# Patient Record
Sex: FEMALE | Race: WHITE
[De-identification: names, ages, dates, MRNs, and addresses within clinical notes are randomized per-mention and may not be internally consistent; named-entity substitution may affect disease eponyms.]

---

## 2018-03-28 ENCOUNTER — HOSPITAL ENCOUNTER (OUTPATIENT)
Dept: HOSPITAL 47 - EC | Age: 63
Setting detail: OBSERVATION
LOS: 2 days | Discharge: HOME | End: 2018-03-30
Attending: INTERNAL MEDICINE | Admitting: INTERNAL MEDICINE
Payer: MEDICAID

## 2018-03-28 VITALS — BODY MASS INDEX: 32.1 KG/M2

## 2018-03-28 DIAGNOSIS — I11.0: ICD-10-CM

## 2018-03-28 DIAGNOSIS — Z88.1: ICD-10-CM

## 2018-03-28 DIAGNOSIS — Z80.3: ICD-10-CM

## 2018-03-28 DIAGNOSIS — Z79.84: ICD-10-CM

## 2018-03-28 DIAGNOSIS — Z99.89: ICD-10-CM

## 2018-03-28 DIAGNOSIS — R59.0: ICD-10-CM

## 2018-03-28 DIAGNOSIS — I50.32: ICD-10-CM

## 2018-03-28 DIAGNOSIS — Z91.041: ICD-10-CM

## 2018-03-28 DIAGNOSIS — G47.30: ICD-10-CM

## 2018-03-28 DIAGNOSIS — E66.9: ICD-10-CM

## 2018-03-28 DIAGNOSIS — N39.0: ICD-10-CM

## 2018-03-28 DIAGNOSIS — Z80.0: ICD-10-CM

## 2018-03-28 DIAGNOSIS — R07.89: Primary | ICD-10-CM

## 2018-03-28 DIAGNOSIS — Z79.899: ICD-10-CM

## 2018-03-28 DIAGNOSIS — Z79.82: ICD-10-CM

## 2018-03-28 DIAGNOSIS — I25.2: ICD-10-CM

## 2018-03-28 DIAGNOSIS — Z88.8: ICD-10-CM

## 2018-03-28 DIAGNOSIS — E11.65: ICD-10-CM

## 2018-03-28 DIAGNOSIS — Z87.442: ICD-10-CM

## 2018-03-28 DIAGNOSIS — T36.8X5A: ICD-10-CM

## 2018-03-28 DIAGNOSIS — Z90.49: ICD-10-CM

## 2018-03-28 DIAGNOSIS — Z87.820: ICD-10-CM

## 2018-03-28 DIAGNOSIS — E78.5: ICD-10-CM

## 2018-03-28 DIAGNOSIS — R12: ICD-10-CM

## 2018-03-28 DIAGNOSIS — Z91.048: ICD-10-CM

## 2018-03-28 LAB
ALBUMIN SERPL-MCNC: 4.2 G/DL (ref 3.5–5)
ALP SERPL-CCNC: 193 U/L (ref 38–126)
ALT SERPL-CCNC: 24 U/L (ref 9–52)
ANION GAP SERPL CALC-SCNC: 16 MMOL/L
APTT BLD: 21.8 SEC (ref 22–30)
AST SERPL-CCNC: 23 U/L (ref 14–36)
BASOPHILS # BLD AUTO: 0.1 K/UL (ref 0–0.2)
BASOPHILS NFR BLD AUTO: 1 %
BUN SERPL-SCNC: 17 MG/DL (ref 7–17)
CALCIUM SPEC-MCNC: 10.1 MG/DL (ref 8.4–10.2)
CHLORIDE SERPL-SCNC: 99 MMOL/L (ref 98–107)
CK SERPL-CCNC: 25 U/L (ref 30–135)
CK SERPL-CCNC: 26 U/L (ref 30–135)
CO2 SERPL-SCNC: 25 MMOL/L (ref 22–30)
EOSINOPHIL # BLD AUTO: 0.4 K/UL (ref 0–0.7)
EOSINOPHIL NFR BLD AUTO: 3 %
ERYTHROCYTE [DISTWIDTH] IN BLOOD BY AUTOMATED COUNT: 4.77 M/UL (ref 3.8–5.4)
ERYTHROCYTE [DISTWIDTH] IN BLOOD: 15.1 % (ref 11.5–15.5)
GLUCOSE BLD-MCNC: 311 MG/DL (ref 75–99)
GLUCOSE BLD-MCNC: 382 MG/DL (ref 75–99)
GLUCOSE SERPL-MCNC: 398 MG/DL (ref 74–99)
HCT VFR BLD AUTO: 40.7 % (ref 34–46)
HGB BLD-MCNC: 13.2 GM/DL (ref 11.4–16)
INR PPP: 0.9 (ref ?–1.2)
LYMPHOCYTES # SPEC AUTO: 1.7 K/UL (ref 1–4.8)
LYMPHOCYTES NFR SPEC AUTO: 14 %
MAGNESIUM SPEC-SCNC: 1.7 MG/DL (ref 1.6–2.3)
MCH RBC QN AUTO: 27.6 PG (ref 25–35)
MCHC RBC AUTO-ENTMCNC: 32.4 G/DL (ref 31–37)
MCV RBC AUTO: 85.3 FL (ref 80–100)
MONOCYTES # BLD AUTO: 0.5 K/UL (ref 0–1)
MONOCYTES NFR BLD AUTO: 4 %
NEUTROPHILS # BLD AUTO: 9.1 K/UL (ref 1.3–7.7)
NEUTROPHILS NFR BLD AUTO: 76 %
PLATELET # BLD AUTO: 396 K/UL (ref 150–450)
POTASSIUM SERPL-SCNC: 4.3 MMOL/L (ref 3.5–5.1)
PROT SERPL-MCNC: 7.2 G/DL (ref 6.3–8.2)
PT BLD: 9.3 SEC (ref 9–12)
SODIUM SERPL-SCNC: 140 MMOL/L (ref 137–145)
TROPONIN I SERPL-MCNC: <0.012 NG/ML (ref 0–0.03)
TROPONIN I SERPL-MCNC: <0.012 NG/ML (ref 0–0.03)
WBC # BLD AUTO: 12 K/UL (ref 3.8–10.6)

## 2018-03-28 PROCEDURE — 86140 C-REACTIVE PROTEIN: CPT

## 2018-03-28 PROCEDURE — 80048 BASIC METABOLIC PNL TOTAL CA: CPT

## 2018-03-28 PROCEDURE — 83735 ASSAY OF MAGNESIUM: CPT

## 2018-03-28 PROCEDURE — 80061 LIPID PANEL: CPT

## 2018-03-28 PROCEDURE — 96361 HYDRATE IV INFUSION ADD-ON: CPT

## 2018-03-28 PROCEDURE — 85652 RBC SED RATE AUTOMATED: CPT

## 2018-03-28 PROCEDURE — 93306 TTE W/DOPPLER COMPLETE: CPT

## 2018-03-28 PROCEDURE — 81003 URINALYSIS AUTO W/O SCOPE: CPT

## 2018-03-28 PROCEDURE — 36415 COLL VENOUS BLD VENIPUNCTURE: CPT

## 2018-03-28 PROCEDURE — 74176 CT ABD & PELVIS W/O CONTRAST: CPT

## 2018-03-28 PROCEDURE — 84484 ASSAY OF TROPONIN QUANT: CPT

## 2018-03-28 PROCEDURE — 80053 COMPREHEN METABOLIC PANEL: CPT

## 2018-03-28 PROCEDURE — 75635 CT ANGIO ABDOMINAL ARTERIES: CPT

## 2018-03-28 PROCEDURE — 85730 THROMBOPLASTIN TIME PARTIAL: CPT

## 2018-03-28 PROCEDURE — 82553 CREATINE MB FRACTION: CPT

## 2018-03-28 PROCEDURE — 82550 ASSAY OF CK (CPK): CPT

## 2018-03-28 PROCEDURE — 96374 THER/PROPH/DIAG INJ IV PUSH: CPT

## 2018-03-28 PROCEDURE — 96375 TX/PRO/DX INJ NEW DRUG ADDON: CPT

## 2018-03-28 PROCEDURE — 87086 URINE CULTURE/COLONY COUNT: CPT

## 2018-03-28 PROCEDURE — 85610 PROTHROMBIN TIME: CPT

## 2018-03-28 PROCEDURE — 85025 COMPLETE CBC W/AUTO DIFF WBC: CPT

## 2018-03-28 PROCEDURE — 71046 X-RAY EXAM CHEST 2 VIEWS: CPT

## 2018-03-28 PROCEDURE — 99285 EMERGENCY DEPT VISIT HI MDM: CPT

## 2018-03-28 PROCEDURE — 70490 CT SOFT TISSUE NECK W/O DYE: CPT

## 2018-03-28 PROCEDURE — 93005 ELECTROCARDIOGRAM TRACING: CPT

## 2018-03-28 PROCEDURE — 83036 HEMOGLOBIN GLYCOSYLATED A1C: CPT

## 2018-03-28 PROCEDURE — 71250 CT THORAX DX C-: CPT

## 2018-03-28 PROCEDURE — 71275 CT ANGIOGRAPHY CHEST: CPT

## 2018-03-28 RX ADMIN — LORATADINE SCH MG: 10 TABLET ORAL at 22:29

## 2018-03-28 RX ADMIN — LISINOPRIL SCH MG: 20 TABLET ORAL at 22:29

## 2018-03-28 RX ADMIN — GABAPENTIN SCH MG: 100 CAPSULE ORAL at 22:29

## 2018-03-28 NOTE — XR
EXAMINATION TYPE: XR chest 2V

 

DATE OF EXAM: 3/28/2018

 

COMPARISON: 10/9/2015

 

HISTORY: Chest pain

 

TECHNIQUE:  Frontal and lateral views of the chest are obtained.

 

FINDINGS:  Heart and mediastinum are normal. There are small calcified granuloma in the left upper lo
be. Lungs are clear of consolidation. There is no heart failure. There is no pleural effusion. There 
are chest leads.

 

IMPRESSION:  No active cardiopulmonary disease. No change.

## 2018-03-28 NOTE — ED
General Adult HPI





- General


Chief complaint: Chest Pain


Stated complaint: Needs to have ct done but allergic to contrast


Time Seen by Provider: 03/28/18 16:15


Source: patient, RN notes reviewed


Mode of arrival: ambulatory


Limitations: no limitations





- History of Present Illness


Initial comments: 





62-year-old female presents to the emergency department stating that she does 

have a CT done of her chest.  She states for the last 6 weeks she's been 

developing this chest pain.  She states over the last today she's noticed 

worsening chest pain IN THE RIGHT SIDE OF HER CHEST AND RADIATING TO HER BACK.  

SHE STATES SOMETIMES IT'LL RADIATE DOWN HER RIGHT ARM.  SHE STATES KEEPING HER 

UP AT NIGHT TO THE POINT THAT SHE CANNOT SLEEP.  PATIENT STATES SHE WENT TO HER 

DOCTOR TODAY AND THEY REFERRED HER TO HAVE A STAT CT ANGIOGRAM FOR ANEURYSM.  

PATIENT STATES SHE IS ALLERGIC to the dye so they sent her here.  She states 

that this time she's not having the pain has been getting worse over last 3 

days.  She has a nausea vomiting or shortness of breath.  She does admit to 

history of hypertension diabetes and history of MI in the past.  Patient states 

that this time she is symptom free and she was just like a CAT scan done.

Patient denies any recent fever, chills, shortness of breath, abdominal pain, 

nausea vomiting, numbness or tingling, dysuria or hematuria, constipation or 

diarrhea, headaches or visual changes, or any other current symptoms.





- Related Data


 Home Medications











 Medication  Instructions  Recorded  Confirmed


 


Furosemide 20 mg PO DAILY 07/24/14 03/28/18


 


Gabapentin 100 mg PO QAM 07/24/14 03/28/18


 


Lisinopril [Zestril] 20 mg PO BID 07/24/14 03/28/18


 


Potassium Chloride [Klor-Con 10] 10 meq PO DAILY 07/24/14 03/28/18


 


Aspirin 162 mg PO DAILY 10/09/15 03/28/18


 


Atorvastatin [Lipitor] 20 mg PO HS 10/09/15 03/28/18


 


Gabapentin [Neurontin] 200 mg PO HS 10/09/15 03/28/18


 


Levothyroxine Sodium [Synthroid] 125 mcg PO [id number] 10/09/15 03/28/18


 


amLODIPine [Norvasc] 5 mg PO DAILY 10/09/15 03/28/18


 


metFORMIN HCL 1,000 mg PO BID 10/09/15 03/28/18


 


Cetirizine HCl [Zyrtec] 10 mg PO HS 03/28/18 03/28/18


 


Cholecalciferol [Vitamin D3] 1,000 unit PO DAILY 03/28/18 03/28/18


 


Glimepiride [Amaryl] 2 mg PO [id number] 03/28/18 03/28/18


 


Multivitamins, Thera [Multivitamin 1 tab PO DAILY 03/28/18 03/28/18





(formulary)]   











 Allergies











Allergy/AdvReac Type Severity Reaction Status Date / Time


 


haloperidol [From Haldol] Allergy  Unknown Verified 03/28/18 16:34


 


haloperidol lactate Allergy  Unknown Verified 03/28/18 16:34





[From Haldol]     


 


Iodinated Contrast- Oral and Allergy  Unknown Verified 03/28/18 16:34





IV Dye     





[Iodinated Contrast Media -     





IV Dye]     


 


adhesive AdvReac Severe sores Verified 03/28/18 16:34


 


ciprofloxacin [From Cipro] AdvReac  Heartburn Verified 03/28/18 16:34


 


steri-strips AdvReac Severe sore Uncoded 03/28/18 16:12














Review of Systems


ROS Statement: 


Those systems with pertinent positive or pertinent negative responses have been 

documented in the HPI.





ROS Other: All systems not noted in ROS Statement are negative.





Past Medical History


Past Medical History: Heart Failure, Diabetes Mellitus, Hypertension, 

Myocardial Infarction (MI), Sleep Apnea/CPAP/BIPAP


Additional Past Medical History / Comment(s): closed head injury 1989, kidney 

stones


Last Myocardial Infarction Date:: 1989


History of Any Multi-Drug Resistant Organisms: None Reported


Past Surgical History: Cholecystectomy, Hysterectomy


Additional Past Surgical History / Comment(s): thyroidectomy


Past Anesthesia/Blood Transfusion Reactions: Postoperative Nausea & Vomiting (

PONV)


Past Psychological History: No Psychological Hx Reported


Smoking Status: Never smoker


Past Alcohol Use History: None Reported


Past Drug Use History: None Reported





- Past Family History


  ** Mother


Family Medical History: Cancer





  ** Father


Family Medical History: Cancer





General Exam





- General Exam Comments


Initial Comments: 





General:  The patient is awake and alert, in no distress, and does not appear 

acutely ill. 


Eye:  Pupils are equal, round and reactive to light, extra-ocular movements are 

intact; there is normal conjunctiva bilaterally.  No signs of icterus.  


Ears, nose, mouth and throat:  There are moist mucous membranes. 


Neck:  The neck is supple, there is no tenderness.


Cardiovascular:  There is a regular rate and rhythm. No murmur, rub or gallop 

is appreciated.


Respiratory:  Lungs are clear to auscultation, respirations are non-labored, 

breath sounds are equal.  No wheezes, stridor, rales, or rhonchi.


Gastrointestinal:  Soft, non-distended, non-tender abdomen without masses or 

organomegaly noted. There is no rebound or guarding present.  No CVA 

tenderness. Bowel sounds are unremarkable.


Back:  There is no tenderness to palpation in the midline. There is no obvious 

deformity. No rashes noted. 


Musculoskeletal:  Normal ROM, no tenderness, There is no pedal edema. There is 

no calf tenderness or swelling. Sensation intact. Pulses equal bilaterally 2+.  


Neurological:  CN II-XII intact, There are no obvious motor or sensory 

deficits. Coordination appears grossly intact. Speech is normal.


Skin:  Skin is warm and dry and no rashes or lesions are noted. 


Psychiatric:  Cooperative, appropriate mood & affect, normal judgment.  





Limitations: no limitations





Course


 Vital Signs











  03/28/18 03/28/18 03/28/18





  16:10 17:23 19:20


 


Temperature 97.8 F  


 


Pulse Rate 86 86 77


 


Respiratory 18 18 18





Rate   


 


Blood Pressure 142/70 123/71 154/72


 


O2 Sat by Pulse 97 97 96





Oximetry   














EKG Findings





- EKG Comments:


EKG Findings:: Normal sinus rhythm, left axis deviation, ventricular rate 82, 

AK interval 182, QRS duration 98





Medical Decision Making





- Medical Decision Making





62-year-old female presents for needing CT to rule out aneurysm.  Patient does 

complain of worsening chest pain with hypertension diabetes and history of MI 

in the past.  This time we did do blood work we were unable to just send the 

patient for outpatient CAT scan because CAT scan stated that they will not give 

the patient that 3 Meds and they do not do CAT scans from premeds given prior 

to the patient receiving the CAT scan in the ER and would have to be in ER test 

ordered.  This time the test was ordered and also additional workup for the 

patient's chest pain.  CAT scan has been reviewed that does show some 

retroperitoneal lymphadenopathy.  Patient's laboratory otherwise is stable.  

This time patient for cardiac rule out. He will like her admitted overnight for 

observation when discussed with her by Dr. Vazquez.  Dr. Sanders who does agree to 

the admission.





- Lab Data


Result diagrams: 


 03/28/18 16:41





 03/28/18 16:41


 Lab Results











  03/28/18 03/28/18 03/28/18 Range/Units





  16:41 16:41 16:41 


 


WBC   12.0 H   (3.8-10.6)  k/uL


 


RBC   4.77   (3.80-5.40)  m/uL


 


Hgb   13.2   (11.4-16.0)  gm/dL


 


Hct   40.7   (34.0-46.0)  %


 


MCV   85.3   (80.0-100.0)  fL


 


MCH   27.6   (25.0-35.0)  pg


 


MCHC   32.4   (31.0-37.0)  g/dL


 


RDW   15.1   (11.5-15.5)  %


 


Plt Count   396   (150-450)  k/uL


 


Neutrophils %   76   %


 


Lymphocytes %   14   %


 


Monocytes %   4   %


 


Eosinophils %   3   %


 


Basophils %   1   %


 


Neutrophils #   9.1 H   (1.3-7.7)  k/uL


 


Lymphocytes #   1.7   (1.0-4.8)  k/uL


 


Monocytes #   0.5   (0-1.0)  k/uL


 


Eosinophils #   0.4   (0-0.7)  k/uL


 


Basophils #   0.1   (0-0.2)  k/uL


 


PT     (9.0-12.0)  sec


 


INR     (<1.2)  


 


APTT     (22.0-30.0)  sec


 


Sodium    140  (137-145)  mmol/L


 


Potassium    4.3  (3.5-5.1)  mmol/L


 


Chloride    99  ()  mmol/L


 


Carbon Dioxide    25  (22-30)  mmol/L


 


Anion Gap    16  mmol/L


 


BUN    17  (7-17)  mg/dL


 


Creatinine    1.02  (0.52-1.04)  mg/dL


 


Est GFR (CKD-EPI)AfAm    68  (>60 ml/min/1.73 sqM)  


 


Est GFR (CKD-EPI)NonAf    59  (>60 ml/min/1.73 sqM)  


 


Glucose    398 H  (74-99)  mg/dL


 


Calcium    10.1  (8.4-10.2)  mg/dL


 


Magnesium    1.7  (1.6-2.3)  mg/dL


 


Total Bilirubin    0.5  (0.2-1.3)  mg/dL


 


AST    23  (14-36)  U/L


 


ALT    24  (9-52)  U/L


 


Alkaline Phosphatase    193 H  ()  U/L


 


Total Creatine Kinase  25 L    ()  U/L


 


CK-MB (CK-2)  0.3    (0.0-2.4)  ng/mL


 


CK-MB (CK-2) Rel Index  1.2    


 


Troponin I  <0.012    (0.000-0.034)  ng/mL


 


Total Protein    7.2  (6.3-8.2)  g/dL


 


Albumin    4.2  (3.5-5.0)  g/dL














  03/28/18 Range/Units





  16:41 


 


WBC   (3.8-10.6)  k/uL


 


RBC   (3.80-5.40)  m/uL


 


Hgb   (11.4-16.0)  gm/dL


 


Hct   (34.0-46.0)  %


 


MCV   (80.0-100.0)  fL


 


MCH   (25.0-35.0)  pg


 


MCHC   (31.0-37.0)  g/dL


 


RDW   (11.5-15.5)  %


 


Plt Count   (150-450)  k/uL


 


Neutrophils %   %


 


Lymphocytes %   %


 


Monocytes %   %


 


Eosinophils %   %


 


Basophils %   %


 


Neutrophils #   (1.3-7.7)  k/uL


 


Lymphocytes #   (1.0-4.8)  k/uL


 


Monocytes #   (0-1.0)  k/uL


 


Eosinophils #   (0-0.7)  k/uL


 


Basophils #   (0-0.2)  k/uL


 


PT  9.3  (9.0-12.0)  sec


 


INR  0.9  (<1.2)  


 


APTT  21.8 L  (22.0-30.0)  sec


 


Sodium   (137-145)  mmol/L


 


Potassium   (3.5-5.1)  mmol/L


 


Chloride   ()  mmol/L


 


Carbon Dioxide   (22-30)  mmol/L


 


Anion Gap   mmol/L


 


BUN   (7-17)  mg/dL


 


Creatinine   (0.52-1.04)  mg/dL


 


Est GFR (CKD-EPI)AfAm   (>60 ml/min/1.73 sqM)  


 


Est GFR (CKD-EPI)NonAf   (>60 ml/min/1.73 sqM)  


 


Glucose   (74-99)  mg/dL


 


Calcium   (8.4-10.2)  mg/dL


 


Magnesium   (1.6-2.3)  mg/dL


 


Total Bilirubin   (0.2-1.3)  mg/dL


 


AST   (14-36)  U/L


 


ALT   (9-52)  U/L


 


Alkaline Phosphatase   ()  U/L


 


Total Creatine Kinase   ()  U/L


 


CK-MB (CK-2)   (0.0-2.4)  ng/mL


 


CK-MB (CK-2) Rel Index   


 


Troponin I   (0.000-0.034)  ng/mL


 


Total Protein   (6.3-8.2)  g/dL


 


Albumin   (3.5-5.0)  g/dL














- Radiology Data


Radiology results: report reviewed, image reviewed





Disposition


Clinical Impression: 


 Lymphadenopathy, retroperitoneal, Unstable angina





Disposition: ADMITTED [facility] IP TO [facility] [place]


Condition: Stable


Referrals: 


Faustino Villegas MD [Primary Care Provider] - 1-2 days


Decision Date: 03/28/18


Decision Time: 20:29

## 2018-03-28 NOTE — CT
EXAMINATION TYPE: CT angio thoracic/abd aorta

 

DATE OF EXAM: 3/28/2018

 

COMPARISON: 2015

 

HISTORY: Chest and back pain.

 

CT DLP: 2200 mGycm. Automated Exposure Control for Dose Reduction was Utilized.

 

 

CONTRAST: 

CT scan of the thorax, abdomen and pelvis is performed with IV Contrast, patient injected with 100ml 
mL of Isovue 370.

 

FINDINGS:

 

There are 3-D post processed images.

 

There is no evidence of thoracic aortic aneurysm or dissection. Heart is enlarged. There is no perica
rdial effusion.

 

Abdominal aorta has normal size. There is wide patency of the common internal and external iliac edelmira
sacha. There is no evidence of abdominal aortic aneurysm or dissection. There is wide patency of the c
eliac artery and the superior mesenteric artery. There is bilateral wide patency of the renal arterie
s.

 

There are multiple enlarged retroperitoneal lymph nodes on the left side more than the right. These m
easure up to 4 cm. There are multiple sigmoid diverticula. There is no evidence of diverticulitis. Bl
adder distends smoothly. There is no ascites. Appendix appears normal. There is a 1.5 cm left renal c
ortical cyst. There is normal contrast opacification of the kidneys without evidence of a solid renal
 mass. I see no bony destructive process. There are spondylotic changes in the thoracic and lumbar sp
ine. There are calcified granulomata at the left pulmonary hilum.

 

CONCLUSION:

There is atherosclerotic vascular disease. No evidence of thoracic aortic aneurysm or dissection.

 

Mild sigmoid diverticulosis without sign of diverticulitis.

 

There is clearing of the right renal obstruction and right renal calculus compared to old CT scan of 
10/9/2015.

 

There is new retroperitoneal moderate adenopathy suspicious for lymphoma compared to old exam. Follow
-up is recommended.

## 2018-03-29 LAB
ANION GAP SERPL CALC-SCNC: 22 MMOL/L
BASOPHILS # BLD AUTO: 0 K/UL (ref 0–0.2)
BASOPHILS NFR BLD AUTO: 0 %
BUN SERPL-SCNC: 21 MG/DL (ref 7–17)
CALCIUM SPEC-MCNC: 10.6 MG/DL (ref 8.4–10.2)
CHLORIDE SERPL-SCNC: 101 MMOL/L (ref 98–107)
CHOLEST SERPL-MCNC: 182 MG/DL (ref ?–200)
CK SERPL-CCNC: 25 U/L (ref 30–135)
CO2 SERPL-SCNC: 20 MMOL/L (ref 22–30)
EOSINOPHIL # BLD AUTO: 0 K/UL (ref 0–0.7)
EOSINOPHIL NFR BLD AUTO: 0 %
ERYTHROCYTE [DISTWIDTH] IN BLOOD BY AUTOMATED COUNT: 4.75 M/UL (ref 3.8–5.4)
ERYTHROCYTE [DISTWIDTH] IN BLOOD: 15.3 % (ref 11.5–15.5)
GLUCOSE BLD-MCNC: 258 MG/DL (ref 75–99)
GLUCOSE BLD-MCNC: 318 MG/DL (ref 75–99)
GLUCOSE BLD-MCNC: 373 MG/DL (ref 75–99)
GLUCOSE BLD-MCNC: 375 MG/DL (ref 75–99)
GLUCOSE BLD-MCNC: 422 MG/DL (ref 75–99)
GLUCOSE BLD-MCNC: 426 MG/DL (ref 75–99)
GLUCOSE SERPL-MCNC: 475 MG/DL (ref 74–99)
GLUCOSE UR QL: (no result)
HBA1C MFR BLD: 8.3 % (ref 4–6)
HCT VFR BLD AUTO: 40.9 % (ref 34–46)
HDLC SERPL-MCNC: 56 MG/DL (ref 40–60)
HGB BLD-MCNC: 12.6 GM/DL (ref 11.4–16)
LDLC SERPL CALC-MCNC: 113 MG/DL (ref 0–99)
LYMPHOCYTES # SPEC AUTO: 1.1 K/UL (ref 1–4.8)
LYMPHOCYTES NFR SPEC AUTO: 8 %
MCH RBC QN AUTO: 26.6 PG (ref 25–35)
MCHC RBC AUTO-ENTMCNC: 30.8 G/DL (ref 31–37)
MCV RBC AUTO: 86.1 FL (ref 80–100)
MONOCYTES # BLD AUTO: 0.3 K/UL (ref 0–1)
MONOCYTES NFR BLD AUTO: 2 %
NEUTROPHILS # BLD AUTO: 12.1 K/UL (ref 1.3–7.7)
NEUTROPHILS NFR BLD AUTO: 90 %
PH UR: 5.5 [PH] (ref 5–8)
PLATELET # BLD AUTO: 415 K/UL (ref 150–450)
POTASSIUM SERPL-SCNC: 4.7 MMOL/L (ref 3.5–5.1)
SODIUM SERPL-SCNC: 143 MMOL/L (ref 137–145)
SP GR UR: 1.03 (ref 1–1.03)
TRIGL SERPL-MCNC: 65 MG/DL (ref ?–150)
TROPONIN I SERPL-MCNC: <0.012 NG/ML (ref 0–0.03)
UROBILINOGEN UR QL STRIP: <2 MG/DL (ref ?–2)
WBC # BLD AUTO: 13.5 K/UL (ref 3.8–10.6)

## 2018-03-29 RX ADMIN — INSULIN ASPART SCH UNIT: 100 INJECTION, SOLUTION INTRAVENOUS; SUBCUTANEOUS at 12:20

## 2018-03-29 RX ADMIN — INSULIN ASPART SCH UNIT: 100 INJECTION, SOLUTION INTRAVENOUS; SUBCUTANEOUS at 17:16

## 2018-03-29 RX ADMIN — LORATADINE SCH MG: 10 TABLET ORAL at 19:57

## 2018-03-29 RX ADMIN — HYDROCODONE BITARTRATE AND ACETAMINOPHEN PRN EACH: 5; 325 TABLET ORAL at 01:30

## 2018-03-29 RX ADMIN — HYDROCODONE BITARTRATE AND ACETAMINOPHEN PRN EACH: 5; 325 TABLET ORAL at 19:57

## 2018-03-29 RX ADMIN — GLIMEPIRIDE SCH MG: 2 TABLET ORAL at 09:48

## 2018-03-29 RX ADMIN — INSULIN DETEMIR SCH UNIT: 100 INJECTION, SOLUTION SUBCUTANEOUS at 12:16

## 2018-03-29 RX ADMIN — GABAPENTIN SCH MG: 100 CAPSULE ORAL at 19:57

## 2018-03-29 RX ADMIN — INSULIN ASPART SCH UNIT: 100 INJECTION, SOLUTION INTRAVENOUS; SUBCUTANEOUS at 05:45

## 2018-03-29 RX ADMIN — LISINOPRIL SCH MG: 20 TABLET ORAL at 19:57

## 2018-03-29 RX ADMIN — POTASSIUM CHLORIDE SCH MEQ: 750 TABLET, EXTENDED RELEASE ORAL at 09:48

## 2018-03-29 RX ADMIN — Medication SCH UNIT: at 09:51

## 2018-03-29 RX ADMIN — LISINOPRIL SCH MG: 20 TABLET ORAL at 09:48

## 2018-03-29 RX ADMIN — THERA TABS SCH EACH: TAB at 09:51

## 2018-03-29 RX ADMIN — GABAPENTIN SCH MG: 100 CAPSULE ORAL at 09:48

## 2018-03-29 RX ADMIN — INSULIN ASPART SCH UNIT: 100 INJECTION, SOLUTION INTRAVENOUS; SUBCUTANEOUS at 19:59

## 2018-03-29 RX ADMIN — LEVOTHYROXINE SODIUM SCH MCG: 0.12 TABLET ORAL at 05:44

## 2018-03-29 NOTE — ECHOF
Referral Reason:cp



MEASUREMENTS

--------

HEIGHT: 157.5 cm

WEIGHT: 79.4 kg

BP: 

IVSd:   1.2 cm     (0.6 - 1.1)

LVIDd:   4.0 cm     (3.9 - 5.3)

LVPWd:   1.3 cm     (0.6 - 1.1)

IVSs:   2.1 cm

LVIDs:   2.1 cm

LVPWs:   1.8 cm

Ao Diam:   3.3 cm     (2.0 - 3.7)

AV Cusp:   1.9 cm     (1.5 - 2.6)

LA Diam:   3.2 cm     (2.7 - 3.8)

MV EXCURSION:   11.800 mm     (> 18.000)

MV EF SLOPE:   48 mm/s     (70 - 150)

EPSS:   1.5 cm

MV E Juan M:   0.73 m/s

MV DecT:   184 ms

MV A Juan M:   1.16 m/s

MV E/A Ratio:   0.63 

RAP:   5.00 mmHg

RVSP:   8.92 mmHg







FINDINGS

--------

Sinus rhythm.

This was a technically difficult study with suboptimal views.

The left ventricular size is normal.   There is mild concentric left ventricular hypertrophy.   Overa
ll left ventricular systolic function is low-normal with, an EF between 50 - 55 %.

The right ventricle is normal in size and function.

The left atrium is normal in size.

The right atrium is normal in size.

Lumason used

The aortic valve is trileaflet, and appears structurally normal. No aortic stenosis or regurgitation.


The mitral valve leaflets are mildly thickened.   There is trace mitral regurgitation.

Mild tricuspid regurgitation present.   The right ventricular systolic pressure, as measured by Doppl
er, is 8.92mmHg.

Pulmonic valve appears structurally normal.

The aortic root size is normal.

The pericardium is normal.



CONCLUSIONS

--------

1. Sinus rhythm.

2. This was a technically difficult study with suboptimal views.

3. The left ventricular size is normal.

4. There is mild concentric left ventricular hypertrophy.

5. Overall left ventricular systolic function is low-normal with, an EF between 50 - 55 %.

6. The right ventricle is normal in size and function.

7. The left atrium is normal in size.

8. The right atrium is normal in size.

9. Lumason used

10. The aortic valve is trileaflet, and appears structurally normal. No aortic stenosis or regurgitat
ion.

11. The mitral valve leaflets are mildly thickened.

12. There is trace mitral regurgitation.

13. Mild tricuspid regurgitation present.

14. The right ventricular systolic pressure, as measured by Doppler, is 8.92mmHg.

15. Pulmonic valve appears structurally normal.

16. The aortic root size is normal.

17. The pericardium is normal.





SONOGRAPHER: Aminata Lee RDCS

## 2018-03-29 NOTE — P.CRDCN
History of Present Illness


Consult date: 03/29/18


Consult reason: chest pain


History of present illness: 


Mrs. Falcon is a pleasant 62-year-old  female past medical history 

significant for diastolic heart failure, diabetes mellitus, hypertension, sleep 

apnea and kidney stones. She denies history of coronary artery disease. She has 

seen a cardiologist, Dr. Novoa out of [facility] in the past. She 

states she underwent cardiac catheterization 3-4 yrs ago and there was no 

blockage, per her. Those records are not available to me at this time. We have 

been asked to see her in consultation for complaints of chest pain. She states 

last week she had been feeling pain in the lower back/flank region. She saw her 

PCP and was started on cipro for a UTI. She took the cipro for a few days and 

then started noticing a burning sensation in her right anterior chest wall 

around the right breast. The pain was described as a burning, squeezing 

sensation that radiated through to the right upper back. The pain was worse 

with certain movements and when she coughed or sneezed. She denies associated 

shortness of breath, dizziness, palpitations, nausea, vomiting or diaphoresis. 

She saw her PCP who recommended she come to ED for evaluation and to rule out 

aortic dissection. CTA was done and was negative for aortic aneurysm or 

dissection but did reveal a new retroperitoneal moderate adenopathy suspicious 

for lymphoma compared to old exam in 2015. At the time of my exam she is seen 

sitting up in the chair in no acute distress. Chest pain has resolved. 





EKG reveals sinus mechanism with T-wave inversions noted in the anterior 

lateral leads.  Compared to old EKG this does not look to be anything new.


Chest x-ray negative for an acute cardiopulmonary process.


Laboratory data reviewed, WBC 13.5, hemoglobin 12.6, sodium 143, potassium 4.7, 

magnesium 1.7, creatinine 1.2 up from 1.02 on admission, cardiac enzymes 

negative 3, CRP 44.2, .  Blood sugars have been upwards of 300 since 

admission with 475 this morning.


Current cardiac medications include aspirin 162 mg daily, atorvastatin 20 mg 

daily, Lasix 20 mg daily, lisinopril 20 mg twice a day, potassium 

supplementation 10 daily and Norvasc 5 mg daily.


Most recent echocardiogram on file June 2014 reveals reserved left ventricular 

systolic function with ejection fraction 50-55% and no evidence of valvular 

heart disease.


 





Review of Systems





At the time of my exam:


CONSTITUTIONAL: Denies fever. Denies chills.


EYES: Denies blurred vision. Denies vision changes. Denies eye pain.


EARS, NOSE, MOUTH & THROAT: Denies headache. Denies sore throat. Denies ear 

pain.


CARDIOVASCULAR: Denies chest pain. Denies shortness of breath. Denies 

orthopnea. Denies PND. Denies palpitations.


RESPIRATORY: Denies cough. 


GASTROINTESTINAL: Denies abdominal pain. Denies diarrhea. Denies constipation. 

Denies nausea. Denies vomiting.


MUSCULOSKELETAL: Denies myalgias.


INTEGUMENTARY: Denies pruitis. Denies rash.


NEUROLOGIC: Denies numbness. Denies tingling. Denies weakness.


PSYCHIATRIC: Denies anxiety. Denies depression.


ENDOCRINE: Denies fatigue. Denies weight change. Denies polydipsia. Denies 

polyurina.


GENITOURINARY: Denies burning, hematuria or urgency with micturation.


HEMATOLOGIC: Denies history of anemia. Denies bleeding. 








Past Medical History


Past Medical History: Heart Failure, Diabetes Mellitus, Hypertension, Sleep 

Apnea/CPAP/BIPAP


Additional Past Medical History / Comment(s): closed head injury 1989, kidney 

stones


Last Myocardial Infarction Date:: 1989


History of Any Multi-Drug Resistant Organisms: None Reported


Past Surgical History: Cholecystectomy, Hysterectomy


Additional Past Surgical History / Comment(s): thyroidectomy


Past Anesthesia/Blood Transfusion Reactions: Postoperative Nausea & Vomiting (

PONV)


Smoking Status: Never smoker





- Past Family History


  ** Mother


Family Medical History: Cancer


Additional Family Medical History / Comment(s): breast CA





  ** Father


Family Medical History: Cancer


Additional Family Medical History / Comment(s): colon CA





Medications and Allergies


 Home Medications











 Medication  Instructions  Recorded  Confirmed  Type


 


Furosemide 20 mg PO DAILY 07/24/14 03/28/18 History


 


Gabapentin 100 mg PO QAM 07/24/14 03/28/18 History


 


Lisinopril [Zestril] 20 mg PO BID 07/24/14 03/28/18 History


 


Potassium Chloride [Klor-Con 10] 10 meq PO DAILY 07/24/14 03/28/18 History


 


Aspirin 162 mg PO DAILY 10/09/15 03/28/18 History


 


Atorvastatin [Lipitor] 20 mg PO HS 10/09/15 03/28/18 History


 


Gabapentin [Neurontin] 200 mg PO HS 10/09/15 03/28/18 History


 


Levothyroxine Sodium [Synthroid] 125 mcg PO MOTUWETHFRSA 10/09/15 03/28/18 

History


 


amLODIPine [Norvasc] 5 mg PO DAILY 10/09/15 03/28/18 History


 


metFORMIN HCL 1,000 mg PO BID 10/09/15 03/28/18 History


 


Cetirizine HCl [Zyrtec] 10 mg PO HS 03/28/18 03/28/18 History


 


Cholecalciferol [Vitamin D3] 1,000 unit PO DAILY 03/28/18 03/28/18 History


 


Glimepiride [Amaryl] 2 mg PO AC-BRKFST 03/28/18 03/28/18 History


 


Multivitamins, Thera [Multivitamin 1 tab PO DAILY 03/28/18 03/28/18 History





(formulary)]    











 Allergies











Allergy/AdvReac Type Severity Reaction Status Date / Time


 


haloperidol [From Haldol] Allergy  Unknown Verified 03/28/18 21:16


 


haloperidol lactate Allergy  Unknown Verified 03/28/18 21:16





[From Haldol]     


 


Iodinated Contrast- Oral and Allergy  Rash/Hives Verified 03/28/18 21:16





IV Dye     





[Iodinated Contrast Media -     





IV Dye]     


 


adhesive AdvReac Severe sores Verified 03/28/18 21:16


 


ciprofloxacin [From Cipro] AdvReac  Heartburn Verified 03/28/18 21:16


 


steri-strips AdvReac Severe sore Uncoded 03/28/18 21:16














Physical Exam


Vitals: 


 Vital Signs











  Temp Pulse Pulse Resp BP BP Pulse Ox


 


 03/29/18 04:00  98.1 F   79  16   122/64  96


 


 03/29/18 03:23     18   


 


 03/29/18 00:00     18   


 


 03/28/18 23:26  98.2 F   80  17   119/62  94 L


 


 03/28/18 21:23  98.5 F   81  16   140/73  98


 


 03/28/18 20:36   97   18  138/92   96


 


 03/28/18 19:20   77   18  154/72   96


 


 03/28/18 17:23   86   18  123/71   97


 


 03/28/18 16:10  97.8 F  86   18  142/70   97








 Intake and Output











 03/28/18 03/29/18 03/29/18





 22:59 06:59 14:59


 


Other:   


 


  # Voids 1 1 


 


  Weight 79.6 kg  














Blood pressure 141/74 heart rate 93 afebrile maintaining oxygen saturation on 

room air


GENERAL: This is a 62-year-old  female in no apparent distress at the 

time of my examination.  Obese.


HEENT: Head is atraumatic, normocephalic. Pupils are equal, round. Sclerae 

anicteric. Conjunctivae are clear. Mucous membranes of the mouth are moist. 

Neck is supple. There is no jugular venous distention. No carotid bruit is 

heard.


LUNGS: Clear to auscultation no wheezes, rales or rhonchi. No chest wall 

tenderness is noted on palpation or with deep breathing.


HEART: Regular rate and rhythm without murmurs, rubs or gallops. S1 and S2 

heard.


ABDOMEN: Soft, nontender. Bowel sounds are heard. No organomegaly noted.


EXTREMITIES: No evidence of peripheral edema and no calf tenderness noted.


VASCULAR: Radial and dorsalis pedis pulses palpated, no evidence of clubbing.  


NEUROLOGIC: Patient is awake, alert and oriented x3.


 








Results





 03/29/18 04:32





 03/29/18 04:32


 Cardiac Enzymes











  03/28/18 03/28/18 03/28/18 Range/Units





  16:41 16:41 22:16 


 


AST   23   (14-36)  U/L


 


CK-MB (CK-2)  0.3   0.4  (0.0-2.4)  ng/mL


 


Troponin I  <0.012   <0.012  (0.000-0.034)  ng/mL














  03/29/18 Range/Units





  04:32 


 


AST   (14-36)  U/L


 


CK-MB (CK-2)  0.4  (0.0-2.4)  ng/mL


 


Troponin I  <0.012  (0.000-0.034)  ng/mL








 Coagulation











  03/28/18 Range/Units





  16:41 


 


PT  9.3  (9.0-12.0)  sec


 


APTT  21.8 L  (22.0-30.0)  sec








 Lipids











  03/29/18 Range/Units





  04:32 


 


Triglycerides  65  (<150)  mg/dL


 


Cholesterol  182  (<200)  mg/dL


 


HDL Cholesterol  56  (40-60)  mg/dL








 CBC











  03/28/18 03/29/18 Range/Units





  16:41 04:32 


 


WBC  12.0 H  13.5 H  (3.8-10.6)  k/uL


 


RBC  4.77  4.75  (3.80-5.40)  m/uL


 


Hgb  13.2  12.6  (11.4-16.0)  gm/dL


 


Hct  40.7  40.9  (34.0-46.0)  %


 


Plt Count  396  415  (150-450)  k/uL








 Comprehensive Metabolic Panel











  03/28/18 03/29/18 Range/Units





  16:41 04:32 


 


Sodium  140  143  (137-145)  mmol/L


 


Potassium  4.3  4.7  (3.5-5.1)  mmol/L


 


Chloride  99  101  ()  mmol/L


 


Carbon Dioxide  25  20 L  (22-30)  mmol/L


 


BUN  17  21 H  (7-17)  mg/dL


 


Creatinine  1.02  1.20 H  (0.52-1.04)  mg/dL


 


Glucose  398 H  475 H*  (74-99)  mg/dL


 


Calcium  10.1  10.6 H  (8.4-10.2)  mg/dL


 


AST  23   (14-36)  U/L


 


ALT  24   (9-52)  U/L


 


Alkaline Phosphatase  193 H   ()  U/L


 


Total Protein  7.2   (6.3-8.2)  g/dL


 


Albumin  4.2   (3.5-5.0)  g/dL








 Current Medications











Generic Name Dose Route Start Last Admin





  Trade Name Freq  PRN Reason Stop Dose Admin


 


Hydrocodone Bitart/Acetaminophen  1 each  03/29/18 00:08  03/29/18 01:30





  [place] 5-325  PO   1 each





  Q6HR PRN   Administration





  MODERATE Pain   


 


Amlodipine Besylate  5 mg  03/29/18 09:00  





  Norvasc  PO   





  DAILY [facility]   


 


Aspirin  325 mg  03/29/18 09:00  





  Aspirin  PO   





  DAILY [facility]   


 


Atorvastatin Calcium  20 mg  03/28/18 21:00  03/28/18 22:29





  Lipitor  PO   20 mg





  HS [facility]IS   Administration


 


Cholecalciferol  1,000 unit  03/29/18 12:00  





  Vitamin D3  PO   





  DAILY@1200 [facility]   


 


Furosemide  20 mg  03/29/18 09:00  





  Lasix  PO   





  BID@0900,1600 [facility]   


 


Gabapentin  100 mg  03/29/18 09:00  





  Neurontin  PO   





  QAM LUIS   


 


Gabapentin  200 mg  03/28/18 21:00  03/28/18 22:29





  Neurontin  PO   200 mg





  HS LUIS   Administration


 


Glimepiride  2 mg  03/29/18 07:30  





  Amaryl  PO   





  AC-BRKFST [facility]   


 


Insulin Aspart  0 unit  03/29/18 07:30  03/29/18 05:45





  Novolog  SQ   8 unit





  ACHS LUIS   Administration





  Protocol   


 


Levothyroxine Sodium  125 mcg  03/29/18 06:30  03/29/18 05:44





  Synthroid  PO   125 mcg





  MOTUWETHFRSA LUIS   Administration


 


Lisinopril  20 mg  03/28/18 21:00  03/28/18 22:29





  Zestril  PO   20 mg





  BID LUIS   Administration


 


Loratadine  10 mg  03/28/18 21:00  03/28/18 22:29





  Claritin  PO   10 mg





  HS [facility]   Administration


 


Metformin HCl  1,000 mg  03/29/18 07:30  





  Glucophage  PO   





  BID-W/MEALS [facility]   


 


Miscellaneous Information  1 each  03/28/18 16:45  03/28/18 16:56





  Rx Info: Iv Contrast Was Given  MISCELLANE  03/30/18 16:46  1 each





  DAILY PRN   Administration





  Per Protocol   


 


Multivitamins  1 each  03/29/18 12:00  





  Theragran  PO   





  DAILY@1200 [facility]   


 


Nitroglycerin  0.4 mg  03/28/18 20:30  





  Nitrostat  SUBLINGUAL   





  Q5M PRN   





  Chest Pain   


 


Potassium Chloride  10 meq  03/29/18 09:00  





  K-Dur 10  PO   





  DAILY LU[facility]   








 Intake and Output











 03/28/18 03/29/18 03/29/18





 22:59 06:59 14:59


 


Other:   


 


  # Voids 1 1 


 


  Weight 79.6 kg  








 





 03/29/18 04:32 





 03/29/18 04:32 











Assessment and Plan


Assessment: 





ASSESSMENT


1.  Right sided chest pain, atypical for an acute ischemic event.  EKG shows no 

evidence of acute ischemic changes and cardiac enzymes are negative 3.  Acute 

coronary event has been ruled out.


2.  Hypertension


3.  Dyslipidemia


4.  Diabetes mellitus, uncontrolled


5.  History of diastolic heart failure


6.  Leukocytosis


7.  Sleep apnea





PLAN


Her pain is very atypical for cardiac etiology.  We will obtain a 2-D 

echocardiogram and Doppler study to assess cardiac structure and function.


Obtain records of previous catheterization. 


Continue with medical management of uncontrolled diabetes and questionable 

lymphoma on CT scan.


Increase atorvastatin 40 mg daily.  Change aspirin to 81 mg daily.  Continue 

with lisinopril, amlodipine and Lasix at home doses.


Follow-up with her primary cardiologist upon discharge.








Nurse Practitioner note has been reviewed, I agree with a documented findings 

and plan of care.  Patient was seen and examined.

## 2018-03-29 NOTE — HP
DOS 3/28/2018



HISTORY AND PHYSICAL



I am covering for Dr. Faustino Villegas.



CHIEF COMPLAINT:

Chest pain.



HISTORY OF PRESENT ILLNESS:

This 62-year-old woman with a past medical history of multiple medical problems

including CHF, history of diabetes, hypertension, history of sleep apnea, 
history of

closed head injury being followed by Dr. Faustino Villegas in the outpatient setting 
was

complaining of on and off chest pains on the right side of the chest for 6-8 
weeks.

The patient stated the pain is throbbing and radiating right from the breast to 
the

posterior part of the chest and patient came to [facility].  A CT scan 
of the

abdomen and pelvis did not show any evidence of aortic aneurysm, but showed some

retroperitoneal lymph nodes.

The patient admitted for further evaluation and treatment.  The patient is 
taking Aleve

for the pain.  Otherwise there is mild fever .  Otherwise no headache, loss of

consciousness, seizures at this time.



PAST MEDICAL HISTORY:

Past medical history of nephrolithiasis and as well as ureteric stent 
implantation,

diabetes, hypertension, sleep apnea, closed injury, cholecystectomy.



MEDICATIONS:

Prior to admission include:

1. Metformin 1000 mg p.o. b.i.d.

2. Norvasc 5 mg p.o. daily.

3. Klor-Con 10 mEq p.o. daily.

4. Multivitamins 1 p.o. daily.

6. Synthroid 125 mcg Monday, Tuesday, Wednesday, Thursday, Friday, Saturday.

7. Amaryl 2 mg with breakfast.

8. Neurontin 200 mg q.h.s.

9. Gabapentin 100 mg q.a.m.

10.Lasix 20 mg.

11.Vitamin D3 1000 daily.

12.Zyrtec 10 mg daily.

13.Lipitor 20 mg q.h.s.

14.Aspirin 162 mg p.o. daily.



ALLERGIES:

HALDOL, IODINATED CONTRAST DYE, ADHESIVES, CIPRO AND STERI-STRIPS.



FAMILY HISTORY:

History of breast cancer in the family.



SOCIAL HISTORY:

Occasional alcohol intake.  No history of smoking.



REVIEW OF SYSTEMS:

ENT: No diminished vision or hearing. Cardiovascular as mentioned earlier. 
Respiratory:

As mentioned earlier.  GI no nausea or vomiting.  as mentioned earlier. 
Central

nervous system: No numbness or weakness.  Allergy/Immunology: No asthma or 
hayfever.

Musculoskeletal: As mentioned earlier. Hematology/Oncology: No history of 
anemia.

Endocrine as mentioned earlier.  Constitutional: As mentioned earlier.

DERMATOLOGY: Negative. Negative. Rheumatology: Negative. Psychiatric: As 
mentioned

earlier.



PHYSICAL EXAMINATION:

Alert oriented x 3.  Pulse 77, blood pressure 150/72, respiration 18, 
temperature 98.4,

pulse ox 98% on room air. HEENT:  Conjunctivae normal.  Oral mucosa moist.  
Neck is no

jugular venous distention. No carotid bruit. No lymph node enlargement.

Cardiovascular System: S1, S2 muffled.  Respiration: Breath sounds diminished 
in the

bases.  No rhonchi and no crackles.

ABDOMEN:  Soft, nontender.  No mass palpable.  No hepatosplenomegaly.  Legs:  
No edema

and no swelling.

NERVOUS SYSTEM: Higher functions as mentioned earlier. Moves all four limbs.  
No focal

deficits.

LYMPHATICS: No lymph nodes palpable in neck, axillae or groin. SKIN: No ulcer, 
rash or

bleeding.



LAB STUDIES:

WBC 12, otherwise glucose 398.  Alkaline phosphatase 193.  Creatine kinase 25.



ASSESSMENT:

1. Right sided chest pain, rule out coronary artery disease.

2. Rule out possible musculoskeletal chest pain.

3. Increased WBC.

4. Rule out urinary tract infection.

5. Diabetes mellitus type 2, poorly controlled.

6. History of congestive heart failure with chronic diastolic dysfunction.

7. Diabetes.

8. Hypertension.

9. Sleep apnea.

10.History of closed head injury.

11.History of nephrolithiasis.



RECOMMENDATIONS AND DISCUSSION:

In this 62-year-old woman who presented with multiple complex medical issues, 
we will

monitor the patient closely.  Continue the current management and symptomatic

treatment.  Otherwise at this time I would recommend cardiology consultation.  
CT scan

has been done.  Repeat blood work as noted.  I would also recommend insulin to 
control

the blood sugars also.  Otherwise the prognosis guarded because of multiple 
complex

medical issues.  Further recommendations to follow.  A copy of dictation being

forwarded to Dr. Villegas who is the primary physician. We will check hemoglobin 
A1c also.





MMODL / IJN: [id number] / Job#: [id number]

MORIS

## 2018-03-29 NOTE — P.CONS
History of Present Illness





- Reason for Consult


Consult date: 03/29/18


retorperitoneal adenopathy


Requesting physician: Alina Cadet





- Chief Complaint


chest pain





- History of Present Illness





Mrs. Falcon is a very pleasant  female pt admitted with chest pain, 

she has been seen by IM and Cardiology and her presenting symptoms have 

resolved.  As part of her work up she had a CT of the aorta and incidentally 

multiple retroperitoneal lymph nodes were seen, L>R, up to 4cm in size, which 

is why we have been asked to see her.  Pt denies unintentional wt. loss, acute 

changes in appetite, fevers, night sweats, recent illnesses.  She is positive 

for 6-8 weeks of "lot's of back pain", all over, but more in the mid back on 

the right, came and went, worse when laying flat, aleve helped the pain, she 

was treated for a UTI and that helped the pain a little bit but, she just "

knows someting is wrong".  Denied cough, SOB, current chest pain or pressure, 

nausea, vomiting, did have some heartburn with abx therapy for UTI, no changes 

on bowel or bladder habits, nematuria, black or bloody stool, pain with 

elimination, swelling or other pain to report.  





Review of Systems





14 point ROS as stated in HPI





Past Medical History


Past Medical History: Heart Failure, Diabetes Mellitus, Hypertension, Sleep 

Apnea/CPAP/BIPAP


Additional Past Medical History / Comment(s): closed head injury 1989, kidney 

stones


Last Myocardial Infarction Date:: 1989


History of Any Multi-Drug Resistant Organisms: None Reported


Past Surgical History: Cholecystectomy, Hysterectomy


Additional Past Surgical History / Comment(s): thyroidectomy


Past Anesthesia/Blood Transfusion Reactions: Postoperative Nausea & Vomiting (

PONV)


Additional Psychological History / Comment(s): TBI


Smoking Status: Never smoker


Past Alcohol Use History: Unable to Obtain


Past Drug Use History: None Reported





- Past Family History


  ** Mother


Family Medical History: Cancer


Additional Family Medical History / Comment(s): breast CA





  ** Father


Family Medical History: Cancer


Additional Family Medical History / Comment(s): colon CA





Medications and Allergies


 Home Medications











 Medication  Instructions  Recorded  Confirmed  Type


 


Furosemide 20 mg PO DAILY 07/24/14 03/28/18 History


 


Gabapentin 100 mg PO QAM 07/24/14 03/28/18 History


 


Lisinopril [Zestril] 20 mg PO BID 07/24/14 03/28/18 History


 


Potassium Chloride [Klor-Con 10] 10 meq PO DAILY 07/24/14 03/28/18 History


 


Aspirin 162 mg PO DAILY 10/09/15 03/28/18 History


 


Atorvastatin [Lipitor] 20 mg PO HS 10/09/15 03/28/18 History


 


Gabapentin [Neurontin] 200 mg PO HS 10/09/15 03/28/18 History


 


Levothyroxine Sodium [Synthroid] 125 mcg PO [id number] 10/09/15 03/28/18 

History


 


amLODIPine [Norvasc] 5 mg PO DAILY 10/09/15 03/28/18 History


 


metFORMIN HCL 1,000 mg PO BID 10/09/15 03/28/18 History


 


Cetirizine HCl [Zyrtec] 10 mg PO HS 03/28/18 03/28/18 History


 


Cholecalciferol [Vitamin D3] 1,000 unit PO DAILY 03/28/18 03/28/18 History


 


Glimepiride [Amaryl] 2 mg PO [id number] 03/28/18 03/28/18 History


 


Multivitamins, Thera [Multivitamin 1 tab PO DAILY 03/28/18 03/28/18 History





(formulary)]    











 Allergies











Allergy/AdvReac Type Severity Reaction Status Date / Time


 


haloperidol [From Haldol] Allergy  Unknown Verified 03/28/18 21:16


 


haloperidol lactate Allergy  Unknown Verified 03/28/18 21:16





[From Haldol]     


 


Iodinated Contrast- Oral and Allergy  Rash/Hives Verified 03/28/18 21:16





IV Dye     





[Iodinated Contrast Media -     





IV Dye]     


 


adhesive AdvReac Severe sores Verified 03/28/18 21:16


 


ciprofloxacin [From Cipro] AdvReac  Heartburn Verified 03/28/18 21:16


 


steri-strips AdvReac Severe sore Uncoded 03/28/18 21:16














Physical Exam


Vitals: 


 Vital Signs











  Temp Pulse Pulse Resp BP BP Pulse Ox


 


 03/29/18 08:00  97.7 F   93  18   141/74  99


 


 03/29/18 04:00  98.1 F   79  16   122/64  96


 


 03/29/18 03:23     18   


 


 03/29/18 00:00     18   


 


 03/28/18 23:26  98.2 F   80  17   119/62  94 L


 


 03/28/18 21:23  98.5 F   81  16   140/73  98


 


 03/28/18 20:36   97   18  138/92   96


 


 03/28/18 19:20   77   18  154/72   96


 


 03/28/18 17:23   86   18  123/71   97


 


 03/28/18 16:10  97.8 F  86   18  142/70   97








 Intake and Output











 03/28/18 03/29/18 03/29/18





 22:59 06:59 14:59


 


Other:   


 


  # Voids 1 1 


 


  Weight 79.6 kg  














- Constitutional


General appearance: cooperative, no acute distress, obese





- EENT


Eyes: anicteric sclerae, EOMI, PERRLA, normal appearance


ENT: hearing grossly normal, normal oropharynx





- Neck





left submandibular ?LN, firm area that was tender to palpation.  Left 

supraclavicular fullness, possible LN swelling, no other areas of question, no 

axillary lymphadenpathy


Neck: lymphadenopathy





- Respiratory


Respiratory: bilateral: CTA





- Cardiovascular


Rhythm: regular


Heart sounds: normal: S1, S2


Abnormal Heart Sounds: no systolic murmur, no diastolic murmur, no rub, no S3 

Gallop, no S4 Gallop, no click, no other


  ** leg


Peripheral Edema: bilateral: None





- Gastrointestinal


General gastrointestinal: no absent bowel sounds, no decreased bowel sounds, no 

distended, no hepatomegaly, no hyperactive bowel sounds, normal bowel sounds, 

no organomegaly, no rigid, no scaphoid, soft, no splenomegaly, no tenderness, 

no umbilical hernia, no ventral hernia





- Genitourinary





no inguinal adenopathy





- Integumentary


Integumentary: normal





- Neurologic


Neurologic: CNII-XII intact





- Musculoskeletal


Musculoskeletal: strength equal bilaterally





- Psychiatric


Psychiatric: A&O x's 3, appropriate affect, intact judgment & insight





Results


CBC & Chem 7: 


 03/29/18 04:32





 03/29/18 04:32


Labs: 


 Abnormal Lab Results - Last 24 Hours (Table)











  03/28/18 03/28/18 03/28/18 Range/Units





  16:41 16:41 16:41 


 


WBC   12.0 H   (3.8-10.6)  k/uL


 


MCHC     (31.0-37.0)  g/dL


 


Neutrophils #   9.1 H   (1.3-7.7)  k/uL


 


ESR     (0-20)  mm/hr


 


APTT     (22.0-30.0)  sec


 


Carbon Dioxide     (22-30)  mmol/L


 


BUN     (7-17)  mg/dL


 


Creatinine     (0.52-1.04)  mg/dL


 


Glucose    398 H  (74-99)  mg/dL


 


POC Glucose (mg/dL)     (75-99)  mg/dL


 


Calcium     (8.4-10.2)  mg/dL


 


Alkaline Phosphatase    193 H  ()  U/L


 


Total Creatine Kinase  25 L    ()  U/L


 


C-Reactive Protein     (<10.0)  mg/L


 


LDL Cholesterol, Calc     (0-99)  mg/dL














  03/28/18 03/28/18 03/28/18 Range/Units





  16:41 21:09 22:16 


 


WBC     (3.8-10.6)  k/uL


 


MCHC     (31.0-37.0)  g/dL


 


Neutrophils #     (1.3-7.7)  k/uL


 


ESR     (0-20)  mm/hr


 


APTT  21.8 L    (22.0-30.0)  sec


 


Carbon Dioxide     (22-30)  mmol/L


 


BUN     (7-17)  mg/dL


 


Creatinine     (0.52-1.04)  mg/dL


 


Glucose     (74-99)  mg/dL


 


POC Glucose (mg/dL)   311 H   (75-99)  mg/dL


 


Calcium     (8.4-10.2)  mg/dL


 


Alkaline Phosphatase     ()  U/L


 


Total Creatine Kinase    26 L  ()  U/L


 


C-Reactive Protein     (<10.0)  mg/L


 


LDL Cholesterol, Calc     (0-99)  mg/dL














  03/28/18 03/29/18 03/29/18 Range/Units





  23:01 04:32 04:32 


 


WBC     (3.8-10.6)  k/uL


 


MCHC     (31.0-37.0)  g/dL


 


Neutrophils #     (1.3-7.7)  k/uL


 


ESR     (0-20)  mm/hr


 


APTT     (22.0-30.0)  sec


 


Carbon Dioxide    20 L  (22-30)  mmol/L


 


BUN    21 H  (7-17)  mg/dL


 


Creatinine    1.20 H  (0.52-1.04)  mg/dL


 


Glucose    475 H*  (74-99)  mg/dL


 


POC Glucose (mg/dL)  382 H    (75-99)  mg/dL


 


Calcium    10.6 H  (8.4-10.2)  mg/dL


 


Alkaline Phosphatase     ()  U/L


 


Total Creatine Kinase   25 L   ()  U/L


 


C-Reactive Protein    44.2 H  (<10.0)  mg/L


 


LDL Cholesterol, Calc    113 H  (0-99)  mg/dL














  03/29/18 03/29/18 03/29/18 Range/Units





  04:32 05:40 06:41 


 


WBC  13.5 H    (3.8-10.6)  k/uL


 


MCHC  30.8 L    (31.0-37.0)  g/dL


 


Neutrophils #  12.1 H    (1.3-7.7)  k/uL


 


ESR  70 H    (0-20)  mm/hr


 


APTT     (22.0-30.0)  sec


 


Carbon Dioxide     (22-30)  mmol/L


 


BUN     (7-17)  mg/dL


 


Creatinine     (0.52-1.04)  mg/dL


 


Glucose     (74-99)  mg/dL


 


POC Glucose (mg/dL)   422 H  426 H  (75-99)  mg/dL


 


Calcium     (8.4-10.2)  mg/dL


 


Alkaline Phosphatase     ()  U/L


 


Total Creatine Kinase     ()  U/L


 


C-Reactive Protein     (<10.0)  mg/L


 


LDL Cholesterol, Calc     (0-99)  mg/dL














  03/29/18 Range/Units





  07:44 


 


WBC   (3.8-10.6)  k/uL


 


MCHC   (31.0-37.0)  g/dL


 


Neutrophils #   (1.3-7.7)  k/uL


 


ESR   (0-20)  mm/hr


 


APTT   (22.0-30.0)  sec


 


Carbon Dioxide   (22-30)  mmol/L


 


BUN   (7-17)  mg/dL


 


Creatinine   (0.52-1.04)  mg/dL


 


Glucose   (74-99)  mg/dL


 


POC Glucose (mg/dL)  373 H  (75-99)  mg/dL


 


Calcium   (8.4-10.2)  mg/dL


 


Alkaline Phosphatase   ()  U/L


 


Total Creatine Kinase   ()  U/L


 


C-Reactive Protein   (<10.0)  mg/L


 


LDL Cholesterol, Calc   (0-99)  mg/dL











Chest x-ray: report reviewed


CT scan - abdomen: report reviewed





Assessment and Plan


(1) Lymphadenopathy, retroperitoneal


Narrative/Plan: 


Possible some adenopathy in the left submandibular area and left 

supraclavicular area.  CT of chest and neck ordered for evaluation and to see 

if there are any other areas of lymphadenopathy that are more amenable to core 

biopsy or excision. 


If there are plans for discharge biopsy could be scheduled out pt as pt with a 

follow up with Dr. Espinoza for results. 





Case was discussed with IM NP, agree with work up at this time.  They are 

working on treating pt severe hyperglycemia.





Current Visit: Yes   Status: Acute   Priority: High   Code(s): R59.0 - 

LOCALIZED ENLARGED LYMPH NODES   SNOMED Code(s): 098286926

## 2018-03-29 NOTE — CT
EXAMINATION TYPE: CT ChestAbdPelvis wo con

 

DATE OF EXAM: 3/29/2018

 

COMPARISON: 3/28/2018 and 10/9/2015

 

HISTORY: 62-year-old female retroperitoneal Lymphadenopathy

 

TECHNIQUE: Contiguous axial scanning of the chest, abdomen, and pelvis without IV contrast. Coronal a
nd sagittal reconstructions performed.

 

CT DLP: 1130.0 mGycm

Automated exposure control for dose reduction was used.

 

 

FINDINGS: 

Chest:

Heart is upper limits of normal in size without pericardial effusion. Mild coronary vessel calcificat
ions are present.

 

Ascending aortic ectatic at 3.8 cm. Conventional arch vessel branching anatomy.

 

Scattered nonenlarged mediastinal and axillary lymph nodes are present. Some calcified left paratrach
eal and left hilar lymph nodes compatible with prior granulomatous disease with a calcified granuloma
 at the left midlung.

 

Mild emphysematous change and some strandy atelectasis at the peripheral left base. No consolidation 
or pleural effusion.

 

 

 

ABDOMEN:

Noncontrast appearance of the liver, adrenal glands, spleen, and mildly atrophic pancreas show no paulo
ss abnormality. Retroaortic left renal vein.

 

Cholecystectomy clips.

 

Faint contrast seen still being excreted from the kidneys. Subcentimeter hypodensity lateral upper to
 midpole left kidney too small for accurate CT characterization, probable cyst.

 

Redemonstrated retroperitoneal lymphadenopathy. Lymph nodes measure up to 1 cm in the gastrohepatic l
igament region, 9 mm near the right frida of the diaphragm, 1.4 cm gastroduodenal, 1.9 cm caval, 1.1 c
m retrocaval, 4.8 x 2.9 cm left para-aortic.

 

No dilated small bowel, free fluid, or free air.

 

Normal appendix. Mild to moderate stool. Diffuse colonic diverticulosis. No pericolonic inflammatory 
change.

 

 

Pelvis:

Previously injected IV contrast has collected in the bladder which is incompletely distended. Uterus 
surgically absent. Neither ovary is clearly visualized. Pelvic phleboliths. No abnormal fluid collect
ion in the pelvis or pelvic lymphadenopathy seen.

 

 

Bones:

Mild degenerative changes at the hips and SI joints. Advanced degenerative changes throughout the lum
bar spine with grade 1 retrolistheses at L2-L3 and L3-L4 and grade 1 anterolisthesis at L4-L5. No oss
eous destructive process.

 

 

IMPRESSION: 

 

1. STABLE RETROPERITONEAL LYMPHADENOPATHY MEASURING UP TO 4.8 X 2.9 CM AND ADDITIONAL UPPER ABDOMINAL
 LYMPHADENOPATHY MEASURING UP TO 1.9 CM. NO OTHER LYMPHADENOPATHY SEEN IN THE THORAX OR PELVIS. LYMPH
INDIRA AND METASTATIC DISEASE ARE THE PRIMARY DIFFERENTIALS.

2. FAINT CONTRAST SEEN STILL BEING EXCRETED FROM THE KIDNEYS. CORRELATE FOR ACUTE RENAL FAILURE.

## 2018-03-29 NOTE — CT
EXAMINATION TYPE: CT soft tissue neck wo con

 

DATE OF EXAM: 3/29/2018

 

COMPARISON: CT brain 2/6/2012

 

HISTORY: 62-year-old female Left supraclavicular fullness

 

TECHNIQUE: Contiguous axial scanning of the neck without IV contrast. Coronal and sagittal reconstruc
tions performed.

 

CT DLP: 442.90 mGycm

Automated exposure control for dose reduction was used.

 

 

FINDINGS: 

Focal hypodensity lateral inferior left temporal lobe partially visualized has an appearance similar 
to the 2/6/2012 CT probably represents an area of encephalomalacia. Orbits and globes, visualized par
anasal sinuses, and mastoid air cells are clear. Rightward nasal septal deviation.

 

Lack of IV contrast limits assessment of the mucosal space within this limitation, the nasopharynx ap
pears clear.

 

There is dental amalgam artifact limiting assessment of the oropharynx. Fullness of the bilateral pal
atine tonsils in the assessed with direct visualization.

 

The glottic and subglottic airway as well as the tracheal column appear clear. Chest reported separat
ely.

 

The thyroid gland is either atrophic or surgically absent. The submandibular and parotid glands appea
r satisfactory.

 

Borderline sized 8mm left submandibular space lymph node. Additional scattered cervical lymph nodes o
n both sides measuring up to 8 mm. No supraclavicular lymphadenopathy is identified on this noncontra
st study.

 

Bones: Spondylotic change with grade 1 anterolisthesis at C4-C5. Some heterotopic ossification poster
iorly along the midline opposite C5 and C6.

 

 

 

IMPRESSION: 

 

1. SOFT TISSUE FULLNESS IN THE REGION OF THE BILATERAL PALATINE TONSILS. THIS CAN BE CORRELATED WITH 
DIRECT VISUALIZATION. PROBABLE TONSILLAR HYPERTROPHY.

2. AN ASYMMETRIC BORDERLINE-SIZED 8MM LEFT SUBMANDIBULAR SPACE LYMPH NODE CAN BE FOLLOWED CLINICALLY.


3. NO SUSPICIOUS MASS OR LYMPHADENOPATHY IDENTIFIED IN THE LEFT SUPRACLAVICULAR REGION OR ELSEWHERE I
N THE NECK.

## 2018-03-30 VITALS
SYSTOLIC BLOOD PRESSURE: 135 MMHG | RESPIRATION RATE: 16 BRPM | DIASTOLIC BLOOD PRESSURE: 63 MMHG | TEMPERATURE: 98 F | HEART RATE: 57 BPM

## 2018-03-30 LAB
ANION GAP SERPL CALC-SCNC: 12 MMOL/L
BASOPHILS # BLD AUTO: 0.1 K/UL (ref 0–0.2)
BASOPHILS NFR BLD AUTO: 1 %
BUN SERPL-SCNC: 27 MG/DL (ref 7–17)
CALCIUM SPEC-MCNC: 10.1 MG/DL (ref 8.4–10.2)
CHLORIDE SERPL-SCNC: 104 MMOL/L (ref 98–107)
CO2 SERPL-SCNC: 28 MMOL/L (ref 22–30)
EOSINOPHIL # BLD AUTO: 0.1 K/UL (ref 0–0.7)
EOSINOPHIL NFR BLD AUTO: 1 %
ERYTHROCYTE [DISTWIDTH] IN BLOOD BY AUTOMATED COUNT: 4.13 M/UL (ref 3.8–5.4)
ERYTHROCYTE [DISTWIDTH] IN BLOOD: 15.3 % (ref 11.5–15.5)
GLUCOSE BLD-MCNC: 125 MG/DL (ref 75–99)
GLUCOSE BLD-MCNC: 132 MG/DL (ref 75–99)
GLUCOSE BLD-MCNC: 258 MG/DL (ref 75–99)
GLUCOSE SERPL-MCNC: 134 MG/DL (ref 74–99)
HCT VFR BLD AUTO: 34.4 % (ref 34–46)
HGB BLD-MCNC: 11.1 GM/DL (ref 11.4–16)
LYMPHOCYTES # SPEC AUTO: 2.4 K/UL (ref 1–4.8)
LYMPHOCYTES NFR SPEC AUTO: 20 %
MCH RBC QN AUTO: 27 PG (ref 25–35)
MCHC RBC AUTO-ENTMCNC: 32.3 G/DL (ref 31–37)
MCV RBC AUTO: 83.3 FL (ref 80–100)
MONOCYTES # BLD AUTO: 0.6 K/UL (ref 0–1)
MONOCYTES NFR BLD AUTO: 5 %
NEUTROPHILS # BLD AUTO: 8.7 K/UL (ref 1.3–7.7)
NEUTROPHILS NFR BLD AUTO: 71 %
PLATELET # BLD AUTO: 399 K/UL (ref 150–450)
POTASSIUM SERPL-SCNC: 3.4 MMOL/L (ref 3.5–5.1)
SODIUM SERPL-SCNC: 144 MMOL/L (ref 137–145)
WBC # BLD AUTO: 12.3 K/UL (ref 3.8–10.6)

## 2018-03-30 RX ADMIN — INSULIN ASPART SCH UNIT: 100 INJECTION, SOLUTION INTRAVENOUS; SUBCUTANEOUS at 12:40

## 2018-03-30 RX ADMIN — GABAPENTIN SCH MG: 100 CAPSULE ORAL at 08:52

## 2018-03-30 RX ADMIN — INSULIN ASPART SCH UNIT: 100 INJECTION, SOLUTION INTRAVENOUS; SUBCUTANEOUS at 08:52

## 2018-03-30 RX ADMIN — LISINOPRIL SCH MG: 20 TABLET ORAL at 08:52

## 2018-03-30 RX ADMIN — POTASSIUM CHLORIDE SCH MEQ: 750 TABLET, EXTENDED RELEASE ORAL at 08:52

## 2018-03-30 RX ADMIN — INSULIN ASPART SCH: 100 INJECTION, SOLUTION INTRAVENOUS; SUBCUTANEOUS at 08:53

## 2018-03-30 RX ADMIN — GLIMEPIRIDE SCH MG: 2 TABLET ORAL at 08:52

## 2018-03-30 RX ADMIN — INSULIN DETEMIR SCH UNIT: 100 INJECTION, SOLUTION SUBCUTANEOUS at 12:41

## 2018-03-30 RX ADMIN — Medication SCH UNIT: at 08:52

## 2018-03-30 RX ADMIN — THERA TABS SCH EACH: TAB at 08:51

## 2018-03-30 RX ADMIN — LEVOTHYROXINE SODIUM SCH MCG: 0.12 TABLET ORAL at 05:32

## 2018-03-30 NOTE — DS
DISCHARGE SUMMARY



DATE OF SERVICE:

03/30/2018.



FINAL DIAGNOSES:

1. Chest pain, right-sided, possibly musculoskeletal.  Myocardial infarction  ruled

    out.

2. Increased WBC, improved.

3. Urinary tract infection.

4. Diabetes mellitus type 2, poorly controlled.

5. Retroperitoneal and upper abdominal lymphadenopathy for evaluation.

6. History of congestive heart failure with chronic diastolic dysfunction.

7. Hypertension.

8. Sleep apnea.

9. History of closed head injury.

10.History of nephrolithiasis.



DISCHARGE DISPOSITION:

The patient will be discharged in stable condition with guarded prognosis.



HISTORY OF PRESENT ILLNESS:

This 62-year-old woman with past multiple history of multiple medical problems is being

followed by Dr. Faustino Villegas in the outpatient setting, admitted with right-sided chest

pain, myocardial infarction ruled out.  Cardiology saw the patient and CT scan of the

abdomen showed retroperitoneal and upper abdominal lymphadenopathy.  Seen by Dr. Dumont,

who recommended outpatient followup.  Lantus insulin was added for better diabetic

control and the patient is recommended to use Lantus and Accu-Cheks a.c. and at

bedtime, hold the Lantus if Accu-Cheks less than 120 and to follow up with Dr. Villegas in

the outpatient setting.



EXAM:

Vitals are stable.

CARDIOVASCULAR:  S1, S2 muffled.

ABDOMEN:  Soft.

NERVOUS SYSTEM:  Nonfocal.



DISCHARGE MEDICATIONS:

1. Diet is cardiac.

2. Activity limited until ru.

3. Follow up with Dr. Faustino Villegas in 2-3 days.

4. Follow up with Dr. Dumont as advised.

5. Follow up with Cardiology as recommended.

6. Medications will be:

    a.                 Norvasc 5 mg p.o. daily.

    b.                Ecotrin 160 mg p.o. daily.

    c.                   Lipitor 40 mg q.h.s.

    d.                   Zyrtec 10 mg q.h.s.

    e.                  Vitamin D3 1000 daily.

    f.                 Lasix 20 mg p.o. daily.

    g.                Gabapentin 1000 mg q.a.m.

    h.                 Neurontin 200 mg q.h.s.

    i.             Amaryl 2 mg p.o. a.c. breakfast.

    j.Levemir 20 units subcu daily and as mentioned earlier, hold if the Accu-Chek was

     less than 120.

    k.              Synthroid 125 mcg p.o. daily.

    l.                Zestril 20 mg p.o. b.i.d.

    m.              Metformin 1000 mg p.o. b.i.d.

    n.               Multivitamins 1 p.o. daily.

    o.               Klor-Con 10 mg p.o. daily.

Once again, the patient will discharged in stable condition with a guarded prognosis.





GUADALUPE / CHAO: [id number] / Job#: [id number]

## 2018-04-23 ENCOUNTER — HOSPITAL ENCOUNTER (OUTPATIENT)
Dept: HOSPITAL 47 - RADPROMAIN | Age: 63
End: 2018-04-23
Payer: COMMERCIAL

## 2018-04-23 VITALS — DIASTOLIC BLOOD PRESSURE: 66 MMHG | HEART RATE: 79 BPM | SYSTOLIC BLOOD PRESSURE: 107 MMHG

## 2018-04-23 VITALS — RESPIRATION RATE: 14 BRPM

## 2018-04-23 VITALS — TEMPERATURE: 97.8 F

## 2018-04-23 DIAGNOSIS — C83.83: Primary | ICD-10-CM

## 2018-04-23 LAB
INR PPP: 0.9 (ref ?–1.2)
PLATELET # BLD AUTO: 447 K/UL (ref 150–450)
PT BLD: 9.3 SEC (ref 9–12)

## 2018-04-23 PROCEDURE — 49180 BIOPSY ABDOMINAL MASS: CPT

## 2018-04-23 PROCEDURE — 10022: CPT

## 2018-04-23 PROCEDURE — 85049 AUTOMATED PLATELET COUNT: CPT

## 2018-04-23 PROCEDURE — 88305 TISSUE EXAM BY PATHOLOGIST: CPT

## 2018-04-23 PROCEDURE — 38505 NEEDLE BIOPSY LYMPH NODES: CPT

## 2018-04-23 PROCEDURE — 88341 IMHCHEM/IMCYTCHM EA ADD ANTB: CPT

## 2018-04-23 PROCEDURE — 88342 IMHCHEM/IMCYTCHM 1ST ANTB: CPT

## 2018-04-23 PROCEDURE — 77012 CT SCAN FOR NEEDLE BIOPSY: CPT

## 2018-04-23 PROCEDURE — 85610 PROTHROMBIN TIME: CPT

## 2018-04-23 PROCEDURE — 88173 CYTOPATH EVAL FNA REPORT: CPT

## 2018-04-23 NOTE — CT
EXAMINATION TYPE: CT biopsy abdomen percutabeous, core biopsy and fine-needle 
aspiration retroperitoneal adenopathy

 

DATE OF EXAM: 4/23/2018

 

HISTORY: Retroperitoneal lymphadenopathy

 

COMPARISON: NONE

 

Maximal barrier technique was utilized.  The skin overlying a suitable path to 
the retroperitoneal lesion was localized using CT and the overlying skin was 
prepped and draped.  Lidocaine used for local anesthesia.  A skin nick made 
with a scalpel.  Using CT guidance, access was gained to the lesion with a 22-
gauge needle through a 17-gauge guide.  Aspirated specimen submitted to 
cytology. 2 core specimens were subsequently obtained with an 18-gauge needle.  
3 passes were performed in all.  Following the procedure no immediate 
complications.   The patient is discharged in stable condition.  Hemostasis 
achieved.  

 

IMPRESSION: 

 

SUCCESSFUL CT GUIDED CORE BIOPSY and fine-needle aspiration of retroperitoneal 
adenopathy.  PATHOLOGY PENDING.  THIS PROCEDURE WAS PERFORMED BY THE 
UNDERSIGNED.

 

MORIS

## 2018-05-05 ENCOUNTER — HOSPITAL ENCOUNTER (OUTPATIENT)
Dept: HOSPITAL 47 - RADPETMAIN | Age: 63
End: 2018-05-05
Payer: COMMERCIAL

## 2018-05-05 DIAGNOSIS — C85.93: Primary | ICD-10-CM

## 2018-05-05 PROCEDURE — 78815 PET IMAGE W/CT SKULL-THIGH: CPT

## 2018-05-07 NOTE — PE
EXAMINATION TYPE: PET CT fusion skull to thigh

 

DATE OF EXAM: 5/5/2018

 

COMPARISON: 3/20/2019

 

HISTORY: Lymphoma. Initial staging.   

 

TECHNIQUE:  Following the intravenous administration of 11.15 mCi of F-18 FDG, whole body images are 
performed from the skull base to the midthigh.  Images are reviewed on the computer in the coronal, a
xial, and sagittal planes.  Reconstructed rotating images are created on independent workstation and 
reviewed on the computer.   A localization and attenuation correction CT is performed in conjunction 
with the PET scan. Total exam DLP of 420.01.

 

SCAN: Initial

 

FINDINGS: 

 

MEDIASTINAL BACKGROUND: 2.63

 

ABDOMINAL BACKGROUND: 3.8

 

SKULL BASE AND NECK:  No suspicious hypermetabolic uptake. There is a nonenlarged 7 mm right cervical
 chain lymph node posterior to the sternocleidomastoid at the level of the true vocal cords and thyro
id cartilage that does not exceed mediastinal background with a maximum SUV of 1.98.

 

CHEST, MEDIASTINUM, AND HILAR REGION: No suspicious hypermetabolic uptake.

 

ABDOMEN AND PELVIS: There is somewhat heterogenous uptake within the hepatic parenchyma however more 
focal uptake is seen within the left hepatic lobe in segment 2 (maximum SUV of 9.87) and within segme
nt 4A (maximum SUV of 4.71). Uptake in 4B extending medially into segment 5 is attributable to misreg
istration from the adjacent colon hypermetabolic activity throughout the right hemicolon is likely re
lated to physiologic excretion. Enhanced CT abdomen is recommended to ensure no underlying hepatic ma
ss. On unenhanced images no discrete hepatic masses seen.

 

Right para-aortic 1.0 cm short axis lymph node on series 3 image 128 is hypermetabolic with a maximum
 SUV of 5.1. Conglomeration of right periaortic lymph nodes measure approximately 2.7 x 3.6 cm on ser
ies 3 image 145 and a maximum SUV of 24.73. Conglomeration of left periaortic lymph nodes on series 3
 image 150 to measure 4.8 x 3.0 cm, overall unchanged from the prior of 3/29/2018 where this measured
 4.8 x 2.9 cm. This conglomeration has a maximum SUV of 8.81.

 

Additionally on the level of the gastroesophageal junction there is a right epigastric superficial ly
mph node with a maximum uptake of 10.04. Other scattered nonenlarged nonhypermetabolic lymph nodes ar
e seen within the periaortic chain (most notably on series 3 image 165 a lymph node measuring 1 cm in
 short axis in the left periaortic space has a maximum SUV of 2.33) and in the superficial inguinal r
egions. Few nonenlarged nodes are also present within the gastrohepatic region without hypermetabolic
 uptake.

 

OSSEOUS STRUCTURES: Bilateral glenohumeral arthropathy is overall symmetric with a maximum SUV of 2.4
5 on the right and 2.56 on the left, not above mediastinal background and likely attributable to dege
nerative change. The highest area of metabolic activity is seen within the L3 vertebral body with a m
aximum SUV of 3.27, not above mediastinal background. Osseous activity is favored to represent degene
rative change.

 

OTHER CT: There is redemonstration of an ectatic ascending thoracic aorta upper limits of normal size
 measuring 3.8 cm, not meeting criteria for aneurysm. Heart is also upper normal limits normal size. 
Mild coronary artery calcifications are again seen. Few paratracheal and left hilar calcified lymph n
odes are seen within the mediastinum. Left lower lobe superior segment calcified pulmonary granulomas
 present. This is benign. No adenopathy by size criteria. Mild pulmonary emphysema is present.

 

The unenhanced adrenal glands, spleen, pancreas, and right kidney are grossly unremarkable. Probable 
left renal cyst and surgical absence of the gallbladder are noted. Adenopathy as described above. Mchenry
el is limited without oral contrast but demonstrates diverticulosis. Severe multilevel degenerative c
hanges are present throughout the spine with grade 1 retrolisthesis of L2 on L3 and L3 on L4 and grad
e 1 anterolisthesis of L4 on L5.

 

IMPRESSION: 

1. Infradiaphragmatic hypermetabolic adenopathy in keeping with the patient's history of known lympho
ma. There is marked avidity within the periaortic chain lymph nodes with a maximum SUV of 24.73 in co
mparison to the abdominal background of 3.8 compatible with a Deauville 5 score and WHO grade II. How
ever see the below findings 4 questionable involvement of the extralymphatic sites.

2. Multifocal hepatic hypermetabolic uptake without identifiable mass on nonenhanced imaging. Dynamic
 enhanced CT or MR is recommended for further evaluation.

## 2018-05-29 ENCOUNTER — HOSPITAL ENCOUNTER (OUTPATIENT)
Dept: HOSPITAL 47 - RADMRIMAIN | Age: 63
Discharge: HOME | End: 2018-05-29
Payer: COMMERCIAL

## 2018-05-29 DIAGNOSIS — R94.8: ICD-10-CM

## 2018-05-29 DIAGNOSIS — K76.9: Primary | ICD-10-CM

## 2018-05-29 PROCEDURE — 74183 MRI ABD W/O CNTR FLWD CNTR: CPT

## 2018-05-29 PROCEDURE — 82565 ASSAY OF CREATININE: CPT

## 2018-05-29 PROCEDURE — 36415 COLL VENOUS BLD VENIPUNCTURE: CPT

## 2018-05-29 NOTE — MR
EXAMINATION TYPE: MR liver wo/w con

 

DATE OF EXAM: 5/29/2018

 

COMPARISON: PET/CT May 5, 2018. CT chest abdomen and pelvis March 29, 2018 and older CTs.

 

HISTORY: history of known lymphoma recently diagnosed on biopsy April 23, 2018, with liver lesion or 
abnormal PET/CT.

 

CONTRAST: 

Standard multiplanar, multisequence MRI departmental protocol utilizing 7.5 mL intravenous Gadavist g
adolinium contrast.  

 

FINDINGS: Liver: There is diffuse signal dropout consistent with fatty infiltration as seen on prior 
CTs. Liver is normal in size. There are multiple round lesions of T1 hypointensity and T2 hyperintens
ity scattered throughout the liver on MRI not clearly seen on noncontrast CT. One of the larger lesio
ns correspond to hypermetabolic lesion in the lateral segment left hepatic lobe anteriorly in Couinau
d segment 2 seen for reference coronal image 10. Majority of these lesions show irregular peripheral 
enhancement without definitive nodular centripetal progression on more delayed dynamic images.

 

No intrahepatic ductal dilatation is seen. Gallbladder is noted surgically absent.

 

Other :  Mild cardiomegaly is redemonstrated. The spleen and pancreas are normal in size. Both adrena
l glands are normal in size. There are simple appearing 1.2 cm cyst laterally upper pole of the left 
kidney seen best image 12 series 501 redemonstrated. There is redemonstration of scattered diverticul
a throughout the colon. Some enlarged upper abdominal lymph nodes remain present anterior to IVC near
 portal vein. Multilevel spurring in the spine is seen.

 

IMPRESSION: 

Presence of multiple hepatic lesions seen MRI with at least 8 distinct lesions present and abnormal h
ypermetabolic uptake in largest lesion left hepatic lobe is concerning for metastatic malignancy, con
 second colonic primary. Correlate clinically and with recent colonoscopy if has not been perfor
med would advise.

## 2018-06-12 ENCOUNTER — HOSPITAL ENCOUNTER (OUTPATIENT)
Dept: HOSPITAL 47 - RADPROMAIN | Age: 63
Discharge: HOME | End: 2018-06-12
Payer: MEDICARE

## 2018-06-12 VITALS — RESPIRATION RATE: 16 BRPM

## 2018-06-12 VITALS — SYSTOLIC BLOOD PRESSURE: 101 MMHG | HEART RATE: 72 BPM | DIASTOLIC BLOOD PRESSURE: 52 MMHG

## 2018-06-12 VITALS — TEMPERATURE: 98.1 F

## 2018-06-12 DIAGNOSIS — R16.0: Primary | ICD-10-CM

## 2018-06-12 LAB
GLUCOSE BLD-MCNC: 127 MG/DL (ref 75–99)
INR PPP: 0.9 (ref ?–1.2)
PLATELET # BLD AUTO: 389 K/UL (ref 150–450)
PT BLD: 9.3 SEC (ref 9–12)

## 2018-06-12 PROCEDURE — 85610 PROTHROMBIN TIME: CPT

## 2018-06-12 PROCEDURE — 47000 NEEDLE BIOPSY OF LIVER PERQ: CPT

## 2018-06-12 PROCEDURE — 85049 AUTOMATED PLATELET COUNT: CPT

## 2018-06-12 PROCEDURE — 88341 IMHCHEM/IMCYTCHM EA ADD ANTB: CPT

## 2018-06-12 PROCEDURE — 76942 ECHO GUIDE FOR BIOPSY: CPT

## 2018-06-12 PROCEDURE — 36415 COLL VENOUS BLD VENIPUNCTURE: CPT

## 2018-06-12 PROCEDURE — 88307 TISSUE EXAM BY PATHOLOGIST: CPT

## 2018-06-12 PROCEDURE — 88342 IMHCHEM/IMCYTCHM 1ST ANTB: CPT

## 2018-06-12 NOTE — US
EXAMINATION TYPE: US biopsy liver

 

DATE OF EXAM: 6/12/2018

 

HISTORY: Multiple liver masses.

 

 

FINDINGS: Maximal barrier technique was utilized.  The skin overlying a suitable path to the patient'
s right lobe liver mass was localized with ultrasound and the overlying skin prepped and draped.  Ult
rasound was utilized with sterile technique. Lidocaine was  used for local anesthesia.  A skin nick w
as made with a scalpel.  An 17-gauge needle was advanced under direct ultrasound guidance, 18-gauge n
eedle advanced coaxially and core specimen obtained of the mass.  Specimen submitted in formalin to P
athology.  Following the procedure, hemostasis achieved and the patient is discharged in stable condi
tion without complication.

 

IMPRESSION:STATUS POST ULTRASOUND GUIDED CORE BIOPSY OF right lobe liver MASS, PATHOLOGY IS PENDING. 
 THIS PROCEDURE IS PERFORMED BY THE UNDERSIGNED.

## 2018-07-02 ENCOUNTER — HOSPITAL ENCOUNTER (OUTPATIENT)
Dept: HOSPITAL 47 - OR | Age: 63
Discharge: HOME | End: 2018-07-02
Payer: MEDICARE

## 2018-07-02 VITALS — SYSTOLIC BLOOD PRESSURE: 107 MMHG | DIASTOLIC BLOOD PRESSURE: 52 MMHG | HEART RATE: 70 BPM

## 2018-07-02 VITALS — RESPIRATION RATE: 18 BRPM | TEMPERATURE: 97.8 F

## 2018-07-02 VITALS — BODY MASS INDEX: 29.9 KG/M2

## 2018-07-02 DIAGNOSIS — Z79.1: ICD-10-CM

## 2018-07-02 DIAGNOSIS — Z91.09: ICD-10-CM

## 2018-07-02 DIAGNOSIS — Z79.891: ICD-10-CM

## 2018-07-02 DIAGNOSIS — Z88.8: ICD-10-CM

## 2018-07-02 DIAGNOSIS — Z79.84: ICD-10-CM

## 2018-07-02 DIAGNOSIS — E11.9: ICD-10-CM

## 2018-07-02 DIAGNOSIS — Z90.49: ICD-10-CM

## 2018-07-02 DIAGNOSIS — Z90.710: ICD-10-CM

## 2018-07-02 DIAGNOSIS — Z99.89: ICD-10-CM

## 2018-07-02 DIAGNOSIS — Z79.890: ICD-10-CM

## 2018-07-02 DIAGNOSIS — E89.0: ICD-10-CM

## 2018-07-02 DIAGNOSIS — G47.33: ICD-10-CM

## 2018-07-02 DIAGNOSIS — Z91.048: ICD-10-CM

## 2018-07-02 DIAGNOSIS — C85.13: Primary | ICD-10-CM

## 2018-07-02 DIAGNOSIS — K75.9: ICD-10-CM

## 2018-07-02 DIAGNOSIS — Z79.899: ICD-10-CM

## 2018-07-02 DIAGNOSIS — Z79.82: ICD-10-CM

## 2018-07-02 DIAGNOSIS — I50.9: ICD-10-CM

## 2018-07-02 DIAGNOSIS — Z88.1: ICD-10-CM

## 2018-07-02 LAB
BASOPHILS # BLD AUTO: 0.1 K/UL (ref 0–0.2)
BASOPHILS NFR BLD AUTO: 1 %
EOSINOPHIL # BLD AUTO: 0.5 K/UL (ref 0–0.7)
EOSINOPHIL NFR BLD AUTO: 4 %
ERYTHROCYTE [DISTWIDTH] IN BLOOD BY AUTOMATED COUNT: 4.43 M/UL (ref 3.8–5.4)
ERYTHROCYTE [DISTWIDTH] IN BLOOD: 15.8 % (ref 11.5–15.5)
GLUCOSE BLD-MCNC: 184 MG/DL (ref 75–99)
HCT VFR BLD AUTO: 36.5 % (ref 34–46)
HGB BLD-MCNC: 11.7 GM/DL (ref 11.4–16)
LYMPHOCYTES # SPEC AUTO: 1.5 K/UL (ref 1–4.8)
LYMPHOCYTES NFR SPEC AUTO: 13 %
MCH RBC QN AUTO: 26.3 PG (ref 25–35)
MCHC RBC AUTO-ENTMCNC: 32 G/DL (ref 31–37)
MCV RBC AUTO: 82.3 FL (ref 80–100)
MONOCYTES # BLD AUTO: 1.1 K/UL (ref 0–1)
MONOCYTES NFR BLD AUTO: 10 %
NEUTROPHILS # BLD AUTO: 8 K/UL (ref 1.3–7.7)
NEUTROPHILS NFR BLD AUTO: 70 %
PLATELET # BLD AUTO: 492 K/UL (ref 150–450)
WBC # BLD AUTO: 11.5 K/UL (ref 3.8–10.6)

## 2018-07-02 PROCEDURE — 85025 COMPLETE CBC W/AUTO DIFF WBC: CPT

## 2018-07-02 PROCEDURE — 38221 DX BONE MARROW BIOPSIES: CPT

## 2018-07-02 NOTE — P.PCN
Date of Procedure: 07/02/18


Preoperative Diagnosis: 


Non- Hodgkins Lymphoma


Postoperative Diagnosis: 


Same


Procedure(s) Performed: 


Bone marrow aspiration and biopsy


Anesthesia: MAC


Surgeon: Michael Dumont


Assistant #1: Stated None


Assistant #2: Stated None


Estimated Blood Loss (ml): 2


Pathology: other


Condition: stable


Disposition: same day


Indications for Procedure: 


Low grade non-Hodgkin's lymphoma.  Bone marrow aspiration biopsies been done 

for staging


Operative Findings: 


Adequate samples


Description of Procedure: 


The procedure was explained in detail to the patient in the office.  She 

presented to the outpatient endoscopy suite where IV access and informed 

consent were obtained.  She was then placed in the left lateral defer 

disposition.  The area over both posterior iliac crest was cleaned and prepped 

with chlorhexidine and sterile draping.  IV sedation was then initiated.  Local 

anesthesia was administered with lidocaine to the right posterior hilar crest.  

A Jamshidi needle was then inserted and bone marrow aspirate and biopsy 

obtained.  On the first pass, biopsy sample was not present.  Therefore an 

additional pass was made with a biopsy specimen obtained this time.


  Hemostasis was easily achieved optimal drawn of the needle.  Blood loss was 

minimal and recovery from sedation was satisfactory.  She appeared to have 

tolerated the procedure well without any obvious mediate competitions.

## 2018-08-25 ENCOUNTER — HOSPITAL ENCOUNTER (INPATIENT)
Dept: HOSPITAL 47 - EC | Age: 63
LOS: 4 days | Discharge: HOME | DRG: 436 | End: 2018-08-29
Attending: FAMILY MEDICINE | Admitting: FAMILY MEDICINE
Payer: MEDICARE

## 2018-08-25 VITALS — BODY MASS INDEX: 28.7 KG/M2

## 2018-08-25 DIAGNOSIS — C79.52: ICD-10-CM

## 2018-08-25 DIAGNOSIS — Z79.82: ICD-10-CM

## 2018-08-25 DIAGNOSIS — I07.1: ICD-10-CM

## 2018-08-25 DIAGNOSIS — Z88.8: ICD-10-CM

## 2018-08-25 DIAGNOSIS — Z80.3: ICD-10-CM

## 2018-08-25 DIAGNOSIS — E89.0: ICD-10-CM

## 2018-08-25 DIAGNOSIS — E78.5: ICD-10-CM

## 2018-08-25 DIAGNOSIS — Z80.0: ICD-10-CM

## 2018-08-25 DIAGNOSIS — I25.2: ICD-10-CM

## 2018-08-25 DIAGNOSIS — Z79.890: ICD-10-CM

## 2018-08-25 DIAGNOSIS — D72.829: ICD-10-CM

## 2018-08-25 DIAGNOSIS — Z90.710: ICD-10-CM

## 2018-08-25 DIAGNOSIS — Z80.8: ICD-10-CM

## 2018-08-25 DIAGNOSIS — I44.0: ICD-10-CM

## 2018-08-25 DIAGNOSIS — Z79.84: ICD-10-CM

## 2018-08-25 DIAGNOSIS — R26.81: ICD-10-CM

## 2018-08-25 DIAGNOSIS — C78.7: Primary | ICD-10-CM

## 2018-08-25 DIAGNOSIS — Z91.041: ICD-10-CM

## 2018-08-25 DIAGNOSIS — I50.30: ICD-10-CM

## 2018-08-25 DIAGNOSIS — Z88.1: ICD-10-CM

## 2018-08-25 DIAGNOSIS — Z79.899: ICD-10-CM

## 2018-08-25 DIAGNOSIS — C85.10: ICD-10-CM

## 2018-08-25 DIAGNOSIS — Z87.442: ICD-10-CM

## 2018-08-25 DIAGNOSIS — E86.0: ICD-10-CM

## 2018-08-25 DIAGNOSIS — E11.65: ICD-10-CM

## 2018-08-25 DIAGNOSIS — N17.9: ICD-10-CM

## 2018-08-25 DIAGNOSIS — D64.9: ICD-10-CM

## 2018-08-25 DIAGNOSIS — I11.0: ICD-10-CM

## 2018-08-25 DIAGNOSIS — G47.33: ICD-10-CM

## 2018-08-25 LAB
ALBUMIN SERPL-MCNC: 3.8 G/DL (ref 3.5–5)
ALP SERPL-CCNC: 254 U/L (ref 38–126)
ALT SERPL-CCNC: 29 U/L (ref 9–52)
ANION GAP SERPL CALC-SCNC: 13 MMOL/L
APTT BLD: 19.9 SEC (ref 22–30)
AST SERPL-CCNC: 31 U/L (ref 14–36)
BUN SERPL-SCNC: 27 MG/DL (ref 7–17)
CALCIUM SPEC-MCNC: 12 MG/DL (ref 8.4–10.2)
CELLS COUNTED: 100
CHLORIDE SERPL-SCNC: 101 MMOL/L (ref 98–107)
CK SERPL-CCNC: <20 U/L (ref 30–135)
CO2 SERPL-SCNC: 26 MMOL/L (ref 22–30)
EOSINOPHIL # BLD MANUAL: 0.26 K/UL (ref 0–0.7)
ERYTHROCYTE [DISTWIDTH] IN BLOOD BY AUTOMATED COUNT: 4.33 M/UL (ref 3.8–5.4)
ERYTHROCYTE [DISTWIDTH] IN BLOOD: 17 % (ref 11.5–15.5)
GLUCOSE BLD-MCNC: 122 MG/DL (ref 75–99)
GLUCOSE BLD-MCNC: 73 MG/DL (ref 75–99)
GLUCOSE SERPL-MCNC: 73 MG/DL (ref 74–99)
HCT VFR BLD AUTO: 34.6 % (ref 34–46)
HGB BLD-MCNC: 10.6 GM/DL (ref 11.4–16)
INR PPP: 1 (ref ?–1.2)
LYMPHOCYTES # BLD MANUAL: 1.58 K/UL (ref 1–4.8)
MCH RBC QN AUTO: 24.4 PG (ref 25–35)
MCHC RBC AUTO-ENTMCNC: 30.5 G/DL (ref 31–37)
MCV RBC AUTO: 79.9 FL (ref 80–100)
MONOCYTES # BLD MANUAL: 1.45 K/UL (ref 0–1)
NEUTROPHILS NFR BLD MANUAL: 75 %
NEUTS SEG # BLD MANUAL: 9.9 K/UL (ref 1.3–7.7)
PH UR: 5.5 [PH] (ref 5–8)
PLATELET # BLD AUTO: 510 K/UL (ref 150–450)
POTASSIUM SERPL-SCNC: 4.8 MMOL/L (ref 3.5–5.1)
PROT SERPL-MCNC: 7 G/DL (ref 6.3–8.2)
PT BLD: 9.5 SEC (ref 9–12)
SODIUM SERPL-SCNC: 140 MMOL/L (ref 137–145)
SP GR UR: 1.02 (ref 1–1.03)
SQUAMOUS UR QL AUTO: 7 /HPF (ref 0–4)
TROPONIN I SERPL-MCNC: <0.012 NG/ML (ref 0–0.03)
UROBILINOGEN UR QL STRIP: 2 MG/DL (ref ?–2)
WBC # BLD AUTO: 13.2 K/UL (ref 3.8–10.6)
WBC #/AREA URNS HPF: 4 /HPF (ref 0–5)

## 2018-08-25 PROCEDURE — 82747 ASSAY OF FOLIC ACID RBC: CPT

## 2018-08-25 PROCEDURE — 82553 CREATINE MB FRACTION: CPT

## 2018-08-25 PROCEDURE — 88341 IMHCHEM/IMCYTCHM EA ADD ANTB: CPT

## 2018-08-25 PROCEDURE — 96365 THER/PROPH/DIAG IV INF INIT: CPT

## 2018-08-25 PROCEDURE — 76942 ECHO GUIDE FOR BIOPSY: CPT

## 2018-08-25 PROCEDURE — 83880 ASSAY OF NATRIURETIC PEPTIDE: CPT

## 2018-08-25 PROCEDURE — 88307 TISSUE EXAM BY PATHOLOGIST: CPT

## 2018-08-25 PROCEDURE — 83036 HEMOGLOBIN GLYCOSYLATED A1C: CPT

## 2018-08-25 PROCEDURE — 83921 ORGANIC ACID SINGLE QUANT: CPT

## 2018-08-25 PROCEDURE — 80053 COMPREHEN METABOLIC PANEL: CPT

## 2018-08-25 PROCEDURE — 85025 COMPLETE CBC W/AUTO DIFF WBC: CPT

## 2018-08-25 PROCEDURE — 80048 BASIC METABOLIC PNL TOTAL CA: CPT

## 2018-08-25 PROCEDURE — 93005 ELECTROCARDIOGRAM TRACING: CPT

## 2018-08-25 PROCEDURE — 71260 CT THORAX DX C+: CPT

## 2018-08-25 PROCEDURE — 83540 ASSAY OF IRON: CPT

## 2018-08-25 PROCEDURE — 85730 THROMBOPLASTIN TIME PARTIAL: CPT

## 2018-08-25 PROCEDURE — 70450 CT HEAD/BRAIN W/O DYE: CPT

## 2018-08-25 PROCEDURE — 96376 TX/PRO/DX INJ SAME DRUG ADON: CPT

## 2018-08-25 PROCEDURE — 80061 LIPID PANEL: CPT

## 2018-08-25 PROCEDURE — 96361 HYDRATE IV INFUSION ADD-ON: CPT

## 2018-08-25 PROCEDURE — 82728 ASSAY OF FERRITIN: CPT

## 2018-08-25 PROCEDURE — 83550 IRON BINDING TEST: CPT

## 2018-08-25 PROCEDURE — 85610 PROTHROMBIN TIME: CPT

## 2018-08-25 PROCEDURE — 88173 CYTOPATH EVAL FNA REPORT: CPT

## 2018-08-25 PROCEDURE — 84484 ASSAY OF TROPONIN QUANT: CPT

## 2018-08-25 PROCEDURE — 74177 CT ABD & PELVIS W/CONTRAST: CPT

## 2018-08-25 PROCEDURE — 47000 NEEDLE BIOPSY OF LIVER PERQ: CPT

## 2018-08-25 PROCEDURE — 88342 IMHCHEM/IMCYTCHM 1ST ANTB: CPT

## 2018-08-25 PROCEDURE — 88305 TISSUE EXAM BY PATHOLOGIST: CPT

## 2018-08-25 PROCEDURE — 82550 ASSAY OF CK (CPK): CPT

## 2018-08-25 PROCEDURE — 99285 EMERGENCY DEPT VISIT HI MDM: CPT

## 2018-08-25 PROCEDURE — 81001 URINALYSIS AUTO W/SCOPE: CPT

## 2018-08-25 PROCEDURE — 85045 AUTOMATED RETICULOCYTE COUNT: CPT

## 2018-08-25 PROCEDURE — 36415 COLL VENOUS BLD VENIPUNCTURE: CPT

## 2018-08-25 PROCEDURE — 71046 X-RAY EXAM CHEST 2 VIEWS: CPT

## 2018-08-25 PROCEDURE — 82607 VITAMIN B-12: CPT

## 2018-08-25 PROCEDURE — 83615 LACTATE (LD) (LDH) ENZYME: CPT

## 2018-08-25 PROCEDURE — 87040 BLOOD CULTURE FOR BACTERIA: CPT

## 2018-08-25 RX ADMIN — GABAPENTIN SCH MG: 100 CAPSULE ORAL at 22:17

## 2018-08-25 RX ADMIN — LISINOPRIL SCH: 20 TABLET ORAL at 22:09

## 2018-08-25 RX ADMIN — FUROSEMIDE SCH MG: 20 TABLET ORAL at 22:16

## 2018-08-25 NOTE — ED
General Adult HPI





- General


Chief complaint: Weakness


Stated complaint: lethargic


Time Seen by Provider: 08/25/18 18:18


Source: patient, EMS, RN notes reviewed


Mode of arrival: EMS


Limitations: no limitations





- History of Present Illness


Initial comments: 





63-year-old female presents emergency Department via EMS with multiple 

complaints.  Patient states she's had generalized weakness over the last 3-4 

weeks which has worsened she states that she sleeps mostly today because she 

has no energy.  She states that she's been having increased difficulty 

ambulating states that she is very unsteady with her gait and requires the use 

of assistive device at this time.  Patient states that today she developed 

worsening symptoms including severe nausea and had vomiting just prior to EMS 

arriving.  Patient also complained that she had some mild chest discomfort and 

she did state that it's been intermittent.  Patient denies any known fever, 

chills.  She states that she just says generalized fatigue.  She states that 

she has no appetite that her blood sugars have been labile.  Patient denies any 

focal weakness.  She has no current headache.  Patient does state that she has 

been diagnosed with stage IV non-Hodgkin's lymphoma by Dr. ervin.  Patient states 

she has no current treatment.





- Related Data


 Home Medications











 Medication  Instructions  Recorded  Confirmed


 


Furosemide 20 mg PO BID 07/24/14 06/28/18


 


Gabapentin 100 mg PO QAM 07/24/14 06/28/18


 


Lisinopril [Zestril] 20 mg PO HS 07/24/14 06/28/18


 


Potassium Chloride [Klor-Con 10] 10 meq PO DAILY 07/24/14 06/28/18


 


Aspirin 162 mg PO DAILY 10/09/15 06/28/18


 


Gabapentin [Neurontin] 200 mg PO HS 10/09/15 06/28/18


 


Levothyroxine Sodium [Synthroid] 125 mcg PO [id number] 10/09/15 06/28/18


 


amLODIPine [Norvasc] 5 mg PO QAM 10/09/15 06/28/18


 


metFORMIN HCL 1,000 mg PO BID 10/09/15 06/28/18


 


Cetirizine HCl [Zyrtec] 10 mg PO HS 03/28/18 06/28/18


 


Cholecalciferol [Vitamin D3] 1,000 unit PO DAILY 03/28/18 06/28/18


 


Glimepiride [Amaryl] 2 mg PO [id number] 03/28/18 06/28/18


 


Multivitamins, Thera [Multivitamin 1 tab PO DAILY 03/28/18 06/28/18





(formulary)]   


 


ALPRAZolam [Xanax] 1 mg PO DAILY PRN 04/16/18 06/28/18


 


Atorvastatin Calcium [Lipitor] 20 mg PO HS 04/16/18 06/28/18


 


HYDROcodone/APAP 7.5-325MG [Norco 1 tab PO Q6HR PRN 04/16/18 06/28/18





7.5-325]   


 


Naproxen Sodium [Aleve] 220 mg PO Q48H PRN 06/28/18 06/28/18











 Allergies











Allergy/AdvReac Type Severity Reaction Status Date / Time


 


haloperidol [From Haldol] Allergy  Unknown Verified 08/25/18 18:07


 


haloperidol lactate Allergy  Unknown Verified 08/25/18 18:07





[From Haldol]     


 


Iodinated Contrast- Oral and Allergy  Rash/Hives Verified 08/25/18 18:07





IV Dye     





[Iodinated Contrast Media -     





IV Dye]     


 


adhesive AdvReac Severe sores Verified 08/25/18 18:07


 


ciprofloxacin [From Cipro] AdvReac  Heartburn Verified 08/25/18 18:07


 


steri-strips AdvReac Severe sore Uncoded 08/25/18 18:07














Review of Systems


ROS Statement: 


Those systems with pertinent positive or pertinent negative responses have been 

documented in the HPI.





ROS Other: All systems not noted in ROS Statement are negative.





Past Medical History


Past Medical History: Cancer, Heart Failure, Diabetes Mellitus, Hyperlipidemia, 

Hypertension, Liver Disease, Sleep Apnea/CPAP/BIPAP, Thyroid Disorder


Additional Past Medical History / Comment(s): Closed head injury 1989, 

sometimes has walking difficullties, uses walker as needed for long walks, no 

sense of smell. Hx of  kidney stones. Recent diagnosis of B cell non-hodgkins 

lymphona and liver biopsy with 8 lesions seen on liver. CPAP use. Had 

thyroidectomy. Patient states she has NOT had MI.


Last Myocardial Infarction Date:: 1989


History of Any Multi-Drug Resistant Organisms: None Reported


Past Surgical History: Cholecystectomy, Hysterectomy, Tonsillectomy


Additional Past Surgical History / Comment(s): Thyroidectomy.


Past Anesthesia/Blood Transfusion Reactions: Postoperative Nausea & Vomiting (

PONV)


Past Psychological History: Anxiety


Smoking Status: Never smoker


Past Alcohol Use History: None Reported


Past Drug Use History: None Reported





- Past Family History


  ** Mother


Family Medical History: Cancer


Additional Family Medical History / Comment(s): breast CA





  ** Father


Family Medical History: Cancer


Additional Family Medical History / Comment(s): colon CA





  ** Sister(s)


Family Medical History: Cancer


Additional Family Medical History / Comment(s): Skin cancer





General Exam


Limitations: no limitations


General appearance: alert, in no apparent distress


Head exam: Present: atraumatic, normocephalic, normal inspection


Eye exam: Present: normal appearance, PERRL, EOMI.  Absent: scleral icterus, 

conjunctival injection, periorbital swelling


ENT exam: Present: normal exam, normal oropharynx, mucous membranes moist, TM's 

normal bilaterally


Neck exam: Present: normal inspection, full ROM.  Absent: tenderness, 

meningismus, lymphadenopathy


Respiratory exam: Present: normal lung sounds bilaterally.  Absent: respiratory 

distress, wheezes, rales, rhonchi, stridor


Cardiovascular Exam: Present: regular rate, normal rhythm, normal heart sounds.

  Absent: systolic murmur, diastolic murmur, rubs, gallop, clicks


GI/Abdominal exam: Present: soft, normal bowel sounds.  Absent: distended, 

tenderness, guarding, rebound, rigid


Extremities exam: Present: normal inspection, full ROM, normal capillary 

refill.  Absent: tenderness, pedal edema, joint swelling, calf tenderness


Neurological exam: Present: alert, oriented X3, CN II-XII intact, reflexes 

normal.  Absent: motor sensory deficit


Skin exam: Present: warm, dry, intact, normal color.  Absent: rash





Course


 Vital Signs











  08/25/18 08/25/18





  18:02 19:28


 


Temperature 97.7 F 


 


Pulse Rate 70 88


 


Respiratory 18 16





Rate  


 


Blood Pressure 108/64 117/57


 


O2 Sat by Pulse 96 96





Oximetry  














EKG Findings





- EKG Comments:


EKG Findings:: EKG performed at 19:37 sinus rhythm with a rate of 82  QRS 

90 QT//448





Medical Decision Making





- Medical Decision Making





63-year-old female presented for multiple complaints.  Patient has progressive 

weakness, unsteady gait, chest discomfort, nausea vomiting.  Patient will be 

admitted for observation, repeat cardiac enzymes 





- Lab Data


Result diagrams: 


 08/25/18 18:10





 08/25/18 18:10


 Lab Results











  08/25/18 08/25/18 08/25/18 Range/Units





  18:09 18:10 18:10 


 


WBC    13.2 H  (3.8-10.6)  k/uL


 


RBC    4.33  (3.80-5.40)  m/uL


 


Hgb    10.6 L  (11.4-16.0)  gm/dL


 


Hct    34.6  (34.0-46.0)  %


 


MCV    79.9 L  (80.0-100.0)  fL


 


MCH    24.4 L  (25.0-35.0)  pg


 


MCHC    30.5 L  (31.0-37.0)  g/dL


 


RDW    17.0 H  (11.5-15.5)  %


 


Plt Count    510 H  (150-450)  k/uL


 


Neutrophils % (Manual)    75  %


 


Lymphocytes % (Manual)    12  %


 


Monocytes % (Manual)    11  %


 


Eosinophils % (Manual)    2  %


 


Neutrophils # (Manual)    9.90 H  (1.3-7.7)  k/uL


 


Lymphocytes # (Manual)    1.58  (1.0-4.8)  k/uL


 


Monocytes # (Manual)    1.45 H  (0-1.0)  k/uL


 


Eosinophils # (Manual)    0.26  (0-0.7)  k/uL


 


Nucleated RBCs    0  (0-0)  /100 WBC


 


Manual Slide Review    Performed  


 


Hypochromasia    Moderate  


 


Anisocytosis    Slight  


 


Microcytosis    Slight  


 


PT     (9.0-12.0)  sec


 


INR     (<1.2)  


 


APTT     (22.0-30.0)  sec


 


Sodium     (137-145)  mmol/L


 


Potassium     (3.5-5.1)  mmol/L


 


Chloride     ()  mmol/L


 


Carbon Dioxide     (22-30)  mmol/L


 


Anion Gap     mmol/L


 


BUN     (7-17)  mg/dL


 


Creatinine     (0.52-1.04)  mg/dL


 


Est GFR (CKD-EPI)AfAm     (>60 ml/min/1.73 sqM)  


 


Est GFR (CKD-EPI)NonAf     (>60 ml/min/1.73 sqM)  


 


Glucose     (74-99)  mg/dL


 


POC Glucose (mg/dL)  73 L    (75-99)  mg/dL


 


POC Glu Operater ID  Salgat, [place]    


 


Calcium     (8.4-10.2)  mg/dL


 


Total Bilirubin     (0.2-1.3)  mg/dL


 


AST     (14-36)  U/L


 


ALT     (9-52)  U/L


 


Alkaline Phosphatase     ()  U/L


 


Total Creatine Kinase   <20 L   ()  U/L


 


CK-MB (CK-2)   <0.2   (0.0-2.4)  ng/mL


 


CK-MB (CK-2) Rel Index      


 


Troponin I   <0.012   (0.000-0.034)  ng/mL


 


NT-Pro-B Natriuret Pep     pg/mL


 


Total Protein     (6.3-8.2)  g/dL


 


Albumin     (3.5-5.0)  g/dL


 


Urine Color     


 


Urine Appearance     (Clear)  


 


Urine pH     (5.0-8.0)  


 


Ur Specific Gravity     (1.001-1.035)  


 


Urine Protein     (Negative)  


 


Urine Glucose (UA)     (Negative)  


 


Urine Ketones     (Negative)  


 


Urine Blood     (Negative)  


 


Urine Nitrite     (Negative)  


 


Urine Bilirubin     (Negative)  


 


Urine Urobilinogen     (<2.0)  mg/dL


 


Ur Leukocyte Esterase     (Negative)  


 


Urine WBC     (0-5)  /hpf


 


Ur Squamous Epith Cells     (0-4)  /hpf


 


Urine Bacteria     (None)  /hpf


 


Urine Mucus     (None)  /hpf














  08/25/18 08/25/18 08/25/18 Range/Units





  18:10 18:10 18:10 


 


WBC     (3.8-10.6)  k/uL


 


RBC     (3.80-5.40)  m/uL


 


Hgb     (11.4-16.0)  gm/dL


 


Hct     (34.0-46.0)  %


 


MCV     (80.0-100.0)  fL


 


MCH     (25.0-35.0)  pg


 


MCHC     (31.0-37.0)  g/dL


 


RDW     (11.5-15.5)  %


 


Plt Count     (150-450)  k/uL


 


Neutrophils % (Manual)     %


 


Lymphocytes % (Manual)     %


 


Monocytes % (Manual)     %


 


Eosinophils % (Manual)     %


 


Neutrophils # (Manual)     (1.3-7.7)  k/uL


 


Lymphocytes # (Manual)     (1.0-4.8)  k/uL


 


Monocytes # (Manual)     (0-1.0)  k/uL


 


Eosinophils # (Manual)     (0-0.7)  k/uL


 


Nucleated RBCs     (0-0)  /100 WBC


 


Manual Slide Review     


 


Hypochromasia     


 


Anisocytosis     


 


Microcytosis     


 


PT    9.5  (9.0-12.0)  sec


 


INR    1.0  (<1.2)  


 


APTT    19.9 L  (22.0-30.0)  sec


 


Sodium  140    (137-145)  mmol/L


 


Potassium  4.8    (3.5-5.1)  mmol/L


 


Chloride  101    ()  mmol/L


 


Carbon Dioxide  26    (22-30)  mmol/L


 


Anion Gap  13    mmol/L


 


BUN  27 H    (7-17)  mg/dL


 


Creatinine  1.10 H    (0.52-1.04)  mg/dL


 


Est GFR (CKD-EPI)AfAm  61    (>60 ml/min/1.73 sqM)  


 


Est GFR (CKD-EPI)NonAf  53    (>60 ml/min/1.73 sqM)  


 


Glucose  73 L    (74-99)  mg/dL


 


POC Glucose (mg/dL)     (75-99)  mg/dL


 


POC Glu Operater ID     


 


Calcium  12.0 H    (8.4-10.2)  mg/dL


 


Total Bilirubin  0.6    (0.2-1.3)  mg/dL


 


AST  31    (14-36)  U/L


 


ALT  29    (9-52)  U/L


 


Alkaline Phosphatase  254 H    ()  U/L


 


Total Creatine Kinase     ()  U/L


 


CK-MB (CK-2)     (0.0-2.4)  ng/mL


 


CK-MB (CK-2) Rel Index     


 


Troponin I     (0.000-0.034)  ng/mL


 


NT-Pro-B Natriuret Pep   70   pg/mL


 


Total Protein  7.0    (6.3-8.2)  g/dL


 


Albumin  3.8    (3.5-5.0)  g/dL


 


Urine Color     


 


Urine Appearance     (Clear)  


 


Urine pH     (5.0-8.0)  


 


Ur Specific Gravity     (1.001-1.035)  


 


Urine Protein     (Negative)  


 


Urine Glucose (UA)     (Negative)  


 


Urine Ketones     (Negative)  


 


Urine Blood     (Negative)  


 


Urine Nitrite     (Negative)  


 


Urine Bilirubin     (Negative)  


 


Urine Urobilinogen     (<2.0)  mg/dL


 


Ur Leukocyte Esterase     (Negative)  


 


Urine WBC     (0-5)  /hpf


 


Ur Squamous Epith Cells     (0-4)  /hpf


 


Urine Bacteria     (None)  /hpf


 


Urine Mucus     (None)  /hpf














  08/25/18 Range/Units





  19:37 


 


WBC   (3.8-10.6)  k/uL


 


RBC   (3.80-5.40)  m/uL


 


Hgb   (11.4-16.0)  gm/dL


 


Hct   (34.0-46.0)  %


 


MCV   (80.0-100.0)  fL


 


MCH   (25.0-35.0)  pg


 


MCHC   (31.0-37.0)  g/dL


 


RDW   (11.5-15.5)  %


 


Plt Count   (150-450)  k/uL


 


Neutrophils % (Manual)   %


 


Lymphocytes % (Manual)   %


 


Monocytes % (Manual)   %


 


Eosinophils % (Manual)   %


 


Neutrophils # (Manual)   (1.3-7.7)  k/uL


 


Lymphocytes # (Manual)   (1.0-4.8)  k/uL


 


Monocytes # (Manual)   (0-1.0)  k/uL


 


Eosinophils # (Manual)   (0-0.7)  k/uL


 


Nucleated RBCs   (0-0)  /100 WBC


 


Manual Slide Review   


 


Hypochromasia   


 


Anisocytosis   


 


Microcytosis   


 


PT   (9.0-12.0)  sec


 


INR   (<1.2)  


 


APTT   (22.0-30.0)  sec


 


Sodium   (137-145)  mmol/L


 


Potassium   (3.5-5.1)  mmol/L


 


Chloride   ()  mmol/L


 


Carbon Dioxide   (22-30)  mmol/L


 


Anion Gap   mmol/L


 


BUN   (7-17)  mg/dL


 


Creatinine   (0.52-1.04)  mg/dL


 


Est GFR (CKD-EPI)AfAm   (>60 ml/min/1.73 sqM)  


 


Est GFR (CKD-EPI)NonAf   (>60 ml/min/1.73 sqM)  


 


Glucose   (74-99)  mg/dL


 


POC Glucose (mg/dL)   (75-99)  mg/dL


 


POC Glu Operater ID   


 


Calcium   (8.4-10.2)  mg/dL


 


Total Bilirubin   (0.2-1.3)  mg/dL


 


AST   (14-36)  U/L


 


ALT   (9-52)  U/L


 


Alkaline Phosphatase   ()  U/L


 


Total Creatine Kinase   ()  U/L


 


CK-MB (CK-2)   (0.0-2.4)  ng/mL


 


CK-MB (CK-2) Rel Index   


 


Troponin I   (0.000-0.034)  ng/mL


 


NT-Pro-B Natriuret Pep   pg/mL


 


Total Protein   (6.3-8.2)  g/dL


 


Albumin   (3.5-5.0)  g/dL


 


Urine Color  Yellow  


 


Urine Appearance  Cloudy H  (Clear)  


 


Urine pH  5.5  (5.0-8.0)  


 


Ur Specific Gravity  1.020  (1.001-1.035)  


 


Urine Protein  Trace H  (Negative)  


 


Urine Glucose (UA)  Negative  (Negative)  


 


Urine Ketones  Negative  (Negative)  


 


Urine Blood  Negative  (Negative)  


 


Urine Nitrite  Negative  (Negative)  


 


Urine Bilirubin  Negative  (Negative)  


 


Urine Urobilinogen  2.0  (<2.0)  mg/dL


 


Ur Leukocyte Esterase  Moderate H  (Negative)  


 


Urine WBC  4  (0-5)  /hpf


 


Ur Squamous Epith Cells  7 H  (0-4)  /hpf


 


Urine Bacteria  Rare H  (None)  /hpf


 


Urine Mucus  Moderate H  (None)  /hpf














Disposition


Clinical Impression: 


 Weakness, Chest pain, Non Hodgkin's lymphoma, Anemia, Unsteady gait





Disposition: ADMITTED AS IP TO THIS [facility]


Condition: Fair


Referrals: 


Faustino Villegas MD [Primary Care Provider] - 1-2 days

## 2018-08-25 NOTE — XR
EXAMINATION TYPE: XR chest 2V

 

DATE OF EXAM: 8/25/2018

 

COMPARISON: Right chest x-ray 3/28/2018

 

HISTORY: Weakness, fatigue and nausea

 

TECHNIQUE:  Frontal and lateral views of the chest are obtained.

 

FINDINGS:  There is no change in focal air space opacity, pleural effusion, or pneumothorax seen. Nod
ular density in the left upper lobe is stable and is calcified. The cardiac silhouette size is stable
, borderline enlarged.   The osseous structures are intact.

 

IMPRESSION:  No acute cardiopulmonary process.

## 2018-08-25 NOTE — CT
EXAMINATION TYPE: CT brain wo con

 

DATE OF EXAM: 8/25/2018

 

COMPARISON: Prior head CT 5/20/2013

 

HISTORY: Weakness.

 

CT DLP: 866.2 mGycm

Automated exposure control for dose reduction was used. Helical acquisition through the brain

 

FINDINGS: 

No interval change. Focal encephalomalacia present in the left upper lobe is unchanged. There is abigail
ical atrophy. There is encephalomalacia also present in the left frontal lobe, periventricular white 
matter shows patchy low attenuation. Orbits show symmetric appearance. Calvarium is intact. No hemorr
valery or hydrocephalus. Mastoid air cells, paranasal sinuses as visualized are normal.

 

IMPRESSION: 

NO ACUTE ABNORMALITY. CHRONIC FINDINGS ABOVE.

## 2018-08-26 LAB
CHOLEST SERPL-MCNC: 127 MG/DL (ref ?–200)
CK SERPL-CCNC: <20 U/L (ref 30–135)
CK SERPL-CCNC: <20 U/L (ref 30–135)
GLUCOSE BLD-MCNC: 161 MG/DL (ref 75–99)
GLUCOSE BLD-MCNC: 168 MG/DL (ref 75–99)
GLUCOSE BLD-MCNC: 226 MG/DL (ref 75–99)
GLUCOSE BLD-MCNC: 92 MG/DL (ref 75–99)
HDLC SERPL-MCNC: 29 MG/DL (ref 40–60)
LDLC SERPL CALC-MCNC: 70 MG/DL (ref 0–99)
TRIGL SERPL-MCNC: 139 MG/DL (ref ?–150)
TROPONIN I SERPL-MCNC: <0.012 NG/ML (ref 0–0.03)
TROPONIN I SERPL-MCNC: <0.012 NG/ML (ref 0–0.03)

## 2018-08-26 RX ADMIN — POTASSIUM CHLORIDE SCH MEQ: 750 TABLET, EXTENDED RELEASE ORAL at 09:16

## 2018-08-26 RX ADMIN — GLIMEPIRIDE SCH: 2 TABLET ORAL at 11:16

## 2018-08-26 RX ADMIN — BARIUM SULFATE PRN ML: 21 SUSPENSION ORAL at 16:59

## 2018-08-26 RX ADMIN — CEFAZOLIN SCH MLS/HR: 330 INJECTION, POWDER, FOR SOLUTION INTRAMUSCULAR; INTRAVENOUS at 18:06

## 2018-08-26 RX ADMIN — BARIUM SULFATE PRN ML: 21 SUSPENSION ORAL at 14:29

## 2018-08-26 RX ADMIN — GABAPENTIN SCH MG: 100 CAPSULE ORAL at 09:15

## 2018-08-26 RX ADMIN — GABAPENTIN SCH MG: 100 CAPSULE ORAL at 20:16

## 2018-08-26 RX ADMIN — INSULIN ASPART SCH: 100 INJECTION, SOLUTION INTRAVENOUS; SUBCUTANEOUS at 19:07

## 2018-08-26 RX ADMIN — FUROSEMIDE SCH MG: 20 TABLET ORAL at 09:15

## 2018-08-26 RX ADMIN — HYDROCODONE BITARTRATE AND ACETAMINOPHEN PRN EACH: 7.5; 325 TABLET ORAL at 20:16

## 2018-08-26 RX ADMIN — INSULIN ASPART SCH: 100 INJECTION, SOLUTION INTRAVENOUS; SUBCUTANEOUS at 20:16

## 2018-08-26 RX ADMIN — LISINOPRIL SCH MG: 20 TABLET ORAL at 20:16

## 2018-08-26 RX ADMIN — FUROSEMIDE SCH MG: 20 TABLET ORAL at 20:15

## 2018-08-26 NOTE — P.PN
Progress Note - Text





Patient interviewed and examined by nurse practitioner.  She reviewed the data 

with me in detail.  Normal ECG normal cardiac enzymes the chest pain story is 

questionable


No further cardiac workup indicated


Will sign off

## 2018-08-26 NOTE — CT
EXAMINATION TYPE: CT ChestAbdPelvis w con

 

DATE OF EXAM: 8/26/2018

 

COMPARISON: Pet/CT 5/5/2018

 

HISTORY: Follow up for progression on lymphoma. Mets to bone

 

CT DLP: 1920 mGycm

Automated exposure control for dose reduction was used.

 

CONTRAST: 

CT scan of the chest, abdomen and pelvis is performed with Oral Contrast and with IV Contrast, patien
t injected with 80 mL of Isovue 300.

 

FINDINGS:

 

LUNGS: The lungs are grossly clear, there is no concerning parenchymal mass or nodule identified.   T
here is no pleural effusion or pneumothorax seen.  The tracheobronchial tree is patent.

 

MEDIASTINUM: There are no greater than 1 cm hilar or mediastinal lymph nodes.   No pericardial effusi
on is seen.  

 

OTHER:  Enlarged right axillary lymph node is present which measures up to 1.6 cm and was not present
 on prior examination.

 

LIVER/GB: Interval development of areas of low attenuation are seen scattered throughout the liver. T
he largest discrete areas in the segment 4, is extremely ill-defined but roughly measures up to 6.0 x
 4.4 cm up. An additional smaller region of ill-defined low-attenuation segment 4A roughly measures 2
.3 x 3.0 cm.

 

The gallbladder is surgically absent.

 

PANCREAS: No significant abnormality is seen.

 

SPLEEN: No significant abnormality is seen.

 

ADRENALS: No significant abnormality is seen.

 

KIDNEYS: Punctate bilateral nonobstructing renal calculi.

 

BOWEL:  No significant abnormality is seen.

 

REPRODUCTIVE ORGANS: No gross abnormality seen.

 

LYMPH NODES: Interval enlargement of periaortic, pericaval, gastrohepatic and periportal adenopathy. 
A new periportal lymph node now measures up to 3.4 cm with an additional periportal node measuring 3.
0 x 4.8 cm. The largest retroaortic node is located just inferior to the left renal vein, has enlarge
d in the interval now measuring 3.4 x 6.2 cm. Extensive additional new periaortic nodes are also pres
ent. Scattered but not pathologically enlarged inguinal nodes are evident. No obviously enlarged nadya
c chain nodes.

 

OSSEOUS STRUCTURES: Unchanged multilevel degenerative changes.

 

IMPRESSION: 

1. Overall findings are consistent with progression of disease.

2. Increased size and number of periaortic, pericaval, gastrohepatic and periportal lymph nodes.

3. Interval development of numerous regions of ill-defined hepatic low attenuation which are most con
sistent with hepatic metastasis.

4. Interval development of enlarged left axillary lymph node.

## 2018-08-26 NOTE — P.CRDCN
History of Present Illness


History of present illness: 





This is a pleasant 63-year-old  female past medical history 

significant for diabetes mellitus, dyslipidemia, hypertension, diastolic 

dysfunction, obstructive sleep apnea and was recently diagnosed with non-Hodgkin

's lymphoma stage IV per Dr. Dumont a month and a half ago.  She denies history of 

coronary artery disease and is followed with a cardiologist out of town once or 

twice but has not seen anybody in the previous 5-6 years.  We have been asked 

to see her in consultation for symptoms of chest pain.  Upon evaluation of the 

patient she denies ever having had any symptoms of chest discomfort.  She 

states she came to the hospital because for the previous month she has had 

increased weakness and fatigue.  She states she sleeps approximately 20 hours 

per day.  She has also been struggling with blood sugars her blood sugar will 

drop.  Low to the 40-50 range and then will spike very high up to 200.  She 

denies symptoms of chest pain, shortness of breath, dizziness, nausea, vomiting 

or diaphoresis.  She states approximately 5-6 years ago she had an outpatient 

stress test that was abnormal and for that reason she underwent cardiac 

catheterization which she states was huang and she receivedstents at that time 

l.  His records are unavailable to me at this time.  She was started on IV 

heparin infusion upon admission.





EKG reveals sinus mechanism with first-degree AV block and nonspecific ST 

abnormalities noted.  No acute ST or T-wave changes.  No changes from previous 

EKGs.


Chest x-ray is negative for an acute cardiopulmonary process.


CT brain negative for an acute process.


Laboratory data reviewed, WBC 13.2, hemoglobin 10.6, platelets 510, sodium 140, 

potassium 4.8, creatinine 1.1, proBNP 70, cardiac enzymes negative 3, LDL 70 

and HDL 29.


Most recent echocardiogram performed March 2018 reveals preserved left 

ventricular systolic function with ejection fraction 50-55%, mild TR noted.


Current cardiac medications include Lasix 20 mg twice a day, atorvastatin 20 mg 

daily, lisinopril 20 mg daily, amlodipine 5 mg daily, aspirin 162 mg daily.  

She also takes Synthroid, Zyrtec, Neurontin, metformin, Amaryl the Norco and 

Xanax.





At the time of my exam:


CONSTITUTIONAL: Denies fever. Denies chills.


EYES: Denies blurred vision. Denies vision changes. Denies eye pain.


EARS, NOSE, MOUTH & THROAT: Denies headache. Denies sore throat. Denies ear 

pain.


CARDIOVASCULAR: Denies chest pain. Denies shortness of breath. Denies 

orthopnea. Denies PND. Denies palpitations.


RESPIRATORY: Denies cough. 


GASTROINTESTINAL: Denies abdominal pain. Denies diarrhea. Denies constipation. 

Denies nausea. Denies vomiting.


MUSCULOSKELETAL: Denies myalgias.


INTEGUMENTARY: Denies pruitis. Denies rash.


NEUROLOGIC: Denies numbness. Denies tingling. Denies weakness.


PSYCHIATRIC: Denies anxiety. Denies depression.


ENDOCRINE: Complains of fatigue.  Complains of 4 pound weight loss. Denies 

polydipsia. Denies polyurina.


GENITOURINARY: Denies burning, hematuria or urgency with micturation.


HEMATOLOGIC: Denies history of anemia. Denies bleeding. 








Blood pressure 119/69 heart rate 72 afebrile maintaining oxygen saturation on 

room air


GENERAL: This is a 63-year-old  female in no apparent distress at the 

time of my examination.


HEENT: Head is atraumatic, normocephalic. Pupils are equal, round. Sclerae 

anicteric. Conjunctivae are clear. Mucous membranes of the mouth are moist. 

Neck is supple. There is no jugular venous distention. No carotid bruit is 

heard.


LUNGS: Clear to auscultation no wheezes, rales or rhonchi. No chest wall 

tenderness is noted on palpation or with deep breathing.


HEART: Regular rate and rhythm without murmurs, rubs or gallops. S1 and S2 

heard.


ABDOMEN: Soft, nontender. Bowel sounds are heard. No organomegaly noted.


EXTREMITIES: No evidence of peripheral edema and no calf tenderness noted.


VASCULAR: Radial and dorsalis pedis pulses palpated, no evidence of clubbing.  


NEUROLOGIC: Patient is awake, alert and oriented x3.





ASSESSMENT


Generalized weakness and fatigue


Stage IV non-Hodgkin's lymphoma


Chronic diastolic dysfunction, currently anemic


Hypertension


Dyslipidemia





PLAN


This is a pleasant 63-year-old  female who denies ever having had any 

symptoms of chest discomfort prior to coming to the hospital.  She states she 

came in for generalized weakness and fatigue.  This is thought to be likely to 

her new diagnosis of stage IV non-Hodgkin's lymphoma.  She has no EKG evidence 

of ischemia and negative cardiac enzymes.  An acute coronary event has been 

ruled out.  Discontinue IV heparin infusion.  There is no evidence of an acute 

exacerbation of heart failure at this time.  Continue current home medications.

  No further cardiac intervention required at this time.  Ongoing medical 

management.  Thank you kindly for this consultation.





Nurse Practitioner note has been reviewed, I agree with a documented findings 

and plan of care.  Patient was seen and examined.





 








Past Medical History


Past Medical History: Cancer, Heart Failure, Diabetes Mellitus, Hyperlipidemia, 

Hypertension, Sleep Apnea/CPAP/BIPAP, Thyroid Disorder


Additional Past Medical History / Comment(s): Closed head injury 1989, 

sometimes has walking difficullties, uses walker as needed for long walks, no 

sense of smell. Hx of  kidney stones. Recent diagnosis of B cell non-hodgkins 

lymphona stage 4 and liver biopsy with 8 lesions seen on liver. CPAP use. Had 

thyroidectomy. Patient states she has NOT had MI.


Last Myocardial Infarction Date:: 1989


History of Any Multi-Drug Resistant Organisms: None Reported


Past Surgical History: Cholecystectomy, Hysterectomy, Tonsillectomy


Additional Past Surgical History / Comment(s): Thyroidectomy.


Past Anesthesia/Blood Transfusion Reactions: Postoperative Nausea & Vomiting (

PONV)


Smoking Status: Never smoker





- Past Family History


  ** Mother


Family Medical History: Cancer


Additional Family Medical History / Comment(s): breast CA





  ** Father


Family Medical History: Cancer


Additional Family Medical History / Comment(s): colon CA





  ** Sister(s)


Family Medical History: Cancer


Additional Family Medical History / Comment(s): Skin cancer





Medications and Allergies


 Home Medications











 Medication  Instructions  Recorded  Confirmed  Type


 


Furosemide 20 mg PO BID 07/24/14 08/25/18 History


 


Gabapentin 100 mg PO QAM 07/24/14 08/25/18 History


 


Lisinopril [Zestril] 20 mg PO DAILY 07/24/14 08/25/18 History


 


Potassium Chloride [Klor-Con 10] 10 meq PO DAILY 07/24/14 08/25/18 History


 


Aspirin 162 mg PO DAILY 10/09/15 08/25/18 History


 


Gabapentin [Neurontin] 200 mg PO HS 10/09/15 08/25/18 History


 


Levothyroxine Sodium [Synthroid] 125 mcg PO DAILY 10/09/15 08/25/18 History


 


amLODIPine [Norvasc] 5 mg PO QAM 10/09/15 08/25/18 History


 


metFORMIN HCL 1,000 mg PO BID 10/09/15 08/25/18 History


 


Cetirizine HCl [Zyrtec] 10 mg PO HS 03/28/18 08/25/18 History


 


Cholecalciferol [Vitamin D3] 1,000 unit PO DAILY 03/28/18 08/25/18 History


 


Glimepiride [Amaryl] 2 mg PO DAILY 03/28/18 08/25/18 History


 


Multivitamins, Thera [Multivitamin 1 tab PO DAILY 03/28/18 08/25/18 History





(formulary)]    


 


ALPRAZolam [Xanax] 1 mg PO DAILY PRN 04/16/18 08/25/18 History


 


Atorvastatin Calcium [Lipitor] 20 mg PO HS 04/16/18 08/25/18 History


 


HYDROcodone/APAP 7.5-325MG [Norco 1 tab PO Q6HR PRN 04/16/18 08/25/18 History





7.5-325]    


 


Naproxen Sodium [Aleve] 220 mg PO Q48H PRN 06/28/18 08/25/18 History


 


B Complex-Vit C-Vit E-Zinc [Z-Bec] 1 tab PO DAILY 08/25/18 08/25/18 History











 Allergies











Allergy/AdvReac Type Severity Reaction Status Date / Time


 


haloperidol [From Haldol] Allergy  Unknown Verified 08/25/18 18:07


 


haloperidol lactate Allergy  Unknown Verified 08/25/18 18:07





[From Haldol]     


 


Iodinated Contrast- Oral and Allergy  Rash/Hives Verified 08/25/18 18:07





IV Dye     





[Iodinated Contrast Media -     





IV Dye]     


 


ciprofloxacin [From Cipro] AdvReac  Heartburn Verified 08/25/18 18:07


 


steri-strips AdvReac Severe sore Uncoded 08/25/18 18:07














Physical Exam


Vitals: 


 Vital Signs











  Temp Pulse Pulse Resp BP BP Pulse Ox


 


 08/26/18 08:00  98.5 F   72  16   119/69  91 L


 


 08/26/18 03:46     16   


 


 08/26/18 03:20  98.3 F   73  16   99/65 


 


 08/26/18 00:05  98.2 F   75  16   100/54  92 L


 


 08/26/18 00:00     16   


 


 08/25/18 22:25     16   


 


 08/25/18 21:45  98.2 F   80  16   100/57  92 L


 


 08/25/18 21:37  99.1 F  80   16  101/59   94 L


 


 08/25/18 20:28  97.8 F  81   16  102/66   94 L


 


 08/25/18 19:28   88   16  117/57   96


 


 08/25/18 18:02  97.7 F  70   18  108/64   96








 Intake and Output











 08/25/18 08/26/18 08/26/18





 22:59 06:59 14:59


 


Intake Total  100.261 


 


Balance  100.261 


 


Intake:   


 


  Intake, IV Titration  100.261 





  Amount   


 


    Heparin Sod,Pork in 0.45%  100.261 





    NaCl 25,000 unit In 0.45   





    % NaCl 1 500ml.bag @ 12   





    UNITS/KG/HR 17.09 mls/hr   





    IV .Q24H [facility] Rx#:   





    [id number]   


 


Other:   


 


  Voiding Method   Toilet


 


  # Voids  1 


 


  Weight 71.2 kg  














Results





 08/25/18 18:10





 08/25/18 18:10


 Cardiac Enzymes











  08/25/18 08/25/18 08/26/18 Range/Units





  18:10 18:10 01:10 


 


AST   31   (14-36)  U/L


 


CK-MB (CK-2)  <0.2   <0.2  (0.0-2.4)  ng/mL


 


Troponin I  <0.012   <0.012  (0.000-0.034)  ng/mL














  08/26/18 Range/Units





  07:03 


 


AST   (14-36)  U/L


 


CK-MB (CK-2)  <0.2  (0.0-2.4)  ng/mL


 


Troponin I  <0.012  (0.000-0.034)  ng/mL








 Coagulation











  08/25/18 08/26/18 08/26/18 Range/Units





  18:10 01:10 07:03 


 


PT  9.5    (9.0-12.0)  sec


 


APTT  19.9 L  31.1 H  36.1 H  (22.0-30.0)  sec








 Lipids











  08/25/18 Range/Units





  18:10 


 


Triglycerides  139  (<150)  mg/dL


 


Cholesterol  127  (<200)  mg/dL


 


HDL Cholesterol  29 L  (40-60)  mg/dL








 CBC











  08/25/18 Range/Units





  18:10 


 


WBC  13.2 H  (3.8-10.6)  k/uL


 


RBC  4.33  (3.80-5.40)  m/uL


 


Hgb  10.6 L  (11.4-16.0)  gm/dL


 


Hct  34.6  (34.0-46.0)  %


 


Plt Count  510 H  (150-450)  k/uL








 Comprehensive Metabolic Panel











  08/25/18 Range/Units





  18:10 


 


Sodium  140  (137-145)  mmol/L


 


Potassium  4.8  (3.5-5.1)  mmol/L


 


Chloride  101  ()  mmol/L


 


Carbon Dioxide  26  (22-30)  mmol/L


 


BUN  27 H  (7-17)  mg/dL


 


Creatinine  1.10 H  (0.52-1.04)  mg/dL


 


Glucose  73 L  (74-99)  mg/dL


 


Calcium  12.0 H  (8.4-10.2)  mg/dL


 


AST  31  (14-36)  U/L


 


ALT  29  (9-52)  U/L


 


Alkaline Phosphatase  254 H  ()  U/L


 


Total Protein  7.0  (6.3-8.2)  g/dL


 


Albumin  3.8  (3.5-5.0)  g/dL








 Current Medications











Generic Name Dose Route Start Last Admin





  Trade Name Freq  PRN Reason Stop Dose Admin


 


Hydrocodone Bitart/Acetaminophen  1 each  08/25/18 20:04  





  [place] 7.5-325  PO   





  Q6HR PRN   





  Pain   





     





     





     


 


Alprazolam  1 mg  08/25/18 20:04  





  Xanax  PO   





  DAILY PRN   





  Anxiety   





     





     





     


 


Amlodipine Besylate  5 mg  08/26/18 09:00  08/26/18 09:15





  Norvasc  PO   5 mg





  QAM LUIS   Administration





     





     





     





     


 


Aspirin  325 mg  08/26/18 09:00  08/26/18 09:15





  Aspirin  PO   325 mg





  DAILY LUIS   Administration





     





     





     





     


 


Furosemide  20 mg  08/25/18 21:00  08/26/18 09:15





  Lasix  PO   20 mg





  BID LUIS   Administration





     





     





     





     


 


Gabapentin  100 mg  08/26/18 09:00  08/26/18 09:15





  Neurontin  PO   100 mg





  QAM LUIS   Administration





     





     





     





     


 


Gabapentin  200 mg  08/25/18 21:00  08/25/18 22:17





  Neurontin  PO   200 mg





  HS LUIS   Administration





     





     





     





     


 


Glimepiride  2 mg  08/26/18 07:30  





  Amaryl  PO   





  [id number] LUIS   





     





     





     





     


 


Heparin Sodium/Sodium Chloride  500 mls @ 17.09 mls/hr  08/25/18 20:15  08/26/ 18 02:18





  25,000 unit/ Sodium Chloride  IV   15 units/kg/hr





  .Q24H [facility]   21.36 mls/hr





     Titration





     





  Protocol   





  12 UNITS/KG/HR   


 


Levothyroxine Sodium  125 mcg  08/27/18 06:00  





  Synthroid  PO   





  [id number] LUIS   





     





     





     





     


 


Lisinopril  20 mg  08/25/18 21:00  08/25/18 22:09





  Zestril  PO   Not Given





  HS LUIS   





     





     





     





     


 


Metformin HCl  1,000 mg  08/25/18 21:00  08/25/18 22:17





  Glucophage  PO   1,000 mg





  BID LUIS   Administration





     





     





     





     


 


Nitroglycerin  0.4 mg  08/25/18 20:02  





  Nitrostat  SUBLINGUAL   





  Q5M PRN   





  Chest Pain   





     





     





     


 


Potassium Chloride  10 meq  08/26/18 09:00  08/26/18 09:16





  K-Dur 10  PO   10 meq





  DAILY LUIS   Administration





     





     





     





     








 Intake and Output











 08/25/18 08/26/18 08/26/18





 22:59 06:59 14:59


 


Intake Total  100.261 


 


Balance  100.261 


 


Intake:   


 


  Intake, IV Titration  100.261 





  Amount   


 


    Heparin Sod,Pork in 0.45%  100.261 





    NaCl 25,000 unit In 0.45   





    % NaCl 1 500ml.bag @ 12   





    UNITS/KG/HR 17.09 mls/hr   





    IV .Q24H [facility] Rx#:   





    [id number]   


 


Other:   


 


  Voiding Method   Toilet


 


  # Voids  1 


 


  Weight 71.2 kg  








 





 08/25/18 18:10 





 08/25/18 18:10

## 2018-08-26 NOTE — P.CONS
History of Present Illness





- Reason for Consult


Consult date: 08/26/18


Non-Hodgkin's lymphoma.





- History of Present Illness





  The patient is a 63-year-old white female, well known to our service.  She 

was initially seen in 3/18.  The patient had presented with some chest pain.  

She had a computed tomography scan done to assess for aortic aneurysm, which 

incidentally revealed upper retroperitoneal lymph nodes.  The patient 

subsequently had a lymph node biopsy which was positive for low-grade non-

Hodgkin's lymphoma, with marginal zone lymphoma favored.  The patient then had 

a PET scan done, which showed uptake limited to upper left retroperitoneal area

, as well as uptake in the liver.  The patient had an MRI of the liver which 

was not conclusive in terms of ruling in or ruling out liver involvement.  She 

therefore underwent liver biopsy in 5/18 which showed no evidence of lymphoma 

involvement.  It did show evidence of inflammation and steatosis.


  The patient subsequently had a bone marrow aspiration biopsy on 7/2/18 which 

did show involvement of the bone marrow with B-cell lymphoma, confirming stage 

IV disease.  As the patient was asymptomatic, she was placed on observation 

which is the standard of care for stage IV low-grade non-Hodgkin lymphoma.


   The patient states that she has actually been feeling progressively fatigued

, with decrease in appetite over the last month or so.  She has not informed 

the office of the same.  Apparently she has had some intermittent lower chest 

discomfort.  She has had episodes of low blood sugar.  She came into the 

hospital because of low blood sugar and marked weakness.  Apparently there was 

some concern for angina at the time of admission due to which she was started 

on heparin.  Troponins were negative.  The patient was seen by cardiology and 

heparin discontinued.  Actually to me she denied any recent significant chest 

pain.


  Consult was placed due to her history of lymphoma.





Review of Systems


Constitutional: Reports fatigue, Reports weakness


Eyes: denies blurred vision, denies pain


Ears: deny: decreased hearing, ear discharge, earache, tinnitus


Ears, nose, mouth and throat: Denies headache, Denies sore throat


Cardiovascular: Reports chest pain, Reports decreased exercise tolerance


Respiratory: Denies cough


Gastrointestinal: Denies abdominal pain, Denies diarrhea, Denies nausea, Denies 

vomiting


Genitourinary: Denies dysuria, Denies hematuria


Menstruation: Reports postmenopausal


Musculoskeletal: Reports muscle weakness


Neurological: Denies numbness, Denies weakness


Psychiatric: Denies anxiety, Denies depression


Endocrine: Denies fatigue, Denies weight change


Hematologic/Lymphatic: Reports as per HPI





Past Medical History


Past Medical History: Cancer, Heart Failure, Diabetes Mellitus, Hyperlipidemia, 

Hypertension, Sleep Apnea/CPAP/BIPAP, Thyroid Disorder


Additional Past Medical History / Comment(s): Closed head injury 1989, 

sometimes has walking difficullties, uses walker as needed for long walks, no 

sense of smell. Hx of  kidney stones. Recent diagnosis of B cell non-hodgkins 

lymphona stage 4 and liver biopsy with 8 lesions seen on liver. CPAP use. Had 

thyroidectomy. Patient states she has NOT had MI.


Last Myocardial Infarction Date:: 1989


History of Any Multi-Drug Resistant Organisms: None Reported


Past Surgical History: Cholecystectomy, Hysterectomy, Tonsillectomy


Additional Past Surgical History / Comment(s): Thyroidectomy.


Past Anesthesia/Blood Transfusion Reactions: Postoperative Nausea & Vomiting (

PONV)


Smoking Status: Never smoker





- Past Family History


  ** Mother


Family Medical History: Cancer


Additional Family Medical History / Comment(s): breast CA





  ** Father


Family Medical History: Cancer


Additional Family Medical History / Comment(s): colon CA





  ** Sister(s)


Family Medical History: Cancer


Additional Family Medical History / Comment(s): Skin cancer





Medications and Allergies


 Home Medications











 Medication  Instructions  Recorded  Confirmed  Type


 


Furosemide 20 mg PO BID 07/24/14 08/26/18 History


 


Gabapentin 100 mg PO QAM 07/24/14 08/26/18 History


 


Lisinopril [Zestril] 20 mg PO DAILY 07/24/14 08/26/18 History


 


Potassium Chloride [Klor-Con 10] 10 meq PO DAILY 07/24/14 08/26/18 History


 


Aspirin 162 mg PO DAILY 10/09/15 08/26/18 History


 


Gabapentin [Neurontin] 200 mg PO HS 10/09/15 08/26/18 History


 


Levothyroxine Sodium [Synthroid] 125 mcg PO DAILY 10/09/15 08/26/18 History


 


amLODIPine [Norvasc] 5 mg PO QAM 10/09/15 08/26/18 History


 


metFORMIN HCL 1,000 mg PO BID 10/09/15 08/26/18 History


 


Cetirizine HCl [Zyrtec] 10 mg PO HS 03/28/18 08/26/18 History


 


Cholecalciferol [Vitamin D3] 1,000 unit PO DAILY 03/28/18 08/26/18 History


 


Glimepiride [Amaryl] 2 mg PO DAILY 03/28/18 08/26/18 History


 


Multivitamins, Thera [Multivitamin 1 tab PO DAILY 03/28/18 08/26/18 History





(formulary)]    


 


ALPRAZolam [Xanax] 1 mg PO DAILY PRN 04/16/18 08/26/18 History


 


Atorvastatin Calcium [Lipitor] 40 mg PO HS 04/16/18 08/26/18 History


 


HYDROcodone/APAP 7.5-325MG [Norco 1 tab PO Q6HR PRN 04/16/18 08/26/18 History





7.5-325]    


 


Naproxen Sodium [Aleve] 220 mg PO Q48H PRN 06/28/18 08/26/18 History


 


B Complex-Vit C-Vit E-Zinc [Z-Bec] 1 tab PO DAILY 08/25/18 08/26/18 History


 


Citalopram Hydrobromide [CeleXA] 10 - 20 mg PO DAILY 08/26/18 08/26/18 History











 Allergies











Allergy/AdvReac Type Severity Reaction Status Date / Time


 


haloperidol [From Haldol] Allergy  Unknown Verified 08/26/18 11:04


 


haloperidol lactate Allergy  Unknown Verified 08/26/18 11:04





[From Haldol]     


 


Iodinated Contrast- Oral and Allergy  Rash/Hives Verified 08/26/18 11:04





IV Dye     





[Iodinated Contrast Media -     





IV Dye]     


 


ciprofloxacin [From Cipro] AdvReac  Heartburn Verified 08/26/18 11:04


 


steri-strips AdvReac Severe sore Uncoded 08/25/18 18:07














Physical Exam


Vitals: 


 Vital Signs











  Temp Pulse Pulse Pulse Pulse Pulse Resp


 


 08/26/18 12:00  98.0 F      


 


 08/26/18 10:53     80  84  75 


 


 08/26/18 08:00  98.5 F   72     16


 


 08/26/18 03:46        16


 


 08/26/18 03:20  98.3 F   73     16


 


 08/26/18 00:05  98.2 F   75     16


 


 08/26/18 00:00        16


 


 08/25/18 22:25        16


 


 08/25/18 21:45  98.2 F   80     16


 


 08/25/18 21:37  99.1 F  80      16


 


 08/25/18 20:28  97.8 F  81      16


 


 08/25/18 19:28   88      16


 


 08/25/18 18:02  97.7 F  70      18














  BP BP BP BP BP Pulse Ox


 


 08/26/18 12:00       95


 


 08/26/18 10:53    123/71  112/69  111/66 


 


 08/26/18 08:00   119/69     91 L


 


 08/26/18 03:46      


 


 08/26/18 03:20   99/65    


 


 08/26/18 00:05   100/54     92 L


 


 08/26/18 00:00      


 


 08/25/18 22:25      


 


 08/25/18 21:45   100/57     92 L


 


 08/25/18 21:37  101/59      94 L


 


 08/25/18 20:28  102/66      94 L


 


 08/25/18 19:28  117/57      96


 


 08/25/18 18:02  108/64      96








 Intake and Output











 08/25/18 08/26/18 08/26/18





 22:59 06:59 14:59


 


Intake Total  100.261 


 


Balance  100.261 


 


Intake:   


 


  Intake, IV Titration  100.261 





  Amount   


 


    Heparin Sod,Pork in 0.45%  100.261 





    NaCl 25,000 unit In 0.45   





    % NaCl 1 500ml.bag @ 12   





    UNITS/KG/HR 17.09 mls/hr   





    IV .Q24H [facility] Rx#:   





    [id number]   


 


Other:   


 


  Voiding Method   Toilet


 


  # Voids  1 


 


  Weight 71.2 kg  














- Constitutional


General appearance: no acute distress





- EENT


Eyes: EOMI, PERRLA


ENT: hearing grossly normal, normal oropharynx





- Neck


Neck: no lymphadenopathy





- Respiratory


Respiratory: bilateral: CTA





- Cardiovascular


Rhythm: regular


Heart sounds: normal: S1, S2





- Gastrointestinal


General gastrointestinal: normal bowel sounds, soft





- Integumentary


Integumentary: normal





- Neurologic


Neurologic: CNII-XII intact





- Musculoskeletal


Musculoskeletal: strength equal bilaterally





- Psychiatric


Psychiatric: A&O x's 3, appropriate affect





Results


CBC & Chem 7: 


 08/25/18 18:10





 08/25/18 18:10


Labs: 


 Abnormal Lab Results - Last 24 Hours (Table)











  08/25/18 08/25/18 08/25/18 Range/Units





  18:09 18:10 18:10 


 


WBC    13.2 H  (3.8-10.6)  k/uL


 


Hgb    10.6 L  (11.4-16.0)  gm/dL


 


MCV    79.9 L  (80.0-100.0)  fL


 


MCH    24.4 L  (25.0-35.0)  pg


 


MCHC    30.5 L  (31.0-37.0)  g/dL


 


RDW    17.0 H  (11.5-15.5)  %


 


Plt Count    510 H  (150-450)  k/uL


 


Neutrophils # (Manual)    9.90 H  (1.3-7.7)  k/uL


 


Monocytes # (Manual)    1.45 H  (0-1.0)  k/uL


 


APTT     (22.0-30.0)  sec


 


BUN     (7-17)  mg/dL


 


Creatinine     (0.52-1.04)  mg/dL


 


Glucose     (74-99)  mg/dL


 


POC Glucose (mg/dL)  73 L    (75-99)  mg/dL


 


Calcium     (8.4-10.2)  mg/dL


 


Alkaline Phosphatase     ()  U/L


 


Total Creatine Kinase   <20 L   ()  U/L


 


HDL Cholesterol     (40-60)  mg/dL


 


Urine Appearance     (Clear)  


 


Urine Protein     (Negative)  


 


Ur Leukocyte Esterase     (Negative)  


 


Ur Squamous Epith Cells     (0-4)  /hpf


 


Urine Bacteria     (None)  /hpf


 


Urine Mucus     (None)  /hpf














  08/25/18 08/25/18 08/25/18 Range/Units





  18:10 18:10 18:10 


 


WBC     (3.8-10.6)  k/uL


 


Hgb     (11.4-16.0)  gm/dL


 


MCV     (80.0-100.0)  fL


 


MCH     (25.0-35.0)  pg


 


MCHC     (31.0-37.0)  g/dL


 


RDW     (11.5-15.5)  %


 


Plt Count     (150-450)  k/uL


 


Neutrophils # (Manual)     (1.3-7.7)  k/uL


 


Monocytes # (Manual)     (0-1.0)  k/uL


 


APTT   19.9 L   (22.0-30.0)  sec


 


BUN  27 H    (7-17)  mg/dL


 


Creatinine  1.10 H    (0.52-1.04)  mg/dL


 


Glucose  73 L    (74-99)  mg/dL


 


POC Glucose (mg/dL)     (75-99)  mg/dL


 


Calcium  12.0 H    (8.4-10.2)  mg/dL


 


Alkaline Phosphatase  254 H    ()  U/L


 


Total Creatine Kinase     ()  U/L


 


HDL Cholesterol    29 L  (40-60)  mg/dL


 


Urine Appearance     (Clear)  


 


Urine Protein     (Negative)  


 


Ur Leukocyte Esterase     (Negative)  


 


Ur Squamous Epith Cells     (0-4)  /hpf


 


Urine Bacteria     (None)  /hpf


 


Urine Mucus     (None)  /hpf














  08/25/18 08/25/18 08/26/18 Range/Units





  19:37 22:13 01:10 


 


WBC     (3.8-10.6)  k/uL


 


Hgb     (11.4-16.0)  gm/dL


 


MCV     (80.0-100.0)  fL


 


MCH     (25.0-35.0)  pg


 


MCHC     (31.0-37.0)  g/dL


 


RDW     (11.5-15.5)  %


 


Plt Count     (150-450)  k/uL


 


Neutrophils # (Manual)     (1.3-7.7)  k/uL


 


Monocytes # (Manual)     (0-1.0)  k/uL


 


APTT     (22.0-30.0)  sec


 


BUN     (7-17)  mg/dL


 


Creatinine     (0.52-1.04)  mg/dL


 


Glucose     (74-99)  mg/dL


 


POC Glucose (mg/dL)   122 H   (75-99)  mg/dL


 


Calcium     (8.4-10.2)  mg/dL


 


Alkaline Phosphatase     ()  U/L


 


Total Creatine Kinase    <20 L  ()  U/L


 


HDL Cholesterol     (40-60)  mg/dL


 


Urine Appearance  Cloudy H    (Clear)  


 


Urine Protein  Trace H    (Negative)  


 


Ur Leukocyte Esterase  Moderate H    (Negative)  


 


Ur Squamous Epith Cells  7 H    (0-4)  /hpf


 


Urine Bacteria  Rare H    (None)  /hpf


 


Urine Mucus  Moderate H    (None)  /hpf














  08/26/18 08/26/18 08/26/18 Range/Units





  01:10 07:03 07:03 


 


WBC     (3.8-10.6)  k/uL


 


Hgb     (11.4-16.0)  gm/dL


 


MCV     (80.0-100.0)  fL


 


MCH     (25.0-35.0)  pg


 


MCHC     (31.0-37.0)  g/dL


 


RDW     (11.5-15.5)  %


 


Plt Count     (150-450)  k/uL


 


Neutrophils # (Manual)     (1.3-7.7)  k/uL


 


Monocytes # (Manual)     (0-1.0)  k/uL


 


APTT  31.1 H   36.1 H  (22.0-30.0)  sec


 


BUN     (7-17)  mg/dL


 


Creatinine     (0.52-1.04)  mg/dL


 


Glucose     (74-99)  mg/dL


 


POC Glucose (mg/dL)     (75-99)  mg/dL


 


Calcium     (8.4-10.2)  mg/dL


 


Alkaline Phosphatase     ()  U/L


 


Total Creatine Kinase   <20 L   ()  U/L


 


HDL Cholesterol     (40-60)  mg/dL


 


Urine Appearance     (Clear)  


 


Urine Protein     (Negative)  


 


Ur Leukocyte Esterase     (Negative)  


 


Ur Squamous Epith Cells     (0-4)  /hpf


 


Urine Bacteria     (None)  /hpf


 


Urine Mucus     (None)  /hpf














  08/26/18 Range/Units





  11:57 


 


WBC   (3.8-10.6)  k/uL


 


Hgb   (11.4-16.0)  gm/dL


 


MCV   (80.0-100.0)  fL


 


MCH   (25.0-35.0)  pg


 


MCHC   (31.0-37.0)  g/dL


 


RDW   (11.5-15.5)  %


 


Plt Count   (150-450)  k/uL


 


Neutrophils # (Manual)   (1.3-7.7)  k/uL


 


Monocytes # (Manual)   (0-1.0)  k/uL


 


APTT   (22.0-30.0)  sec


 


BUN   (7-17)  mg/dL


 


Creatinine   (0.52-1.04)  mg/dL


 


Glucose   (74-99)  mg/dL


 


POC Glucose (mg/dL)  161 H  (75-99)  mg/dL


 


Calcium   (8.4-10.2)  mg/dL


 


Alkaline Phosphatase   ()  U/L


 


Total Creatine Kinase   ()  U/L


 


HDL Cholesterol   (40-60)  mg/dL


 


Urine Appearance   (Clear)  


 


Urine Protein   (Negative)  


 


Ur Leukocyte Esterase   (Negative)  


 


Ur Squamous Epith Cells   (0-4)  /hpf


 


Urine Bacteria   (None)  /hpf


 


Urine Mucus   (None)  /hpf














Assessment and Plan


(1) Non Hodgkin's lymphoma


Narrative/Plan: 


  The patient has known stage IV low-grade non-Hodgkin's the fall.  At 

presentation she was quite asymptomatic from the same due to which she was 

placed on observation, which is the standard of care.  She is complaining of 

new symptoms, especially increased fatigue as well as episodes of 

hyperglycemia.  It is not clear if these are related to the lymphoma .  I will 

check computed tomography scan of the chest abdomen and pelvis to rule out the 

same.  If this is negative for progression, then she can be continued on 

observation from the oncology standpoint.  In that case we would defer to the 

admitting service and other consultants for workup of her symptoms.


 If progression is noted, she will need treatment 


Current Visit: Yes   Status: Acute   Code(s): C85.90 - NON-HODGKIN LYMPHOMA, 

UNSPECIFIED, UNSPECIFIED SITE   SNOMED Code(s): 909098399


   





(2) Anemia


Narrative/Plan: 


The patient has a mild anemia with some microcytosis.  This is a new finding.  

I will check anemia workup.


Current Visit: Yes   Status: Acute   Code(s): D64.9 - ANEMIA, UNSPECIFIED   

SNOMED Code(s): 617276281

## 2018-08-27 LAB
ANION GAP SERPL CALC-SCNC: 12 MMOL/L
BASOPHILS # BLD AUTO: 0 K/UL (ref 0–0.2)
BASOPHILS NFR BLD AUTO: 0 %
BUN SERPL-SCNC: 26 MG/DL (ref 7–17)
CALCIUM SPEC-MCNC: 10.6 MG/DL (ref 8.4–10.2)
CHLORIDE SERPL-SCNC: 99 MMOL/L (ref 98–107)
CO2 SERPL-SCNC: 24 MMOL/L (ref 22–30)
EOSINOPHIL # BLD AUTO: 0 K/UL (ref 0–0.7)
EOSINOPHIL NFR BLD AUTO: 0 %
ERYTHROCYTE [DISTWIDTH] IN BLOOD BY AUTOMATED COUNT: 3.93 M/UL (ref 3.8–5.4)
ERYTHROCYTE [DISTWIDTH] IN BLOOD: 16.7 % (ref 11.5–15.5)
GLUCOSE BLD-MCNC: 155 MG/DL (ref 75–99)
GLUCOSE BLD-MCNC: 226 MG/DL (ref 75–99)
GLUCOSE BLD-MCNC: 264 MG/DL (ref 75–99)
GLUCOSE BLD-MCNC: 281 MG/DL (ref 75–99)
GLUCOSE BLD-MCNC: 325 MG/DL (ref 75–99)
GLUCOSE BLD-MCNC: 374 MG/DL (ref 75–99)
GLUCOSE BLD-MCNC: 413 MG/DL (ref 75–99)
GLUCOSE BLD-MCNC: 475 MG/DL (ref 75–99)
GLUCOSE SERPL-MCNC: 446 MG/DL (ref 74–99)
HBA1C MFR BLD: 6.2 % (ref 4–6)
HCT VFR BLD AUTO: 33.3 % (ref 34–46)
HGB BLD-MCNC: 9.4 GM/DL (ref 11.4–16)
LYMPHOCYTES # SPEC AUTO: 0.9 K/UL (ref 1–4.8)
LYMPHOCYTES NFR SPEC AUTO: 8 %
MCH RBC QN AUTO: 23.9 PG (ref 25–35)
MCHC RBC AUTO-ENTMCNC: 28.2 G/DL (ref 31–37)
MCV RBC AUTO: 84.7 FL (ref 80–100)
MONOCYTES # BLD AUTO: 0.3 K/UL (ref 0–1)
MONOCYTES NFR BLD AUTO: 3 %
NEUTROPHILS # BLD AUTO: 9.2 K/UL (ref 1.3–7.7)
NEUTROPHILS NFR BLD AUTO: 88 %
PLATELET # BLD AUTO: 413 K/UL (ref 150–450)
POTASSIUM SERPL-SCNC: 4.7 MMOL/L (ref 3.5–5.1)
SODIUM SERPL-SCNC: 135 MMOL/L (ref 137–145)
VIT B12 SERPL-MCNC: 428 PG/ML (ref 200–944)
WBC # BLD AUTO: 10.4 K/UL (ref 3.8–10.6)

## 2018-08-27 RX ADMIN — INSULIN ASPART SCH: 100 INJECTION, SOLUTION INTRAVENOUS; SUBCUTANEOUS at 21:16

## 2018-08-27 RX ADMIN — INSULIN ASPART SCH UNIT: 100 INJECTION, SOLUTION INTRAVENOUS; SUBCUTANEOUS at 08:38

## 2018-08-27 RX ADMIN — GABAPENTIN SCH MG: 100 CAPSULE ORAL at 08:36

## 2018-08-27 RX ADMIN — INSULIN ASPART SCH UNIT: 100 INJECTION, SOLUTION INTRAVENOUS; SUBCUTANEOUS at 17:56

## 2018-08-27 RX ADMIN — HYDROCODONE BITARTRATE AND ACETAMINOPHEN PRN EACH: 7.5; 325 TABLET ORAL at 14:43

## 2018-08-27 RX ADMIN — CEFAZOLIN SCH MLS/HR: 330 INJECTION, POWDER, FOR SOLUTION INTRAMUSCULAR; INTRAVENOUS at 06:21

## 2018-08-27 RX ADMIN — GABAPENTIN SCH MG: 100 CAPSULE ORAL at 21:54

## 2018-08-27 RX ADMIN — INSULIN ASPART SCH UNIT: 100 INJECTION, SOLUTION INTRAVENOUS; SUBCUTANEOUS at 13:33

## 2018-08-27 RX ADMIN — LISINOPRIL SCH MG: 20 TABLET ORAL at 21:54

## 2018-08-27 RX ADMIN — FUROSEMIDE SCH MG: 20 TABLET ORAL at 21:54

## 2018-08-27 RX ADMIN — ASPIRIN 81 MG CHEWABLE TABLET SCH MG: 81 TABLET CHEWABLE at 08:36

## 2018-08-27 RX ADMIN — PANTOPRAZOLE SODIUM SCH MG: 40 TABLET, DELAYED RELEASE ORAL at 08:36

## 2018-08-27 RX ADMIN — LEVOTHYROXINE SODIUM SCH MCG: 0.12 TABLET ORAL at 06:21

## 2018-08-27 RX ADMIN — CEFAZOLIN SCH MLS/HR: 330 INJECTION, POWDER, FOR SOLUTION INTRAMUSCULAR; INTRAVENOUS at 20:29

## 2018-08-27 RX ADMIN — FUROSEMIDE SCH MG: 20 TABLET ORAL at 13:33

## 2018-08-27 RX ADMIN — GLIMEPIRIDE SCH MG: 2 TABLET ORAL at 08:36

## 2018-08-27 RX ADMIN — POTASSIUM CHLORIDE SCH MEQ: 750 TABLET, EXTENDED RELEASE ORAL at 08:36

## 2018-08-27 RX ADMIN — HYDROCODONE BITARTRATE AND ACETAMINOPHEN PRN EACH: 7.5; 325 TABLET ORAL at 21:15

## 2018-08-27 NOTE — P.PN
Subjective


Progress Note Date: 08/27/18


Principal diagnosis: 





Abdominal Pain





Patient seen and evaluated today in follow-up. Her CT scan was reviewed with 

her. 





Objective





- Vital Signs


Vital signs: 


 Vital Signs











Temp  97.5 F L  08/27/18 12:00


 


Pulse  66   08/27/18 12:00


 


Resp  16   08/27/18 12:00


 


BP  109/66   08/27/18 12:00


 


Pulse Ox  98   08/27/18 12:00








 Intake & Output











 08/26/18 08/27/18 08/27/18





 18:59 06:59 18:59


 


Intake Total   200


 


Balance   200


 


Intake:   


 


  Oral   200


 


Other:   


 


  Voiding Method Toilet Toilet Toilet


 


  # Voids  1 3














- Exam





- Constitutional


General appearance: no acute distress





- EENT


Eyes: EOMI, PERRLA


ENT: hearing grossly normal, normal oropharynx





- Neck


Neck: no lymphadenopathy





- Respiratory


Respiratory: bilateral: CTA





- Cardiovascular


Rhythm: regular


Heart sounds: normal: S1, S2





- Gastrointestinal


General gastrointestinal: normal bowel sounds, soft





- Integumentary


Integumentary: normal





- Neurologic


Neurologic: CNII-XII intact





- Musculoskeletal


Musculoskeletal: strength equal bilaterally





- Psychiatric


Psychiatric: A&O x's 3, appropriate affect








- Labs


CBC & Chem 7: 


 08/27/18 03:56





 08/27/18 03:56


Labs: 


 Abnormal Lab Results - Last 24 Hours (Table)











  08/26/18 08/26/18 08/27/18 Range/Units





  15:06 20:12 02:32 


 


Hgb     (11.4-16.0)  gm/dL


 


Hct     (34.0-46.0)  %


 


MCH     (25.0-35.0)  pg


 


MCHC     (31.0-37.0)  g/dL


 


RDW     (11.5-15.5)  %


 


Neutrophils #     (1.3-7.7)  k/uL


 


Lymphocytes #     (1.0-4.8)  k/uL


 


Retic Count  2.8 H    (0.5-2.0)  %


 


Sodium     (137-145)  mmol/L


 


BUN     (7-17)  mg/dL


 


Creatinine     (0.52-1.04)  mg/dL


 


Glucose     (74-99)  mg/dL


 


POC Glucose (mg/dL)   168 H  413 H  (75-99)  mg/dL


 


Calcium     (8.4-10.2)  mg/dL














  08/27/18 08/27/18 08/27/18 Range/Units





  03:39 03:56 03:56 


 


Hgb   9.4 L   (11.4-16.0)  gm/dL


 


Hct   33.3 L   (34.0-46.0)  %


 


MCH   23.9 L   (25.0-35.0)  pg


 


MCHC   28.2 L   (31.0-37.0)  g/dL


 


RDW   16.7 H   (11.5-15.5)  %


 


Neutrophils #   9.2 H   (1.3-7.7)  k/uL


 


Lymphocytes #   0.9 L   (1.0-4.8)  k/uL


 


Retic Count     (0.5-2.0)  %


 


Sodium    135 L  (137-145)  mmol/L


 


BUN    26 H  (7-17)  mg/dL


 


Creatinine    1.10 H  (0.52-1.04)  mg/dL


 


Glucose    446 H  (74-99)  mg/dL


 


POC Glucose (mg/dL)  475 H    (75-99)  mg/dL


 


Calcium    10.6 H  (8.4-10.2)  mg/dL














  08/27/18 08/27/18 08/27/18 Range/Units





  05:02 06:36 11:19 


 


Hgb     (11.4-16.0)  gm/dL


 


Hct     (34.0-46.0)  %


 


MCH     (25.0-35.0)  pg


 


MCHC     (31.0-37.0)  g/dL


 


RDW     (11.5-15.5)  %


 


Neutrophils #     (1.3-7.7)  k/uL


 


Lymphocytes #     (1.0-4.8)  k/uL


 


Retic Count     (0.5-2.0)  %


 


Sodium     (137-145)  mmol/L


 


BUN     (7-17)  mg/dL


 


Creatinine     (0.52-1.04)  mg/dL


 


Glucose     (74-99)  mg/dL


 


POC Glucose (mg/dL)  374 H  325 H  264 H  (75-99)  mg/dL


 


Calcium     (8.4-10.2)  mg/dL














  08/27/18 Range/Units





  12:39 


 


Hgb   (11.4-16.0)  gm/dL


 


Hct   (34.0-46.0)  %


 


MCH   (25.0-35.0)  pg


 


MCHC   (31.0-37.0)  g/dL


 


RDW   (11.5-15.5)  %


 


Neutrophils #   (1.3-7.7)  k/uL


 


Lymphocytes #   (1.0-4.8)  k/uL


 


Retic Count   (0.5-2.0)  %


 


Sodium   (137-145)  mmol/L


 


BUN   (7-17)  mg/dL


 


Creatinine   (0.52-1.04)  mg/dL


 


Glucose   (74-99)  mg/dL


 


POC Glucose (mg/dL)  226 H  (75-99)  mg/dL


 


Calcium   (8.4-10.2)  mg/dL








 Microbiology - Last 24 Hours (Table)











 08/25/18 18:10 Blood Culture - Preliminary





 Blood    No Growth after 24 hours














Assessment and Plan


Plan: 





Assessment and Plan


(1) Non Hodgkin's lymphoma


Narrative/Plan: 


  The patient has known stage IV low-grade non-Hodgkin's the fall.  At 

presentation she was quite asymptomatic from the same due to which she was 

placed on observation, which is the standard of care.  She is complaining of 

new symptoms, especially increased fatigue as well as episodes of 

hyperglycemia.  It is not clear if these are related to the lymphoma .  


 - CT Chest Abdomen and Pelvis revealed multiple hepatic lesions, the amount 

and change from previous imaging is not consistent with a low grade lymphoma as 

her current diagnosis in which she has been on observation. With this new 

finding, she should undergo a biopsy of the Liver. 


 - CORE biopsy and pathology must be sent for FLOW CYTOMETRY 


 - If her status is changed to in patient, then we will order Interventional 

Radiaology Consult to have this biopsy completed while inpatient


 - If she is discharged will need to arrange outpatient Biopsy sooner than 

later as she is symptomatic. 





Current Visit: Yes   Status: Acute   Code(s): C85.90 - NON-HODGKIN LYMPHOMA, 

UNSPECIFIED, UNSPECIFIED SITE   SNOMED Code(s): 863535808


   





(2) Anemia


Narrative/Plan: 


The patient has a mild anemia with some microcytosis.  This is a new finding.  

Anemia work-up in progress





Current Visit: Yes   Status: Acute   Code(s): D64.9 - ANEMIA, UNSPECIFIED   

SNOMED Code(s): 994007780





Physician Attest: I have completed the full history and physical of this 

patient and agree with above dictation by Earline Figueroa NP. DIctated as a 

scribe.

## 2018-08-27 NOTE — P.PN
Subjective





Patient resting in bed continues to complain of extreme fatigue.  There was 

discussion with oncology regarding possible liver biopsy to monitor metastatic 

disease with lymphoma





Objective





- Vital Signs


Vital signs: 


 Vital Signs











Temp  97.5 F L  08/27/18 12:00


 


Pulse  66   08/27/18 12:00


 


Resp  16   08/27/18 12:00


 


BP  109/66   08/27/18 12:00


 


Pulse Ox  98   08/27/18 12:00








 Intake & Output











 08/26/18 08/27/18 08/27/18





 18:59 06:59 18:59


 


Intake Total   200


 


Balance   200


 


Intake:   


 


  Oral   200


 


Other:   


 


  Voiding Method Toilet Toilet Toilet


 


  # Voids  1 














- Constitutional


General appearance: Present: mild distress





- EENT


Eyes: Present: PERRLA


Ears: bilateral: normal





- Neck


Neck: Present: normal ROM





- Respiratory


Respiratory: bilateral: CTA





- Cardiovascular


Rhythm: regular





- Gastrointestinal


General gastrointestinal: Present: soft





- Integumentary


Integumentary: Present: normal





- Neurologic


Neurologic: Present: CNII-XII intact





- Musculoskeletal


Musculoskeletal: Present: generalized weakness





- Labs


CBC & Chem 7: 


 08/27/18 03:56





 08/27/18 03:56


Labs: 


 Abnormal Lab Results - Last 24 Hours (Table)











  08/26/18 08/26/18 08/26/18 Range/Units





  14:02 15:06 20:12 


 


Hgb     (11.4-16.0)  gm/dL


 


Hct     (34.0-46.0)  %


 


MCH     (25.0-35.0)  pg


 


MCHC     (31.0-37.0)  g/dL


 


RDW     (11.5-15.5)  %


 


Neutrophils #     (1.3-7.7)  k/uL


 


Lymphocytes #     (1.0-4.8)  k/uL


 


Retic Count   2.8 H   (0.5-2.0)  %


 


Sodium     (137-145)  mmol/L


 


BUN     (7-17)  mg/dL


 


Creatinine     (0.52-1.04)  mg/dL


 


Glucose     (74-99)  mg/dL


 


POC Glucose (mg/dL)  226 H   168 H  (75-99)  mg/dL


 


Calcium     (8.4-10.2)  mg/dL














  08/27/18 08/27/18 08/27/18 Range/Units





  02:32 03:39 03:56 


 


Hgb    9.4 L  (11.4-16.0)  gm/dL


 


Hct    33.3 L  (34.0-46.0)  %


 


MCH    23.9 L  (25.0-35.0)  pg


 


MCHC    28.2 L  (31.0-37.0)  g/dL


 


RDW    16.7 H  (11.5-15.5)  %


 


Neutrophils #    9.2 H  (1.3-7.7)  k/uL


 


Lymphocytes #    0.9 L  (1.0-4.8)  k/uL


 


Retic Count     (0.5-2.0)  %


 


Sodium     (137-145)  mmol/L


 


BUN     (7-17)  mg/dL


 


Creatinine     (0.52-1.04)  mg/dL


 


Glucose     (74-99)  mg/dL


 


POC Glucose (mg/dL)  413 H  475 H   (75-99)  mg/dL


 


Calcium     (8.4-10.2)  mg/dL














  08/27/18 08/27/18 08/27/18 Range/Units





  03:56 05:02 06:36 


 


Hgb     (11.4-16.0)  gm/dL


 


Hct     (34.0-46.0)  %


 


MCH     (25.0-35.0)  pg


 


MCHC     (31.0-37.0)  g/dL


 


RDW     (11.5-15.5)  %


 


Neutrophils #     (1.3-7.7)  k/uL


 


Lymphocytes #     (1.0-4.8)  k/uL


 


Retic Count     (0.5-2.0)  %


 


Sodium  135 L    (137-145)  mmol/L


 


BUN  26 H    (7-17)  mg/dL


 


Creatinine  1.10 H    (0.52-1.04)  mg/dL


 


Glucose  446 H    (74-99)  mg/dL


 


POC Glucose (mg/dL)   374 H  325 H  (75-99)  mg/dL


 


Calcium  10.6 H    (8.4-10.2)  mg/dL














  08/27/18 Range/Units





  11:19 


 


Hgb   (11.4-16.0)  gm/dL


 


Hct   (34.0-46.0)  %


 


MCH   (25.0-35.0)  pg


 


MCHC   (31.0-37.0)  g/dL


 


RDW   (11.5-15.5)  %


 


Neutrophils #   (1.3-7.7)  k/uL


 


Lymphocytes #   (1.0-4.8)  k/uL


 


Retic Count   (0.5-2.0)  %


 


Sodium   (137-145)  mmol/L


 


BUN   (7-17)  mg/dL


 


Creatinine   (0.52-1.04)  mg/dL


 


Glucose   (74-99)  mg/dL


 


POC Glucose (mg/dL)  264 H  (75-99)  mg/dL


 


Calcium   (8.4-10.2)  mg/dL








 Microbiology - Last 24 Hours (Table)











 08/25/18 18:10 Blood Culture - Preliminary





 Blood    No Growth after 24 hours














- Imaging and Cardiology


Chest x-ray: report reviewed


CT scan - abdomen: report reviewed


CT scan - chest: report reviewed





Assessment and Plan


Plan: 





Assessment


Chest pain cleared by cardiology not cardiac event


Acute renal injury dehydration


Leukocytosis of thrombocytosis


Anemia new-onset


Non-Hodgkin's lymphoma





History of hypothyroidism


Hypertension


Hyperlipidemia


Sleep apnea uses CPAP machine





Plan


Continue consultation with oncology regarding lymphoma and symptomatic fatigue


Cleared by cardiology is not cardiac event

## 2018-08-28 LAB
BASOPHILS # BLD AUTO: 0.1 K/UL (ref 0–0.2)
BASOPHILS NFR BLD AUTO: 1 %
EOSINOPHIL # BLD AUTO: 0.2 K/UL (ref 0–0.7)
EOSINOPHIL NFR BLD AUTO: 2 %
ERYTHROCYTE [DISTWIDTH] IN BLOOD BY AUTOMATED COUNT: 4.14 M/UL (ref 3.8–5.4)
ERYTHROCYTE [DISTWIDTH] IN BLOOD: 16.9 % (ref 11.5–15.5)
GLUCOSE BLD-MCNC: 106 MG/DL (ref 75–99)
GLUCOSE BLD-MCNC: 107 MG/DL (ref 75–99)
GLUCOSE BLD-MCNC: 114 MG/DL (ref 75–99)
GLUCOSE BLD-MCNC: 200 MG/DL (ref 75–99)
GLUCOSE BLD-MCNC: 96 MG/DL (ref 75–99)
HCT VFR BLD AUTO: 34 % (ref 34–46)
HGB BLD-MCNC: 10.1 GM/DL (ref 11.4–16)
LYMPHOCYTES # SPEC AUTO: 1.5 K/UL (ref 1–4.8)
LYMPHOCYTES NFR SPEC AUTO: 12 %
MCH RBC QN AUTO: 24.5 PG (ref 25–35)
MCHC RBC AUTO-ENTMCNC: 29.8 G/DL (ref 31–37)
MCV RBC AUTO: 82.2 FL (ref 80–100)
MONOCYTES # BLD AUTO: 0.8 K/UL (ref 0–1)
MONOCYTES NFR BLD AUTO: 6 %
NEUTROPHILS # BLD AUTO: 10.2 K/UL (ref 1.3–7.7)
NEUTROPHILS NFR BLD AUTO: 77 %
PLATELET # BLD AUTO: 473 K/UL (ref 150–450)
WBC # BLD AUTO: 13.2 K/UL (ref 3.8–10.6)

## 2018-08-28 PROCEDURE — 0FB23ZX EXCISION OF LEFT LOBE LIVER, PERCUTANEOUS APPROACH, DIAGNOSTIC: ICD-10-PCS

## 2018-08-28 RX ADMIN — CEFAZOLIN SCH: 330 INJECTION, POWDER, FOR SOLUTION INTRAMUSCULAR; INTRAVENOUS at 20:38

## 2018-08-28 RX ADMIN — INSULIN ASPART SCH UNIT: 100 INJECTION, SOLUTION INTRAVENOUS; SUBCUTANEOUS at 20:37

## 2018-08-28 RX ADMIN — GLIMEPIRIDE SCH MG: 2 TABLET ORAL at 07:34

## 2018-08-28 RX ADMIN — INSULIN ASPART SCH: 100 INJECTION, SOLUTION INTRAVENOUS; SUBCUTANEOUS at 16:58

## 2018-08-28 RX ADMIN — FUROSEMIDE SCH MG: 20 TABLET ORAL at 07:35

## 2018-08-28 RX ADMIN — FUROSEMIDE SCH MG: 20 TABLET ORAL at 20:33

## 2018-08-28 RX ADMIN — INSULIN ASPART SCH: 100 INJECTION, SOLUTION INTRAVENOUS; SUBCUTANEOUS at 07:33

## 2018-08-28 RX ADMIN — GABAPENTIN SCH MG: 100 CAPSULE ORAL at 20:32

## 2018-08-28 RX ADMIN — LISINOPRIL SCH MG: 20 TABLET ORAL at 20:33

## 2018-08-28 RX ADMIN — POTASSIUM CHLORIDE SCH MEQ: 750 TABLET, EXTENDED RELEASE ORAL at 07:35

## 2018-08-28 RX ADMIN — HYDROCODONE BITARTRATE AND ACETAMINOPHEN PRN EACH: 7.5; 325 TABLET ORAL at 20:33

## 2018-08-28 RX ADMIN — HYDROCODONE BITARTRATE AND ACETAMINOPHEN PRN EACH: 7.5; 325 TABLET ORAL at 07:32

## 2018-08-28 RX ADMIN — PANTOPRAZOLE SODIUM SCH MG: 40 TABLET, DELAYED RELEASE ORAL at 07:35

## 2018-08-28 RX ADMIN — CEFAZOLIN SCH: 330 INJECTION, POWDER, FOR SOLUTION INTRAMUSCULAR; INTRAVENOUS at 10:42

## 2018-08-28 RX ADMIN — ASPIRIN 81 MG CHEWABLE TABLET SCH: 81 TABLET CHEWABLE at 10:43

## 2018-08-28 RX ADMIN — GABAPENTIN SCH MG: 100 CAPSULE ORAL at 10:50

## 2018-08-28 RX ADMIN — LEVOTHYROXINE SODIUM SCH MCG: 0.12 TABLET ORAL at 06:13

## 2018-08-28 RX ADMIN — INSULIN ASPART SCH: 100 INJECTION, SOLUTION INTRAVENOUS; SUBCUTANEOUS at 12:58

## 2018-08-28 NOTE — US
EXAMINATION TYPE: US biopsy liver

 

DATE OF EXAM: 8/28/2018

 

HISTORY: Liver masses.

 

FINDINGS: Maximal barrier technique was utilized.  The skin overlying a suitable path to the patient'
s left lobe liver mass was localized with ultrasound and the overlying skin prepped and draped.  Ultr
asound was utilized with sterile technique. Lidocaine was  used for local anesthesia.  A skin nick wa
s made with a scalpel.  A 17-gauge needle was advanced under direct ultrasound guidance into the mass
, 22-gauge needle was advanced and aspirated specimen submitted to cytology. Subsequently 4 cores obt
ained of the mass with and 18-gauge needle.  Specimens submitted to Pathology.  Following the procedu
re, hemostasis achieved and the patient is discharged in stable condition without complication.

 

IMPRESSION:STATUS POST ULTRASOUND GUIDED FINE NEEDLE ASPIRATION CORE BIOPSY OF left lobe liver MASS, 
PATHOLOGY IS PENDING.  THIS PROCEDURE IS PERFORMED BY THE UNDERSIGNED.

## 2018-08-28 NOTE — P.PN
Subjective





Patient ambulating freely in the room.  Discussed that she is having a biopsy 

this afternoon.





Objective





- Vital Signs


Vital signs: 


 Vital Signs











Temp  96.8 F L  08/28/18 05:00


 


Pulse  82   08/28/18 08:00


 


Resp  16   08/28/18 08:00


 


BP  129/64   08/28/18 05:00


 


Pulse Ox  99   08/28/18 05:00








 Intake & Output











 08/27/18 08/28/18 08/28/18





 18:59 06:59 18:59


 


Intake Total 200 890 


 


Balance 200 890 


 


Weight  72.235 kg 


 


Intake:   


 


  IV  300 


 


    Sodium Chloride 0.9% 1,  300 





    000 ml @ 75 mls/hr IV .   





    [id number] Rx#:[id number]   


 


  Oral 200 590 


 


Other:   


 


  Voiding Method Toilet Toilet Toilet


 


  # Voids 3 3 














- Constitutional


General appearance: Present: obese





- EENT


Eyes: Present: PERRLA


Ears: bilateral: normal





- Neck


Neck: Present: normal ROM





- Cardiovascular


Rhythm: regular





- Integumentary


Integumentary: Present: normal





- Neurologic


Neurologic: Present: CNII-XII intact





- Musculoskeletal


Musculoskeletal: Present: gait normal





- Psychiatric


Psychiatric: Present: A&O x's 3, appropriate affect, intact judgment & insight





- Labs


CBC & Chem 7: 


 08/28/18 08:04





 08/27/18 03:56


Labs: 


 Abnormal Lab Results - Last 24 Hours (Table)











  08/26/18 08/26/18 08/27/18 Range/Units





  07:03 15:06 11:19 


 


WBC     (3.8-10.6)  k/uL


 


Hgb     (11.4-16.0)  gm/dL


 


MCH     (25.0-35.0)  pg


 


MCHC     (31.0-37.0)  g/dL


 


RDW     (11.5-15.5)  %


 


Plt Count     (150-450)  k/uL


 


Neutrophils #     (1.3-7.7)  k/uL


 


POC Glucose (mg/dL)    264 H  (75-99)  mg/dL


 


Hemoglobin A1c  6.2 H    (4.0-6.0)  %


 


Iron   29 L   ()  ug/dL


 


Iron Saturation   8.84 L   (12.00-45.00)   


 


Ferritin   418.8 H   (10.0-291.0)  ng/mL


 


Lactate Dehydrogenase     (313-618)  U/L














  08/27/18 08/27/18 08/27/18 Range/Units





  12:39 16:32 20:12 


 


WBC     (3.8-10.6)  k/uL


 


Hgb     (11.4-16.0)  gm/dL


 


MCH     (25.0-35.0)  pg


 


MCHC     (31.0-37.0)  g/dL


 


RDW     (11.5-15.5)  %


 


Plt Count     (150-450)  k/uL


 


Neutrophils #     (1.3-7.7)  k/uL


 


POC Glucose (mg/dL)  226 H  281 H  155 H  (75-99)  mg/dL


 


Hemoglobin A1c     (4.0-6.0)  %


 


Iron     ()  ug/dL


 


Iron Saturation     (12.00-45.00)   


 


Ferritin     (10.0-291.0)  ng/mL


 


Lactate Dehydrogenase     (313-618)  U/L














  08/28/18 08/28/18 08/28/18 Range/Units





  06:59 08:04 08:04 


 


WBC   13.2 H   (3.8-10.6)  k/uL


 


Hgb   10.1 L   (11.4-16.0)  gm/dL


 


MCH   24.5 L   (25.0-35.0)  pg


 


MCHC   29.8 L   (31.0-37.0)  g/dL


 


RDW   16.9 H   (11.5-15.5)  %


 


Plt Count   473 H   (150-450)  k/uL


 


Neutrophils #   10.2 H   (1.3-7.7)  k/uL


 


POC Glucose (mg/dL)  114 H    (75-99)  mg/dL


 


Hemoglobin A1c     (4.0-6.0)  %


 


Iron     ()  ug/dL


 


Iron Saturation     (12.00-45.00)   


 


Ferritin     (10.0-291.0)  ng/mL


 


Lactate Dehydrogenase    294 L  (313-618)  U/L








 Microbiology - Last 24 Hours (Table)











 08/25/18 18:10 Blood Culture - Preliminary





 Blood    No Growth after 48 hours














Assessment and Plan


Plan: 





Assessment


Chest pain cleared by cardiology noncardiac


Acute renal injury dehydration


Leukocytosis thrombocytosis


Anemia


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


History of hypothyroidism


Hypertension


Hyperlipidemia


Sleep apnea uses CPAP machine





Plan


Liver biopsy this afternoon


Continue consultation with oncology


Long-term plan includes chemotherapy

## 2018-08-28 NOTE — P.PN
Subjective


Progress Note Date: 08/28/18


Principal diagnosis: 





Abdominal Pain





Patient seen and evaluated today in follow-up. Her CT scan was reviewed with 

her. Awaiting Biopsy to be performed. 





Objective





- Vital Signs


Vital signs: 


 Vital Signs











Temp  96.8 F L  08/28/18 05:00


 


Pulse  75   08/28/18 11:57


 


Resp  18   08/28/18 11:57


 


BP  91/55   08/28/18 11:57


 


Pulse Ox  97   08/28/18 11:57








 Intake & Output











 08/27/18 08/28/18 08/28/18





 18:59 06:59 18:59


 


Intake Total 200 890 


 


Balance 200 890 


 


Weight  72.235 kg 


 


Intake:   


 


  IV  300 


 


    Sodium Chloride 0.9% 1,  300 





    000 ml @ 75 mls/hr IV .   





    [id number] LUIS Rx#:[id number]   


 


  Oral 200 590 


 


Other:   


 


  Voiding Method Toilet Toilet Toilet


 


  # Voids 3 3 














- Exam





- Constitutional


General appearance: no acute distress





- EENT


Eyes: EOMI, PERRLA


ENT: hearing grossly normal, normal oropharynx





- Neck


Neck: no lymphadenopathy





- Respiratory


Respiratory: bilateral: CTA





- Cardiovascular


Rhythm: regular


Heart sounds: normal: S1, S2





- Gastrointestinal


General gastrointestinal: normal bowel sounds, soft





- Integumentary


Integumentary: normal





- Neurologic


Neurologic: CNII-XII intact





- Musculoskeletal


Musculoskeletal: strength equal bilaterally





- Psychiatric


Psychiatric: A&O x's 3, appropriate affect








- Labs


CBC & Chem 7: 


 08/28/18 08:04





 08/27/18 03:56


Labs: 


 Abnormal Lab Results - Last 24 Hours (Table)











  08/26/18 08/26/18 08/27/18 Range/Units





  07:03 15:06 12:39 


 


WBC     (3.8-10.6)  k/uL


 


Hgb     (11.4-16.0)  gm/dL


 


MCH     (25.0-35.0)  pg


 


MCHC     (31.0-37.0)  g/dL


 


RDW     (11.5-15.5)  %


 


Plt Count     (150-450)  k/uL


 


Neutrophils #     (1.3-7.7)  k/uL


 


POC Glucose (mg/dL)    226 H  (75-99)  mg/dL


 


Hemoglobin A1c  6.2 H    (4.0-6.0)  %


 


Iron   29 L   ()  ug/dL


 


Iron Saturation   8.84 L   (12.00-45.00)   


 


Ferritin   418.8 H   (10.0-291.0)  ng/mL


 


Lactate Dehydrogenase     (313-618)  U/L














  08/27/18 08/27/18 08/28/18 Range/Units





  16:32 20:12 06:59 


 


WBC     (3.8-10.6)  k/uL


 


Hgb     (11.4-16.0)  gm/dL


 


MCH     (25.0-35.0)  pg


 


MCHC     (31.0-37.0)  g/dL


 


RDW     (11.5-15.5)  %


 


Plt Count     (150-450)  k/uL


 


Neutrophils #     (1.3-7.7)  k/uL


 


POC Glucose (mg/dL)  281 H  155 H  114 H  (75-99)  mg/dL


 


Hemoglobin A1c     (4.0-6.0)  %


 


Iron     ()  ug/dL


 


Iron Saturation     (12.00-45.00)   


 


Ferritin     (10.0-291.0)  ng/mL


 


Lactate Dehydrogenase     (313-618)  U/L














  08/28/18 08/28/18 08/28/18 Range/Units





  08:04 08:04 12:07 


 


WBC  13.2 H    (3.8-10.6)  k/uL


 


Hgb  10.1 L    (11.4-16.0)  gm/dL


 


MCH  24.5 L    (25.0-35.0)  pg


 


MCHC  29.8 L    (31.0-37.0)  g/dL


 


RDW  16.9 H    (11.5-15.5)  %


 


Plt Count  473 H    (150-450)  k/uL


 


Neutrophils #  10.2 H    (1.3-7.7)  k/uL


 


POC Glucose (mg/dL)    106 H  (75-99)  mg/dL


 


Hemoglobin A1c     (4.0-6.0)  %


 


Iron     ()  ug/dL


 


Iron Saturation     (12.00-45.00)   


 


Ferritin     (10.0-291.0)  ng/mL


 


Lactate Dehydrogenase   294 L   (313-618)  U/L








 Microbiology - Last 24 Hours (Table)











 08/25/18 18:10 Blood Culture - Preliminary





 Blood    No Growth after 48 hours














Assessment and Plan


Plan: 





Assessment and Plan


(1) Non Hodgkin's lymphoma


Narrative/Plan: 


  The patient has known stage IV low-grade non-Hodgkin's the fall.  At 

presentation she was quite asymptomatic from the same due to which she was 

placed on observation, which is the standard of care.  She is complaining of 

new symptoms, especially increased fatigue as well as episodes of 

hyperglycemia.  It is not clear if these are related to the lymphoma .  


 - CT Chest Abdomen and Pelvis revealed multiple hepatic lesions, the amount 

and change from previous imaging is not consistent with a low grade lymphoma as 

her current diagnosis in which she has been on observation. With this new 

finding, she should undergo a biopsy of the Liver. 


 - CORE biopsy and pathology must be sent for FLOW CYTOMETRY 


 - Patient has been changed to inpatient status and Interventional Radiology 

has been consulted for Biopsy 





Current Visit: Yes   Status: Acute   Code(s): C85.90 - NON-HODGKIN LYMPHOMA, 

UNSPECIFIED, UNSPECIFIED SITE   SNOMED Code(s): 776153454


   





(2) Anemia


Narrative/Plan: 


The patient has a mild anemia with some microcytosis.  This is a new finding.  

Anemia work-up in progress





Current Visit: Yes   Status: Acute   Code(s): D64.9 - ANEMIA, UNSPECIFIED   

SNOMED Code(s): 555668865





Physician Attest: I have completed the full history and physical of this 

patient and agree with above dictation by Earline Figueroa NP. DIctated as a 

scribe.

## 2018-08-29 VITALS
DIASTOLIC BLOOD PRESSURE: 63 MMHG | TEMPERATURE: 98.5 F | HEART RATE: 75 BPM | RESPIRATION RATE: 16 BRPM | SYSTOLIC BLOOD PRESSURE: 127 MMHG

## 2018-08-29 LAB
GLUCOSE BLD-MCNC: 221 MG/DL (ref 75–99)
GLUCOSE BLD-MCNC: 79 MG/DL (ref 75–99)

## 2018-08-29 RX ADMIN — PANTOPRAZOLE SODIUM SCH MG: 40 TABLET, DELAYED RELEASE ORAL at 08:49

## 2018-08-29 RX ADMIN — ASPIRIN 81 MG CHEWABLE TABLET SCH MG: 81 TABLET CHEWABLE at 08:51

## 2018-08-29 RX ADMIN — FUROSEMIDE SCH MG: 20 TABLET ORAL at 08:50

## 2018-08-29 RX ADMIN — CEFAZOLIN SCH: 330 INJECTION, POWDER, FOR SOLUTION INTRAMUSCULAR; INTRAVENOUS at 12:21

## 2018-08-29 RX ADMIN — POTASSIUM CHLORIDE SCH MEQ: 750 TABLET, EXTENDED RELEASE ORAL at 08:51

## 2018-08-29 RX ADMIN — INSULIN ASPART SCH: 100 INJECTION, SOLUTION INTRAVENOUS; SUBCUTANEOUS at 08:48

## 2018-08-29 RX ADMIN — LEVOTHYROXINE SODIUM SCH MCG: 0.12 TABLET ORAL at 06:14

## 2018-08-29 RX ADMIN — INSULIN ASPART SCH UNIT: 100 INJECTION, SOLUTION INTRAVENOUS; SUBCUTANEOUS at 12:22

## 2018-08-29 RX ADMIN — GLIMEPIRIDE SCH MG: 2 TABLET ORAL at 08:51

## 2018-08-29 RX ADMIN — GABAPENTIN SCH MG: 100 CAPSULE ORAL at 08:49

## 2018-08-29 NOTE — P.PN
Subjective


Progress Note Date: 08/29/18


Principal diagnosis: 





Abdominal Pain





Patient seen and evaluated today in follow-up. SHe states she is concerned 

about the biopsy and wants to wait till returns to start treatment. I explained 

to her if her symptoms are controlled, the biopsy results will likely not be 

back until late this week or early next week and at that time we will have to 

see what our tretment options will be. SHe appears anxious and concerned about 

the unexpected today, and unable to comfort her as I do not have the answers 

untill tissue biopsy has resulted. I have assisted in a follow-up appointment 

for her to see Dr. Dumont in office on 9/4/18@9am at our [facility] office. 





Objective





- Vital Signs


Vital signs: 


 Vital Signs











Temp  98.5 F   08/29/18 05:00


 


Pulse  75   08/29/18 08:00


 


Resp  16   08/29/18 08:00


 


BP  127/63   08/29/18 05:00


 


Pulse Ox  96   08/29/18 05:00








 Intake & Output











 08/28/18 08/29/18 08/29/18





 18:59 06:59 18:59


 


Intake Total  1780 


 


Balance  1780 


 


Weight 72.235 kg 69 kg 


 


Intake:   


 


  Oral  1780 


 


Other:   


 


  Voiding Method Toilet Toilet Toilet


 


  # Voids 1 2 














- Exam





- Constitutional


General appearance: no acute distress





- EENT


Eyes: EOMI, PERRLA


ENT: hearing grossly normal, normal oropharynx





- Neck


Neck: no lymphadenopathy





- Respiratory


Respiratory: bilateral: CTA





- Cardiovascular


Rhythm: regular


Heart sounds: normal: S1, S2





- Gastrointestinal


General gastrointestinal: normal bowel sounds, soft





- Integumentary


Integumentary: normal





- Neurologic


Neurologic: CNII-XII intact





- Musculoskeletal


Musculoskeletal: strength equal bilaterally





- Psychiatric


Psychiatric: A&O x's 3, appropriate affect








- Labs


CBC & Chem 7: 


 08/28/18 08:04





 08/27/18 03:56


Labs: 


 Abnormal Lab Results - Last 24 Hours (Table)











  08/26/18 08/28/18 08/28/18 Range/Units





  15:06 16:57 20:35 


 


POC Glucose (mg/dL)   107 H  200 H  (75-99)  mg/dL


 


RBC Folate  1,392 H    (280 - 791)  ng/mL














  08/29/18 Range/Units





  11:22 


 


POC Glucose (mg/dL)  221 H  (75-99)  mg/dL


 


RBC Folate   (280 - 791)  ng/mL








 Microbiology - Last 24 Hours (Table)











 08/25/18 18:10 Blood Culture - Preliminary





 Blood    No Growth after 72 hours














Assessment and Plan


Plan: 





Assessment and Plan


(1) Non Hodgkin's lymphoma


Narrative/Plan: 


  The patient has known stage IV low-grade non-Hodgkin's the fall.  At 

presentation she was quite asymptomatic from the same due to which she was 

placed on observation, which is the standard of care.  She is complaining of 

new symptoms, especially increased fatigue as well as episodes of 

hyperglycemia.  It is not clear if these are related to the lymphoma .  


 - CT Chest Abdomen and Pelvis revealed multiple hepatic lesions, the amount 

and change from previous imaging is not consistent with a low grade lymphoma as 

her current diagnosis in which she has been on observation. With this new 

finding, she should undergo a biopsy of the Liver. 


 - CORE biopsy and pathology must be sent for FLOW CYTOMETRY 


 - Patient has been changed to inpatient status and Interventional Radiology 

has been consulted for Biopsy 


 - Will follow-up with Dr. Dumont at our [facility] office on 9/4/18 to discuss results 

of biopsy and treatment plan options





Current Visit: Yes   Status: Acute   Code(s): C85.90 - NON-HODGKIN LYMPHOMA, 

UNSPECIFIED, UNSPECIFIED SITE   SNOMED Code(s): 891926623


   





(2) Anemia


Narrative/Plan: 


The patient has a mild anemia with some microcytosis.  This is a new finding.  

Anemia work-up in progress





Current Visit: Yes   Status: Acute   Code(s): D64.9 - ANEMIA, UNSPECIFIED   

SNOMED Code(s): 996674397





Ok for discharge from oncology standpoint. 





Physician Attest: I have completed the full history and physical of this 

patient and agree with above dictation by Earline Figueroa NP. DIctated as a 

scribe.

## 2018-08-29 NOTE — P.DS
Providers


Date of admission: 


08/28/18 14:43





Expected date of discharge: 08/29/18


Attending physician: 


Faustino Villegas





Consults: 





 





08/25/18 20:02


Consult Physician Urgent 


   Consulting Provider: Michael Dumont


   Consult Reason/Comments: Non-Hodgkin's lymphoma


   Do you want consulting provider notified?: Yes





08/25/18 22:51


Consult Physician Routine 


   Consulting Provider: Hieu Anderson


   Consult Reason/Comments: Chest Pain


   Do you want consulting provider notified?: Yes, Notify in am











Primary care physician: 


Faustino Villegas





Hospital Course: 





63-year-old female was admitted to the emergency room with multiple complaints.

  Patient states she's increasing generalized weakness with unsteady gait.  

Also complaining of severe nausea and vomiting and some mild chest discomfort.  

Patient has recently been diagnosed with non-Hodgkin's lymphoma with Dr. Dumont 

attending.  Patient was evaluated by cardiology regarding chest pain they felt 

that it was noncardiac.  Patient was evaluated by oncology and they will follow-

up with care.  Patient is post liver biopsy awaiting pathology.  Patient will 

be discharged home to follow-up with family physician and Dr. Dumont











Assessment


Chest pain noncardiac


Acute renal injury dehydration


Leukocytosis thrombocytosis


Anemia non-Hodgkin's lymphoma metastatic to bone marrow and liver


History of hypothyroidism


Hypertension


Hyperlipidemia


Sleep apnea with use of CPAP





Plan


Follow-up with Dr. Dumont regarding treatment of lymphoma awaiting biopsy report 

of liver


Follow-up with family physician Dr. Faustino Villegas














Patient Condition at Discharge: Fair





Plan - Discharge Summary


New Discharge Prescriptions: 


New


   Insulin Aspart [NovoLOG (formulary)] 0 unit SQ ACHS  vial





Continue


   Lisinopril [Zestril] 20 mg PO DAILY


   Potassium Chloride [Klor-Con 10] 10 meq PO DAILY


   Gabapentin 100 mg PO QAM


   Furosemide 20 mg PO BID


   Aspirin 162 mg PO DAILY


   Gabapentin [Neurontin] 200 mg PO HS


   amLODIPine [Norvasc] 5 mg PO QAM


   Levothyroxine Sodium [Synthroid] 125 mcg PO DAILY


   metFORMIN HCL 1,000 mg PO BID


   Cetirizine HCl [Zyrtec] 10 mg PO HS


   Multivitamins, Thera [Multivitamin (formulary)] 1 tab PO DAILY


   Glimepiride [Amaryl] 2 mg PO DAILY


   Cholecalciferol [Vitamin D3] 1,000 unit PO DAILY


   Atorvastatin Calcium [Lipitor] 40 mg PO HS


   HYDROcodone/APAP 7.5-325MG [Norco 7.5-325] 1 tab PO Q6HR PRN


     PRN Reason: Pain


   ALPRAZolam [Xanax] 1 mg PO DAILY PRN


     PRN Reason: Anxiety


   Naproxen Sodium [Aleve] 220 mg PO Q48H PRN


     PRN Reason: Pain


   B Complex-Vit C-Vit E-Zinc [Z-Bec] 1 tab PO DAILY


   Citalopram Hydrobromide [CeleXA] 10 - 20 mg PO DAILY


Discharge Medication List





Furosemide 20 mg PO BID 07/24/14 [History]


Gabapentin 100 mg PO QAM 07/24/14 [History]


Lisinopril [Zestril] 20 mg PO DAILY 07/24/14 [History]


Potassium Chloride [Klor-Con 10] 10 meq PO DAILY 07/24/14 [History]


Aspirin 162 mg PO DAILY 10/09/15 [History]


Gabapentin [Neurontin] 200 mg PO HS 10/09/15 [History]


Levothyroxine Sodium [Synthroid] 125 mcg PO DAILY 10/09/15 [History]


amLODIPine [Norvasc] 5 mg PO QAM 10/09/15 [History]


metFORMIN HCL 1,000 mg PO BID 10/09/15 [History]


Cetirizine HCl [Zyrtec] 10 mg PO HS 03/28/18 [History]


Cholecalciferol [Vitamin D3] 1,000 unit PO DAILY 03/28/18 [History]


Glimepiride [Amaryl] 2 mg PO DAILY 03/28/18 [History]


Multivitamins, Thera [Multivitamin (formulary)] 1 tab PO DAILY 03/28/18 [History

]


ALPRAZolam [Xanax] 1 mg PO DAILY PRN 04/16/18 [History]


Atorvastatin Calcium [Lipitor] 40 mg PO HS 04/16/18 [History]


HYDROcodone/APAP 7.5-325MG [Norco 7.5-325] 1 tab PO Q6HR PRN 04/16/18 [History]


Naproxen Sodium [Aleve] 220 mg PO Q48H PRN 06/28/18 [History]


B Complex-Vit C-Vit E-Zinc [Z-Bec] 1 tab PO DAILY 08/25/18 [History]


Citalopram Hydrobromide [CeleXA] 10 - 20 mg PO DAILY 08/26/18 [History]


Insulin Aspart [NovoLOG (formulary)] 0 unit SQ ACHS  vial 08/29/18 [Rx]








Follow up Appointment(s)/Referral(s): 


Faustino Villegas MD [Primary Care Provider] - 1-2 days


[facility], [NON-STAFF] -

## 2018-10-04 ENCOUNTER — HOSPITAL ENCOUNTER (INPATIENT)
Dept: HOSPITAL 47 - EC | Age: 63
LOS: 9 days | Discharge: HOME HEALTH | DRG: 871 | End: 2018-10-13
Attending: FAMILY MEDICINE | Admitting: FAMILY MEDICINE
Payer: MEDICARE

## 2018-10-04 VITALS — BODY MASS INDEX: 30.6 KG/M2

## 2018-10-04 DIAGNOSIS — Z79.890: ICD-10-CM

## 2018-10-04 DIAGNOSIS — Z99.89: ICD-10-CM

## 2018-10-04 DIAGNOSIS — Z91.048: ICD-10-CM

## 2018-10-04 DIAGNOSIS — I50.32: ICD-10-CM

## 2018-10-04 DIAGNOSIS — Z80.0: ICD-10-CM

## 2018-10-04 DIAGNOSIS — N18.3: ICD-10-CM

## 2018-10-04 DIAGNOSIS — E78.5: ICD-10-CM

## 2018-10-04 DIAGNOSIS — N13.2: ICD-10-CM

## 2018-10-04 DIAGNOSIS — C85.18: ICD-10-CM

## 2018-10-04 DIAGNOSIS — I49.3: ICD-10-CM

## 2018-10-04 DIAGNOSIS — F41.9: ICD-10-CM

## 2018-10-04 DIAGNOSIS — E11.65: ICD-10-CM

## 2018-10-04 DIAGNOSIS — R50.81: ICD-10-CM

## 2018-10-04 DIAGNOSIS — Z90.49: ICD-10-CM

## 2018-10-04 DIAGNOSIS — E89.0: ICD-10-CM

## 2018-10-04 DIAGNOSIS — Z66: ICD-10-CM

## 2018-10-04 DIAGNOSIS — R65.21: ICD-10-CM

## 2018-10-04 DIAGNOSIS — T45.1X5A: ICD-10-CM

## 2018-10-04 DIAGNOSIS — E83.51: ICD-10-CM

## 2018-10-04 DIAGNOSIS — Z90.710: ICD-10-CM

## 2018-10-04 DIAGNOSIS — Z87.442: ICD-10-CM

## 2018-10-04 DIAGNOSIS — E87.6: ICD-10-CM

## 2018-10-04 DIAGNOSIS — A41.9: Primary | ICD-10-CM

## 2018-10-04 DIAGNOSIS — E11.22: ICD-10-CM

## 2018-10-04 DIAGNOSIS — K52.9: ICD-10-CM

## 2018-10-04 DIAGNOSIS — Z87.440: ICD-10-CM

## 2018-10-04 DIAGNOSIS — Z88.8: ICD-10-CM

## 2018-10-04 DIAGNOSIS — Z79.899: ICD-10-CM

## 2018-10-04 DIAGNOSIS — Z80.3: ICD-10-CM

## 2018-10-04 DIAGNOSIS — D70.1: ICD-10-CM

## 2018-10-04 DIAGNOSIS — I13.0: ICD-10-CM

## 2018-10-04 DIAGNOSIS — E83.42: ICD-10-CM

## 2018-10-04 DIAGNOSIS — G47.33: ICD-10-CM

## 2018-10-04 DIAGNOSIS — Z87.820: ICD-10-CM

## 2018-10-04 DIAGNOSIS — E87.1: ICD-10-CM

## 2018-10-04 DIAGNOSIS — G89.29: ICD-10-CM

## 2018-10-04 DIAGNOSIS — M54.6: ICD-10-CM

## 2018-10-04 DIAGNOSIS — E87.2: ICD-10-CM

## 2018-10-04 DIAGNOSIS — Z79.84: ICD-10-CM

## 2018-10-04 DIAGNOSIS — D64.81: ICD-10-CM

## 2018-10-04 DIAGNOSIS — Z91.041: ICD-10-CM

## 2018-10-04 DIAGNOSIS — Z80.8: ICD-10-CM

## 2018-10-04 DIAGNOSIS — D70.3: ICD-10-CM

## 2018-10-04 DIAGNOSIS — K56.7: ICD-10-CM

## 2018-10-04 DIAGNOSIS — Z88.1: ICD-10-CM

## 2018-10-04 DIAGNOSIS — I25.2: ICD-10-CM

## 2018-10-04 LAB
ALBUMIN SERPL-MCNC: 2.6 G/DL (ref 3.5–5)
ALP SERPL-CCNC: 147 U/L (ref 38–126)
ALT SERPL-CCNC: 36 U/L (ref 9–52)
ANION GAP SERPL CALC-SCNC: 13 MMOL/L
APTT BLD: 25 SEC (ref 22–30)
AST SERPL-CCNC: 22 U/L (ref 14–36)
BUN SERPL-SCNC: 24 MG/DL (ref 7–17)
CALCIUM SPEC-MCNC: 7.6 MG/DL (ref 8.4–10.2)
CELLS COUNTED: 200
CHLORIDE SERPL-SCNC: 100 MMOL/L (ref 98–107)
CO2 SERPL-SCNC: 21 MMOL/L (ref 22–30)
EOSINOPHIL # BLD MANUAL: 0.05 K/UL (ref 0–0.7)
ERYTHROCYTE [DISTWIDTH] IN BLOOD BY AUTOMATED COUNT: 3.78 M/UL (ref 3.8–5.4)
ERYTHROCYTE [DISTWIDTH] IN BLOOD: 16.8 % (ref 11.5–15.5)
GLUCOSE BLD-MCNC: 269 MG/DL (ref 75–99)
GLUCOSE BLD-MCNC: 289 MG/DL (ref 75–99)
GLUCOSE SERPL-MCNC: 247 MG/DL (ref 74–99)
HCT VFR BLD AUTO: 30.4 % (ref 34–46)
HGB BLD-MCNC: 9.3 GM/DL (ref 11.4–16)
INR PPP: 1.2 (ref ?–1.2)
LYMPHOCYTES # BLD MANUAL: 0.27 K/UL (ref 1–4.8)
MCH RBC QN AUTO: 24.6 PG (ref 25–35)
MCHC RBC AUTO-ENTMCNC: 30.6 G/DL (ref 31–37)
MCV RBC AUTO: 80.3 FL (ref 80–100)
METAMYELOCYTES # BLD: 0.02 K/UL
MONOCYTES # BLD MANUAL: 0.29 K/UL (ref 0–1)
MYELOCYTES # BLD MANUAL: 0.02 K/UL
NEUTROPHILS NFR BLD MANUAL: 52 %
NEUTS SEG # BLD MANUAL: 0.9 K/UL (ref 1.3–7.7)
PH UR: 5 [PH] (ref 5–8)
PLATELET # BLD AUTO: 167 K/UL (ref 150–450)
POTASSIUM SERPL-SCNC: 3.3 MMOL/L (ref 3.5–5.1)
PROT SERPL-MCNC: 5.4 G/DL (ref 6.3–8.2)
PT BLD: 11.3 SEC (ref 9–12)
RBC UR QL: 11 /HPF (ref 0–5)
SODIUM SERPL-SCNC: 134 MMOL/L (ref 137–145)
SP GR UR: 1.02 (ref 1–1.03)
SQUAMOUS UR QL AUTO: <1 /HPF (ref 0–4)
UROBILINOGEN UR QL STRIP: <2 MG/DL (ref ?–2)
WBC # BLD AUTO: 1.6 K/UL (ref 3.8–10.6)
WBC #/AREA URNS HPF: 4 /HPF (ref 0–5)

## 2018-10-04 PROCEDURE — 83605 ASSAY OF LACTIC ACID: CPT

## 2018-10-04 PROCEDURE — 93306 TTE W/DOPPLER COMPLETE: CPT

## 2018-10-04 PROCEDURE — 84484 ASSAY OF TROPONIN QUANT: CPT

## 2018-10-04 PROCEDURE — 74177 CT ABD & PELVIS W/CONTRAST: CPT

## 2018-10-04 PROCEDURE — 96365 THER/PROPH/DIAG IV INF INIT: CPT

## 2018-10-04 PROCEDURE — 96366 THER/PROPH/DIAG IV INF ADDON: CPT

## 2018-10-04 PROCEDURE — 82784 ASSAY IGA/IGD/IGG/IGM EACH: CPT

## 2018-10-04 PROCEDURE — 93005 ELECTROCARDIOGRAM TRACING: CPT

## 2018-10-04 PROCEDURE — 85730 THROMBOPLASTIN TIME PARTIAL: CPT

## 2018-10-04 PROCEDURE — 87086 URINE CULTURE/COLONY COUNT: CPT

## 2018-10-04 PROCEDURE — 99291 CRITICAL CARE FIRST HOUR: CPT

## 2018-10-04 PROCEDURE — 86900 BLOOD TYPING SEROLOGIC ABO: CPT

## 2018-10-04 PROCEDURE — 87040 BLOOD CULTURE FOR BACTERIA: CPT

## 2018-10-04 PROCEDURE — 71046 X-RAY EXAM CHEST 2 VIEWS: CPT

## 2018-10-04 PROCEDURE — 51702 INSERT TEMP BLADDER CATH: CPT

## 2018-10-04 PROCEDURE — 82533 TOTAL CORTISOL: CPT

## 2018-10-04 PROCEDURE — 96368 THER/DIAG CONCURRENT INF: CPT

## 2018-10-04 PROCEDURE — 83036 HEMOGLOBIN GLYCOSYLATED A1C: CPT

## 2018-10-04 PROCEDURE — 84132 ASSAY OF SERUM POTASSIUM: CPT

## 2018-10-04 PROCEDURE — 83735 ASSAY OF MAGNESIUM: CPT

## 2018-10-04 PROCEDURE — 85025 COMPLETE CBC W/AUTO DIFF WBC: CPT

## 2018-10-04 PROCEDURE — 84100 ASSAY OF PHOSPHORUS: CPT

## 2018-10-04 PROCEDURE — 80202 ASSAY OF VANCOMYCIN: CPT

## 2018-10-04 PROCEDURE — 96367 TX/PROPH/DG ADDL SEQ IV INF: CPT

## 2018-10-04 PROCEDURE — 80053 COMPREHEN METABOLIC PANEL: CPT

## 2018-10-04 PROCEDURE — 86901 BLOOD TYPING SEROLOGIC RH(D): CPT

## 2018-10-04 PROCEDURE — 36415 COLL VENOUS BLD VENIPUNCTURE: CPT

## 2018-10-04 PROCEDURE — 96375 TX/PRO/DX INJ NEW DRUG ADDON: CPT

## 2018-10-04 PROCEDURE — 86850 RBC ANTIBODY SCREEN: CPT

## 2018-10-04 PROCEDURE — 85610 PROTHROMBIN TIME: CPT

## 2018-10-04 PROCEDURE — 81001 URINALYSIS AUTO W/SCOPE: CPT

## 2018-10-04 PROCEDURE — 80048 BASIC METABOLIC PNL TOTAL CA: CPT

## 2018-10-04 RX ADMIN — NICARDIPINE HYDROCHLORIDE SCH MLS/HR: 2.5 INJECTION INTRAVENOUS at 13:47

## 2018-10-04 RX ADMIN — NICARDIPINE HYDROCHLORIDE SCH MLS/HR: 2.5 INJECTION INTRAVENOUS at 13:48

## 2018-10-04 RX ADMIN — METRONIDAZOLE SCH MLS/HR: 500 INJECTION, SOLUTION INTRAVENOUS at 19:41

## 2018-10-04 RX ADMIN — NICARDIPINE HYDROCHLORIDE SCH MLS/HR: 2.5 INJECTION INTRAVENOUS at 12:58

## 2018-10-04 RX ADMIN — GABAPENTIN SCH MG: 100 CAPSULE ORAL at 22:08

## 2018-10-04 RX ADMIN — CEFEPIME HYDROCHLORIDE SCH MLS/HR: 2 INJECTION, POWDER, FOR SOLUTION INTRAVENOUS at 22:03

## 2018-10-04 NOTE — ED
General Adult HPI





- General


Chief complaint: Recheck/Abnormal Lab/Rx


Stated complaint: LOW BP, ABDOMINAL PAIN, Ca PATIENT , SENT BY TIM


Time Seen by Provider: 10/04/18 12:25


Source: patient, RN notes reviewed


Mode of arrival: wheelchair


Limitations: no limitations





- History of Present Illness


Initial comments: 





This is a 63-year-old female with a past medical history of non-Hodgkin's 

lymphoma with metastatic disease from the liver and the lung.  Patient was over 

at the oncologist office today and her blood pressure was low and her heart 

rate was high so they sent to the emergency department immediate.  Patient 

states she's extremely fatigued and is having some abdominal discomfort a 

little more in the upper than the lower.  Patient denies any recent fever 

chills.  Patient denies any cough.  Patient denies any chest pain palpitations 

difficulty breathing or shortness of breath.  Patient denies any nausea 

vomiting or diarrhea.  Patient denies any headache patient denies any 

lightheadedness dizziness or near syncopal episode.





- Related Data


 Home Medications











 Medication  Instructions  Recorded  Confirmed


 


Furosemide 20 mg PO BID 07/24/14 10/04/18


 


Gabapentin 100 mg PO QAM 07/24/14 10/04/18


 


Potassium Chloride [Klor-Con 10] 10 meq PO DAILY 07/24/14 10/04/18


 


Gabapentin [Neurontin] 200 mg PO HS 10/09/15 10/04/18


 


Levothyroxine Sodium [Synthroid] 125 mcg PO MOTUWETHFRSA 10/09/15 10/04/18


 


metFORMIN HCL 1,000 mg PO BID PRN 10/09/15 10/04/18


 


Cetirizine HCl [Zyrtec] 10 mg PO HS 03/28/18 10/04/18


 


Glimepiride [Amaryl] 2 mg PO DAILY PRN 03/28/18 10/04/18


 


Multivitamins, Thera [Multivitamin 1 tab PO DAILY 03/28/18 10/04/18





(formulary)]   


 


ALPRAZolam [Xanax] 0.5 - 1 mg PO DAILY PRN 04/16/18 10/04/18


 


Atorvastatin Calcium [Lipitor] 40 mg PO HS 04/16/18 10/04/18


 


Citalopram Hydrobromide [CeleXA] 20 mg PO DAILY 08/26/18 10/04/18


 


Ondansetron HCl [Zofran] 8 mg PO Q6H PRN 10/04/18 10/04/18


 


Pantoprazole [Protonix] 40 mg PO DAILY 10/04/18 10/04/18


 


Super B Complex 1 tab PO DAILY 10/04/18 10/04/18


 


predniSONE 100 mg PO AS DIRECTED 10/04/18 10/04/18











 Allergies











Allergy/AdvReac Type Severity Reaction Status Date / Time


 


haloperidol [From Haldol] Allergy  Unknown Verified 10/04/18 13:04


 


haloperidol lactate Allergy  Unknown Verified 10/04/18 13:04





[From Haldol]     


 


Iodinated Contrast- Oral and Allergy  Rash/Hives Verified 10/04/18 13:04





IV Dye     





[Iodinated Contrast Media -     





IV Dye]     


 


ciprofloxacin [From Cipro] AdvReac  Heartburn Verified 10/04/18 13:04


 


steri-strips AdvReac Severe sore Uncoded 10/04/18 12:30














Review of Systems


ROS Statement: 


Those systems with pertinent positive or pertinent negative responses have been 

documented in the HPI.





ROS Other: All systems not noted in ROS Statement are negative.





Past Medical History


Past Medical History: Cancer, Heart Failure, Diabetes Mellitus, Hyperlipidemia, 

Hypertension, Sleep Apnea/CPAP/BIPAP, Thyroid Disorder


Additional Past Medical History / Comment(s): Closed head injury 1989, 

sometimes has walking difficullties, uses walker as needed for long walks, no 

sense of smell. Hx of  kidney stones. Recent diagnosis of B cell non-hodgkins 

lymphona stage 4 and liver biopsy with 8 lesions seen on liver. CPAP use. Had 

thyroidectomy. Patient states she has NOT had MI.


Last Myocardial Infarction Date:: 1989


History of Any Multi-Drug Resistant Organisms: None Reported


Past Surgical History: Cholecystectomy, Hysterectomy, Tonsillectomy


Additional Past Surgical History / Comment(s): Thyroidectomy, lymph node and 

liver biopsies


Past Anesthesia/Blood Transfusion Reactions: Postoperative Nausea & Vomiting (

PONV)


Past Psychological History: Anxiety


Smoking Status: Never smoker


Past Alcohol Use History: None Reported


Past Drug Use History: None Reported





- Past Family History


  ** Mother


Family Medical History: Cancer


Additional Family Medical History / Comment(s): breast CA





  ** Father


Family Medical History: Cancer


Additional Family Medical History / Comment(s): colon CA





  ** Sister(s)


Family Medical History: Cancer


Additional Family Medical History / Comment(s): Skin cancer





General Exam





- General Exam Comments


Initial Comments: 





GENERAL:


Patient is well-developed and well-nourished.  Patient is nontoxic and well-

hydrated and is in mild distress.  Patient is very tired in appearance.





ENT:


Neck is soft and supple.  No significant lymphadenopathy is noted.  Oropharynx 

is clear.  Moist mucous membranes.  Neck has full range of motion without 

eliciting any pain





EYES:


The sclera were anicteric and conjunctiva were pink and moist.  Extraocular 

movements were intact and pupils were equal round and reactive to light.  

Eyelids were unremarkable.





PULMONARY:


Unlabored respirations.  Good breath sounds bilaterally.  No audible rales 

rhonchi or wheezing was noted.





CARDIOVASCULAR:


There is a regular rate and rhythm without any murmurs gallops or rubs.  





ABDOMEN:  


Patient's abdomen is tender diffusely but slightly more tender and the right 

upper epigastric and left upper regions.





SKIN:


Skin is clear with no lesions or rashes and otherwise unremarkable.





NEUROLOGIC:


Patient is alert and oriented x3.  Cranial nerves II through XII are grossly 

intact.  Motor and sensory are also intact.  Normal speech, volume and content.

  Symmetrical smile.  





MUSCULOSKELETAL:


Normal extremities with adequate strength and full range of motion.  No lower 

extremity swelling or edema.  No calf tenderness.





LYMPHATICS:


No significant lymphadenopathy is noted





PSYCHIATRIC:


Normal psychiatric evaluation.  


Limitations: no limitations





Course


 Vital Signs











  10/04/18 10/04/18 10/04/18





  12:30 12:50 13:51


 


Temperature 99.2 F 100.6 F H 


 


Pulse Rate 109 H 101 H 83


 


Respiratory 18 20 16





Rate   


 


Blood Pressure 74/44 86/52 78/45


 


O2 Sat by Pulse 95 98 98





Oximetry   














  10/04/18 10/04/18 10/04/18





  14:30 15:09 15:19


 


Temperature   


 


Pulse Rate 74 68 69


 


Respiratory 18 18 18





Rate   


 


Blood Pressure 69/38 85/55 94/51


 


O2 Sat by Pulse 95 97 





Oximetry   














  10/04/18 10/04/18





  15:47 16:34


 


Temperature  


 


Pulse Rate 60 64


 


Respiratory 16 16





Rate  


 


Blood Pressure 108/57 102/52


 


O2 Sat by Pulse 99 99





Oximetry  














Medical Decision Making





- Medical Decision Making





EKG shows sinus tachycardia with occasional PVCs at 103 bpm CO interval 174 QRS 

is 90 QT interval 370 QTC is 495.  Patient's EKG shows T-wave inversions in 

leads 1 and 2 and aVL it is a poor quality EKG.  There is no significant ST 

segment elevation noted.





Chest x-ray shows no acute abnormality.





Patient received 2 half liters of fluid and the blood pressure remained low so 

the patient was started on Levophed.  Patient's blood pressure responded nicely





I spoke with Dr. Tim Montez and Dr. Abreu and put all 3 on consult.





Computed tomography scan of the abdomen and pelvis shows a 2-3 mm left proximal 

ureteral stone with some obstruction.  Patient does not want to be catheterized 

for urine and she has not yet produced urine.





I spoke with Dr. Villegas he agreed to admit the patient admitted the patient I 

consult that Dr. Dumont and Dr. Joel





- Lab Data


Result diagrams: 


 10/04/18 12:56





 10/04/18 12:56


 Lab Results











  10/04/18 10/04/18 10/04/18 Range/Units





  12:56 12:56 12:56 


 


WBC   1.6 L   (3.8-10.6)  k/uL


 


RBC   3.78 L   (3.80-5.40)  m/uL


 


Hgb   9.3 L   (11.4-16.0)  gm/dL


 


Hct   30.4 L   (34.0-46.0)  %


 


MCV   80.3   (80.0-100.0)  fL


 


MCH   24.6 L   (25.0-35.0)  pg


 


MCHC   30.6 L   (31.0-37.0)  g/dL


 


RDW   16.8 H   (11.5-15.5)  %


 


Plt Count   167   (150-450)  k/uL


 


Neutrophils % (Manual)   52   %


 


Band Neutrophils %   10   %


 


Lymphocytes % (Manual)   17   %


 


Monocytes % (Manual)   18   %


 


Eosinophils % (Manual)   3   %


 


Metamyelocytes %   1   %


 


Myelocytes %   1   %


 


Neutrophils # (Manual)   0.90 L   (1.3-7.7)  k/uL


 


Lymphocytes # (Manual)   0.27 L   (1.0-4.8)  k/uL


 


Monocytes # (Manual)   0.29   (0-1.0)  k/uL


 


Eosinophils # (Manual)   0.05   (0-0.7)  k/uL


 


Metamyelocytes # (Man)   0.02 H   (0)  k/uL


 


Myelocytes # (Manual)   0.02 H   (0)  k/uL


 


Nucleated RBCs   0   (0-0)  /100 WBC


 


Manual Slide Review   Performed   


 


Toxic Granulation   Present   


 


Dohle Bodies   Present   


 


Hypochromasia   Moderate   


 


Poikilocytosis (manual   Present   


 


Anisocytosis   Slight   


 


PT     (9.0-12.0)  sec


 


INR     (<1.2)  


 


APTT     (22.0-30.0)  sec


 


Sodium    134 L  (137-145)  mmol/L


 


Potassium    3.3 L  (3.5-5.1)  mmol/L


 


Chloride    100  ()  mmol/L


 


Carbon Dioxide    21 L  (22-30)  mmol/L


 


Anion Gap    13  mmol/L


 


BUN    24 H  (7-17)  mg/dL


 


Creatinine    1.12 H  (0.52-1.04)  mg/dL


 


Est GFR (CKD-EPI)AfAm    60  (>60 ml/min/1.73 sqM)  


 


Est GFR (CKD-EPI)NonAf    52  (>60 ml/min/1.73 sqM)  


 


Glucose    247 H  (74-99)  mg/dL


 


Plasma Lactic Acid Artem     (0.7-2.0)  mmol/L


 


Calcium    7.6 L  (8.4-10.2)  mg/dL


 


Magnesium     (1.6-2.3)  mg/dL


 


Total Bilirubin    0.6  (0.2-1.3)  mg/dL


 


AST    22  (14-36)  U/L


 


ALT    36  (9-52)  U/L


 


Alkaline Phosphatase    147 H  ()  U/L


 


Troponin I     (0.000-0.034)  ng/mL


 


Total Protein    5.4 L  (6.3-8.2)  g/dL


 


Albumin    2.6 L  (3.5-5.0)  g/dL


 


Blood Type  O Positive    


 


Blood Type Recheck  CABO Indicated    


 


Antibody Screen  NEGATIVE    


 


Spec Expiration Date  10/04/2018 - 1425    














  10/04/18 10/04/18 10/04/18 Range/Units





  12:56 12:56 12:56 


 


WBC     (3.8-10.6)  k/uL


 


RBC     (3.80-5.40)  m/uL


 


Hgb     (11.4-16.0)  gm/dL


 


Hct     (34.0-46.0)  %


 


MCV     (80.0-100.0)  fL


 


MCH     (25.0-35.0)  pg


 


MCHC     (31.0-37.0)  g/dL


 


RDW     (11.5-15.5)  %


 


Plt Count     (150-450)  k/uL


 


Neutrophils % (Manual)     %


 


Band Neutrophils %     %


 


Lymphocytes % (Manual)     %


 


Monocytes % (Manual)     %


 


Eosinophils % (Manual)     %


 


Metamyelocytes %     %


 


Myelocytes %     %


 


Neutrophils # (Manual)     (1.3-7.7)  k/uL


 


Lymphocytes # (Manual)     (1.0-4.8)  k/uL


 


Monocytes # (Manual)     (0-1.0)  k/uL


 


Eosinophils # (Manual)     (0-0.7)  k/uL


 


Metamyelocytes # (Man)     (0)  k/uL


 


Myelocytes # (Manual)     (0)  k/uL


 


Nucleated RBCs     (0-0)  /100 WBC


 


Manual Slide Review     


 


Toxic Granulation     


 


Dohle Bodies     


 


Hypochromasia     


 


Poikilocytosis (manual     


 


Anisocytosis     


 


PT   11.3   (9.0-12.0)  sec


 


INR   1.2 H   (<1.2)  


 


APTT   25.0   (22.0-30.0)  sec


 


Sodium     (137-145)  mmol/L


 


Potassium     (3.5-5.1)  mmol/L


 


Chloride     ()  mmol/L


 


Carbon Dioxide     (22-30)  mmol/L


 


Anion Gap     mmol/L


 


BUN     (7-17)  mg/dL


 


Creatinine     (0.52-1.04)  mg/dL


 


Est GFR (CKD-EPI)AfAm     (>60 ml/min/1.73 sqM)  


 


Est GFR (CKD-EPI)NonAf     (>60 ml/min/1.73 sqM)  


 


Glucose     (74-99)  mg/dL


 


Plasma Lactic Acid Artem  1.7    (0.7-2.0)  mmol/L


 


Calcium     (8.4-10.2)  mg/dL


 


Magnesium     (1.6-2.3)  mg/dL


 


Total Bilirubin     (0.2-1.3)  mg/dL


 


AST     (14-36)  U/L


 


ALT     (9-52)  U/L


 


Alkaline Phosphatase     ()  U/L


 


Troponin I    <0.012  (0.000-0.034)  ng/mL


 


Total Protein     (6.3-8.2)  g/dL


 


Albumin     (3.5-5.0)  g/dL


 


Blood Type     


 


Blood Type Recheck     


 


Antibody Screen     


 


Spec Expiration Date     














  10/04/18 Range/Units





  12:56 


 


WBC   (3.8-10.6)  k/uL


 


RBC   (3.80-5.40)  m/uL


 


Hgb   (11.4-16.0)  gm/dL


 


Hct   (34.0-46.0)  %


 


MCV   (80.0-100.0)  fL


 


MCH   (25.0-35.0)  pg


 


MCHC   (31.0-37.0)  g/dL


 


RDW   (11.5-15.5)  %


 


Plt Count   (150-450)  k/uL


 


Neutrophils % (Manual)   %


 


Band Neutrophils %   %


 


Lymphocytes % (Manual)   %


 


Monocytes % (Manual)   %


 


Eosinophils % (Manual)   %


 


Metamyelocytes %   %


 


Myelocytes %   %


 


Neutrophils # (Manual)   (1.3-7.7)  k/uL


 


Lymphocytes # (Manual)   (1.0-4.8)  k/uL


 


Monocytes # (Manual)   (0-1.0)  k/uL


 


Eosinophils # (Manual)   (0-0.7)  k/uL


 


Metamyelocytes # (Man)   (0)  k/uL


 


Myelocytes # (Manual)   (0)  k/uL


 


Nucleated RBCs   (0-0)  /100 WBC


 


Manual Slide Review   


 


Toxic Granulation   


 


Dohle Bodies   


 


Hypochromasia   


 


Poikilocytosis (manual   


 


Anisocytosis   


 


PT   (9.0-12.0)  sec


 


INR   (<1.2)  


 


APTT   (22.0-30.0)  sec


 


Sodium   (137-145)  mmol/L


 


Potassium   (3.5-5.1)  mmol/L


 


Chloride   ()  mmol/L


 


Carbon Dioxide   (22-30)  mmol/L


 


Anion Gap   mmol/L


 


BUN   (7-17)  mg/dL


 


Creatinine   (0.52-1.04)  mg/dL


 


Est GFR (CKD-EPI)AfAm   (>60 ml/min/1.73 sqM)  


 


Est GFR (CKD-EPI)NonAf   (>60 ml/min/1.73 sqM)  


 


Glucose   (74-99)  mg/dL


 


Plasma Lactic Acid Artem   (0.7-2.0)  mmol/L


 


Calcium   (8.4-10.2)  mg/dL


 


Magnesium  1.0 L  (1.6-2.3)  mg/dL


 


Total Bilirubin   (0.2-1.3)  mg/dL


 


AST   (14-36)  U/L


 


ALT   (9-52)  U/L


 


Alkaline Phosphatase   ()  U/L


 


Troponin I   (0.000-0.034)  ng/mL


 


Total Protein   (6.3-8.2)  g/dL


 


Albumin   (3.5-5.0)  g/dL


 


Blood Type   


 


Blood Type Recheck   


 


Antibody Screen   


 


Spec Expiration Date   














Critical Care Time


Critical Care Time: Yes


Total Critical Care Time: 35





Disposition


Clinical Impression: 


 Sepsis, Kidney stone, History of lymphoma, Febrile neutropenia





Disposition: ADMITTED AS IP TO THIS HOSP


Referrals: 


Faustino Villegas MD [Primary Care Provider] - 1-2 days


Time of Disposition: 16:52

## 2018-10-04 NOTE — P.CNPUL
History of Present Illness


Consult date: 10/04/18


Chief complaint: Acute hypotension


History of present illness: 





63-year-old female patient with known history of non-Hodgkin's lymphoma with 

involvement of the liver on the lung who was transferred to the emergency 

department from her oncologist office because of hypotension and tachycardia.  

The patient presented to the ED having extreme fatigue and some degree of 

abdominal discomfort more so in the upper abdomen compared to the lower.  She 

denied having any recent fever.  The daughter however told me that the patient 

was having a low-grade fever of 100.4.  She did admit to have some loose 

liquidy bowel movements 2 bouts yesterday and one bout this morning.  No recent 

antibiotic use.  The patient has a Mediport over the right chest area the site 

of which is clean and that is no erythema and the port is functioning well.  

She denied having any cough or sputum production.  No reported nausea or 

vomiting or emesis.  No chest pain.  No angina.  Her blood work showed that the 

patient was neutropenic with a white cell count of 1.6.  She had a hemoglobin 

of 9.3 with a platelet count of 167.  Her BUN was 24 with a creatinine of 1.1.  

She had no significant metabolic acidosis.  The patient lactic acid level was 

at 1.7 at a time of initial evaluation.  Liver function tests are also within 

normal limits.  She was unable to give a UA and she refused and declined a 

Riley catheter insertion.  The patient was started already on a combination of 

cefepime and vancomycin.  She was found to be hypotensive and she received 2 L 

of IV fluid without much response and her blood pressure in general.  Note that 

her systolic blood pressure was 74 with a diastolic of 44.  At one point, her 

blood pressure was as low as 69/38.  Her heart rate was around 109 and with 

fluid resuscitation dropped down to 83.  She was also started on pressors after 

2-1/2 L of IV fluid and pulmonary critical care consultation was requested.  

CAT scan of the abdomen was done and showed a 3 mm left proximal ureteral stone 

with obstruction and mild left hydronephrosis.  The patient has fluid-filled 

loops of small bowel with wall thickening and extensive diverticular changes 

within the colon.  Retroperitoneal adenopathy was again described by the CAT 

scan.  There was normal aorta, normal pancreas and spleen, multiple hypodense 

lesions within the liver noted suggestive of metastatic foci and the patient is 

postcholecystectomy.








 Note that this patient lymphoma was a recent diagnosis.  She was initially 

seen in 3/18.  The patient had presented with some chest pain.  She had a 

computed tomography scan done to assess for aortic aneurysm, which incidentally 

revealed upper retroperitoneal lymph nodes.  The patient subsequently had a 

lymph node biopsy which was positive for low-grade non-Hodgkin's lymphoma, with 

marginal zone lymphoma favored.  The patient then had a PET scan done, which 

showed uptake limited to upper left retroperitoneal area, as well as uptake in 

the liver.  The patient had an MRI of the liver which was not conclusive in 

terms of ruling in or ruling out liver involvement.  She therefore underwent 

liver biopsy in 5/18 which showed no evidence of lymphoma involvement.  It did 

show evidence of inflammation and steatosis. The patient subsequently had a 

bone marrow aspiration biopsy on 7/2/18 which did show involvement of the bone 

marrow with B-cell lymphoma, confirming stage IV disease.  As the patient was 

asymptomatic, she was placed on observation which is the standard of care for 

stage IV low-grade non-Hodgkin lymphoma.  Patient was recently started on 

systemic chemotherapy knowing that the bone marrow biopsy also showed 

conversion to a high-grade lymphoma and the patient was given a first cycle of 

systemic chemotherapy that included Cytoxan, Rituxan and high-dose prednisone.  

The patient was also given Neulasta for neutropenia post chemotherapy.





Review of Systems





12 point review of system was done and the positive findings are almost above 

history of present illness.  The patient was profoundly fatigued and 

hypotensive.  She was also weak.








Constitutional: Reports fatigue, Reports weakness


Eyes: denies blurred vision, denies pain


Ears: deny: decreased hearing, ear discharge, earache, tinnitus


Ears, nose, mouth and throat: Denies headache, Denies sore throat


Cardiovascular: Reports chest pain, Reports decreased exercise tolerance


Respiratory: Denies cough


Gastrointestinal: Denies abdominal pain, Denies diarrhea, Denies nausea, Denies 

vomiting


Genitourinary: Denies dysuria, Denies hematuria


Menstruation: Reports postmenopausal


Musculoskeletal: Reports muscle weakness


Neurological: Denies numbness, Denies weakness


Psychiatric: Denies anxiety, Denies depression


Endocrine: Denies fatigue, Denies weight change


Hematologic/Lymphatic: Reports as per HPI








Past Medical History


Past Medical History: Cancer, Heart Failure, Diabetes Mellitus, Hyperlipidemia, 

Hypertension, Sleep Apnea/CPAP/BIPAP, Thyroid Disorder


Additional Past Medical History / Comment(s): B-cell non-Hodgkin's lymphoma 

stage IV, diabetes mellitus, hypertension, hyperlipidemia, obstructive sleep 

apnea, hypothyroidism post thyroidectomy, closed head injury in 1989, 

difficulty with mobility and ambulation the patient has been using a walker him 

a history of nephrolithiasis/kidney stones


Last Myocardial Infarction Date:: 1989


History of Any Multi-Drug Resistant Organisms: None Reported


Past Surgical History: Cholecystectomy, Hysterectomy, Tonsillectomy


Additional Past Surgical History / Comment(s): Thyroidectomy, lymph node and 

liver biopsies


Past Anesthesia/Blood Transfusion Reactions: Postoperative Nausea & Vomiting (

PONV)


Past Psychological History: Anxiety


Smoking Status: Never smoker


Past Alcohol Use History: None Reported


Past Drug Use History: None Reported





- Past Family History


  ** Mother


Family Medical History: Cancer


Additional Family Medical History / Comment(s): breast CA





  ** Father


Family Medical History: Cancer


Additional Family Medical History / Comment(s): colon CA





  ** Sister(s)


Family Medical History: Cancer


Additional Family Medical History / Comment(s): Skin cancer





Medications and Allergies


 Home Medications











 Medication  Instructions  Recorded  Confirmed  Type


 


Furosemide 20 mg PO BID 07/24/14 10/04/18 History


 


Gabapentin 100 mg PO QAM 07/24/14 10/04/18 History


 


Potassium Chloride [Klor-Con 10] 10 meq PO DAILY 07/24/14 10/04/18 History


 


Gabapentin [Neurontin] 200 mg PO HS 10/09/15 10/04/18 History


 


Levothyroxine Sodium [Synthroid] 125 mcg PO MOTUWETHFRSA 10/09/15 10/04/18 

History


 


metFORMIN HCL 1,000 mg PO BID PRN 10/09/15 10/04/18 History


 


Cetirizine HCl [Zyrtec] 10 mg PO HS 03/28/18 10/04/18 History


 


Glimepiride [Amaryl] 2 mg PO DAILY PRN 03/28/18 10/04/18 History


 


Multivitamins, Thera [Multivitamin 1 tab PO DAILY 03/28/18 10/04/18 History





(formulary)]    


 


ALPRAZolam [Xanax] 0.5 - 1 mg PO DAILY PRN 04/16/18 10/04/18 History


 


Atorvastatin Calcium [Lipitor] 40 mg PO HS 04/16/18 10/04/18 History


 


Citalopram Hydrobromide [CeleXA] 20 mg PO DAILY 08/26/18 10/04/18 History


 


Ondansetron HCl [Zofran] 8 mg PO Q6H PRN 10/04/18 10/04/18 History


 


Pantoprazole [Protonix] 40 mg PO DAILY 10/04/18 10/04/18 History


 


Super B Complex 1 tab PO DAILY 10/04/18 10/04/18 History


 


predniSONE 100 mg PO AS DIRECTED 10/04/18 10/04/18 History











 Allergies











Allergy/AdvReac Type Severity Reaction Status Date / Time


 


haloperidol [From Haldol] Allergy  Unknown Verified 10/04/18 13:04


 


haloperidol lactate Allergy  Unknown Verified 10/04/18 13:04





[From Haldol]     


 


Iodinated Contrast- Oral and Allergy  Rash/Hives Verified 10/04/18 13:04





IV Dye     





[Iodinated Contrast Media -     





IV Dye]     


 


ciprofloxacin [From Cipro] AdvReac  Heartburn Verified 10/04/18 13:04


 


steri-strips AdvReac Severe sore Uncoded 10/04/18 12:30














Physical Exam


Vitals: 


 Vital Signs











  Temp Pulse Resp BP Pulse Ox


 


 10/04/18 16:34   64  16  102/52  99


 


 10/04/18 15:47   60  16  108/57  99


 


 10/04/18 15:19   69  18  94/51 


 


 10/04/18 15:09   68  18  85/55  97


 


 10/04/18 14:30   74  18  69/38  95


 


 10/04/18 13:51   83  16  78/45  98


 


 10/04/18 12:50  100.6 F H  101 H  20  86/52  98


 


 10/04/18 12:30  99.2 F  109 H  18  74/44  95








 Intake and Output











 10/04/18 10/04/18 10/04/18





 06:59 14:59 22:59


 


Intake Total   9.062


 


Balance   9.062


 


Intake:   


 


  Intake, IV Titration   9.062





  Amount   


 


    Norepinephrine 4 mg In   9.062





    Sodium Chloride 0.9% 250   





    ml @ Titrate IV .Q0M ONE   





    Rx#:[id number]   


 


Other:   


 


  Weight  63.957 kg 

















- Constitutional


General appearance: no acute distress





- EENT


Eyes: EOMI, PERRLA


ENT: hearing grossly normal, normal oropharynx





- Neck


Neck: no lymphadenopathy





- Respiratory


Respiratory: bilateral: CTA





- Cardiovascular


Rhythm: regular


Heart sounds: normal: S1, S2





- Gastrointestinal


General gastrointestinal: normal bowel sounds, soft





- Integumentary


Integumentary: normal





- Neurologic


Neurologic: CNII-XII intact





- Musculoskeletal


Musculoskeletal: strength equal bilaterally





- Psychiatric


Psychiatric: A&O x's 3, appropriate affect





Results





Results





- Laboratory Findings


CBC and BMP: 


 10/04/18 12:56





 10/04/18 12:56


PT/INR, D-dimer











PT  11.3 sec (9.0-12.0)   10/04/18  12:56    


 


INR  1.2  (<1.2)  H  10/04/18  12:56    








Abnormal lab findings: 


 Abnormal Labs











  10/04/18 10/04/18 10/04/18





  12:56 12:56 12:56


 


WBC  1.6 L  


 


RBC  3.78 L  


 


Hgb  9.3 L  


 


Hct  30.4 L  


 


MCH  24.6 L  


 


MCHC  30.6 L  


 


RDW  16.8 H  


 


Neutrophils # (Manual)  0.90 L  


 


Lymphocytes # (Manual)  0.27 L  


 


Metamyelocytes # (Man)  0.02 H  


 


Myelocytes # (Manual)  0.02 H  


 


INR    1.2 H


 


Sodium   134 L 


 


Potassium   3.3 L 


 


Carbon Dioxide   21 L 


 


BUN   24 H 


 


Creatinine   1.12 H 


 


Glucose   247 H 


 


Calcium   7.6 L 


 


Magnesium   


 


Alkaline Phosphatase   147 H 


 


Total Protein   5.4 L 


 


Albumin   2.6 L 














  10/04/18





  12:56


 


WBC 


 


RBC 


 


Hgb 


 


Hct 


 


MCH 


 


MCHC 


 


RDW 


 


Neutrophils # (Manual) 


 


Lymphocytes # (Manual) 


 


Metamyelocytes # (Man) 


 


Myelocytes # (Manual) 


 


INR 


 


Sodium 


 


Potassium 


 


Carbon Dioxide 


 


BUN 


 


Creatinine 


 


Glucose 


 


Calcium 


 


Magnesium  1.0 L


 


Alkaline Phosphatase 


 


Total Protein 


 


Albumin 














Assessment and Plan


Plan: 











Assessment





1 acute hypotension, likely of a septic in nature.  The patient is neutropenic 

and absolute neutrophil count is less than the thousand.  In addition, she 

presents in with profound hypotension, responsive to IV fluids and the patient 

was placed on pressors and broad-spectrum antibiotics including a combination 

of cefepime and vancomycin.  Blood cultures of been sent.  Unable to collect 

urine due to refusal of a Riley catheter and the patient has not been able to 

produce any urine.  CAT scan of the abdomen shows 3 mm ureteral calculus along 

with some mild left kidney hydronephrosis.  Possibility of an obstructive 

uropathy/complicated UTI with secondary sepsis needs to be considered.  

Possibility of underlying colitis/enteritis with secondary diarrhea.





2 stage IV non-Hodgkin's lymphoma low-grade





3 obstructive uropathy with left ureteral stone and hydronephrosis





4 anemia, normocytic likely chronic





5 obstructive sleep apnea





6 hypothyroidism





7 hyperlipidemia





8 diabetes mellitus





9 history of closed head injury





Plan





Continue IV fluids at 150 mL an hour.  Continue stress dose hydrocortisone 50 

mg every 8 hours that the patient was taken steroids on outpatient basis.  The 

patient needs to get at least 4 L of IV fluid boluses.  Continue pressors to 

maintain a mean artery pressure above 65.  The patient has a Mediport in place.

  We will utilize a Mediport in addition to the peripheral lines.  Continue 

same antibiotic coverage.  Patient is on cefepime and vancomycin.  We'll add 

Flagyl for any possibility of underlying enteritis/colitis.  Check stool for C. 

diff if there is any recurrent diarrhea.  He says 40 catheter.  Obtain urology 

consultation regarding the ureteral stone and hydronephrosis.  Resume 

outpatient medications including the thyroid hormone.  Hold diuretics.  Sliding 

scale insulin Sugar coverage.  Stop the metformin and Amaryl for now.  We'll 

move this patient in intensive care unit for further monitoring.


Time with Patient: Greater than 30

## 2018-10-04 NOTE — XR
EXAMINATION TYPE: XR chest 2V

 

DATE OF EXAM: 10/4/2018

 

COMPARISON: 9/10/2018

 

INDICATION: Fever

 

TECHNIQUE:  Frontal and lateral views of the chest are obtained.

 

FINDINGS:  

The heart size is normal.  

The pulmonary vasculature is normal.

There is a nodule within the mid left lung which is stable. Right-sided port is stable. No suspicious
 infiltrate is evident.

 

IMPRESSION:  

1. Stable nodule left midlung.

## 2018-10-04 NOTE — CT
EXAMINATION TYPE: CT abdomen pelvis w con

 

DATE OF EXAM: 10/4/2018

 

COMPARISON: Prior CT 8/26/2018

 

HISTORY: Patient poor historian.  Pain.

 

CT DLP: 674 mGycm

Automated exposure control for dose reduction was used.

 

TECHNIQUE:  Helical acquisition of images from the lung bases through the pelvis have been completed.


 

CONTRAST: 

Performed without Oral Contrast and with IV Contrast, patient injected with 80 mL of Isovue 300.

 

FINDINGS: 

 

LUNG BASES: Minimal dependent atelectatic changes are noted..

 

AORTA:  No significant abnormality is appreciated.

 

LIVER/GB: Multiple hypointense lesions within the liver are again noted compatible with metastatic fo
ci. Patient is post cholecystectomy.

 

PANCREAS: No significant abnormality is seen.

 

SPLEEN: No significant abnormality is seen.

 

ADRENALS: No significant abnormality is seen.

 

KIDNEYS: No significant abnormality is seen. Retroaortic left renal vein is noted. There is a proxima
l left ureteral calculus with hydronephrosis. Calculus measures approximately 2 to 3 mm in size. Low 
dense focus within the left kidney is again seen.

 

 

REPRODUCTIVE ORGANS: No significant abnormality is seen

 

BOWEL:  There are fluid-filled loops of small bowel with wall thickening which are somewhat distended
. Extensive diverticular change noted within the colon.

 

FREE AIR:  No Free Air visible.

 

ASCITES:  None visible.

 

PELVIC ADENOPATHY:  None visualized.

 

RETROPERITONEAL ADENOPATHY:  Again noted is retroperitoneal adenopathy as described on prior CT.

 

URINARY BLADDER:  No significant abnormality is seen.

 

OSSEOUS STRUCTURES:  Degenerative disc changes, facet arthropathy are noted.

 

IMPRESSION: 

INTERVAL OBSTRUCTED PROXIMAL LEFT URETERAL CALCULUS. THERE MAY BE AN UNDERLYING ILEUS OR ENTERITIS. M
ETASTATIC DISEASE.

## 2018-10-05 LAB
ANION GAP SERPL CALC-SCNC: 13 MMOL/L
BUN SERPL-SCNC: 28 MG/DL (ref 7–17)
CALCIUM SPEC-MCNC: 7 MG/DL (ref 8.4–10.2)
CELLS COUNTED: 200
CHLORIDE SERPL-SCNC: 109 MMOL/L (ref 98–107)
CO2 SERPL-SCNC: 16 MMOL/L (ref 22–30)
ERYTHROCYTE [DISTWIDTH] IN BLOOD BY AUTOMATED COUNT: 3.4 M/UL (ref 3.8–5.4)
ERYTHROCYTE [DISTWIDTH] IN BLOOD: 17.1 % (ref 11.5–15.5)
GLUCOSE BLD-MCNC: 141 MG/DL (ref 75–99)
GLUCOSE BLD-MCNC: 148 MG/DL (ref 75–99)
GLUCOSE BLD-MCNC: 148 MG/DL (ref 75–99)
GLUCOSE BLD-MCNC: 151 MG/DL (ref 75–99)
GLUCOSE BLD-MCNC: 155 MG/DL (ref 75–99)
GLUCOSE BLD-MCNC: 158 MG/DL (ref 75–99)
GLUCOSE BLD-MCNC: 165 MG/DL (ref 75–99)
GLUCOSE BLD-MCNC: 174 MG/DL (ref 75–99)
GLUCOSE BLD-MCNC: 176 MG/DL (ref 75–99)
GLUCOSE BLD-MCNC: 179 MG/DL (ref 75–99)
GLUCOSE BLD-MCNC: 187 MG/DL (ref 75–99)
GLUCOSE BLD-MCNC: 248 MG/DL (ref 75–99)
GLUCOSE BLD-MCNC: 293 MG/DL (ref 75–99)
GLUCOSE BLD-MCNC: 310 MG/DL (ref 75–99)
GLUCOSE BLD-MCNC: 346 MG/DL (ref 75–99)
GLUCOSE BLD-MCNC: 348 MG/DL (ref 75–99)
GLUCOSE BLD-MCNC: 352 MG/DL (ref 75–99)
GLUCOSE BLD-MCNC: 363 MG/DL (ref 75–99)
GLUCOSE SERPL-MCNC: 316 MG/DL (ref 74–99)
HBA1C MFR BLD: 7.5 % (ref 4–6)
HCT VFR BLD AUTO: 27.4 % (ref 34–46)
HGB BLD-MCNC: 8.2 GM/DL (ref 11.4–16)
LYMPHOCYTES # BLD MANUAL: 0.05 K/UL (ref 1–4.8)
MAGNESIUM SPEC-SCNC: 1.2 MG/DL (ref 1.6–2.3)
MCH RBC QN AUTO: 24 PG (ref 25–35)
MCHC RBC AUTO-ENTMCNC: 29.8 G/DL (ref 31–37)
MCV RBC AUTO: 80.6 FL (ref 80–100)
METAMYELOCYTES # BLD: 0.1 K/UL
MONOCYTES # BLD MANUAL: 0.52 K/UL (ref 0–1)
NEUTROPHILS NFR BLD MANUAL: 65 %
NEUTS SEG # BLD MANUAL: 4.6 K/UL (ref 1.3–7.7)
PLATELET # BLD AUTO: 218 K/UL (ref 150–450)
POTASSIUM SERPL-SCNC: 3.8 MMOL/L (ref 3.5–5.1)
SODIUM SERPL-SCNC: 138 MMOL/L (ref 137–145)
WBC # BLD AUTO: 5.2 K/UL (ref 3.8–10.6)

## 2018-10-05 RX ADMIN — METRONIDAZOLE SCH MLS/HR: 500 INJECTION, SOLUTION INTRAVENOUS at 08:11

## 2018-10-05 RX ADMIN — THERA TABS SCH EACH: TAB at 11:42

## 2018-10-05 RX ADMIN — FILGRASTIM-SNDZ SCH MCG: 480 INJECTION, SOLUTION INTRAVENOUS; SUBCUTANEOUS at 14:08

## 2018-10-05 RX ADMIN — HYDROCODONE BITARTRATE AND ACETAMINOPHEN PRN EACH: 7.5; 325 TABLET ORAL at 11:45

## 2018-10-05 RX ADMIN — MAGNESIUM SULFATE IN DEXTROSE SCH MLS/HR: 10 INJECTION, SOLUTION INTRAVENOUS at 09:00

## 2018-10-05 RX ADMIN — HYDROCORTISONE SODIUM SUCCINATE SCH MG: 100 INJECTION, POWDER, FOR SOLUTION INTRAMUSCULAR; INTRAVENOUS at 23:05

## 2018-10-05 RX ADMIN — LORATADINE SCH MG: 10 TABLET ORAL at 20:36

## 2018-10-05 RX ADMIN — METRONIDAZOLE SCH MLS/HR: 500 INJECTION, SOLUTION INTRAVENOUS at 16:33

## 2018-10-05 RX ADMIN — FILGRASTIM-SNDZ SCH: 480 INJECTION, SOLUTION INTRAVENOUS; SUBCUTANEOUS at 09:17

## 2018-10-05 RX ADMIN — CITALOPRAM HYDROBROMIDE SCH MG: 20 TABLET ORAL at 08:11

## 2018-10-05 RX ADMIN — HYDROCORTISONE SODIUM SUCCINATE SCH MG: 100 INJECTION, POWDER, FOR SOLUTION INTRAMUSCULAR; INTRAVENOUS at 01:25

## 2018-10-05 RX ADMIN — HYDROCORTISONE SODIUM SUCCINATE SCH MG: 100 INJECTION, POWDER, FOR SOLUTION INTRAMUSCULAR; INTRAVENOUS at 08:15

## 2018-10-05 RX ADMIN — POTASSIUM CHLORIDE SCH MEQ: 20 TABLET, EXTENDED RELEASE ORAL at 03:04

## 2018-10-05 RX ADMIN — INSULIN HUMAN SCH MLS/HR: 100 INJECTION, SOLUTION PARENTERAL at 09:40

## 2018-10-05 RX ADMIN — GABAPENTIN SCH MG: 100 CAPSULE ORAL at 20:36

## 2018-10-05 RX ADMIN — CEFAZOLIN SCH MLS/HR: 330 INJECTION, POWDER, FOR SOLUTION INTRAMUSCULAR; INTRAVENOUS at 18:23

## 2018-10-05 RX ADMIN — MAGNESIUM SULFATE IN DEXTROSE SCH MLS/HR: 10 INJECTION, SOLUTION INTRAVENOUS at 07:00

## 2018-10-05 RX ADMIN — HYDROCORTISONE SODIUM SUCCINATE SCH MG: 100 INJECTION, POWDER, FOR SOLUTION INTRAMUSCULAR; INTRAVENOUS at 16:33

## 2018-10-05 RX ADMIN — POTASSIUM CHLORIDE SCH MEQ: 20 TABLET, EXTENDED RELEASE ORAL at 02:20

## 2018-10-05 RX ADMIN — CEFEPIME HYDROCHLORIDE SCH MLS/HR: 2 INJECTION, POWDER, FOR SOLUTION INTRAVENOUS at 05:01

## 2018-10-05 RX ADMIN — SODIUM CHLORIDE SCH MLS/HR: 9 INJECTION, SOLUTION INTRAVENOUS at 06:36

## 2018-10-05 RX ADMIN — CEFEPIME HYDROCHLORIDE SCH MLS/HR: 2 INJECTION, POWDER, FOR SOLUTION INTRAVENOUS at 13:10

## 2018-10-05 RX ADMIN — METRONIDAZOLE SCH MLS/HR: 500 INJECTION, SOLUTION INTRAVENOUS at 23:05

## 2018-10-05 RX ADMIN — POTASSIUM CHLORIDE SCH MEQ: 750 TABLET, EXTENDED RELEASE ORAL at 08:12

## 2018-10-05 RX ADMIN — METRONIDAZOLE SCH MLS/HR: 500 INJECTION, SOLUTION INTRAVENOUS at 01:14

## 2018-10-05 RX ADMIN — GABAPENTIN SCH MG: 100 CAPSULE ORAL at 08:11

## 2018-10-05 RX ADMIN — CEFAZOLIN SCH MLS/HR: 330 INJECTION, POWDER, FOR SOLUTION INTRAMUSCULAR; INTRAVENOUS at 11:39

## 2018-10-05 RX ADMIN — MAGNESIUM SULFATE IN DEXTROSE SCH MLS/HR: 10 INJECTION, SOLUTION INTRAVENOUS at 11:40

## 2018-10-05 RX ADMIN — INSULIN HUMAN SCH MLS/HR: 100 INJECTION, SOLUTION PARENTERAL at 14:17

## 2018-10-05 NOTE — P.GSCN
History of Present Illness


Consult date: 10/05/18


Reason for Consult: 





Left ureteral calculus-possible urosepsis


History of present illness: 


The patient is a 63-year-old female admitted through the emergency room 

yesterday evening for evaluation of neutropenic fever associated with 

hypotension.  She has recently been diagnosed with stage IV non-Hodgkin's 

lymphoma with liver and pulmonary metastases.  She received cytotoxin,  Rituxan 

and high-dose prednisone approximately 10 days ago.  She was seen by Dr. Dumont in 

his office yesterday and complained of extreme fatigue and was noted to have 

hypotension.  She will also had a low-grade fever.  When she came to the 

emergency room her systolic blood pressure was approximately 70.  She did have 

a temperature 100.4.  Her white blood count was 1600.  BUN/creatinine were 24/

1.12 which is not significantly changed from the values obtained 2 weeks ago 

when her creatinine was 1.10.  Noncontrast computed tomography scan of the chest

, abdomen and pelvis showed a less than 2 mm calculus in the proximal left 

ureter with minimal if any hydronephrosis.  The patient was unable to void in 

the emergency room and refused placement of a catheter to obtain a urine 

specimen.  There was initially some concern that the patient could have sepsis 

related to urinary tract infection complicated by an obstructive calculus.  She 

was started on cefepime and vancomycin and transferred to the intensive care 

unit.  She has remained stable overnight and her systolic blood pressure is now 

approximately 100 however she is receiving Levaquin support.





The patient has a history of urolithiasis and underwent right ureteroscopy with 

lithotripsy for treatment of an obstructive right ureteral calculus in 11/2015 

by Dr. Harris.  She says she's had no problems since that time.  She does not 

have a history of recurrent urinary tract infections and according to the 

hospital records urine cultures since then have then repeatedly no growth.  She 

denies any recent hematuria or change in her normal voiding pattern.  She says 

she usually voids every 2-3 hours during the day and only occasionally at 

night.  She says she's had some lower abdominal pain for several weeks as well 

as chronic back pain but this has not changed recently.  She denies any left 

flank pain.








Review of Systems





- Constitutional


Reports fatigue, Reports lethargy





- Cardiovascular


Denies shortness of breath





- Respiratory


Denies cough





- Gastrointestinal


Reports as per HPI





- Genitourinary


Genitourinary: Reports as per HPI





Past Medical History


Past Medical History: Cancer, Heart Failure, Diabetes Mellitus, Hyperlipidemia, 

Hypertension, Sleep Apnea/CPAP/BIPAP, Thyroid Disorder


Additional Past Medical History / Comment(s): B-cell non-Hodgkin's lymphoma 

stage IV, diabetes mellitus,daughter stated pt had first chemo mon 9-24-18 

hypertension, hyperlipidemia, obstructive sleep apnea, hypothyroidism post 

thyroidectomy, closed head injury in 1989, difficulty with mobility and 

ambulation the patient has been using a walker. has a history of kidney stones


Last Myocardial Infarction Date:: 1989


History of Any Multi-Drug Resistant Organisms: None Reported


Past Surgical History: Cholecystectomy, Hysterectomy, Tonsillectomy


Additional Past Surgical History / Comment(s): Thyroidectomy, lymph node and 

liver biopsies. port a cath 9-10-18, right ureteroscopy with lithotripsy 11/2015


Past Anesthesia/Blood Transfusion Reactions: Postoperative Nausea & Vomiting (

PONV)


Smoking Status: Never smoker





- Past Family History


  ** Mother


Family Medical History: Cancer


Additional Family Medical History / Comment(s): breast CA





  ** Father


Family Medical History: Cancer


Additional Family Medical History / Comment(s): colon CA





  ** Sister(s)


Family Medical History: Cancer


Additional Family Medical History / Comment(s): Skin cancer





Medications and Allergies


 Home Medications











 Medication  Instructions  Recorded  Confirmed  Type


 


Furosemide 20 mg PO BID 07/24/14 10/04/18 History


 


Gabapentin 100 mg PO QAM 07/24/14 10/04/18 History


 


Potassium Chloride [Klor-Con 10] 10 meq PO DAILY 07/24/14 10/04/18 History


 


Gabapentin [Neurontin] 200 mg PO HS 10/09/15 10/04/18 History


 


Levothyroxine Sodium [Synthroid] 125 mcg PO MOTUWETHFRSA 10/09/15 10/04/18 

History


 


metFORMIN HCL 1,000 mg PO BID PRN 10/09/15 10/04/18 History


 


Cetirizine HCl [Zyrtec] 10 mg PO HS 03/28/18 10/04/18 History


 


Glimepiride [Amaryl] 2 mg PO DAILY PRN 03/28/18 10/04/18 History


 


Multivitamins, Thera [Multivitamin 1 tab PO DAILY 03/28/18 10/04/18 History





(formulary)]    


 


ALPRAZolam [Xanax] 0.5 - 1 mg PO DAILY PRN 04/16/18 10/04/18 History


 


Atorvastatin Calcium [Lipitor] 40 mg PO HS 04/16/18 10/04/18 History


 


Citalopram Hydrobromide [CeleXA] 20 mg PO DAILY 08/26/18 10/04/18 History


 


Ondansetron HCl [Zofran] 8 mg PO Q6H PRN 10/04/18 10/04/18 History


 


Pantoprazole [Protonix] 40 mg PO DAILY 10/04/18 10/04/18 History


 


Super B Complex 1 tab PO DAILY 10/04/18 10/04/18 History


 


predniSONE 100 mg PO AS DIRECTED 10/04/18 10/04/18 History











 Allergies











Allergy/AdvReac Type Severity Reaction Status Date / Time


 


haloperidol [From Haldol] Allergy  Unknown Verified 10/04/18 13:04


 


haloperidol lactate Allergy  Unknown Verified 10/04/18 13:04





[From Haldol]     


 


Iodinated Contrast- Oral and Allergy  Rash/Hives Verified 10/04/18 13:04





IV Dye     





[Iodinated Contrast Media -     





IV Dye]     


 


ciprofloxacin [From Cipro] AdvReac  Heartburn Verified 10/04/18 13:04


 


steri-strips AdvReac Severe sore Uncoded 10/04/18 12:30














Surgical - Exam


 Vital Signs











Temp Pulse Resp BP Pulse Ox


 


 99.2 F   109 H  18   74/44   95 


 


 10/04/18 12:30  10/04/18 12:30  10/04/18 12:30  10/04/18 12:30  10/04/18 12:30














- General


no pain, chronically ill





- Neck


no masses, no lymphadectomy





- Respiratory


normal respiratory effort





- Abdomen


Abdomen: soft, non tender, no organomegaly, no guarding





Results





- Labs





 10/05/18 05:04





 10/05/18 05:04


 Abnormal Lab Results - Last 24 Hours (Table)











  10/04/18 10/04/18 10/04/18 Range/Units





  12:56 12:56 12:56 


 


WBC  1.6 L    (3.8-10.6)  k/uL


 


RBC  3.78 L    (3.80-5.40)  m/uL


 


Hgb  9.3 L    (11.4-16.0)  gm/dL


 


Hct  30.4 L    (34.0-46.0)  %


 


MCH  24.6 L    (25.0-35.0)  pg


 


MCHC  30.6 L    (31.0-37.0)  g/dL


 


RDW  16.8 H    (11.5-15.5)  %


 


Neutrophils # (Manual)  0.90 L    (1.3-7.7)  k/uL


 


Lymphocytes # (Manual)  0.27 L    (1.0-4.8)  k/uL


 


Metamyelocytes # (Man)  0.02 H    (0)  k/uL


 


Myelocytes # (Manual)  0.02 H    (0)  k/uL


 


INR    1.2 H  (<1.2)  


 


Sodium   134 L   (137-145)  mmol/L


 


Potassium   3.3 L   (3.5-5.1)  mmol/L


 


Chloride     ()  mmol/L


 


Carbon Dioxide   21 L   (22-30)  mmol/L


 


BUN   24 H   (7-17)  mg/dL


 


Creatinine   1.12 H   (0.52-1.04)  mg/dL


 


Glucose   247 H   (74-99)  mg/dL


 


POC Glucose (mg/dL)     (75-99)  mg/dL


 


Calcium   7.6 L   (8.4-10.2)  mg/dL


 


Magnesium     (1.6-2.3)  mg/dL


 


Alkaline Phosphatase   147 H   ()  U/L


 


Total Protein   5.4 L   (6.3-8.2)  g/dL


 


Albumin   2.6 L   (3.5-5.0)  g/dL


 


Urine Appearance     (Clear)  


 


Urine Protein     (Negative)  


 


Urine Ketones     (Negative)  


 


Urine Blood     (Negative)  


 


Urine RBC     (0-5)  /hpf


 


Urine Mucus     (None)  /hpf














  10/04/18 10/04/18 10/04/18 Range/Units





  12:56 18:50 20:49 


 


WBC     (3.8-10.6)  k/uL


 


RBC     (3.80-5.40)  m/uL


 


Hgb     (11.4-16.0)  gm/dL


 


Hct     (34.0-46.0)  %


 


MCH     (25.0-35.0)  pg


 


MCHC     (31.0-37.0)  g/dL


 


RDW     (11.5-15.5)  %


 


Neutrophils # (Manual)     (1.3-7.7)  k/uL


 


Lymphocytes # (Manual)     (1.0-4.8)  k/uL


 


Metamyelocytes # (Man)     (0)  k/uL


 


Myelocytes # (Manual)     (0)  k/uL


 


INR     (<1.2)  


 


Sodium     (137-145)  mmol/L


 


Potassium     (3.5-5.1)  mmol/L


 


Chloride     ()  mmol/L


 


Carbon Dioxide     (22-30)  mmol/L


 


BUN     (7-17)  mg/dL


 


Creatinine     (0.52-1.04)  mg/dL


 


Glucose     (74-99)  mg/dL


 


POC Glucose (mg/dL)    269 H  (75-99)  mg/dL


 


Calcium     (8.4-10.2)  mg/dL


 


Magnesium  1.0 L    (1.6-2.3)  mg/dL


 


Alkaline Phosphatase     ()  U/L


 


Total Protein     (6.3-8.2)  g/dL


 


Albumin     (3.5-5.0)  g/dL


 


Urine Appearance   Cloudy H   (Clear)  


 


Urine Protein   Trace H   (Negative)  


 


Urine Ketones   Trace H   (Negative)  


 


Urine Blood   Small H   (Negative)  


 


Urine RBC   11 H   (0-5)  /hpf


 


Urine Mucus   Rare H   (None)  /hpf














  10/04/18 10/05/18 10/05/18 Range/Units





  23:18 05:04 05:04 


 


WBC     (3.8-10.6)  k/uL


 


RBC   3.40 L   (3.80-5.40)  m/uL


 


Hgb   8.2 L   (11.4-16.0)  gm/dL


 


Hct   27.4 L   (34.0-46.0)  %


 


MCH   24.0 L   (25.0-35.0)  pg


 


MCHC   29.8 L   (31.0-37.0)  g/dL


 


RDW   17.1 H   (11.5-15.5)  %


 


Neutrophils # (Manual)     (1.3-7.7)  k/uL


 


Lymphocytes # (Manual)     (1.0-4.8)  k/uL


 


Metamyelocytes # (Man)     (0)  k/uL


 


Myelocytes # (Manual)     (0)  k/uL


 


INR     (<1.2)  


 


Sodium     (137-145)  mmol/L


 


Potassium     (3.5-5.1)  mmol/L


 


Chloride    109 H  ()  mmol/L


 


Carbon Dioxide    16 L  (22-30)  mmol/L


 


BUN    28 H  (7-17)  mg/dL


 


Creatinine    1.19 H  (0.52-1.04)  mg/dL


 


Glucose    316 H  (74-99)  mg/dL


 


POC Glucose (mg/dL)  289 H    (75-99)  mg/dL


 


Calcium    7.0 L  (8.4-10.2)  mg/dL


 


Magnesium    1.2 L  (1.6-2.3)  mg/dL


 


Alkaline Phosphatase     ()  U/L


 


Total Protein     (6.3-8.2)  g/dL


 


Albumin     (3.5-5.0)  g/dL


 


Urine Appearance     (Clear)  


 


Urine Protein     (Negative)  


 


Urine Ketones     (Negative)  


 


Urine Blood     (Negative)  


 


Urine RBC     (0-5)  /hpf


 


Urine Mucus     (None)  /hpf














  10/05/18 Range/Units





  07:25 


 


WBC   (3.8-10.6)  k/uL


 


RBC   (3.80-5.40)  m/uL


 


Hgb   (11.4-16.0)  gm/dL


 


Hct   (34.0-46.0)  %


 


MCH   (25.0-35.0)  pg


 


MCHC   (31.0-37.0)  g/dL


 


RDW   (11.5-15.5)  %


 


Neutrophils # (Manual)   (1.3-7.7)  k/uL


 


Lymphocytes # (Manual)   (1.0-4.8)  k/uL


 


Metamyelocytes # (Man)   (0)  k/uL


 


Myelocytes # (Manual)   (0)  k/uL


 


INR   (<1.2)  


 


Sodium   (137-145)  mmol/L


 


Potassium   (3.5-5.1)  mmol/L


 


Chloride   ()  mmol/L


 


Carbon Dioxide   (22-30)  mmol/L


 


BUN   (7-17)  mg/dL


 


Creatinine   (0.52-1.04)  mg/dL


 


Glucose   (74-99)  mg/dL


 


POC Glucose (mg/dL)  363 H  (75-99)  mg/dL


 


Calcium   (8.4-10.2)  mg/dL


 


Magnesium   (1.6-2.3)  mg/dL


 


Alkaline Phosphatase   ()  U/L


 


Total Protein   (6.3-8.2)  g/dL


 


Albumin   (3.5-5.0)  g/dL


 


Urine Appearance   (Clear)  


 


Urine Protein   (Negative)  


 


Urine Ketones   (Negative)  


 


Urine Blood   (Negative)  


 


Urine RBC   (0-5)  /hpf


 


Urine Mucus   (None)  /hpf








 Microbiology - Last 24 Hours (Table)











 10/04/18 18:50 Urine Culture - Preliminary





 Urine,Catheterized 








 Diabetes panel











  10/04/18 10/05/18 Range/Units





  12:56 05:04 


 


Sodium  134 L  138  (137-145)  mmol/L


 


Potassium  3.3 L  3.8  (3.5-5.1)  mmol/L


 


Chloride  100  109 H  ()  mmol/L


 


Carbon Dioxide  21 L  16 L  (22-30)  mmol/L


 


BUN  24 H  28 H  (7-17)  mg/dL


 


Creatinine  1.12 H  1.19 H  (0.52-1.04)  mg/dL


 


Glucose  247 H  316 H  (74-99)  mg/dL


 


Calcium  7.6 L  7.0 L  (8.4-10.2)  mg/dL


 


AST  22   (14-36)  U/L


 


ALT  36   (9-52)  U/L


 


Alkaline Phosphatase  147 H   ()  U/L


 


Total Protein  5.4 L   (6.3-8.2)  g/dL


 


Albumin  2.6 L   (3.5-5.0)  g/dL








 Calcium panel











  10/04/18 10/05/18 Range/Units





  12:56 05:04 


 


Calcium  7.6 L  7.0 L  (8.4-10.2)  mg/dL


 


Phosphorus   3.4  (2.5-4.5)  mg/dL


 


Albumin  2.6 L   (3.5-5.0)  g/dL








 Pituitary panel











  10/04/18 10/05/18 Range/Units





  12:56 05:04 


 


Sodium  134 L  138  (137-145)  mmol/L


 


Potassium  3.3 L  3.8  (3.5-5.1)  mmol/L


 


Chloride  100  109 H  ()  mmol/L


 


Carbon Dioxide  21 L  16 L  (22-30)  mmol/L


 


BUN  24 H  28 H  (7-17)  mg/dL


 


Creatinine  1.12 H  1.19 H  (0.52-1.04)  mg/dL


 


Glucose  247 H  316 H  (74-99)  mg/dL


 


Calcium  7.6 L  7.0 L  (8.4-10.2)  mg/dL








 Adrenal panel











  10/04/18 10/05/18 Range/Units





  12:56 05:04 


 


Sodium  134 L  138  (137-145)  mmol/L


 


Potassium  3.3 L  3.8  (3.5-5.1)  mmol/L


 


Chloride  100  109 H  ()  mmol/L


 


Carbon Dioxide  21 L  16 L  (22-30)  mmol/L


 


BUN  24 H  28 H  (7-17)  mg/dL


 


Creatinine  1.12 H  1.19 H  (0.52-1.04)  mg/dL


 


Glucose  247 H  316 H  (74-99)  mg/dL


 


Calcium  7.6 L  7.0 L  (8.4-10.2)  mg/dL


 


Total Bilirubin  0.6   (0.2-1.3)  mg/dL


 


AST  22   (14-36)  U/L


 


ALT  36   (9-52)  U/L


 


Alkaline Phosphatase  147 H   ()  U/L


 


Total Protein  5.4 L   (6.3-8.2)  g/dL


 


Albumin  2.6 L   (3.5-5.0)  g/dL














Assessment and Plan


(1) Left ureteral calculus


Narrative/Plan: 


The patient's computed tomography scan identified a calculus in the proximal 

left ureter measuring less than 2 mm in size.  There did not appear to be any 

significant hydronephrosis.  I reviewed a computed tomography scan of the 

abdomen performed in 8/2018 and the calculus was not visible at that time.  Her 

urinalysis showed 11 red cells, 4 white cells and rare mucus and was nitrite 

negative.  The patient has no pain referrable to the left side of the abdomen 

or flank and I believe the likelihood that she has infected urine complicating 

the ureteral calculus is low.  Given the small size of the calculus there is a 

reasonably good chance that this will pass spontaneously.  The patient should 

strain her urine has she will probably not feel the stone pass from the 

bladder.  The patient has a nonobstructive 3 mm right renal calculus and no 

specific treatment of this is necessary at this time.


Current Visit: Yes   Status: Acute   Code(s): N20.1 - CALCULUS OF URETER   

SNOMED Code(s): 15961952

## 2018-10-05 NOTE — P.CONS
History of Present Illness





- Reason for Consult


Consult date: 10/05/18





- History of Present Illness





   Ms Falcon is a 63 yr old white female, initially seen in consult at 

[facility] on 3/29/18.  She had been admitted with lower chest

/upper abdominal pain.  She was evaluated by internal medicine and cardiology 

with symptoms resolving spontaneously.  She had a CT to evaluate in the outer 

to rule out aortic aneurysm.  This incidentally noted retroperitoneal adenopathy

, left greater than right, largest about 4 cm in size.  The patient had no 

obvious symptoms other than mid back pain for the prior 6-8 weeks.  However 

this was not constant.  She had a CT of the chest abdomen and pelvis which 

showed retroperitoneal adenopathy measuring 4.8 x 2.9 cm with some additional 

upper abdominal adenopathy measuring up to 1.9 cm.  No other adenopathy was 

seen in the thorax or pelvis.  CBC was normal other than mild WBC elevation, 

mostly due to neutropenia.  Chem panel was also negative other than creatinine 

of 1.2.


 The patient had a retroperitoneal core biopsy on 4/23/18.  The biopsy was 

positive for B-cell non-Hodgkin's lymphoma, CD5 negative.  The main 

differentials included follicular, splenic marginal zone, or LPL.  A specimen 

was sent to [facility] with final diagnosis pending post consultation.


  the patient was then referred here and seen for her first office visit on 5/1/ 18.


   the patient had labs and PET scan ordered.  The final pathology was resulted 

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  PET scan showed activity in the area of 

of abdominal adenopathy.  No other areas of suspicious activity are noted, 

except for scattered areas in the liver which could not be well defined.  Labs 

were negative other than elevated Light chain at 10 3 mg/L, versus lambda of 

44.7 mg/L, with mild elevation of ratio at 2.35.





   The patient had an MRI of the liver ordered, which showed multiple 

suspicious lesions, at least 8 in number.  She therefore underwent an ultrasound

-guided liver biopsy in early 6/18.


  This showed a dense lymphocytic infiltrative pattern.  However interestingly 

the majority of the liver cells appear to be reactive T lymphocytes with only a 

small number of B lymphocytes noted.  The pathologic findings are felt to raise 

the possibility of an inflammatory disorder such as primary biliary cirrhosis.  

The specimen was been sent to the [facility] for additional 

consultation, which reported no evidence of malignancy.


  


   She had a bone marrow in 7/18 , revealing involvement, and thus stage 4 

disease. She was thus placed on observation .


  She was admitted with severe fatigue and weakness to [facility] in late 8/18. CT 

scans showed progression in the lymph nodes and liver. She had a liver biopsy 

on 8/28/18, showing presence of a large and small cell lymphoma population. 

Double/triple hit testing was negative.


   she was started on R-CEOP (Adriamycin not use due to diminished ejection 

fraction) on 9/24/18.  She is status post 1 cycle.


   


   She was seen in the office on 10/4/18. She denies any fever/chills. she had 

marked agitation with steroids, which improved with Xanax.  She had markedly 

decreased appetite and progressive weakness. she has been very lethargic, and 

requiring assistance with even minimal activities. She has been nauseous 

persistently, with intermittent vomiting.  Over the last 1-2 days, she has been 

having diarrhea with lower abdominal cramping and pain. according to the family

, she has been intermittently confused, especially at night.  This had improved 

after stopping the steroids but seemed to have become somewhat more prominent 

over the last 1-2 days. her blood pressure was quite low in the office, with 

systolic in the 70 range.  She was therefore sent in to the emergency room.


   The case was discussed in detail with the ER physician.   The patient was 

noted to have tachycardia as well as low blood pressure, that did not respond 

well to fluids.  WBC was 1.6.  She was therefore admitted to the ICU, and 

consult placed for further evaluation and recommendations.


  CT of the abdomen and pelvis revealed evidence of enteritis, and possibly 

left hydronephrosis due to renal calculus





Review of Systems


Constitutional: Reports lethargy, Reports malaise, Reports poor appetite, 

Reports weight loss


Eyes: denies blurred vision, denies pain


Ears: deny: decreased hearing, ear discharge, earache, tinnitus


Ears, nose, mouth and throat: Denies headache, Denies sore throat


Cardiovascular: Reports decreased exercise tolerance


Respiratory: Denies cough


Gastrointestinal: Reports abdominal pain, Reports diarrhea, Reports nausea, 

Reports vomiting


Genitourinary: Denies dysuria, Denies hematuria


Menstruation: Reports postmenopausal


Musculoskeletal: Reports muscle weakness


Integumentary: Denies pruritus, Denies rash


Neurological: Reports change in mentation, Reports confusion, Reports weakness


Psychiatric: Reports confusion, Reports irritability


Endocrine: Reports fatigue, Reports weight change


Hematologic/Lymphatic: Reports as per HPI





Past Medical History


Past Medical History: Cancer, Heart Failure, Diabetes Mellitus, Hyperlipidemia, 

Hypertension, Sleep Apnea/CPAP/BIPAP, Thyroid Disorder


Additional Past Medical History / Comment(s): B-cell non-Hodgkin's lymphoma 

stage IV, diabetes mellitus,daughter stated pt had first chemo mon 9-24-18 

hypertension, hyperlipidemia, obstructive sleep apnea, hypothyroidism post 

thyroidectomy, closed head injury in 1989, difficulty with mobility and 

ambulation the patient has been using a walker. has a history of kidney stones


Last Myocardial Infarction Date:: 1989


History of Any Multi-Drug Resistant Organisms: None Reported


Past Surgical History: Cholecystectomy, Hysterectomy, Tonsillectomy


Additional Past Surgical History / Comment(s): Thyroidectomy, lymph node and 

liver biopsies. port a cath 9-10-18, right ureteroscopy with lithotripsy 11/2015


Past Anesthesia/Blood Transfusion Reactions: Postoperative Nausea & Vomiting (

PONV)


Smoking Status: Never smoker





- Past Family History


  ** Mother


Family Medical History: Cancer


Additional Family Medical History / Comment(s): breast CA





  ** Father


Family Medical History: Cancer


Additional Family Medical History / Comment(s): colon CA





  ** Sister(s)


Family Medical History: Cancer


Additional Family Medical History / Comment(s): Skin cancer





Medications and Allergies


 Home Medications











 Medication  Instructions  Recorded  Confirmed  Type


 


Furosemide 20 mg PO BID 07/24/14 10/04/18 History


 


Gabapentin 100 mg PO QAM 07/24/14 10/04/18 History


 


Potassium Chloride [Klor-Con 10] 10 meq PO DAILY 07/24/14 10/04/18 History


 


Gabapentin [Neurontin] 200 mg PO HS 10/09/15 10/04/18 History


 


Levothyroxine Sodium [Synthroid] 125 mcg PO MOTUWETHFRSA 10/09/15 10/04/18 

History


 


metFORMIN HCL 1,000 mg PO BID PRN 10/09/15 10/04/18 History


 


Cetirizine HCl [Zyrtec] 10 mg PO HS 03/28/18 10/04/18 History


 


Glimepiride [Amaryl] 2 mg PO DAILY PRN 03/28/18 10/04/18 History


 


Multivitamins, Thera [Multivitamin 1 tab PO DAILY 03/28/18 10/04/18 History





(formulary)]    


 


ALPRAZolam [Xanax] 0.5 - 1 mg PO DAILY PRN 04/16/18 10/04/18 History


 


Atorvastatin Calcium [Lipitor] 40 mg PO HS 04/16/18 10/04/18 History


 


Citalopram Hydrobromide [CeleXA] 20 mg PO DAILY 08/26/18 10/04/18 History


 


Ondansetron HCl [Zofran] 8 mg PO Q6H PRN 10/04/18 10/04/18 History


 


Pantoprazole [Protonix] 40 mg PO DAILY 10/04/18 10/04/18 History


 


Super B Complex 1 tab PO DAILY 10/04/18 10/04/18 History


 


predniSONE 100 mg PO AS DIRECTED 10/04/18 10/04/18 History











 Allergies











Allergy/AdvReac Type Severity Reaction Status Date / Time


 


haloperidol [From Haldol] Allergy  Unknown Verified 10/04/18 13:04


 


haloperidol lactate Allergy  Unknown Verified 10/04/18 13:04





[From Haldol]     


 


Iodinated Contrast- Oral and Allergy  Rash/Hives Verified 10/04/18 13:04





IV Dye     





[Iodinated Contrast Media -     





IV Dye]     


 


ciprofloxacin [From Cipro] AdvReac  Heartburn Verified 10/04/18 13:04


 


steri-strips AdvReac Severe sore Uncoded 10/04/18 12:30














Physical Exam


Vitals: 


 Vital Signs











  Temp Pulse Resp BP Pulse Ox


 


 10/05/18 14:00   61  15  95/48  95


 


 10/05/18 13:30   56 L  15  99/53  97


 


 10/05/18 13:00   55 L  19  91/56 


 


 10/05/18 12:30   70  21  104/52  98


 


 10/05/18 12:00  97.1 F L  65  13  99/54  99


 


 10/05/18 11:30   52 L  11 L  87/50  99


 


 10/05/18 11:00   60  11 L  90/52  98


 


 10/05/18 10:30   48 L  11 L  93/50  99


 


 10/05/18 10:00   53 L  12  88/49  98


 


 10/05/18 09:30   51 L  12  85/52  97


 


 10/05/18 09:00   60  11 L  100/57  98


 


 10/05/18 08:30   63  18  104/50  96


 


 10/05/18 08:00  97.6 F  45 L  16  95/52  96


 


 10/05/18 07:30  98.9 F  59 L  24  121/64  98


 


 10/05/18 07:00   52 L  16  102/74  98


 


 10/05/18 06:00   62  12  104/56  99


 


 10/05/18 05:30   60  11 L  118/62  99


 


 10/05/18 05:00  97.7 F  54 L  15  121/66  99


 


 10/05/18 04:30   60  11 L  119/65  99


 


 10/05/18 04:00   54 L  12  126/66  99


 


 10/05/18 03:30   57 L  12  110/62  99


 


 10/05/18 03:00   59 L  11 L  107/59  99


 


 10/05/18 02:30   53 L  13  116/60  99


 


 10/05/18 02:00  97.6 F  52 L  12  109/60  98


 


 10/05/18 01:30   69  11 L  99/58  98


 


 10/05/18 01:00   58 L  12  108/57  98


 


 10/05/18 00:30   53 L  15  137/65  99


 


 10/05/18 00:00   76  24  131/67  100


 


 10/04/18 23:19  97.5 F L  80  20  131/67  96


 


 10/04/18 22:44   53 L  18  124/60  99


 


 10/04/18 22:09   73  18  99/54  99


 


 10/04/18 21:26  96.9 F L  54 L  18  121/65  100


 


 10/04/18 20:30   54 L  18  110/62  100


 


 10/04/18 20:00   58 L  17  100/61  100


 


 10/04/18 19:44   56 L  16  105/60  100


 


 10/04/18 19:08   62  17  104/57  99


 


 10/04/18 18:55   62  16  111/60  99


 


 10/04/18 17:37  97.5 F L  58 L  16  114/58  100


 


 10/04/18 16:34   64  16  102/52  99








 Intake and Output











 10/05/18 10/05/18 10/05/18





 06:59 14:59 22:59


 


Intake Total 309.135 0784.775 


 


Output Total 405 460 


 


Balance -99.259 1102.775 


 


Intake:   


 


   930 


 


    0.9 155 580 


 


    Cefepime 2 gm In Sodium  50 





    Chloride 0.9% 50 ml @ 100   





    mls/hr IVPB ONCE STA Rx#   





    :[id number]   


 


    Magnesium Sulfate-D5w Pmx  200 





    1 gm In Dextrose/Water 1   





    100ml.bag @ 100 mls/hr   





    IVPB Q1H [facility] Rx#:   





    [id number]   


 


    metroNIDAZOLE-NS   100 





    mg In Saline 1 100ml.bag   





    @ 100 mls/hr IVPB Q8HR   





    [facility] Rx#:[id number]   


 


  Intake, IV Titration 150.741 392.775 





  Amount   


 


    Insulin Regular 100 unit  58.025 





    In Sodium Chloride 0.9%   





    100 ml @ Per Protocol IV   





    .Q0M [facility] Rx#:[id number]   


 


    Norepinephrine 4 mg In 150.741 84.750 





    Sodium Chloride 0.9% 250   





    ml @ Titrate IV .Q0M [facility]   





    Rx#:[id number]   


 


    Vancomycin 1,250 mg In  250 





    Sodium Chloride 0.9% 250   





    ml @ 125 mls/hr IVPB Q24H   





    [facility] Rx#:[id number]   


 


  Oral  240 


 


Output:   


 


  Urine 405 460 


 


Other:   


 


  Voiding Method Indwelling Catheter  


 


  Weight 70 kg  














- Constitutional


General appearance: no acute distress





- EENT


Eyes: EOMI, PERRLA


ENT: hearing grossly normal, normal oropharynx





- Neck


Neck: no lymphadenopathy


Thyroid: bilateral: normal size





- Respiratory


Respiratory: bilateral: CTA





- Cardiovascular


Rhythm: regular


Heart sounds: normal: S1, S2





- Gastrointestinal


General gastrointestinal: normal bowel sounds, soft


Localized gastrointestinal: tender: diffuse (Mild, no rebound)





- Integumentary


Integumentary: normal





- Neurologic


Neurologic: CNII-XII intact





- Musculoskeletal


Musculoskeletal: generalized weakness, strength equal bilaterally





- Psychiatric


Psychiatric: A&O x's 3, appropriate affect





Results


CBC & Chem 7: 


 10/05/18 05:04





 10/05/18 05:04


Labs: 


 Abnormal Lab Results - Last 24 Hours (Table)











  10/04/18 10/04/18 10/04/18 Range/Units





  18:50 20:49 23:18 


 


RBC     (3.80-5.40)  m/uL


 


Hgb     (11.4-16.0)  gm/dL


 


Hct     (34.0-46.0)  %


 


MCH     (25.0-35.0)  pg


 


MCHC     (31.0-37.0)  g/dL


 


RDW     (11.5-15.5)  %


 


Lymphocytes # (Manual)     (1.0-4.8)  k/uL


 


Metamyelocytes # (Man)     (0)  k/uL


 


Chloride     ()  mmol/L


 


Carbon Dioxide     (22-30)  mmol/L


 


BUN     (7-17)  mg/dL


 


Creatinine     (0.52-1.04)  mg/dL


 


Glucose     (74-99)  mg/dL


 


POC Glucose (mg/dL)   269 H  289 H  (75-99)  mg/dL


 


Calcium     (8.4-10.2)  mg/dL


 


Magnesium     (1.6-2.3)  mg/dL


 


Urine Appearance  Cloudy H    (Clear)  


 


Urine Protein  Trace H    (Negative)  


 


Urine Ketones  Trace H    (Negative)  


 


Urine Blood  Small H    (Negative)  


 


Urine RBC  11 H    (0-5)  /hpf


 


Urine Mucus  Rare H    (None)  /hpf














  10/05/18 10/05/18 10/05/18 Range/Units





  05:04 05:04 07:25 


 


RBC  3.40 L    (3.80-5.40)  m/uL


 


Hgb  8.2 L    (11.4-16.0)  gm/dL


 


Hct  27.4 L    (34.0-46.0)  %


 


MCH  24.0 L    (25.0-35.0)  pg


 


MCHC  29.8 L    (31.0-37.0)  g/dL


 


RDW  17.1 H    (11.5-15.5)  %


 


Lymphocytes # (Manual)  0.05 L    (1.0-4.8)  k/uL


 


Metamyelocytes # (Man)  0.10 H    (0)  k/uL


 


Chloride   109 H   ()  mmol/L


 


Carbon Dioxide   16 L   (22-30)  mmol/L


 


BUN   28 H   (7-17)  mg/dL


 


Creatinine   1.19 H   (0.52-1.04)  mg/dL


 


Glucose   316 H   (74-99)  mg/dL


 


POC Glucose (mg/dL)    363 H  (75-99)  mg/dL


 


Calcium   7.0 L   (8.4-10.2)  mg/dL


 


Magnesium   1.2 L   (1.6-2.3)  mg/dL


 


Urine Appearance     (Clear)  


 


Urine Protein     (Negative)  


 


Urine Ketones     (Negative)  


 


Urine Blood     (Negative)  


 


Urine RBC     (0-5)  /hpf


 


Urine Mucus     (None)  /hpf














  10/05/18 10/05/18 10/05/18 Range/Units





  08:20 09:22 10:10 


 


RBC     (3.80-5.40)  m/uL


 


Hgb     (11.4-16.0)  gm/dL


 


Hct     (34.0-46.0)  %


 


MCH     (25.0-35.0)  pg


 


MCHC     (31.0-37.0)  g/dL


 


RDW     (11.5-15.5)  %


 


Lymphocytes # (Manual)     (1.0-4.8)  k/uL


 


Metamyelocytes # (Man)     (0)  k/uL


 


Chloride     ()  mmol/L


 


Carbon Dioxide     (22-30)  mmol/L


 


BUN     (7-17)  mg/dL


 


Creatinine     (0.52-1.04)  mg/dL


 


Glucose     (74-99)  mg/dL


 


POC Glucose (mg/dL)  346 H  348 H  352 H  (75-99)  mg/dL


 


Calcium     (8.4-10.2)  mg/dL


 


Magnesium     (1.6-2.3)  mg/dL


 


Urine Appearance     (Clear)  


 


Urine Protein     (Negative)  


 


Urine Ketones     (Negative)  


 


Urine Blood     (Negative)  


 


Urine RBC     (0-5)  /hpf


 


Urine Mucus     (None)  /hpf














  10/05/18 10/05/18 10/05/18 Range/Units





  10:39 11:01 11:53 


 


RBC     (3.80-5.40)  m/uL


 


Hgb     (11.4-16.0)  gm/dL


 


Hct     (34.0-46.0)  %


 


MCH     (25.0-35.0)  pg


 


MCHC     (31.0-37.0)  g/dL


 


RDW     (11.5-15.5)  %


 


Lymphocytes # (Manual)     (1.0-4.8)  k/uL


 


Metamyelocytes # (Man)     (0)  k/uL


 


Chloride     ()  mmol/L


 


Carbon Dioxide     (22-30)  mmol/L


 


BUN     (7-17)  mg/dL


 


Creatinine     (0.52-1.04)  mg/dL


 


Glucose     (74-99)  mg/dL


 


POC Glucose (mg/dL)  310 H  293 H  248 H  (75-99)  mg/dL


 


Calcium     (8.4-10.2)  mg/dL


 


Magnesium     (1.6-2.3)  mg/dL


 


Urine Appearance     (Clear)  


 


Urine Protein     (Negative)  


 


Urine Ketones     (Negative)  


 


Urine Blood     (Negative)  


 


Urine RBC     (0-5)  /hpf


 


Urine Mucus     (None)  /hpf














  10/05/18 10/05/18 10/05/18 Range/Units





  13:06 14:05 14:54 


 


RBC     (3.80-5.40)  m/uL


 


Hgb     (11.4-16.0)  gm/dL


 


Hct     (34.0-46.0)  %


 


MCH     (25.0-35.0)  pg


 


MCHC     (31.0-37.0)  g/dL


 


RDW     (11.5-15.5)  %


 


Lymphocytes # (Manual)     (1.0-4.8)  k/uL


 


Metamyelocytes # (Man)     (0)  k/uL


 


Chloride     ()  mmol/L


 


Carbon Dioxide     (22-30)  mmol/L


 


BUN     (7-17)  mg/dL


 


Creatinine     (0.52-1.04)  mg/dL


 


Glucose     (74-99)  mg/dL


 


POC Glucose (mg/dL)  179 H  176 H  174 H  (75-99)  mg/dL


 


Calcium     (8.4-10.2)  mg/dL


 


Magnesium     (1.6-2.3)  mg/dL


 


Urine Appearance     (Clear)  


 


Urine Protein     (Negative)  


 


Urine Ketones     (Negative)  


 


Urine Blood     (Negative)  


 


Urine RBC     (0-5)  /hpf


 


Urine Mucus     (None)  /hpf








 Microbiology - Last 24 Hours (Table)











 10/04/18 12:56 Blood Culture - Preliminary





 Blood    No Growth after 24 hours


 


 10/04/18 18:50 Urine Culture - Preliminary





 Urine,Catheterized 











Comments: 





 Echo report reviewed


Chest x-ray: report reviewed


CT scan - abdomen: report reviewed


CT scan - pelvis: report reviewed





Assessment and Plan


(1) Febrile neutropenia


Narrative/Plan: 


  The pt had one episode of fever. There is no definite source, other than the 

abdomen. Continue antibiotics. Await cultures, including stool studies. Pt was 

started on GCSF, and has increased WBC today. Stop GCSF after today's dose


Current Visit: Yes   Status: Acute   Code(s): D70.9 - NEUTROPENIA, UNSPECIFIED; 

R50.81 - FEVER PRESENTING WITH CONDITIONS CLASSIFIED ELSEWHERE   SNOMED Code(s)

: 216328376


   





(2) Sepsis


Narrative/Plan: 


  The pt was very hyprotensive on admission, and has required ICU care and 

pressor support, along with aggressive fluid resuscitation. Her BP is improved 

and she is currently off pressors. Continue antibiotics and management per [facility], 

and the admitting service


Current Visit: Yes   Status: Acute   Code(s): A41.9 - SEPSIS, UNSPECIFIED 

ORGANISM   SNOMED Code(s): 31065113


   





(3) Hydroureteronephrosis


Narrative/Plan: 


 Ct revealed L renal calculus. Pt was evaluated by Urology, and not felt to 

have significant obstruction needing intervention


Current Visit: No   Status: Acute   Code(s): N13.30 - UNSPECIFIED 

HYDRONEPHROSIS   SNOMED Code(s): 69421777


   





(4) Bicytopenia


Narrative/Plan: 


 Chemo induced. WBC improved with GCSF. Hgb is adequate. Continue to monitor 

and transfuse if needed


Current Visit: Yes   Status: Acute   Code(s): D75.89 - OTHER SPECIFIED DISEASES 

OF BLOOD AND BLOOD-FORMING ORGANS   SNOMED Code(s): 109391999


   





(5) Non Hodgkin's lymphoma


Narrative/Plan: 


  The pt is s/p 1 cycle of chemo. She has multiple chemo related side effects, 

that are a major component of her presentation. She will have dose reduction 

and upfront GCSF with her next cycle


Current Visit: No   Status: Acute   Code(s): C85.90 - NON-HODGKIN LYMPHOMA, 

UNSPECIFIED, UNSPECIFIED SITE   SNOMED Code(s): 516725101

## 2018-10-05 NOTE — ECHOF
Referral Reason:hypotension



MEASUREMENTS

--------

HEIGHT: 157.5 cm

WEIGHT: 69.9 kg

BP: 104/49

RVIDd:   2.3 cm     (< 3.3)

IVSd:   1.2 cm     (0.6 - 1.1)

LVIDd:   4.6 cm     (3.9 - 5.3)

LVPWd:   0.9 cm     (0.6 - 1.1)

IVSs:   1.4 cm

LVIDs:   3.5 cm

LVPWs:   1.2 cm

Ao Diam:   2.9 cm     (2.0 - 3.7)

AV Cusp:   2.0 cm     (1.5 - 2.6)

LA Diam:   2.6 cm     (2.7 - 3.8)

MV EXCURSION:   17.310 mm     (> 18.000)

MV EF SLOPE:   55 mm/s     (70 - 150)

EPSS:   1.3 cm

MV E Juan M:   0.86 m/s

MV DecT:   277 ms

MV A Juan M:   0.76 m/s

MV E/A Ratio:   1.14 

RAP:   5.00 mmHg

RVSP:   21.90 mmHg







FINDINGS

--------

Sinus rhythm.   Resting bradycardia (HR<60bpm).

This was a technically good study.

The left ventricular size is normal.   There is borderline concentric left ventricular hypertrophy.  
 Overall left ventricular systolic function is normal with, an EF between 55 - 60 %.

The right ventricle is normal in size.

The left atrium is normal in size.

The right atrium is normal in size.

The aortic valve is trileaflet, and appears structurally normal. No aortic stenosis or regurgitation.


The mitral valve is normal.   There is trace to mild mitral regurgitation.

Mild tricuspid regurgitation present.   The right ventricular systolic pressure, as measured by Doppl
er, is 21.90mmHg.

There is no pulmonic regurgitation present.

The aortic root size is normal.

Normal inferior vena cava with normal inspiratory collapse consistent with estimated right atrial pre
ssure of  5 mmHg.

There is no pericardial effusion.



CONCLUSIONS

--------

1. Sinus rhythm.

2. Resting bradycardia (HR<60bpm).

3. This was a technically good study.

4. The left ventricular size is normal.

5. There is borderline concentric left ventricular hypertrophy.

6. Overall left ventricular systolic function is normal with, an EF between 55 - 60 %.

7. The left atrium is normal in size.

8. The aortic valve is trileaflet, and appears structurally normal. No aortic stenosis or regurgitati
on.

9. There is trace to mild mitral regurgitation.

10. Mild tricuspid regurgitation present.

11. The right ventricular systolic pressure, as measured by Doppler, is 21.90mmHg.

12. There is no pulmonic regurgitation present.

13. The aortic root size is normal.

14. Normal inferior vena cava with normal inspiratory collapse consistent with estimated right atrial
 pressure of  5 mmHg.

15. There is no pericardial effusion.





SONOGRAPHER: Aminata Lee RDCS

## 2018-10-05 NOTE — P.PN
Subjective


Progress Note Date: 10/05/18








63-year-old female patient with known history of non-Hodgkin's lymphoma with 

involvement of the liver on the lung who was transferred to the emergency 

department from her oncologist office because of hypotension and tachycardia.  

The patient presented to the ED having extreme fatigue and some degree of 

abdominal discomfort more so in the upper abdomen compared to the lower.  She 

denied having any recent fever.  The daughter however told me that the patient 

was having a low-grade fever of 100.4.  She did admit to have some loose 

liquidy bowel movements 2 bouts yesterday and one bout this morning.  No recent 

antibiotic use.  The patient has a Mediport over the right chest area the site 

of which is clean and that is no erythema and the port is functioning well.  

She denied having any cough or sputum production.  No reported nausea or 

vomiting or emesis.  No chest pain.  No angina.  Her blood work showed that the 

patient was neutropenic with a white cell count of 1.6.  She had a hemoglobin 

of 9.3 with a platelet count of 167.  Her BUN was 24 with a creatinine of 1.1.  

She had no significant metabolic acidosis.  The patient lactic acid level was 

at 1.7 at a time of initial evaluation.  Liver function tests are also within 

normal limits.  She was unable to give a UA and she refused and declined a 

Riley catheter insertion.  The patient was started already on a combination of 

cefepime and vancomycin.  She was found to be hypotensive and she received 2 L 

of IV fluid without much response and her blood pressure in general.  Note that 

her systolic blood pressure was 74 with a diastolic of 44.  At one point, her 

blood pressure was as low as 69/38.  Her heart rate was around 109 and with 

fluid resuscitation dropped down to 83.  She was also started on pressors after 

2-1/2 L of IV fluid and pulmonary critical care consultation was requested.  

CAT scan of the abdomen was done and showed a 3 mm left proximal ureteral stone 

with obstruction and mild left hydronephrosis.  The patient has fluid-filled 

loops of small bowel with wall thickening and extensive diverticular changes 

within the colon.  Retroperitoneal adenopathy was again described by the CAT 

scan.  There was normal aorta, normal pancreas and spleen, multiple hypodense 

lesions within the liver noted suggestive of metastatic foci and the patient is 

postcholecystectomy.








 Note that this patient lymphoma was a recent diagnosis.  She was initially 

seen in 3/18.  The patient had presented with some chest pain.  She had a 

computed tomography scan done to assess for aortic aneurysm, which incidentally 

revealed upper retroperitoneal lymph nodes.  The patient subsequently had a 

lymph node biopsy which was positive for low-grade non-Hodgkin's lymphoma, with 

marginal zone lymphoma favored.  The patient then had a PET scan done, which 

showed uptake limited to upper left retroperitoneal area, as well as uptake in 

the liver.  The patient had an MRI of the liver which was not conclusive in 

terms of ruling in or ruling out liver involvement.  She therefore underwent 

liver biopsy in 5/18 which showed no evidence of lymphoma involvement.  It did 

show evidence of inflammation and steatosis. The patient subsequently had a 

bone marrow aspiration biopsy on 7/2/18 which did show involvement of the bone 

marrow with B-cell lymphoma, confirming stage IV disease.  As the patient was 

asymptomatic, she was placed on observation which is the standard of care for 

stage IV low-grade non-Hodgkin lymphoma.  Patient was recently started on 

systemic chemotherapy knowing that the bone marrow biopsy also showed 

conversion to a high-grade lymphoma and the patient was given a first cycle of 

systemic chemotherapy that included Cytoxan, Rituxan and high-dose prednisone.  

The patient was also given Neulasta for neutropenia post chemotherapy.








On 10/05/2018, I'm seeing this patient for a follow-up.  She is awake and alert 

and communicating and there is no altered mentation.  She is on pressors and 

she is currently on 2 mics of norepinephrine infusion for blood pressure 

control.  She is producing adequate amount of urine output.  Riley catheter was 

inserted.  No indication of an underlying UTI based on the UA.  The patient was 

seen by urology regarding the left ureteral calculus and left hydronephrosis.  

No suggestions for any immediate interventions regarding this issue.  The 

patient is afebrile.  The patient has been resuscitated aggressively with IV 

fluids and her urine output is also improved.  On today's blood work, the white 

cell count is up to 5.2.  Her renal function is stable with a creatinine of 

1.1.  Serum bicarb is down to 16.  This is a non-anion gap metabolic acidosis a 

a anion gap of 13.  The patient is receiving stress dose hydrocortisone.  The 

blood sugar is elevated above 300.  The patient was started on insulin drip.  

White cell count is up to 5.2.  She still has some vague abdominal discomfort 

without any active diarrhea.  No dysuria fixed urgency.  Riley catheter is in 

place.





Objective





- Vital Signs


Vital signs: 


 Vital Signs











Temp  97.6 F   10/05/18 08:00


 


Pulse  63   10/05/18 08:30


 


Resp  18   10/05/18 08:30


 


BP  104/50   10/05/18 08:30


 


Pulse Ox  96   10/05/18 08:30








 Intake & Output











 10/04/18 10/05/18 10/05/18





 18:59 06:59 18:59


 


Intake Total 93.437 431.190 329.75


 


Output Total  805 70


 


Balance 93.437 -373.810 259.75


 


Weight 63.957 kg 70 kg 


 


Intake:   


 


  IV  155 40


 


    0.9  155 40


 


  Intake, IV Titration 93.437 276.190 289.75





  Amount   


 


    Norepinephrine 4 mg In 93.437 125.449 





    Sodium Chloride 0.9% 250   





    ml @ Titrate IV .Q0M ONE   





    Rx#:[id number]   


 


    Norepinephrine 4 mg In  150.741 39.75





    Sodium Chloride 0.9% 250   





    ml @ Titrate IV .Q0M [facility]   





    Rx#:[id number]   


 


    Vancomycin 1,250 mg In   250





    Sodium Chloride 0.9% 250   





    ml @ 125 mls/hr IVPB Q24H   





    [facility] Rx#:[id number]   


 


Output:   


 


  Urine  805 70


 


Other:   


 


  Voiding Method  Indwelling Catheter 














- Exam











- Constitutional


General appearance: no acute distress





- EENT


Eyes: EOMI, PERRLA


ENT: hearing grossly normal, normal oropharynx





- Neck


Neck: no lymphadenopathy





- Respiratory


Respiratory: bilateral: CTA





- Cardiovascular


Rhythm: regular


Heart sounds: normal: S1, S2





- Gastrointestinal


General gastrointestinal: normal bowel sounds, soft





- Integumentary


Integumentary: normal





- Neurologic


Neurologic: CNII-XII intact





- Musculoskeletal


Musculoskeletal: strength equal bilaterally





- Psychiatric


Psychiatric: A&O x's 3, appropriate affect





Results





- Labs


CBC & Chem 7: 


 10/05/18 05:04





 10/05/18 05:04


Labs: 


 Abnormal Lab Results - Last 24 Hours (Table)











  10/04/18 10/04/18 10/04/18 Range/Units





  12:56 12:56 12:56 


 


WBC  1.6 L    (3.8-10.6)  k/uL


 


RBC  3.78 L    (3.80-5.40)  m/uL


 


Hgb  9.3 L    (11.4-16.0)  gm/dL


 


Hct  30.4 L    (34.0-46.0)  %


 


MCH  24.6 L    (25.0-35.0)  pg


 


MCHC  30.6 L    (31.0-37.0)  g/dL


 


RDW  16.8 H    (11.5-15.5)  %


 


Neutrophils # (Manual)  0.90 L    (1.3-7.7)  k/uL


 


Lymphocytes # (Manual)  0.27 L    (1.0-4.8)  k/uL


 


Metamyelocytes # (Man)  0.02 H    (0)  k/uL


 


Myelocytes # (Manual)  0.02 H    (0)  k/uL


 


INR    1.2 H  (<1.2)  


 


Sodium   134 L   (137-145)  mmol/L


 


Potassium   3.3 L   (3.5-5.1)  mmol/L


 


Chloride     ()  mmol/L


 


Carbon Dioxide   21 L   (22-30)  mmol/L


 


BUN   24 H   (7-17)  mg/dL


 


Creatinine   1.12 H   (0.52-1.04)  mg/dL


 


Glucose   247 H   (74-99)  mg/dL


 


POC Glucose (mg/dL)     (75-99)  mg/dL


 


Calcium   7.6 L   (8.4-10.2)  mg/dL


 


Magnesium     (1.6-2.3)  mg/dL


 


Alkaline Phosphatase   147 H   ()  U/L


 


Total Protein   5.4 L   (6.3-8.2)  g/dL


 


Albumin   2.6 L   (3.5-5.0)  g/dL


 


Urine Appearance     (Clear)  


 


Urine Protein     (Negative)  


 


Urine Ketones     (Negative)  


 


Urine Blood     (Negative)  


 


Urine RBC     (0-5)  /hpf


 


Urine Mucus     (None)  /hpf














  10/04/18 10/04/18 10/04/18 Range/Units





  12:56 18:50 20:49 


 


WBC     (3.8-10.6)  k/uL


 


RBC     (3.80-5.40)  m/uL


 


Hgb     (11.4-16.0)  gm/dL


 


Hct     (34.0-46.0)  %


 


MCH     (25.0-35.0)  pg


 


MCHC     (31.0-37.0)  g/dL


 


RDW     (11.5-15.5)  %


 


Neutrophils # (Manual)     (1.3-7.7)  k/uL


 


Lymphocytes # (Manual)     (1.0-4.8)  k/uL


 


Metamyelocytes # (Man)     (0)  k/uL


 


Myelocytes # (Manual)     (0)  k/uL


 


INR     (<1.2)  


 


Sodium     (137-145)  mmol/L


 


Potassium     (3.5-5.1)  mmol/L


 


Chloride     ()  mmol/L


 


Carbon Dioxide     (22-30)  mmol/L


 


BUN     (7-17)  mg/dL


 


Creatinine     (0.52-1.04)  mg/dL


 


Glucose     (74-99)  mg/dL


 


POC Glucose (mg/dL)    269 H  (75-99)  mg/dL


 


Calcium     (8.4-10.2)  mg/dL


 


Magnesium  1.0 L    (1.6-2.3)  mg/dL


 


Alkaline Phosphatase     ()  U/L


 


Total Protein     (6.3-8.2)  g/dL


 


Albumin     (3.5-5.0)  g/dL


 


Urine Appearance   Cloudy H   (Clear)  


 


Urine Protein   Trace H   (Negative)  


 


Urine Ketones   Trace H   (Negative)  


 


Urine Blood   Small H   (Negative)  


 


Urine RBC   11 H   (0-5)  /hpf


 


Urine Mucus   Rare H   (None)  /hpf














  10/04/18 10/05/18 10/05/18 Range/Units





  23:18 05:04 05:04 


 


WBC     (3.8-10.6)  k/uL


 


RBC   3.40 L   (3.80-5.40)  m/uL


 


Hgb   8.2 L   (11.4-16.0)  gm/dL


 


Hct   27.4 L   (34.0-46.0)  %


 


MCH   24.0 L   (25.0-35.0)  pg


 


MCHC   29.8 L   (31.0-37.0)  g/dL


 


RDW   17.1 H   (11.5-15.5)  %


 


Neutrophils # (Manual)     (1.3-7.7)  k/uL


 


Lymphocytes # (Manual)   0.05 L   (1.0-4.8)  k/uL


 


Metamyelocytes # (Man)   0.10 H   (0)  k/uL


 


Myelocytes # (Manual)     (0)  k/uL


 


INR     (<1.2)  


 


Sodium     (137-145)  mmol/L


 


Potassium     (3.5-5.1)  mmol/L


 


Chloride    109 H  ()  mmol/L


 


Carbon Dioxide    16 L  (22-30)  mmol/L


 


BUN    28 H  (7-17)  mg/dL


 


Creatinine    1.19 H  (0.52-1.04)  mg/dL


 


Glucose    316 H  (74-99)  mg/dL


 


POC Glucose (mg/dL)  289 H    (75-99)  mg/dL


 


Calcium    7.0 L  (8.4-10.2)  mg/dL


 


Magnesium    1.2 L  (1.6-2.3)  mg/dL


 


Alkaline Phosphatase     ()  U/L


 


Total Protein     (6.3-8.2)  g/dL


 


Albumin     (3.5-5.0)  g/dL


 


Urine Appearance     (Clear)  


 


Urine Protein     (Negative)  


 


Urine Ketones     (Negative)  


 


Urine Blood     (Negative)  


 


Urine RBC     (0-5)  /hpf


 


Urine Mucus     (None)  /hpf














  10/05/18 10/05/18 Range/Units





  07:25 08:20 


 


WBC    (3.8-10.6)  k/uL


 


RBC    (3.80-5.40)  m/uL


 


Hgb    (11.4-16.0)  gm/dL


 


Hct    (34.0-46.0)  %


 


MCH    (25.0-35.0)  pg


 


MCHC    (31.0-37.0)  g/dL


 


RDW    (11.5-15.5)  %


 


Neutrophils # (Manual)    (1.3-7.7)  k/uL


 


Lymphocytes # (Manual)    (1.0-4.8)  k/uL


 


Metamyelocytes # (Man)    (0)  k/uL


 


Myelocytes # (Manual)    (0)  k/uL


 


INR    (<1.2)  


 


Sodium    (137-145)  mmol/L


 


Potassium    (3.5-5.1)  mmol/L


 


Chloride    ()  mmol/L


 


Carbon Dioxide    (22-30)  mmol/L


 


BUN    (7-17)  mg/dL


 


Creatinine    (0.52-1.04)  mg/dL


 


Glucose    (74-99)  mg/dL


 


POC Glucose (mg/dL)  363 H  346 H  (75-99)  mg/dL


 


Calcium    (8.4-10.2)  mg/dL


 


Magnesium    (1.6-2.3)  mg/dL


 


Alkaline Phosphatase    ()  U/L


 


Total Protein    (6.3-8.2)  g/dL


 


Albumin    (3.5-5.0)  g/dL


 


Urine Appearance    (Clear)  


 


Urine Protein    (Negative)  


 


Urine Ketones    (Negative)  


 


Urine Blood    (Negative)  


 


Urine RBC    (0-5)  /hpf


 


Urine Mucus    (None)  /hpf








 Microbiology - Last 24 Hours (Table)











 10/04/18 18:50 Urine Culture - Preliminary





 Urine,Catheterized 














Assessment and Plan


Plan: 











Assessment





1 acute hypotension, likely of a septic in nature.  The patient is neutropenic 

and absolute neutrophil count is less than the thousand.  The patient's wife 

echo is improved and currently up to 5.0.  The patient is currently on 2 mics 

of norepinephrine infusion.  The patient was resuscitated IV fluids.  The 

patient was placed on broad-spectrum antibiotics including a combination of 

cefepime, Flagyl and vancomycin.  Cultures are negative thus far.  She did have 

some loose liquidy diarrhea which is currently stopped and the patient was seen 

by urology regarding the left ureteral stone and hydronephrosis.  The UA was 

negative and the patient was seen by urology and based on their assessment the 

hydronephrosis is not significant and there is no need for any immediate 

intervention at this point in time.  The calculus is probably small and its 

will pass spontaneously.





2 stage IV non-Hodgkin's lymphoma low-grade





3 obstructive uropathy with left ureteral stone and hydronephrosis, see urology'

s evaluation





4 anemia, normocytic likely chronic





5 obstructive sleep apnea





6 hypothyroidism





7 hyperlipidemia





8 diabetes mellitus





9 history of closed head injury





10 non-anion gap metabolic acidosis, probably related to diarrhea.





Plan





Continue IV fluids at 125 mL an hour of normal saline.  Continue stress dose 

hydrocortisone 50 mg every 8 hours that the patient was taken steroids on 

outpatient basis.  Continue pressors to maintain a mean artery pressure above 

65.  The patient has a Mediport in place.  We will utilize a Mediport in 

addition to the peripheral lines.  Continue same antibiotic coverage.  Patient 

is on cefepime and vancomycin and Flagyl for any possibility of underlying 

enteritis/colitis.  Check stool for C. diff if there is any recurrent diarrhea.

  Urology consultation was appreciated regarding the ureteral stone.  May need 

to consult with oncology regarding the need for zarxio and the patient was 

started on insulin drip for blood sugar control.  We'll keep her in the ICU as 

long as she is pressor dependent.  We will continue to follow.  Oncology has 

been consulted.  Obtain a baseline echocardiogram also.

## 2018-10-05 NOTE — HP
HISTORY AND PHYSICAL



I am covering for Dr. Faustino Villegas.



HISTORY OF PRESENT ILLNESS:

This is a 63-year-old woman with a past history of CHF, history of diabetes,

hypertension, hyperlipidemia, history of sleep apnea, history of B-cell non-
Hodgkin's

lymphoma stage IV, diabetes mellitus type 2, being followed by Dr. Faustino Villegas 
in the

outpatient setting.  He is receiving chemotherapy.  The patient is complaining

hypotension and tachycardia from oncologist's office. Patient has features of 
sepsis on

presentation. Patient started on broad-spectrum IV antibiotics.  Patient has 
some

diarrhea also.  The patient also had a CT scan of the abdomen and pelvis, which 
showed

interval obstructed proximal left ureter calculus and underlying ileus also was

suspected. Flagyl was added to the current regimen. Multiple consultants are 
following

the patient closely. A 2D echo with Doppler was also done which showed ejection

fraction about 50 to 60%. The patient also had hypotension. As mentioned the 
patient is

on 2 mcg of Levophed at this time.  There is no history of fever, rigors. No 
history of

headache, loss of consciousness, seizures.



PAST MEDICAL HISTORY:

History of CHF, history of diabetes, hypertension, hyperlipidemia, sleep apnea, 
history

of B-cell non-Hodgkin lymphoma, cholecystectomy, anxiety.



MEDICATIONS:

Prior to admission home medications are:

1. Prednisone 10 mg p.r.n.

2. Metformin 1000 mg b.i.d. p.r.n.

3. Super B complex 1 tablet p.o. daily.

4. Klor-Con 10 mg, p.o. daily.

5. Protonix 40 mg p.o. daily.

6. Zofran 8 mg p.o. q.h.s. p.r.n.

7. Multivitamins 1 p.o. daily.

8. Synthroid 125 mcg p.o. Monday, Tuesday, Wednesday, Thursday, Friday, 
Saturday.

9. Yen 2 mg p.o. daily p.r.n.

10.Neurontin 200 mg p.o. q.h.s.

11.Gabapentin 100 mg p.o. q.a.m.

12.Lasix 10 mg p.o. b.i.d.

13.Celexa 10 mg p.o. daily.

14.Zyrtec 10 mg q.h.s.

15.Lipitor 40 mg p.o. q.h.s.

16.Xanax 0.5 to 1 mg p.o. daily p.r.n.



ALLERGIES:

HALDOL, IODINATED CONTRAST DYE, CIPRO, STERI-STRIPS.



FAMILY HISTORY:

History of breast cancer in the family.



SOCIAL HISTORY:

No history of smoking, no history of alcohol intake.



REVIEW OF SYSTEMS:

ENT: No diminished hearing or vision.

CARDIOVASCULAR: No angina, palpitations.

respiratory: As mentioned earlier.

GI: No nausea.

: No dysuria.

NERVOUS SYSTEM: No numbness or weakness.

ALLERGY/IMMUNOLOGY: No asthma.

MUSCULOSKELETAL: As mentioned earlier.

HEMATOLOGY: As mentioned earlier.

ENDOCRINE: As mentioned earlier.

CONSTITUTIONAL: As mentioned earlier.

DERMATOLOGY: Negative.

RHEUMATOLOGY: Negative.

PSYCHIATRY: As mentioned earlier.



PHYSICAL EXAMINATION:

Alert and oriented x3. Pulse 53, blood pressure 92/49, respirations 12, 
temperature

97.4, pulse ox 96% on 2 L.

HEENT: Conjunctivae normal. Oral mucosa moist. Neck is no jugular venous 
distention.

No carotid bruit. No lymph node enlargement.

CARDIOVASCULAR: S1, S2. No S3, no S4.

RESPIRATORY: Breath sounds diminished in the bases. No rhonchi, no crackles.

ABDOMEN: Soft.  Nontender. No mass palpable.

LEGS: No edema, no swelling.

NERVOUS SYSTEM: Higher function as mentioned earlier. Moves all four limbs. No 
focal

motor deficits.

LYMPHATIC: No lymphadenopathy in the neck, axillae, groin.

SKIN:  No ulcer, rash, bleeding.



LABS:

WBC 5, hemoglobin is 8.2, sodium 130, potassium 3.8, and creatinine is 1.19, 
and Accu-

Cheks are 363, 248, 174, 187. Magnesium 1.2.  White count is 1.6 at this time.



ASSESSMENT:

1. Neutropenic sepsis with a severe sepsis and hypotension, present on 
admission.

2. Stage IV non-Hodgkin lymphoma, low-grade.

3. Obstructive uropathy with left ureteral stone and hydronephrosis.

4. Anemia, leukopenia.

5. Hypokalemia.

6. Hyponatremia.

7. Increase creatinine with chronic kidney disease stage 3.

8. Diabetes mellitus type 2.

9. Hypocalcemia.

10.Hypomagnesemia.

11.History of congestive heart failure.

12.Diabetes mellitus type 2.

13.Hypertension.

14.Hyperlipidemia.

15.Sleep apnea.

16.History of cholecystitis.

17.Anxiety.

18.Traumatic brain injury.

19.FULL CODE.



RECOMMENDATIONS AND DISCUSSION:

In this 63-year-old woman who presented with multiple complex medical issues, 
will

monitor the patient closely. Continue the current medications and symptomatic

treatment. We will initiate broad-spectrum IV antibiotics. Currently the 
patient is

treated with Vancomycin and Flagyl.  Would also recommend cautious IV fluids.  
The

initial chest x-ray was noted.  We will also follow with multiple consultants 
and wait

for the awaited cultures.  Continue with the pressor support. The prognosis 
guarded

because of multiple complex medical issues. Further recommendations to follow. 
A copy

of dictation forwarded to Dr. Villegas who is the primary physician. The initial 
chest x-

ray shows no evidence of any fluid overload at this time. See orders for 
details. INR

is 1.2.  As mentioned earlier the cultures are negative so far at this time.





MMODL / IJN: [id number] / Job#: [id number]

MTDD

## 2018-10-06 LAB
ANION GAP SERPL CALC-SCNC: 9 MMOL/L
BASOPHILS # BLD AUTO: 0 K/UL (ref 0–0.2)
BASOPHILS NFR BLD AUTO: 0 %
BUN SERPL-SCNC: 20 MG/DL (ref 7–17)
CALCIUM SPEC-MCNC: 6.8 MG/DL (ref 8.4–10.2)
CHLORIDE SERPL-SCNC: 116 MMOL/L (ref 98–107)
CO2 SERPL-SCNC: 17 MMOL/L (ref 22–30)
EOSINOPHIL # BLD AUTO: 0 K/UL (ref 0–0.7)
EOSINOPHIL NFR BLD AUTO: 0 %
ERYTHROCYTE [DISTWIDTH] IN BLOOD BY AUTOMATED COUNT: 3.18 M/UL (ref 3.8–5.4)
ERYTHROCYTE [DISTWIDTH] IN BLOOD: 17.3 % (ref 11.5–15.5)
GLUCOSE BLD-MCNC: 112 MG/DL (ref 75–99)
GLUCOSE BLD-MCNC: 137 MG/DL (ref 75–99)
GLUCOSE BLD-MCNC: 141 MG/DL (ref 75–99)
GLUCOSE BLD-MCNC: 144 MG/DL (ref 75–99)
GLUCOSE BLD-MCNC: 145 MG/DL (ref 75–99)
GLUCOSE BLD-MCNC: 148 MG/DL (ref 75–99)
GLUCOSE BLD-MCNC: 148 MG/DL (ref 75–99)
GLUCOSE BLD-MCNC: 149 MG/DL (ref 75–99)
GLUCOSE BLD-MCNC: 149 MG/DL (ref 75–99)
GLUCOSE BLD-MCNC: 158 MG/DL (ref 75–99)
GLUCOSE BLD-MCNC: 159 MG/DL (ref 75–99)
GLUCOSE BLD-MCNC: 164 MG/DL (ref 75–99)
GLUCOSE BLD-MCNC: 195 MG/DL (ref 75–99)
GLUCOSE BLD-MCNC: 196 MG/DL (ref 75–99)
GLUCOSE BLD-MCNC: 198 MG/DL (ref 75–99)
GLUCOSE BLD-MCNC: 255 MG/DL (ref 75–99)
GLUCOSE SERPL-MCNC: 135 MG/DL (ref 74–99)
HCT VFR BLD AUTO: 25 % (ref 34–46)
HGB BLD-MCNC: 7.6 GM/DL (ref 11.4–16)
LYMPHOCYTES # SPEC AUTO: 0.3 K/UL (ref 1–4.8)
LYMPHOCYTES NFR SPEC AUTO: 3 %
MAGNESIUM SPEC-SCNC: 2.1 MG/DL (ref 1.6–2.3)
MCH RBC QN AUTO: 23.8 PG (ref 25–35)
MCHC RBC AUTO-ENTMCNC: 30.2 G/DL (ref 31–37)
MCV RBC AUTO: 78.7 FL (ref 80–100)
MONOCYTES # BLD AUTO: 0.5 K/UL (ref 0–1)
MONOCYTES NFR BLD AUTO: 5 %
NEUTROPHILS # BLD AUTO: 8.7 K/UL (ref 1.3–7.7)
NEUTROPHILS NFR BLD AUTO: 88 %
PLATELET # BLD AUTO: 221 K/UL (ref 150–450)
POTASSIUM SERPL-SCNC: 3.2 MMOL/L (ref 3.5–5.1)
SODIUM SERPL-SCNC: 142 MMOL/L (ref 137–145)
WBC # BLD AUTO: 9.8 K/UL (ref 3.8–10.6)

## 2018-10-06 RX ADMIN — CITALOPRAM HYDROBROMIDE SCH MG: 20 TABLET ORAL at 08:47

## 2018-10-06 RX ADMIN — INSULIN ASPART SCH UNIT: 100 INJECTION, SOLUTION INTRAVENOUS; SUBCUTANEOUS at 21:44

## 2018-10-06 RX ADMIN — SODIUM CHLORIDE SCH MLS/HR: 9 INJECTION, SOLUTION INTRAVENOUS at 05:20

## 2018-10-06 RX ADMIN — METRONIDAZOLE SCH MLS/HR: 500 INJECTION, SOLUTION INTRAVENOUS at 16:23

## 2018-10-06 RX ADMIN — FUROSEMIDE SCH MG: 20 TABLET ORAL at 21:43

## 2018-10-06 RX ADMIN — METRONIDAZOLE SCH MLS/HR: 500 INJECTION, SOLUTION INTRAVENOUS at 08:46

## 2018-10-06 RX ADMIN — LEUCINE, PHENYLALANINE, LYSINE, METHIONINE, ISOLEUCINE, VALINE, HISTIDINE, THREONINE, TRYPTOPHAN, ALANINE, GLYCINE, ARGININE, PROLINE, SERINE, TYROSINE, DEXTROSE SCH MLS/HR: 365; 280; 290; 200; 300; 290; 240; 210; 90; 1035; 515; 575; 340; 250; 20; 15 INJECTION INTRAVENOUS at 15:00

## 2018-10-06 RX ADMIN — SODIUM CHLORIDE SCH MLS/HR: 9 INJECTION, SOLUTION INTRAVENOUS at 17:42

## 2018-10-06 RX ADMIN — HYDROCORTISONE SODIUM SUCCINATE SCH MG: 100 INJECTION, POWDER, FOR SOLUTION INTRAMUSCULAR; INTRAVENOUS at 16:20

## 2018-10-06 RX ADMIN — HYDROCORTISONE SODIUM SUCCINATE SCH MG: 100 INJECTION, POWDER, FOR SOLUTION INTRAMUSCULAR; INTRAVENOUS at 08:45

## 2018-10-06 RX ADMIN — CEFAZOLIN SCH MLS/HR: 330 INJECTION, POWDER, FOR SOLUTION INTRAMUSCULAR; INTRAVENOUS at 05:21

## 2018-10-06 RX ADMIN — GABAPENTIN SCH MG: 100 CAPSULE ORAL at 08:47

## 2018-10-06 RX ADMIN — THERA TABS SCH EACH: TAB at 13:26

## 2018-10-06 RX ADMIN — INSULIN ASPART SCH: 100 INJECTION, SOLUTION INTRAVENOUS; SUBCUTANEOUS at 17:42

## 2018-10-06 RX ADMIN — CEFAZOLIN SCH: 330 INJECTION, POWDER, FOR SOLUTION INTRAMUSCULAR; INTRAVENOUS at 19:21

## 2018-10-06 RX ADMIN — HYDROCODONE BITARTRATE AND ACETAMINOPHEN PRN EACH: 7.5; 325 TABLET ORAL at 09:31

## 2018-10-06 RX ADMIN — LORATADINE SCH MG: 10 TABLET ORAL at 21:43

## 2018-10-06 RX ADMIN — CEFEPIME HYDROCHLORIDE SCH MLS/HR: 2 INJECTION, POWDER, FOR SOLUTION INTRAVENOUS at 14:26

## 2018-10-06 RX ADMIN — LEUCINE, PHENYLALANINE, LYSINE, METHIONINE, ISOLEUCINE, VALINE, HISTIDINE, THREONINE, TRYPTOPHAN, ALANINE, GLYCINE, ARGININE, PROLINE, SERINE, TYROSINE, DEXTROSE SCH MLS/HR: 365; 280; 290; 200; 300; 290; 240; 210; 90; 1035; 515; 575; 340; 250; 20; 15 INJECTION INTRAVENOUS at 13:10

## 2018-10-06 RX ADMIN — LEUCINE, PHENYLALANINE, LYSINE, METHIONINE, ISOLEUCINE, VALINE, HISTIDINE, THREONINE, TRYPTOPHAN, ALANINE, GLYCINE, ARGININE, PROLINE, SERINE, TYROSINE, DEXTROSE SCH MLS/HR: 365; 280; 290; 200; 300; 290; 240; 210; 90; 1035; 515; 575; 340; 250; 20; 15 INJECTION INTRAVENOUS at 10:43

## 2018-10-06 RX ADMIN — POTASSIUM CHLORIDE SCH MLS/HR: 14.9 INJECTION, SOLUTION INTRAVENOUS at 14:38

## 2018-10-06 RX ADMIN — CEFEPIME HYDROCHLORIDE SCH MLS/HR: 2 INJECTION, POWDER, FOR SOLUTION INTRAVENOUS at 00:01

## 2018-10-06 RX ADMIN — POTASSIUM CHLORIDE SCH MEQ: 750 TABLET, EXTENDED RELEASE ORAL at 08:47

## 2018-10-06 RX ADMIN — GABAPENTIN SCH MG: 100 CAPSULE ORAL at 21:43

## 2018-10-06 NOTE — PN
PROGRESS NOTE



DATE OF SERVICE:

10/06/2018



I am covering for Dr. Faustino Villegas.



This 63-year-old woman with a past medical history of multiple medical problems 
include

recent chemotherapy and Non-Hodgkin's lymphoma was admitted with neutropenia,

neutropenic sepsis, also.  The patient is hypotensive.  Patient is on Levophed.

Currently patient being closely monitored.  Patient on broad spectrum IV 
antibiotics.

A 2D echo showed ejection fraction around 55-60 percent.  Cultures are negative 
so far.



PAST MEDICAL HISTORY:

Reviewed.



REVIEW OF SYSTEMS:

CARDIOVASCULAR:  No angina.  No palpitations. Respiration as mentioned earlier.

GI: As mentioned earlier. :  No dysuria. Central nervous system: No numbness 
or

weakness.



MEDICATIONS ARE:

Reviewed and include

1. Tylenol 650 q4.

2. Norco 7.5 p.r.n.

3.2 g IV b.i.d.

4. Celexa.

5. Neurontin 200 mg b.i.d.

6. Solu-Cortef 50 mg IV q.8h.

7. Claritin.

8. Flagyl 500 mg IV q.8h.

9. Replacement protocol.

10.Zofran.

11.Vancomycin IV.



PHYSICAL EXAM:

Patient is alert, oriented x3, pulse 55, blood pressure 98/51, respiration 16,

temperature normal, pulse ox 94% on room air.  HEENT is conjunctivae normal.  
Oral

mucosa moist.  Neck is no jugular venous distention.  No carotid bruit. No 
lymph node

enlargement.  Cardiovascular System: S1, S2 muffled.  Respiration: Breath sounds

diminished in the bases.  A few rhonchi.  No crackles.

ABDOMEN:  Soft, nontender.  No mass palpable.  Legs:  No edema and no swelling.

NERVOUS SYSTEM: Higher functions as mentioned earlier.  Moves all 4 limbs.  No 
focal

motor or sensory deficits.  Lymphatics: No lymph nodes palpable in the neck, 
axillae or

groin.  Skin:  No ulcer, rash or bleeding.



LABS:

At this time glucose 198, WBC 9.8, hemoglobin 7.6, sodium 142, potassium 3.2.



ASSESSMENT:

1. Acute neutropenic sepsis with severe septic shock and hypotension, present on

    admission.

2. Stage IV non-Hodgkin lymphoma, low-grade, on chemotherapy.

3. Obstructive uropathy, left ureter stone and hydronephrosis history.

4. Anemia, leukopenia.

5. Hypokalemia.

6. Hyponatremia.

7. Increased creatinine with chronic kidney disease stage III.

8. Diabetes mellitus type 2.

9. Hypocalcemia.

10.Hypomagnesemia.

11.History of congestive heart failure.

12.Diabetes mellitus type 2.

13.Hypertension.

14.Hyperlipidemia.

15.Obstructive sleep apnea.

16.History of cholecystitis.

17.History of anxiety.

18.Traumatic brain injury history.

19.FULL CODE.



RECOMMENDATIONS AND DISCUSSION:

Recommend to continue current medications, symptomatic treatment, management and

continue with broad-spectrum IV antibiotics.  Continue with IV fluids.  Repeat 
labs

have been ordered.  Replacement protocols as mentioned earlier.  Potassium has 
been

supplemented.  Prognosis guarded because of multiple complex medical issues.  
We will

follow the patient closely along with Pulmonary Critical Care as well as 
Infectious

Disease.  Prognosis guarded.  Further recommendations to follow.





MMDUNIAL / IJN: [id number] / Job#: [id number]

MTDD

## 2018-10-06 NOTE — PN
PROGRESS NOTE



DATE OF SERVICE:

October 6, 2018.



CHIEF COMPLAINT:

Tired.



Christina is seen today as a followup.  She feels a little tired but overall better.  No

fever or chills.  No nausea or vomiting.  No diarrhea.  No melena, hematochezia,

hematuria hemoptysis or hematemesis.



MEDICATIONS:

Her medications reviewed in her electronic medical record.



PHYSICAL EXAMINATION:

On physical examination, she is alert, oriented x3.  She does not appear to be in

distress at this time.  Her vital signs are pulse 56, respiration 16, blood pressure

100/64, pulse ox 98 percent on room air.  HEENT: Normocephalic, atraumatic. Neck is

supple. CHEST: Equal expansion bilaterally.  Lungs are clear to auscultation.  Heart is

regular rate and rhythm.  Abdomen is soft.  No tenderness.  Extremities reveal trace

edema. SKIN: Few bruises.  No ecchymosis or petechiae.



LABORATORY DATA:

WBC are 9.8, hemoglobin 7.6, hematocrit 25.0.  MCV 78.2, platelet 221, absolute

neutrophil count 8.7.



IMPRESSION:

1. Sepsis in this neutropenic patient.  The patient has improved now.  She is

    currently off pressor, maintaining a reasonable blood pressure and her neutrophils

    have recovered.  Blood culture at 48 hours has been negative and the urine culture

    has been negative as well.  She is currently on cefepime 2 g IV piggyback every 12

    hours and she is also on IV vancomycin. Clinically she is doing better.  Her

    neutrophils are up and she is afebrile.

2. Non Hodgkin lymphoma.  The patient underwent treatment with R-CEOP regimen.  She

    had the 1st cycle on 09/24/2018.



RECOMMENDATION:

1. Monitor CBC.  No need for blood transfusion today.

2. Continue broad-spectrum antibiotics per Infectious Disease recommendations.

3. Continue current supportive care.

4. Hopefully, the patient continued to remain off pressor and continued to improve.

    She could be transferred out of the intensive care unit. In regard to further

    systemic treatment, decision will be made once the patient fully recovers from her

    current of her current condition.

The above was discussed in detail with the patient at bedside and I answered all

questions.





MMODL / IJN: [id number] / Job#: [id number]

## 2018-10-06 NOTE — P.CONS
History of Present Illness





- Reason for Consult


Consult date: 10/06/18





- Chief Complaint


Fever





- History of Present Illness


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 

unit, presents to Hospital with increasing weakness as well as fever and 

malaise.  At the time of her admission she had evidence of neutropenia and 

fever and constantly was admitted for sepsis.  She required fluid resuscitation 

as well as his pressor support originally.  She is not having significant 

improvement in is feeling better.  She is very pertinent recent history in that 

she was having some abdominal pain and underwent imaging studies that reveal 

evidence of extensive intra-abdominal lymphadenopathy.  Workup eventually was 

performed revealing the evidence of B cell non-Hodgkin's lymphoma stage IV is 

noted by the bone marrow involvement.  Due to her age and renal dysfunction she 

was treated with R-CEOP.  She is now presenting with evidence of febrile 

neutropenia generalized malaise and feeling quite poorly overall.  She does 

have some chronic cognitive delays.





Also worsened when she was quite ill which are now improving as noted by her 

sister who is present.  The patient at this time is feeling better.  She has 

received growth factor support her white blood cell count is improved.  Her 

fevers have improved.  She is having no chills or rigors.  She had having much 

discomfort at this time.  No evidence of any bleeding is occurring.








Review of Systems


Fever and chills have improved





HEENT:Denies headache or acute visual change.  Denies sinus or mouth 

discomforts.  Denies neck stiffness or pain.  Denies significant oral cavity 

pain.  Denies difficulty on swallowing.





Lungs: Denies significant shortness of breath, cough, sputum production, or 

hemoptysis.





Cardiovascular: Denies significant shortness of breath, chest pain, chest wall 

pain, 


orthopnea, dyspnea on exertion, syncope





Gastrointestinal:Denies nausea, vomiting, diarrhea, constipation, hematemesis, 

melena, hematochezia.  No no significant change of bowel habit noticed.





Musculoskeletal: denies significant myalgias or arthralgias.  No new joint 

swelling.  Denies new back pain.





Skin: Denies new rash or lesions.  No new ulcers or wounds are related..





Neuro: Denies headache or visual change.  Denies any new onset weakness or 

difficulty with ambulation.  Denies falls or seizures.





Psychiatric:Denies anxiety or depression.





Endocrine: Complains of fatigue and some weight loss








Past Medical History


Past Medical History: Cancer, Heart Failure, Diabetes Mellitus, Hyperlipidemia, 

Hypertension, Sleep Apnea/CPAP/BIPAP, Thyroid Disorder


Additional Past Medical History / Comment(s): B-cell non-Hodgkin's lymphoma 

stage IV, diabetes mellitus,daughter stated pt had first chemo mon 9-24-18 

hypertension, hyperlipidemia, obstructive sleep apnea, hypothyroidism post 

thyroidectomy, closed head injury in 1989, difficulty with mobility and 

ambulation the patient has been using a walker. has a history of kidney stones


Last Myocardial Infarction Date:: 1989


History of Any Multi-Drug Resistant Organisms: None Reported


Past Surgical History: Cholecystectomy, Hysterectomy, Tonsillectomy


Additional Past Surgical History / Comment(s): Thyroidectomy, lymph node and 

liver biopsies. port a cath 9-10-18, right ureteroscopy with lithotripsy 11/2015


Past Anesthesia/Blood Transfusion Reactions: Postoperative Nausea & Vomiting (

PONV)


Additional Psychological History / Comment(s): The single.  Retired nurse.  

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  No international 

travel.  No  experience.  No animal exposures


Smoking Status: Never smoker





- Past Family History


  ** Mother


Family Medical History: Cancer


Additional Family Medical History / Comment(s): breast CA





  ** Father


Family Medical History: Cancer


Additional Family Medical History / Comment(s): colon CA





  ** Sister(s)


Family Medical History: Cancer


Additional Family Medical History / Comment(s): Skin cancer





Medications and Allergies


Home Medications and Allergies Comment(s): 





 Current Medications





Acetaminophen (Tylenol Tab)  650 mg PO Q4HR PRN


   PRN Reason: Fever and/or Mild Pain


   Last Admin: 10/05/18 16:53 Dose:  650 mg


Hydrocodone Bitart/Acetaminophen (Norco 7.5-325)  1 each PO Q6H PRN


   PRN Reason: Pain


   Last Admin: 10/06/18 09:31 Dose:  1 each


Alprazolam (Xanax)  0.5 mg PO DAILY PRN


   PRN Reason: Anxiety


Citalopram Hydrobromide (Celexa)  20 mg PO DAILY [facility]


   Last Admin: 10/06/18 08:47 Dose:  20 mg


Furosemide (Lasix)  20 mg PO BID@0900,2100 [facility]


   Last Admin: 10/06/18 21:43 Dose:  20 mg


Gabapentin (Neurontin)  100 mg PO QAM [facility]


   Last Admin: 10/06/18 08:47 Dose:  100 mg


Gabapentin (Neurontin)  200 mg PO HS [facility]


   Last Admin: 10/06/18 21:43 Dose:  200 mg


Hydrocortisone Sodium Succinate (Solu-Cortef)  50 mg IV Q8HR [facility]


   Last Admin: 10/06/18 16:20 Dose:  50 mg


Metronidazole 500 mg/ IV (Solution)  100 mls @ 100 mls/hr IVPB Q8HR [facility]


   Last Admin: 10/06/18 16:23 Dose:  100 mls/hr


Cefepime HCl 2 gm/ Sodium (Chloride)  50 mls @ 100 mls/hr IVPB Q12H [facility]


   Last Admin: 10/06/18 14:26 Dose:  100 mls/hr


Sodium Bicarbonate 150 ml/ (Dextrose/Water)  1,150 mls @ 75 mls/hr IV .[id number] 

[facility]


   Last Admin: 10/06/18 14:38 Dose:  75 mls/hr


Vancomycin HCl 1,250 mg/ (Sodium Chloride)  250 mls @ 125 mls/hr IVPB Q12H [facility]


   Last Admin: 10/06/18 17:42 Dose:  125 mls/hr


Insulin Aspart (Novolog)  0 unit SQ ACHS [facility]; Protocol


   Last Admin: 10/06/18 21:44 Dose:  4 unit


Loratadine (Claritin)  10 mg PO [facility]


   Last Admin: 10/06/18 21:43 Dose:  10 mg


Miscellaneous Information (Magnesium Per Protocol)  1 each MISCELLANE DAILY PRN

; Protocol


   PRN Reason: Per Protocol


Miscellaneous Information (Potassium Per Protocol)  1 each MISCELLANE DAILY PRN

; Protocol


   PRN Reason: Per Protocol


Miscellaneous Information (Phosphorus Per Protocol)  1 each MISCELLANE DAILY PRN

; Protocol


   PRN Reason: Per Protocol


Multivitamins (Theragran)  1 each PO DAILY@1200 [facility]


   Last Admin: 10/06/18 13:26 Dose:  1 each


Naloxone HCl (Narcan)  0.2 mg IV Q2M PRN


   PRN Reason: Opioid Reversal


Naloxone HCl (Narcan)  0.2 mg IV Q2M PRN


   PRN Reason: Opioid Reversal


Ondansetron HCl (Zofran)  8 mg PO Q6H PRN


   PRN Reason: Nausea


Potassium Chloride (K-Dur 10)  10 meq PO DAILY [facility]


   Last Admin: 10/06/18 08:47 Dose:  10 meq








 Home Medications











 Medication  Instructions  Recorded  Confirmed  Type


 


Furosemide 20 mg PO BID 07/24/14 10/04/18 History


 


Gabapentin 100 mg PO QAM 07/24/14 10/04/18 History


 


Potassium Chloride [Klor-Con 10] 10 meq PO DAILY 07/24/14 10/04/18 History


 


Gabapentin [Neurontin] 200 mg PO HS 10/09/15 10/04/18 History


 


Levothyroxine Sodium [Synthroid] 125 mcg PO MOTUWETHFRSA 10/09/15 10/04/18 

History


 


metFORMIN HCL 1,000 mg PO BID PRN 10/09/15 10/04/18 History


 


Cetirizine HCl [Zyrtec] 10 mg PO HS 03/28/18 10/04/18 History


 


Glimepiride [Amaryl] 2 mg PO DAILY PRN 03/28/18 10/04/18 History


 


Multivitamins, Thera [Multivitamin 1 tab PO DAILY 03/28/18 10/04/18 History





(formulary)]    


 


ALPRAZolam [Xanax] 0.5 - 1 mg PO DAILY PRN 04/16/18 10/04/18 History


 


Atorvastatin Calcium [Lipitor] 40 mg PO HS 04/16/18 10/04/18 History


 


Citalopram Hydrobromide [CeleXA] 20 mg PO DAILY 08/26/18 10/04/18 History


 


Ondansetron HCl [Zofran] 8 mg PO Q6H PRN 10/04/18 10/04/18 History


 


Pantoprazole [Protonix] 40 mg PO DAILY 10/04/18 10/04/18 History


 


Super B Complex 1 tab PO DAILY 10/04/18 10/04/18 History


 


predniSONE 100 mg PO AS DIRECTED 10/04/18 10/04/18 History











 Allergies











Allergy/AdvReac Type Severity Reaction Status Date / Time


 


haloperidol [From Haldol] Allergy  Unknown Verified 10/04/18 13:04


 


haloperidol lactate Allergy  Unknown Verified 10/04/18 13:04





[From Haldol]     


 


Iodinated Contrast- Oral and Allergy  Rash/Hives Verified 10/04/18 13:04





IV Dye     





[Iodinated Contrast Media -     





IV Dye]     


 


ciprofloxacin [From Cipro] AdvReac  Heartburn Verified 10/04/18 13:04


 


steri-strips AdvReac Severe sore Uncoded 10/04/18 12:30














Physical Exam


Vitals: 


 Vital Signs











  Temp Pulse Pulse Resp BP BP Pulse Ox


 


 10/06/18 21:49  98.1 F   65  17   106/50  96


 


 10/06/18 16:30   56 L   24  100/64   98


 


 10/06/18 16:00   55 L   17  101/64   95


 


 10/06/18 15:30   55 L   15  96/51   95


 


 10/06/18 15:00   54 L   25 H  82/46   95


 


 10/06/18 14:30   52 L   18  95/54   96


 


 10/06/18 14:00   54 L   18  84/46   95


 


 10/06/18 13:30   54 L   24  86/49   95


 


 10/06/18 13:00   57 L   20  97/55   97


 


 10/06/18 12:30   61   20  104/61   97


 


 10/06/18 12:00   72   24  87/53   96


 


 10/06/18 11:30   46 L   9 L  86/49   94 L


 


 10/06/18 11:00   47 L   12  93/52   93 L


 


 10/06/18 10:30   51 L   15  105/59   95


 


 10/06/18 10:00   49 L   10 L  95/51   97


 


 10/06/18 09:30   52 L   15  95/54   95


 


 10/06/18 09:00   53 L   12  87/49   96


 


 10/06/18 08:30   51 L   15  86/50   94 L


 


 10/06/18 08:00  98.3 F  53 L   24  101/53   94 L


 


 10/06/18 07:30   58 L   20  84/46   93 L


 


 10/06/18 07:00   50 L   11 L  89/51   94 L


 


 10/06/18 06:30   56 L   16  91/52   94 L


 


 10/06/18 06:00   51 L   12  93/51   95


 


 10/06/18 05:30   57 L   19  88/49   95


 


 10/06/18 05:00   52 L   14  87/48   94 L


 


 10/06/18 04:30   52 L   16  90/51   93 L


 


 10/06/18 04:00  97.9 F  55 L   17  93/50   95


 


 10/06/18 03:30   55 L   17  98/58   94 L


 


 10/06/18 03:10   67   22  98/58   96


 


 10/06/18 03:00   58 L   18  99/52   97


 


 10/06/18 02:50   54 L   11 L  81/44   95


 


 10/06/18 02:30   53 L   9 L  85/47   96


 


 10/06/18 02:00   51 L   12  87/49   96


 


 10/06/18 01:30   52 L   12  88/51   95


 


 10/06/18 01:00   51 L   12  87/52   95


 


 10/06/18 00:30   50 L   12  90/50   95


 


 10/06/18 00:00  97.8 F  55 L   18  89/49   95


 


 10/05/18 23:30   51 L   13  97/52   96








 Intake and Output











 10/06/18 10/06/18 10/07/18





 14:59 22:59 06:59


 


Intake Total 1347.932 837.676 


 


Output Total 330 70 


 


Balance 1017.932 767.676 


 


Intake:   


 


   470 


 


    0.9 875 20 


 


    Dextrose 5% in Water 1, 0 450 





    000 ml @ 75 mls/hr IV .   





    [id number] [facility] with Sodium   





    Bicarb (1 Meq/ml) 150 ml   





    Rx#:[id number]   


 


    metroNIDAZOLE-NS  100  





    mg In Saline 1 100ml.bag   





    @ 100 mls/hr IVPB Q8HR   





    [facility] Rx#:[id number]   


 


  Intake, IV Titration 22.932 7.676 





  Amount   


 


    Insulin Regular 100 unit 15.033 7.676 





    In Sodium Chloride 0.9%   





    100 ml @ Per Protocol IV   





    .Q0M [facility] Rx#:[id number]   


 


    Norepinephrine 4 mg In 7.899  





    Sodium Chloride 0.9% 250   





    ml @ Titrate IV .Q0M [facility]   





    Rx#:[id number]   


 


  Oral 350 360 


 


Output:   


 


  Urine 330 70 


 


Other:   


 


  Voiding Method Indwelling Catheter Indwelling Catheter 


 


  # Voids  1 











Pleasant 63-year-old woman who is in no distress, vasopressor therapy has been 

weaned off


HEENT: Anicteric conjunctiva are pink and moist nasal mucosa grossly intact 

without significant lesions, there is no thrush.  Does have pallor.


Neck: The neck is supple without significant lymphadenopathy or thyromegaly.


Lungs: There are symmetrical air entry without significant crackles or 

wheezing.  No dullness or egophony


Heart: Regular rate and rhythm with an audible S1-S2, no S3 no S4.  There is no 

significant murmur click or rub, PMI was nondisplaced.


Abdomen: Positive bowel sounds soft and nontender without palpable masses or 

organomegaly.  There was no guarding or rebound.


Extremities: The upper extremities have excellent pulses they are symmetric, no 

significant petechiae or telangiectasia.  No splinter hemorrhages were noted.  

The lower extremities are free from significant edema.  The peripheral pulses 

were 2+ and symmetric.


Neuro: Awake alert oriented to person place and time.  There are no acute new 

gross focal sensory motor deficits.  Does have a history of head trauma with 

some memory deficits.











Results


CBC & Chem 7: 


 10/06/18 05:29





 10/06/18 05:29


Labs: 


 Abnormal Lab Results - Last 24 Hours (Table)











  10/05/18 10/06/18 10/06/18 Range/Units





  23:59 01:01 02:06 


 


RBC     (3.80-5.40)  m/uL


 


Hgb     (11.4-16.0)  gm/dL


 


Hct     (34.0-46.0)  %


 


MCV     (80.0-100.0)  fL


 


MCH     (25.0-35.0)  pg


 


MCHC     (31.0-37.0)  g/dL


 


RDW     (11.5-15.5)  %


 


Neutrophils #     (1.3-7.7)  k/uL


 


Lymphocytes #     (1.0-4.8)  k/uL


 


Potassium     (3.5-5.1)  mmol/L


 


Chloride     ()  mmol/L


 


Carbon Dioxide     (22-30)  mmol/L


 


BUN     (7-17)  mg/dL


 


Glucose     (74-99)  mg/dL


 


POC Glucose (mg/dL)  149 H  149 H  148 H  (75-99)  mg/dL


 


Calcium     (8.4-10.2)  mg/dL


 


Phosphorus     (2.5-4.5)  mg/dL














  10/06/18 10/06/18 10/06/18 Range/Units





  02:57 03:55 05:19 


 


RBC     (3.80-5.40)  m/uL


 


Hgb     (11.4-16.0)  gm/dL


 


Hct     (34.0-46.0)  %


 


MCV     (80.0-100.0)  fL


 


MCH     (25.0-35.0)  pg


 


MCHC     (31.0-37.0)  g/dL


 


RDW     (11.5-15.5)  %


 


Neutrophils #     (1.3-7.7)  k/uL


 


Lymphocytes #     (1.0-4.8)  k/uL


 


Potassium     (3.5-5.1)  mmol/L


 


Chloride     ()  mmol/L


 


Carbon Dioxide     (22-30)  mmol/L


 


BUN     (7-17)  mg/dL


 


Glucose     (74-99)  mg/dL


 


POC Glucose (mg/dL)  144 H  145 H  159 H  (75-99)  mg/dL


 


Calcium     (8.4-10.2)  mg/dL


 


Phosphorus     (2.5-4.5)  mg/dL














  10/06/18 10/06/18 10/06/18 Range/Units





  05:29 05:29 06:10 


 


RBC  3.18 L    (3.80-5.40)  m/uL


 


Hgb  7.6 L    (11.4-16.0)  gm/dL


 


Hct  25.0 L    (34.0-46.0)  %


 


MCV  78.7 L    (80.0-100.0)  fL


 


MCH  23.8 L    (25.0-35.0)  pg


 


MCHC  30.2 L    (31.0-37.0)  g/dL


 


RDW  17.3 H    (11.5-15.5)  %


 


Neutrophils #  8.7 H    (1.3-7.7)  k/uL


 


Lymphocytes #  0.3 L    (1.0-4.8)  k/uL


 


Potassium   3.2 L   (3.5-5.1)  mmol/L


 


Chloride   116 H   ()  mmol/L


 


Carbon Dioxide   17 L   (22-30)  mmol/L


 


BUN   20 H   (7-17)  mg/dL


 


Glucose   135 H   (74-99)  mg/dL


 


POC Glucose (mg/dL)    144 H  (75-99)  mg/dL


 


Calcium   6.8 L   (8.4-10.2)  mg/dL


 


Phosphorus   1.3 L   (2.5-4.5)  mg/dL














  10/06/18 10/06/18 10/06/18 Range/Units





  06:53 08:26 09:11 


 


RBC     (3.80-5.40)  m/uL


 


Hgb     (11.4-16.0)  gm/dL


 


Hct     (34.0-46.0)  %


 


MCV     (80.0-100.0)  fL


 


MCH     (25.0-35.0)  pg


 


MCHC     (31.0-37.0)  g/dL


 


RDW     (11.5-15.5)  %


 


Neutrophils #     (1.3-7.7)  k/uL


 


Lymphocytes #     (1.0-4.8)  k/uL


 


Potassium     (3.5-5.1)  mmol/L


 


Chloride     ()  mmol/L


 


Carbon Dioxide     (22-30)  mmol/L


 


BUN     (7-17)  mg/dL


 


Glucose     (74-99)  mg/dL


 


POC Glucose (mg/dL)  148 H  164 H  158 H  (75-99)  mg/dL


 


Calcium     (8.4-10.2)  mg/dL


 


Phosphorus     (2.5-4.5)  mg/dL














  10/06/18 10/06/18 10/06/18 Range/Units





  10:08 11:04 12:25 


 


RBC     (3.80-5.40)  m/uL


 


Hgb     (11.4-16.0)  gm/dL


 


Hct     (34.0-46.0)  %


 


MCV     (80.0-100.0)  fL


 


MCH     (25.0-35.0)  pg


 


MCHC     (31.0-37.0)  g/dL


 


RDW     (11.5-15.5)  %


 


Neutrophils #     (1.3-7.7)  k/uL


 


Lymphocytes #     (1.0-4.8)  k/uL


 


Potassium     (3.5-5.1)  mmol/L


 


Chloride     ()  mmol/L


 


Carbon Dioxide     (22-30)  mmol/L


 


BUN     (7-17)  mg/dL


 


Glucose     (74-99)  mg/dL


 


POC Glucose (mg/dL)  144 H  144 H  112 H  (75-99)  mg/dL


 


Calcium     (8.4-10.2)  mg/dL


 


Phosphorus     (2.5-4.5)  mg/dL














  10/06/18 10/06/18 10/06/18 Range/Units





  13:14 14:21 15:10 


 


RBC     (3.80-5.40)  m/uL


 


Hgb     (11.4-16.0)  gm/dL


 


Hct     (34.0-46.0)  %


 


MCV     (80.0-100.0)  fL


 


MCH     (25.0-35.0)  pg


 


MCHC     (31.0-37.0)  g/dL


 


RDW     (11.5-15.5)  %


 


Neutrophils #     (1.3-7.7)  k/uL


 


Lymphocytes #     (1.0-4.8)  k/uL


 


Potassium     (3.5-5.1)  mmol/L


 


Chloride     ()  mmol/L


 


Carbon Dioxide     (22-30)  mmol/L


 


BUN     (7-17)  mg/dL


 


Glucose     (74-99)  mg/dL


 


POC Glucose (mg/dL)  137 H  196 H  198 H  (75-99)  mg/dL


 


Calcium     (8.4-10.2)  mg/dL


 


Phosphorus     (2.5-4.5)  mg/dL














  10/06/18 10/06/18 10/06/18 Range/Units





  15:57 17:29 20:43 


 


RBC     (3.80-5.40)  m/uL


 


Hgb     (11.4-16.0)  gm/dL


 


Hct     (34.0-46.0)  %


 


MCV     (80.0-100.0)  fL


 


MCH     (25.0-35.0)  pg


 


MCHC     (31.0-37.0)  g/dL


 


RDW     (11.5-15.5)  %


 


Neutrophils #     (1.3-7.7)  k/uL


 


Lymphocytes #     (1.0-4.8)  k/uL


 


Potassium     (3.5-5.1)  mmol/L


 


Chloride     ()  mmol/L


 


Carbon Dioxide     (22-30)  mmol/L


 


BUN     (7-17)  mg/dL


 


Glucose     (74-99)  mg/dL


 


POC Glucose (mg/dL)  195 H  141 H  255 H  (75-99)  mg/dL


 


Calcium     (8.4-10.2)  mg/dL


 


Phosphorus     (2.5-4.5)  mg/dL








 Microbiology - Last 24 Hours (Table)











 10/04/18 12:56 Blood Culture - Preliminary





 Blood    No Growth after 48 hours


 


 10/04/18 18:50 Urine Culture - Final





 Urine,Catheterized 








 Laboratory Results











WBC  9.8 k/uL (3.8-10.6)   10/06/18  05:29    


 


RBC  3.18 m/uL (3.80-5.40)  L  10/06/18  05:29    


 


Hgb  7.6 gm/dL (11.4-16.0)  L  10/06/18  05:29    


 


Hct  25.0 % (34.0-46.0)  L  10/06/18  05:29    


 


MCV  78.7 fL (80.0-100.0)  L  10/06/18  05:29    


 


MCH  23.8 pg (25.0-35.0)  L  10/06/18  05:29    


 


MCHC  30.2 g/dL (31.0-37.0)  L  10/06/18  05:29    


 


RDW  17.3 % (11.5-15.5)  H  10/06/18  05:29    


 


Plt Count  221 k/uL (150-450)   10/06/18  05:29    


 


Neutrophils %  88 %  10/06/18  05:29    


 


Neutrophils % (Manual)  65 %  10/05/18  05:04    


 


Band Neutrophils %  24 %  10/05/18  05:04    


 


Lymphocytes %  3 %  10/06/18  05:29    


 


Lymphocytes % (Manual)  1 %  10/05/18  05:04    


 


Monocytes %  5 %  10/06/18  05:29    


 


Monocytes % (Manual)  10 %  10/05/18  05:04    


 


Eosinophils %  0 %  10/06/18  05:29    


 


Eosinophils % (Manual)  3 %  10/04/18  12:56    


 


Basophils %  0 %  10/06/18  05:29    


 


Metamyelocytes %  2 %  10/05/18  05:04    


 


Myelocytes %  1 %  10/04/18  12:56    


 


Neutrophils #  8.7 k/uL (1.3-7.7)  H  10/06/18  05:29    


 


Neutrophils # (Manual)  4.60 k/uL (1.3-7.7)   10/05/18  05:04    


 


Lymphocytes #  0.3 k/uL (1.0-4.8)  L  10/06/18  05:29    


 


Lymphocytes # (Manual)  0.05 k/uL (1.0-4.8)  L  10/05/18  05:04    


 


Monocytes #  0.5 k/uL (0-1.0)   10/06/18  05:29    


 


Monocytes # (Manual)  0.52 k/uL (0-1.0)   10/05/18  05:04    


 


Eosinophils #  0.0 k/uL (0-0.7)   10/06/18  05:29    


 


Eosinophils # (Manual)  0.05 k/uL (0-0.7)   10/04/18  12:56    


 


Basophils #  0.0 k/uL (0-0.2)   10/06/18  05:29    


 


Metamyelocytes # (Man)  0.10 k/uL (0)  H  10/05/18  05:04    


 


Myelocytes # (Manual)  0.02 k/uL (0)  H  10/04/18  12:56    


 


Nucleated RBCs  0 /100 WBC (0-0)   10/05/18  05:04    


 


Manual Slide Review  Performed   10/05/18  05:04    


 


Toxic Granulation  Present   10/05/18  05:04    


 


Dohle Bodies  Present   10/05/18  05:04    


 


Polychromasia  Present   10/05/18  05:04    


 


Hypochromasia  Marked   10/06/18  05:29    


 


Poikilocytosis (manual  Present   10/05/18  05:04    


 


Anisocytosis  Slight   10/06/18  05:29    


 


Microcytosis  Slight   10/06/18  05:29    


 


Crenated Cell  Present   10/05/18  05:04    


 


PT  11.3 sec (9.0-12.0)   10/04/18  12:56    


 


INR  1.2  (<1.2)  H  10/04/18  12:56    


 


APTT  25.0 sec (22.0-30.0)   10/04/18  12:56    


 


Sodium  142 mmol/L (137-145)   10/06/18  05:29    


 


Potassium  3.2 mmol/L (3.5-5.1)  L  10/06/18  05:29    


 


Chloride  116 mmol/L ()  H  10/06/18  05:29    


 


Carbon Dioxide  17 mmol/L (22-30)  L  10/06/18  05:29    


 


Anion Gap  9 mmol/L  10/06/18  05:29    


 


BUN  20 mg/dL (7-17)  H  10/06/18  05:29    


 


Creatinine  0.85 mg/dL (0.52-1.04)   10/06/18  05:29    


 


Est GFR (CKD-EPI)AfAm  84  (>60 ml/min/1.73 sqM)   10/06/18  05:29    


 


Est GFR (CKD-EPI)NonAf  72  (>60 ml/min/1.73 sqM)   10/06/18  05:29    


 


Glucose  135 mg/dL (74-99)  H  10/06/18  05:29    


 


POC Glucose (mg/dL)  255 mg/dL (75-99)  H  10/06/18  20:43    


 


POC Glu Operater ID  Taisha Cruz   10/06/18  20:43    


 


Estimated Ave Glu mg/dL  169   10/05/18  05:04    


 


Hemoglobin A1c  7.5 % (4.0-6.0)  H  10/05/18  05:04    


 


Plasma Lactic Acid Artem  1.7 mmol/L (0.7-2.0)   10/04/18  12:56    


 


Calcium  6.8 mg/dL (8.4-10.2)  L  10/06/18  05:29    


 


Phosphorus  1.3 mg/dL (2.5-4.5)  L  10/06/18  05:29    


 


Magnesium  2.1 mg/dL (1.6-2.3)   10/06/18  05:29    


 


Total Bilirubin  0.6 mg/dL (0.2-1.3)   10/04/18  12:56    


 


AST  22 U/L (14-36)   10/04/18  12:56    


 


ALT  36 U/L (9-52)   10/04/18  12:56    


 


Alkaline Phosphatase  147 U/L ()  H  10/04/18  12:56    


 


Troponin I  <0.012 ng/mL (0.000-0.034)   10/05/18  20:00    


 


Total Protein  5.4 g/dL (6.3-8.2)  L  10/04/18  12:56    


 


Albumin  2.6 g/dL (3.5-5.0)  L  10/04/18  12:56    


 


Cortisol  63 ug/dL  10/06/18  05:29    


 


Urine Color  Yellow   10/04/18  18:50    


 


Urine Appearance  Cloudy  (Clear)  H  10/04/18  18:50    


 


Urine pH  5.0  (5.0-8.0)   10/04/18  18:50    


 


Ur Specific Gravity  1.025  (1.001-1.035)   10/04/18  18:50    


 


Urine Protein  Trace  (Negative)  H  10/04/18  18:50    


 


Urine Glucose (UA)  Negative  (Negative)   10/04/18  18:50    


 


Urine Ketones  Trace  (Negative)  H  10/04/18  18:50    


 


Urine Blood  Small  (Negative)  H  10/04/18  18:50    


 


Urine Nitrite  Negative  (Negative)   10/04/18  18:50    


 


Urine Bilirubin  Negative  (Negative)   10/04/18  18:50    


 


Urine Urobilinogen  <2.0 mg/dL (<2.0)   10/04/18  18:50    


 


Ur Leukocyte Esterase  Negative  (Negative)   10/04/18  18:50    


 


Urine RBC  11 /hpf (0-5)  H  10/04/18  18:50    


 


Urine WBC  4 /hpf (0-5)   10/04/18  18:50    


 


Ur Squamous Epith Cells  <1 /hpf (0-4)   10/04/18  18:50    


 


Urine Mucus  Rare /hpf (None)  H  10/04/18  18:50    


 


Blood Type  O Positive   10/04/18  12:56    


 


Blood Type Recheck  CABO Indicated   10/04/18  12:56    


 


Antibody Screen  NEGATIVE   10/04/18  12:56    


 


Spec Expiration Date  10/04/2018 - 1425   10/04/18  12:56    








 Microbiology





10/04/18 12:56   Blood   Blood Culture - Preliminary


                            No Growth after 48 hours


10/04/18 18:50   Urine,Catheterized   Urine Culture - Final








Chest x-ray: report reviewed (No evidence of acute infiltration)





Assessment and Plan


(1) Febrile neutropenia


Narrative/Plan: 


63-year-old  female, retired nurse, presents to Hospital feeling very 

poorly with evidence of sepsis.  She has received her first course of 

chemotherapy and presents with febrile neutropenia.  She was also hypotensive 

requiring fluid resuscitation and vasopressor therapy.  She is now considerably 

improved.  She is no longer requiring vasopressor r support and is feeling 

considerably better.  Hematology oncology is following and transfusions as per 

their protocol.


Cultures are in process and is receiving antibiotic therapy with cefepime, 

metronidazole and vancomycin at this time pending further culture data.


The patient's bone marrow recovery and cultures will further direct the course 

of antibiotic therapy.  IgG level can be obtained to further help direct 

courses of treatment also.


Current Visit: Yes   Status: Acute   Code(s): D70.9 - NEUTROPENIA, UNSPECIFIED; 

R50.81 - FEVER PRESENTING WITH CONDITIONS CLASSIFIED ELSEWHERE   SNOMED Code(s)

: 643819999


   





(2) Bicytopenia


Current Visit: Yes   Status: Acute   Code(s): D75.89 - OTHER SPECIFIED DISEASES 

OF BLOOD AND BLOOD-FORMING ORGANS   SNOMED Code(s): 645924775


   





(3) Non Hodgkin's lymphoma


Current Visit: No   Status: Acute   Code(s): C85.90 - NON-HODGKIN LYMPHOMA, 

UNSPECIFIED, UNSPECIFIED SITE   SNOMED Code(s): 559653332

## 2018-10-06 NOTE — P.PN
Subjective


Progress Note Date: 10/06/18


Principal diagnosis: 





Non-Hodgkin's lymphoma with involvement of the liver and the lung.





63-year-old female patient with known history of non-Hodgkin's lymphoma with 

involvement of the liver on the lung who was transferred to the emergency 

department from her oncologist office because of hypotension and tachycardia.  

The patient presented to the ED having extreme fatigue and some degree of 

abdominal discomfort more so in the upper abdomen compared to the lower.  She 

denied having any recent fever.  The daughter however told me that the patient 

was having a low-grade fever of 100.4.  She did admit to have some loose 

liquidy bowel movements 2 bouts yesterday and one bout this morning.  No recent 

antibiotic use.  The patient has a Mediport over the right chest area the site 

of which is clean and that is no erythema and the port is functioning well.  

She denied having any cough or sputum production.  No reported nausea or 

vomiting or emesis.  No chest pain.  No angina.  Her blood work showed that the 

patient was neutropenic with a white cell count of 1.6.  She had a hemoglobin 

of 9.3 with a platelet count of 167.  Her BUN was 24 with a creatinine of 1.1.  

She had no significant metabolic acidosis.  The patient lactic acid level was 

at 1.7 at a time of initial evaluation.  Liver function tests are also within 

normal limits.  She was unable to give a UA and she refused and declined a 

Riley catheter insertion.  The patient was started already on a combination of 

cefepime and vancomycin.  She was found to be hypotensive and she received 2 L 

of IV fluid without much response and her blood pressure in general.  Note that 

her systolic blood pressure was 74 with a diastolic of 44.  At one point, her 

blood pressure was as low as 69/38.  Her heart rate was around 109 and with 

fluid resuscitation dropped down to 83.  She was also started on pressors after 

2-1/2 L of IV fluid and pulmonary critical care consultation was requested.  

CAT scan of the abdomen was done and showed a 3 mm left proximal ureteral stone 

with obstruction and mild left hydronephrosis.  The patient has fluid-filled 

loops of small bowel with wall thickening and extensive diverticular changes 

within the colon.  Retroperitoneal adenopathy was again described by the CAT 

scan.  There was normal aorta, normal pancreas and spleen, multiple hypodense 

lesions within the liver noted suggestive of metastatic foci and the patient is 

postcholecystectomy.








 Note that this patient lymphoma was a recent diagnosis.  She was initially 

seen in 3/18.  The patient had presented with some chest pain.  She had a 

computed tomography scan done to assess for aortic aneurysm, which incidentally 

revealed upper retroperitoneal lymph nodes.  The patient subsequently had a 

lymph node biopsy which was positive for low-grade non-Hodgkin's lymphoma, with 

marginal zone lymphoma favored.  The patient then had a PET scan done, which 

showed uptake limited to upper left retroperitoneal area, as well as uptake in 

the liver.  The patient had an MRI of the liver which was not conclusive in 

terms of ruling in or ruling out liver involvement.  She therefore underwent 

liver biopsy in 5/18 which showed no evidence of lymphoma involvement.  It did 

show evidence of inflammation and steatosis. The patient subsequently had a 

bone marrow aspiration biopsy on 7/2/18 which did show involvement of the bone 

marrow with B-cell lymphoma, confirming stage IV disease.  As the patient was 

asymptomatic, she was placed on observation which is the standard of care for 

stage IV low-grade non-Hodgkin lymphoma.  Patient was recently started on 

systemic chemotherapy knowing that the bone marrow biopsy also showed 

conversion to a high-grade lymphoma and the patient was given a first cycle of 

systemic chemotherapy that included Cytoxan, Rituxan and high-dose prednisone.  

The patient was also given Neulasta for neutropenia post chemotherapy.








On 10/05/2018, I'm seeing this patient for a follow-up.  She is awake and alert 

and communicating and there is no altered mentation.  She is on pressors and 

she is currently on 2 mics of norepinephrine infusion for blood pressure 

control.  She is producing adequate amount of urine output.  Riley catheter was 

inserted.  No indication of an underlying UTI based on the UA.  The patient was 

seen by urology regarding the left ureteral calculus and left hydronephrosis.  

No suggestions for any immediate interventions regarding this issue.  The 

patient is afebrile.  The patient has been resuscitated aggressively with IV 

fluids and her urine output is also improved.  On today's blood work, the white 

cell count is up to 5.2.  Her renal function is stable with a creatinine of 

1.1.  Serum bicarb is down to 16.  This is a non-anion gap metabolic acidosis a 

a anion gap of 13.  The patient is receiving stress dose hydrocortisone.  The 

blood sugar is elevated above 300.  The patient was started on insulin drip.  

White cell count is up to 5.2.  She still has some vague abdominal discomfort 

without any active diarrhea.  No dysuria fixed urgency.  Riley catheter is in 

place.





The patient is seen again today 10/06/2018 in follow-up in the intensive care 

unit.  She is currently awake and alert oriented 3.  She is still somewhat 

weak and fatigued.  No pulmonary complaints.  Maintaining good O2 saturations 

in the 90s on room air.  She had been off norepinephrine through most of the 

evening but it was started early this morning approximate 7:30 at 1 florence per 

minute currently down to 0.5.  She is on Solu-Cortef 50 mg IV every 8 hours.  

Mean arterial pressure standing greater than 60.  She's afebrile.  Blood and 

urine cultures reveal no growth.  White count 9.8.  Hemoglobin 7.6.  Bicarb 17.

  Creatinine 0.85.  She is currently on cefepime, Flagyl and vancomycin.  She 

is somewhat depressed this morning.  Verbalizing her desire to be a DO NOT 

RESUSCITATE.  We encouraged her to wait until her family arrives to have 

further discussion.





Objective





- Vital Signs


Vital signs: 


 Vital Signs











Temp  98.3 F   10/06/18 08:00


 


Pulse  52 L  10/06/18 09:30


 


Resp  15   10/06/18 09:30


 


BP  95/54   10/06/18 09:30


 


Pulse Ox  95   10/06/18 09:30








 Intake & Output











 10/05/18 10/06/18 10/06/18





 18:59 06:59 18:59


 


Intake Total 2427.522 897.119 263.559


 


Output Total 605 610 180


 


Balance 1822.522 287.119 83.559


 


Weight  74.7 kg 


 


Intake:   


 


  IV 1530 225 250


 


    0.9 1080 125 250


 


    Cefepime 2 gm In Sodium 50  





    Chloride 0.9% 50 ml @ 100   





    mls/hr IVPB ONCE STA Rx#   





    :[id number]   


 


    Magnesium Sulfate-D5w Pmx 200  





    1 gm In Dextrose/Water 1   





    100ml.bag @ 100 mls/hr   





    IVPB Q1H [facility] Rx#:   





    [id number]   


 


    metroNIDAZOLE-NS  200 100 





    mg In Saline 1 100ml.bag   





    @ 100 mls/hr IVPB Q8HR   





    [facility] Rx#:[id number]   


 


  Intake, IV Titration 407.522 22.119 13.559





  Amount   


 


    Insulin Regular 100 unit 72.772 22.119 8.434





    In Sodium Chloride 0.9%   





    100 ml @ Per Protocol IV   





    .Q0M [facility] Rx#:[id number]   


 


    Norepinephrine 4 mg In 84.750  5.125





    Sodium Chloride 0.9% 250   





    ml @ Titrate IV .Q0M [facility]   





    Rx#:[id number]   


 


    Vancomycin 1,250 mg In 250  





    Sodium Chloride 0.9% 250   





    ml @ 125 mls/hr IVPB Q24H   





    [facility] Rx#:[id number]   


 


  Oral 490 650 


 


Output:   


 


  Urine 605 610 180


 


    Uretheral (Riley)  110 


 


Other:   


 


  Voiding Method Indwelling Catheter Indwelling Catheter 














- Exam





- Constitutional


General appearance: no acute distress





- EENT


Eyes: EOMI, PERRLA


ENT: hearing grossly normal, normal oropharynx





- Neck


Neck: no lymphadenopathy





- Respiratory


Respiratory: bilateral: CTA





- Cardiovascular


Rhythm: regular


Heart sounds: normal: S1, S2





- Gastrointestinal


General gastrointestinal: normal bowel sounds, soft





- Integumentary


Integumentary: normal





- Neurologic


Neurologic: CNII-XII intact





- Musculoskeletal


Musculoskeletal: strength equal bilaterally





- Psychiatric


Psychiatric: A&O x's 3, appropriate affect





- Labs


CBC & Chem 7: 


 10/06/18 05:29





 10/06/18 05:29


Labs: 


 Abnormal Lab Results - Last 24 Hours (Table)











  10/05/18 10/05/18 10/05/18 Range/Units





  05:04 10:39 11:01 


 


RBC     (3.80-5.40)  m/uL


 


Hgb     (11.4-16.0)  gm/dL


 


Hct     (34.0-46.0)  %


 


MCV     (80.0-100.0)  fL


 


MCH     (25.0-35.0)  pg


 


MCHC     (31.0-37.0)  g/dL


 


RDW     (11.5-15.5)  %


 


Neutrophils #     (1.3-7.7)  k/uL


 


Lymphocytes #     (1.0-4.8)  k/uL


 


Potassium     (3.5-5.1)  mmol/L


 


Chloride     ()  mmol/L


 


Carbon Dioxide     (22-30)  mmol/L


 


BUN     (7-17)  mg/dL


 


Glucose     (74-99)  mg/dL


 


POC Glucose (mg/dL)   310 H  293 H  (75-99)  mg/dL


 


Hemoglobin A1c  7.5 H    (4.0-6.0)  %


 


Calcium     (8.4-10.2)  mg/dL


 


Phosphorus     (2.5-4.5)  mg/dL














  10/05/18 10/05/18 10/05/18 Range/Units





  11:53 13:06 14:05 


 


RBC     (3.80-5.40)  m/uL


 


Hgb     (11.4-16.0)  gm/dL


 


Hct     (34.0-46.0)  %


 


MCV     (80.0-100.0)  fL


 


MCH     (25.0-35.0)  pg


 


MCHC     (31.0-37.0)  g/dL


 


RDW     (11.5-15.5)  %


 


Neutrophils #     (1.3-7.7)  k/uL


 


Lymphocytes #     (1.0-4.8)  k/uL


 


Potassium     (3.5-5.1)  mmol/L


 


Chloride     ()  mmol/L


 


Carbon Dioxide     (22-30)  mmol/L


 


BUN     (7-17)  mg/dL


 


Glucose     (74-99)  mg/dL


 


POC Glucose (mg/dL)  248 H  179 H  176 H  (75-99)  mg/dL


 


Hemoglobin A1c     (4.0-6.0)  %


 


Calcium     (8.4-10.2)  mg/dL


 


Phosphorus     (2.5-4.5)  mg/dL














  10/05/18 10/05/18 10/05/18 Range/Units





  14:54 16:30 17:10 


 


RBC     (3.80-5.40)  m/uL


 


Hgb     (11.4-16.0)  gm/dL


 


Hct     (34.0-46.0)  %


 


MCV     (80.0-100.0)  fL


 


MCH     (25.0-35.0)  pg


 


MCHC     (31.0-37.0)  g/dL


 


RDW     (11.5-15.5)  %


 


Neutrophils #     (1.3-7.7)  k/uL


 


Lymphocytes #     (1.0-4.8)  k/uL


 


Potassium     (3.5-5.1)  mmol/L


 


Chloride     ()  mmol/L


 


Carbon Dioxide     (22-30)  mmol/L


 


BUN     (7-17)  mg/dL


 


Glucose     (74-99)  mg/dL


 


POC Glucose (mg/dL)  174 H  165 H  187 H  (75-99)  mg/dL


 


Hemoglobin A1c     (4.0-6.0)  %


 


Calcium     (8.4-10.2)  mg/dL


 


Phosphorus     (2.5-4.5)  mg/dL














  10/05/18 10/05/18 10/05/18 Range/Units





  18:08 18:57 20:26 


 


RBC     (3.80-5.40)  m/uL


 


Hgb     (11.4-16.0)  gm/dL


 


Hct     (34.0-46.0)  %


 


MCV     (80.0-100.0)  fL


 


MCH     (25.0-35.0)  pg


 


MCHC     (31.0-37.0)  g/dL


 


RDW     (11.5-15.5)  %


 


Neutrophils #     (1.3-7.7)  k/uL


 


Lymphocytes #     (1.0-4.8)  k/uL


 


Potassium     (3.5-5.1)  mmol/L


 


Chloride     ()  mmol/L


 


Carbon Dioxide     (22-30)  mmol/L


 


BUN     (7-17)  mg/dL


 


Glucose     (74-99)  mg/dL


 


POC Glucose (mg/dL)  158 H  141 H  155 H  (75-99)  mg/dL


 


Hemoglobin A1c     (4.0-6.0)  %


 


Calcium     (8.4-10.2)  mg/dL


 


Phosphorus     (2.5-4.5)  mg/dL














  10/05/18 10/05/18 10/05/18 Range/Units





  21:15 22:04 23:07 


 


RBC     (3.80-5.40)  m/uL


 


Hgb     (11.4-16.0)  gm/dL


 


Hct     (34.0-46.0)  %


 


MCV     (80.0-100.0)  fL


 


MCH     (25.0-35.0)  pg


 


MCHC     (31.0-37.0)  g/dL


 


RDW     (11.5-15.5)  %


 


Neutrophils #     (1.3-7.7)  k/uL


 


Lymphocytes #     (1.0-4.8)  k/uL


 


Potassium     (3.5-5.1)  mmol/L


 


Chloride     ()  mmol/L


 


Carbon Dioxide     (22-30)  mmol/L


 


BUN     (7-17)  mg/dL


 


Glucose     (74-99)  mg/dL


 


POC Glucose (mg/dL)  148 H  151 H  148 H  (75-99)  mg/dL


 


Hemoglobin A1c     (4.0-6.0)  %


 


Calcium     (8.4-10.2)  mg/dL


 


Phosphorus     (2.5-4.5)  mg/dL














  10/05/18 10/06/18 10/06/18 Range/Units





  23:59 01:01 02:06 


 


RBC     (3.80-5.40)  m/uL


 


Hgb     (11.4-16.0)  gm/dL


 


Hct     (34.0-46.0)  %


 


MCV     (80.0-100.0)  fL


 


MCH     (25.0-35.0)  pg


 


MCHC     (31.0-37.0)  g/dL


 


RDW     (11.5-15.5)  %


 


Neutrophils #     (1.3-7.7)  k/uL


 


Lymphocytes #     (1.0-4.8)  k/uL


 


Potassium     (3.5-5.1)  mmol/L


 


Chloride     ()  mmol/L


 


Carbon Dioxide     (22-30)  mmol/L


 


BUN     (7-17)  mg/dL


 


Glucose     (74-99)  mg/dL


 


POC Glucose (mg/dL)  149 H  149 H  148 H  (75-99)  mg/dL


 


Hemoglobin A1c     (4.0-6.0)  %


 


Calcium     (8.4-10.2)  mg/dL


 


Phosphorus     (2.5-4.5)  mg/dL














  10/06/18 10/06/18 10/06/18 Range/Units





  02:57 03:55 05:19 


 


RBC     (3.80-5.40)  m/uL


 


Hgb     (11.4-16.0)  gm/dL


 


Hct     (34.0-46.0)  %


 


MCV     (80.0-100.0)  fL


 


MCH     (25.0-35.0)  pg


 


MCHC     (31.0-37.0)  g/dL


 


RDW     (11.5-15.5)  %


 


Neutrophils #     (1.3-7.7)  k/uL


 


Lymphocytes #     (1.0-4.8)  k/uL


 


Potassium     (3.5-5.1)  mmol/L


 


Chloride     ()  mmol/L


 


Carbon Dioxide     (22-30)  mmol/L


 


BUN     (7-17)  mg/dL


 


Glucose     (74-99)  mg/dL


 


POC Glucose (mg/dL)  144 H  145 H  159 H  (75-99)  mg/dL


 


Hemoglobin A1c     (4.0-6.0)  %


 


Calcium     (8.4-10.2)  mg/dL


 


Phosphorus     (2.5-4.5)  mg/dL














  10/06/18 10/06/18 10/06/18 Range/Units





  05:29 05:29 06:10 


 


RBC  3.18 L    (3.80-5.40)  m/uL


 


Hgb  7.6 L    (11.4-16.0)  gm/dL


 


Hct  25.0 L    (34.0-46.0)  %


 


MCV  78.7 L    (80.0-100.0)  fL


 


MCH  23.8 L    (25.0-35.0)  pg


 


MCHC  30.2 L    (31.0-37.0)  g/dL


 


RDW  17.3 H    (11.5-15.5)  %


 


Neutrophils #  8.7 H    (1.3-7.7)  k/uL


 


Lymphocytes #  0.3 L    (1.0-4.8)  k/uL


 


Potassium   3.2 L   (3.5-5.1)  mmol/L


 


Chloride   116 H   ()  mmol/L


 


Carbon Dioxide   17 L   (22-30)  mmol/L


 


BUN   20 H   (7-17)  mg/dL


 


Glucose   135 H   (74-99)  mg/dL


 


POC Glucose (mg/dL)    144 H  (75-99)  mg/dL


 


Hemoglobin A1c     (4.0-6.0)  %


 


Calcium   6.8 L   (8.4-10.2)  mg/dL


 


Phosphorus   1.3 L   (2.5-4.5)  mg/dL














  10/06/18 10/06/18 10/06/18 Range/Units





  06:53 08:26 09:11 


 


RBC     (3.80-5.40)  m/uL


 


Hgb     (11.4-16.0)  gm/dL


 


Hct     (34.0-46.0)  %


 


MCV     (80.0-100.0)  fL


 


MCH     (25.0-35.0)  pg


 


MCHC     (31.0-37.0)  g/dL


 


RDW     (11.5-15.5)  %


 


Neutrophils #     (1.3-7.7)  k/uL


 


Lymphocytes #     (1.0-4.8)  k/uL


 


Potassium     (3.5-5.1)  mmol/L


 


Chloride     ()  mmol/L


 


Carbon Dioxide     (22-30)  mmol/L


 


BUN     (7-17)  mg/dL


 


Glucose     (74-99)  mg/dL


 


POC Glucose (mg/dL)  148 H  164 H  158 H  (75-99)  mg/dL


 


Hemoglobin A1c     (4.0-6.0)  %


 


Calcium     (8.4-10.2)  mg/dL


 


Phosphorus     (2.5-4.5)  mg/dL














  10/06/18 Range/Units





  10:08 


 


RBC   (3.80-5.40)  m/uL


 


Hgb   (11.4-16.0)  gm/dL


 


Hct   (34.0-46.0)  %


 


MCV   (80.0-100.0)  fL


 


MCH   (25.0-35.0)  pg


 


MCHC   (31.0-37.0)  g/dL


 


RDW   (11.5-15.5)  %


 


Neutrophils #   (1.3-7.7)  k/uL


 


Lymphocytes #   (1.0-4.8)  k/uL


 


Potassium   (3.5-5.1)  mmol/L


 


Chloride   ()  mmol/L


 


Carbon Dioxide   (22-30)  mmol/L


 


BUN   (7-17)  mg/dL


 


Glucose   (74-99)  mg/dL


 


POC Glucose (mg/dL)  144 H  (75-99)  mg/dL


 


Hemoglobin A1c   (4.0-6.0)  %


 


Calcium   (8.4-10.2)  mg/dL


 


Phosphorus   (2.5-4.5)  mg/dL








 Microbiology - Last 24 Hours (Table)











 10/04/18 18:50 Urine Culture - Final





 Urine,Catheterized 


 


 10/04/18 12:56 Blood Culture - Preliminary





 Blood    No Growth after 24 hours














Assessment and Plan


Assessment: 





Assessment





1 acute hypotension, likely of a septic in nature.  Recovered, currently off 

pressors.





2 stage IV non-Hodgkin's lymphoma low-grade





3 obstructive uropathy with left ureteral stone and hydronephrosis, see urology'

s evaluation





4 anemia, normocytic likely chronic





5 obstructive sleep apnea





6 hypothyroidism





7 hyperlipidemia





8 diabetes mellitus





9 history of closed head injury





10 non-anion gap metabolic acidosis, probably related to diarrhea.





Plan:





The patient was seen and evaluated by Dr. Montez.  Labs were reviewed. No 

growth in cultures thus far.  We'll discontinue the vancomycin.  We'll add 

bicarb.  We'll discontinue her Riley. We'll transfer out of the intensive care 

unit today.  She has expressed an interest in being a DO NOT RESUSCITATE/DO NOT 

INTUBATE CODE STATUS however we recommended she wait and speak with her family 

who are planning to be here today.  We will continue to follow make further 

recommendations based on her clinical status.





I, the cosigning physician, performed a history & physical examination of the 

patient. Lungs sounds are clear.  Maintaining good O2 saturations in the 90s on 

room air.  I discussed the assessment and plan of care with my nurse 

practitioner, Ambreen Dykes. I attest to the above note as dictated by her.

## 2018-10-07 LAB
ANION GAP SERPL CALC-SCNC: 9 MMOL/L
BASOPHILS # BLD AUTO: 0.3 K/UL (ref 0–0.2)
BASOPHILS NFR BLD AUTO: 1 %
BUN SERPL-SCNC: 16 MG/DL (ref 7–17)
CALCIUM SPEC-MCNC: 6.8 MG/DL (ref 8.4–10.2)
CHLORIDE SERPL-SCNC: 114 MMOL/L (ref 98–107)
CO2 SERPL-SCNC: 18 MMOL/L (ref 22–30)
EOSINOPHIL # BLD AUTO: 0.1 K/UL (ref 0–0.7)
EOSINOPHIL NFR BLD AUTO: 0 %
ERYTHROCYTE [DISTWIDTH] IN BLOOD BY AUTOMATED COUNT: 3.49 M/UL (ref 3.8–5.4)
ERYTHROCYTE [DISTWIDTH] IN BLOOD: 17.6 % (ref 11.5–15.5)
GLUCOSE BLD-MCNC: 288 MG/DL (ref 75–99)
GLUCOSE BLD-MCNC: 296 MG/DL (ref 75–99)
GLUCOSE BLD-MCNC: 324 MG/DL (ref 75–99)
GLUCOSE BLD-MCNC: 334 MG/DL (ref 75–99)
GLUCOSE BLD-MCNC: 346 MG/DL (ref 75–99)
GLUCOSE SERPL-MCNC: 274 MG/DL (ref 74–99)
HCT VFR BLD AUTO: 27.4 % (ref 34–46)
HGB BLD-MCNC: 8.7 GM/DL (ref 11.4–16)
LYMPHOCYTES # SPEC AUTO: 0.5 K/UL (ref 1–4.8)
LYMPHOCYTES NFR SPEC AUTO: 2 %
MAGNESIUM SPEC-SCNC: 1.7 MG/DL (ref 1.6–2.3)
MCH RBC QN AUTO: 24.8 PG (ref 25–35)
MCHC RBC AUTO-ENTMCNC: 31.7 G/DL (ref 31–37)
MCV RBC AUTO: 78.3 FL (ref 80–100)
MONOCYTES # BLD AUTO: 1.1 K/UL (ref 0–1)
MONOCYTES NFR BLD AUTO: 4 %
NEUTROPHILS # BLD AUTO: 24 K/UL (ref 1.3–7.7)
NEUTROPHILS NFR BLD AUTO: 91 %
PLATELET # BLD AUTO: 242 K/UL (ref 150–450)
POTASSIUM SERPL-SCNC: 3.5 MMOL/L (ref 3.5–5.1)
SODIUM SERPL-SCNC: 141 MMOL/L (ref 137–145)
WBC # BLD AUTO: 26.5 K/UL (ref 3.8–10.6)

## 2018-10-07 RX ADMIN — HYDROCORTISONE SODIUM SUCCINATE SCH MG: 100 INJECTION, POWDER, FOR SOLUTION INTRAMUSCULAR; INTRAVENOUS at 08:32

## 2018-10-07 RX ADMIN — INSULIN ASPART SCH UNIT: 100 INJECTION, SOLUTION INTRAVENOUS; SUBCUTANEOUS at 17:31

## 2018-10-07 RX ADMIN — INSULIN ASPART SCH UNIT: 100 INJECTION, SOLUTION INTRAVENOUS; SUBCUTANEOUS at 21:07

## 2018-10-07 RX ADMIN — LORATADINE SCH MG: 10 TABLET ORAL at 21:08

## 2018-10-07 RX ADMIN — CEFEPIME HYDROCHLORIDE SCH MLS/HR: 2 INJECTION, POWDER, FOR SOLUTION INTRAVENOUS at 12:41

## 2018-10-07 RX ADMIN — POTASSIUM CHLORIDE SCH MLS/HR: 14.9 INJECTION, SOLUTION INTRAVENOUS at 05:14

## 2018-10-07 RX ADMIN — CITALOPRAM HYDROBROMIDE SCH MG: 20 TABLET ORAL at 08:33

## 2018-10-07 RX ADMIN — FUROSEMIDE SCH MG: 20 TABLET ORAL at 21:07

## 2018-10-07 RX ADMIN — POTASSIUM CHLORIDE SCH: 14.9 INJECTION, SOLUTION INTRAVENOUS at 21:03

## 2018-10-07 RX ADMIN — CEFEPIME HYDROCHLORIDE SCH MLS/HR: 2 INJECTION, POWDER, FOR SOLUTION INTRAVENOUS at 02:47

## 2018-10-07 RX ADMIN — INSULIN ASPART SCH UNIT: 100 INJECTION, SOLUTION INTRAVENOUS; SUBCUTANEOUS at 12:40

## 2018-10-07 RX ADMIN — SODIUM CHLORIDE SCH MLS/HR: 9 INJECTION, SOLUTION INTRAVENOUS at 17:52

## 2018-10-07 RX ADMIN — GABAPENTIN SCH MG: 100 CAPSULE ORAL at 08:33

## 2018-10-07 RX ADMIN — FUROSEMIDE SCH MG: 20 TABLET ORAL at 08:33

## 2018-10-07 RX ADMIN — POTASSIUM CHLORIDE SCH MEQ: 750 TABLET, EXTENDED RELEASE ORAL at 08:33

## 2018-10-07 RX ADMIN — METRONIDAZOLE SCH MLS/HR: 500 INJECTION, SOLUTION INTRAVENOUS at 16:45

## 2018-10-07 RX ADMIN — INSULIN ASPART SCH UNIT: 100 INJECTION, SOLUTION INTRAVENOUS; SUBCUTANEOUS at 08:33

## 2018-10-07 RX ADMIN — HYDROCORTISONE SODIUM SUCCINATE SCH MG: 100 INJECTION, POWDER, FOR SOLUTION INTRAMUSCULAR; INTRAVENOUS at 16:45

## 2018-10-07 RX ADMIN — GABAPENTIN SCH MG: 100 CAPSULE ORAL at 21:07

## 2018-10-07 RX ADMIN — METRONIDAZOLE SCH MLS/HR: 500 INJECTION, SOLUTION INTRAVENOUS at 08:33

## 2018-10-07 RX ADMIN — HYDROCORTISONE SODIUM SUCCINATE SCH MG: 100 INJECTION, POWDER, FOR SOLUTION INTRAMUSCULAR; INTRAVENOUS at 00:41

## 2018-10-07 RX ADMIN — HYDROCODONE BITARTRATE AND ACETAMINOPHEN PRN EACH: 7.5; 325 TABLET ORAL at 00:51

## 2018-10-07 RX ADMIN — METRONIDAZOLE SCH MLS/HR: 500 INJECTION, SOLUTION INTRAVENOUS at 00:42

## 2018-10-07 RX ADMIN — SODIUM CHLORIDE SCH MLS/HR: 9 INJECTION, SOLUTION INTRAVENOUS at 05:24

## 2018-10-07 RX ADMIN — THERA TABS SCH EACH: TAB at 08:33

## 2018-10-07 NOTE — P.PN
Subjective


Progress Note Date: 10/07/18








63-year-old female patient with known history of non-Hodgkin's lymphoma with 

involvement of the liver on the lung who was transferred to the emergency 

department from her oncologist office because of hypotension and tachycardia.  

The patient presented to the ED having extreme fatigue and some degree of 

abdominal discomfort more so in the upper abdomen compared to the lower.  She 

denied having any recent fever.  The daughter however told me that the patient 

was having a low-grade fever of 100.4.  She did admit to have some loose 

liquidy bowel movements 2 bouts yesterday and one bout this morning.  No recent 

antibiotic use.  The patient has a Mediport over the right chest area the site 

of which is clean and that is no erythema and the port is functioning well.  

She denied having any cough or sputum production.  No reported nausea or 

vomiting or emesis.  No chest pain.  No angina.  Her blood work showed that the 

patient was neutropenic with a white cell count of 1.6.  She had a hemoglobin 

of 9.3 with a platelet count of 167.  Her BUN was 24 with a creatinine of 1.1.  

She had no significant metabolic acidosis.  The patient lactic acid level was 

at 1.7 at a time of initial evaluation.  Liver function tests are also within 

normal limits.  She was unable to give a UA and she refused and declined a 

Riley catheter insertion.  The patient was started already on a combination of 

cefepime and vancomycin.  She was found to be hypotensive and she received 2 L 

of IV fluid without much response and her blood pressure in general.  Note that 

her systolic blood pressure was 74 with a diastolic of 44.  At one point, her 

blood pressure was as low as 69/38.  Her heart rate was around 109 and with 

fluid resuscitation dropped down to 83.  She was also started on pressors after 

2-1/2 L of IV fluid and pulmonary critical care consultation was requested.  

CAT scan of the abdomen was done and showed a 3 mm left proximal ureteral stone 

with obstruction and mild left hydronephrosis.  The patient has fluid-filled 

loops of small bowel with wall thickening and extensive diverticular changes 

within the colon.  Retroperitoneal adenopathy was again described by the CAT 

scan.  There was normal aorta, normal pancreas and spleen, multiple hypodense 

lesions within the liver noted suggestive of metastatic foci and the patient is 

postcholecystectomy.








 Note that this patient lymphoma was a recent diagnosis.  She was initially 

seen in 3/18.  The patient had presented with some chest pain.  She had a 

computed tomography scan done to assess for aortic aneurysm, which incidentally 

revealed upper retroperitoneal lymph nodes.  The patient subsequently had a 

lymph node biopsy which was positive for low-grade non-Hodgkin's lymphoma, with 

marginal zone lymphoma favored.  The patient then had a PET scan done, which 

showed uptake limited to upper left retroperitoneal area, as well as uptake in 

the liver.  The patient had an MRI of the liver which was not conclusive in 

terms of ruling in or ruling out liver involvement.  She therefore underwent 

liver biopsy in 5/18 which showed no evidence of lymphoma involvement.  It did 

show evidence of inflammation and steatosis. The patient subsequently had a 

bone marrow aspiration biopsy on 7/2/18 which did show involvement of the bone 

marrow with B-cell lymphoma, confirming stage IV disease.  As the patient was 

asymptomatic, she was placed on observation which is the standard of care for 

stage IV low-grade non-Hodgkin lymphoma.  Patient was recently started on 

systemic chemotherapy knowing that the bone marrow biopsy also showed 

conversion to a high-grade lymphoma and the patient was given a first cycle of 

systemic chemotherapy that included Cytoxan, Rituxan and high-dose prednisone.  

The patient was also given Neulasta for neutropenia post chemotherapy.








On 10/05/2018, I'm seeing this patient for a follow-up.  She is awake and alert 

and communicating and there is no altered mentation.  She is on pressors and 

she is currently on 2 mics of norepinephrine infusion for blood pressure 

control.  She is producing adequate amount of urine output.  Riley catheter was 

inserted.  No indication of an underlying UTI based on the UA.  The patient was 

seen by urology regarding the left ureteral calculus and left hydronephrosis.  

No suggestions for any immediate interventions regarding this issue.  The 

patient is afebrile.  The patient has been resuscitated aggressively with IV 

fluids and her urine output is also improved.  On today's blood work, the white 

cell count is up to 5.2.  Her renal function is stable with a creatinine of 

1.1.  Serum bicarb is down to 16.  This is a non-anion gap metabolic acidosis a 

a anion gap of 13.  The patient is receiving stress dose hydrocortisone.  The 

blood sugar is elevated above 300.  The patient was started on insulin drip.  

White cell count is up to 5.2.  She still has some vague abdominal discomfort 

without any active diarrhea.  No dysuria fixed urgency.  Riley catheter is in 

place.





The patient is seen again today 10/06/2018 in follow-up in the intensive care 

unit.  She is currently awake and alert oriented 3.  She is still somewhat 

weak and fatigued.  No pulmonary complaints.  Maintaining good O2 saturations 

in the 90s on room air.  She had been off norepinephrine through most of the 

evening but it was started early this morning approximate 7:30 at 1 florence per 

minute currently down to 0.5.  She is on Solu-Cortef 50 mg IV every 8 hours.  

Mean arterial pressure standing greater than 60.  She's afebrile.  Blood and 

urine cultures reveal no growth.  White count 9.8.  Hemoglobin 7.6.  Bicarb 17.

  Creatinine 0.85.  She is currently on cefepime, Flagyl and vancomycin.  She 

is somewhat depressed this morning.  Verbalizing her desire to be a DO NOT 

RESUSCITATE.  We encouraged her to wait until her family arrives to have 

further discussion.





On 10/07/2018 the patient is alert and awake and interactive and much more 

energetic compared to yesterday.  I discharged out of the intensive care unit 

yesterday.  She feels great.  No fever chills or night sweats.  No other new 

complaints for now.  No significant abdominal pain no nausea no vomiting no 

emesis.  As mentioned the patient has non-Hodgkin's lymphoma and the patient 

received first cycle of systemic chemotherapy that was followed by febrile 

neutropenia.  Hypotension has recovered.  Pressors have been discontinued.  The 

patient is improving.Antibiotic course include a combination of cefepime, 

Flagyl and vancomycin.  All of the cultures of been negative for now and the 

patient is afebrile.  The white cell count is up to 26.5





Objective





- Vital Signs


Vital signs: 


 Vital Signs











Temp  97.7 F   10/07/18 13:44


 


Pulse  54 L  10/07/18 13:44


 


Resp  18   10/07/18 13:44


 


BP  127/71   10/07/18 13:44


 


Pulse Ox  98   10/07/18 13:44








 Intake & Output











 10/06/18 10/07/18 10/07/18





 18:59 06:59 18:59


 


Intake Total 4293.678 0884 1160


 


Output Total 400  


 


Balance 9496.159 4952 1160


 


Intake:   


 


  IV 1145 1000 710


 


    0.9 895  10


 


    Dextrose 5% in Water 1, 150 900 600





    000 ml @ 75 mls/hr IV .   





    [id number] [facility] with Sodium   





    Bicarb (1 Meq/ml) 150 ml   





    Rx#:[id number]   


 


    metroNIDAZOLE-NS  100 100 100





    mg In Saline 1 100ml.bag   





    @ 100 mls/hr IVPB Q8HR   





    [facility] Rx#:[id number]   


 


  Intake, IV Titration 30.608 175 50





  Amount   


 


    Cefepime 2 gm In Sodium  50 50





    Chloride 0.9% 50 ml @ 100   





    mls/hr IVPB Q12H [facility] Rx#   





    :[id number]   


 


    Insulin Regular 100 unit 22.709  





    In Sodium Chloride 0.9%   





    100 ml @ Per Protocol IV   





    .Q0M [facility] Rx#:[id number]   


 


    Norepinephrine 4 mg In 7.899  





    Sodium Chloride 0.9% 250   





    ml @ Titrate IV .Q0M [facility]   





    Rx#:[id number]   


 


    Vancomycin 1,250 mg In  125 





    Sodium Chloride 0.9% 250   





    ml @ 125 mls/hr IVPB Q12H   





    [facility] Rx#:[id number]   


 


  Oral 470 480 400


 


Output:   


 


  Urine 400  


 


Other:   


 


  Voiding Method Indwelling Catheter Toilet Toilet


 


  # Voids  2 2














- Exam











- Constitutional


General appearance: no acute distress, calm and comfortable not in acute 

distress and the patient is interactive and communicating.





- EENT


Eyes: EOMI, PERRLA


ENT: hearing grossly normal, normal oropharynx





- Neck


Neck: no lymphadenopathy, Neck was supple and without jugular venous distension

, thyromegaly, or carotid bruits. Carotids were easily palpable bilaterally. 

There was no adenopathy.





- Respiratory


Respiratory: bilateral: CTA





- Cardiovascular


Rhythm: regular


Heart sounds: normal: S1, S2, Cardiac exam revealed the PMI to be normally 

situated and sized. The rhythm was regular and no extrasystoles were noted 

during several minutes of auscultation. The first and second heart sounds were 

normal and physiologic splitting of the second heart sound was noted. There 

were no murmurs, rubs, clicks, or gallops.





- Gastrointestinal


General gastrointestinal: normal bowel sounds, soft, Abdominal exam revealed 

normal bowel sounds. The abdomen was soft, non-tender, and without masses, 

organomegaly, or appreciable enlargement of the abdominal aorta.


- Integumentary


Integumentary: normal





- Neurologic


Neurologic: CNII-XII intact





- Musculoskeletal


Musculoskeletal: strength equal bilaterally





- Psychiatric


Psychiatric: A&O x's 3, appropriate affect





Results





- Labs


CBC & Chem 7: 


 10/07/18 07:14





 10/07/18 07:14


Labs: 


 Abnormal Lab Results - Last 24 Hours (Table)











  10/06/18 10/06/18 10/06/18 Range/Units





  15:10 15:57 17:29 


 


WBC     (3.8-10.6)  k/uL


 


RBC     (3.80-5.40)  m/uL


 


Hgb     (11.4-16.0)  gm/dL


 


Hct     (34.0-46.0)  %


 


MCV     (80.0-100.0)  fL


 


MCH     (25.0-35.0)  pg


 


RDW     (11.5-15.5)  %


 


Neutrophils #     (1.3-7.7)  k/uL


 


Lymphocytes #     (1.0-4.8)  k/uL


 


Monocytes #     (0-1.0)  k/uL


 


Basophils #     (0-0.2)  k/uL


 


Chloride     ()  mmol/L


 


Carbon Dioxide     (22-30)  mmol/L


 


Glucose     (74-99)  mg/dL


 


POC Glucose (mg/dL)  198 H  195 H  141 H  (75-99)  mg/dL


 


Calcium     (8.4-10.2)  mg/dL


 


Phosphorus     (2.5-4.5)  mg/dL














  10/06/18 10/07/18 10/07/18 Range/Units





  20:43 02:13 07:14 


 


WBC    26.5 H  (3.8-10.6)  k/uL


 


RBC    3.49 L  (3.80-5.40)  m/uL


 


Hgb    8.7 L  (11.4-16.0)  gm/dL


 


Hct    27.4 L  (34.0-46.0)  %


 


MCV    78.3 L  (80.0-100.0)  fL


 


MCH    24.8 L  (25.0-35.0)  pg


 


RDW    17.6 H  (11.5-15.5)  %


 


Neutrophils #    24.0 H  (1.3-7.7)  k/uL


 


Lymphocytes #    0.5 L  (1.0-4.8)  k/uL


 


Monocytes #    1.1 H  (0-1.0)  k/uL


 


Basophils #    0.3 H  (0-0.2)  k/uL


 


Chloride     ()  mmol/L


 


Carbon Dioxide     (22-30)  mmol/L


 


Glucose     (74-99)  mg/dL


 


POC Glucose (mg/dL)  255 H  296 H   (75-99)  mg/dL


 


Calcium     (8.4-10.2)  mg/dL


 


Phosphorus     (2.5-4.5)  mg/dL














  10/07/18 10/07/18 10/07/18 Range/Units





  07:14 07:34 11:51 


 


WBC     (3.8-10.6)  k/uL


 


RBC     (3.80-5.40)  m/uL


 


Hgb     (11.4-16.0)  gm/dL


 


Hct     (34.0-46.0)  %


 


MCV     (80.0-100.0)  fL


 


MCH     (25.0-35.0)  pg


 


RDW     (11.5-15.5)  %


 


Neutrophils #     (1.3-7.7)  k/uL


 


Lymphocytes #     (1.0-4.8)  k/uL


 


Monocytes #     (0-1.0)  k/uL


 


Basophils #     (0-0.2)  k/uL


 


Chloride  114 H    ()  mmol/L


 


Carbon Dioxide  18 L    (22-30)  mmol/L


 


Glucose  274 H    (74-99)  mg/dL


 


POC Glucose (mg/dL)   324 H  334 H  (75-99)  mg/dL


 


Calcium  6.8 L    (8.4-10.2)  mg/dL


 


Phosphorus  1.9 L    (2.5-4.5)  mg/dL








 Microbiology - Last 24 Hours (Table)











 10/04/18 12:56 Blood Culture - Preliminary





 Blood    No Growth after 48 hours














Assessment and Plan


Plan: 











Assessment





1 acute hypotension, likely of a septic in nature.  The patient is back to her 

baseline in terms of hemodynamics and the blood pressure is normalized.  She 

presented initially with neutropenic fever and suspected septicemia based on 

hemodynamic instability and hypotension.





On today's evaluation of 10/07/2018, the patient is hemodynamically stable and 

the patient got released from the intensive care unit.  All of the cultures of 

been negative and the patient remains on broad-spectrum antibiotics including a 

combination of cefepime and Flagyl and vancomycin.  Afebrile.  White cell count 

is normalized.





2 stage IV non-Hodgkin's lymphoma low-grade





3 obstructive uropathy with left ureteral stone and hydronephrosis, see urology'

s evaluation





4 anemia, normocytic likely chronic





5 obstructive sleep apnea





6 hypothyroidism





7 hyperlipidemia





8 diabetes mellitus





9 history of closed head injury





10 non-anion gap metabolic acidosis, probably related to diarrhea.





Plan





Continue current antibiotic coverage and discussed with ID further antibiotic 

management.  Clinically improved.  No signs of septicemia.  Awake and 

interactive and communicating.  Pulmonary and critical care services we'll sign 

off and will leave the rest of the management up to the medical group and ID.

## 2018-10-07 NOTE — PN
PROGRESS NOTE



DATE OF SERVICE:

10/07/2018



I am covering for Dr. Villegas.



This 63-year-old woman was admitted with neutropenic sepsis and as well as severe shock

and hypotension, is feeling slightly better.  No chest pain.  No palpitations.  No

fever.  White count is elevated.  The cultures are negative so far.



PHYSICAL EXAM:

Alert and oriented x2.  Pulse 54, blood pressure 127/70, respiration 18, temperature

97.7, pulse ox 98% on room air.

HEENT:  Conjunctivae normal.  Oral mucosa moist.  Neck is no jugular venous distention.

No carotid bruit. No lymph node enlargement.

Cardiovascular System: S1, S2.  Respiration: Breath sounds diminished in the bases.

Bilateral scattered rhonchi and crackles.

ABDOMEN:  Soft, obese, nontender.  Legs are no edema. No swelling. Nervous system:  No

focal deficits.



LABS:

WBC 26.2, hemoglobin is 8.7.  Glucose noted.



ASSESSMENT:

1. Acute neutropenic sepsis as well as severe septic shock and hypotension, present on

    admission.

2. Stage IV non-Hodgkin's lymphoma, low-grade on chemotherapy.

3. Obstructive uropathy, left ureteric stone and hydronephrosis, history.

4. Anemia, leukopenia secondary to chemotherapy.

5. Hypokalemia.

6. Hyponatremia.

7. Increased creatinine with chronic kidney disease stage 3.

8. Diabetes type 2.

9. Hypocalcemia.

10.Hypomagnesemia.

11.History of congestive heart failure.

12.Diabetes type 2.

13.Hypertension.

14.Hyperlipidemia.

15.Obstructive sleep apnea.

16.History of cholecystectomy.

17.History of anxiety.

18.Traumatic brain injury.

19.FULL CODE.



RECOMMENDATIONS AND DISCUSSION:

Recommend to continue current medications, continue monitoring and symptomatic

treatment. Otherwise, at this time, I would recommend continue with broad-spectrum IV

antibiotics.  Repeat labs.  Follow the cultures.  Guarded prognosis because of multiple

complex medical issues.  Dr. Villegas will follow.





MMODL / IJN: [id number] / Job#: [id number]

## 2018-10-08 LAB
ANION GAP SERPL CALC-SCNC: 5 MMOL/L
BUN SERPL-SCNC: 16 MG/DL (ref 7–17)
CALCIUM SPEC-MCNC: 7 MG/DL (ref 8.4–10.2)
CELLS COUNTED: 200
CHLORIDE SERPL-SCNC: 109 MMOL/L (ref 98–107)
CO2 SERPL-SCNC: 24 MMOL/L (ref 22–30)
ERYTHROCYTE [DISTWIDTH] IN BLOOD BY AUTOMATED COUNT: 3.42 M/UL (ref 3.8–5.4)
ERYTHROCYTE [DISTWIDTH] IN BLOOD: 17.9 % (ref 11.5–15.5)
GLUCOSE BLD-MCNC: 300 MG/DL (ref 75–99)
GLUCOSE BLD-MCNC: 330 MG/DL (ref 75–99)
GLUCOSE BLD-MCNC: 363 MG/DL (ref 75–99)
GLUCOSE BLD-MCNC: 384 MG/DL (ref 75–99)
GLUCOSE SERPL-MCNC: 307 MG/DL (ref 74–99)
HCT VFR BLD AUTO: 26.5 % (ref 34–46)
HGB BLD-MCNC: 8.3 GM/DL (ref 11.4–16)
LYMPHOCYTES # BLD MANUAL: 1.33 K/UL (ref 1–4.8)
MAGNESIUM SPEC-SCNC: 1.4 MG/DL (ref 1.6–2.3)
MCH RBC QN AUTO: 24.2 PG (ref 25–35)
MCHC RBC AUTO-ENTMCNC: 31.2 G/DL (ref 31–37)
MCV RBC AUTO: 77.6 FL (ref 80–100)
METAMYELOCYTES # BLD: 1 K/UL
MONOCYTES # BLD MANUAL: 1.33 K/UL (ref 0–1)
MYELOCYTES # BLD MANUAL: 1.66 K/UL
NEUTROPHILS NFR BLD MANUAL: 70 %
NEUTS SEG # BLD MANUAL: 27.8 K/UL (ref 1.3–7.7)
PLATELET # BLD AUTO: 290 K/UL (ref 150–450)
POTASSIUM SERPL-SCNC: 3.1 MMOL/L (ref 3.5–5.1)
SODIUM SERPL-SCNC: 138 MMOL/L (ref 137–145)
WBC # BLD AUTO: 33.2 K/UL (ref 3.8–10.6)

## 2018-10-08 RX ADMIN — POTASSIUM CHLORIDE SCH MEQ: 750 TABLET, EXTENDED RELEASE ORAL at 08:49

## 2018-10-08 RX ADMIN — POTASSIUM CHLORIDE SCH MLS/HR: 14.9 INJECTION, SOLUTION INTRAVENOUS at 11:31

## 2018-10-08 RX ADMIN — POTASSIUM CHLORIDE SCH MLS/HR: 14.9 INJECTION, SOLUTION INTRAVENOUS at 21:43

## 2018-10-08 RX ADMIN — GABAPENTIN SCH MG: 100 CAPSULE ORAL at 08:49

## 2018-10-08 RX ADMIN — HYDROCORTISONE SODIUM SUCCINATE SCH MG: 100 INJECTION, POWDER, FOR SOLUTION INTRAMUSCULAR; INTRAVENOUS at 08:41

## 2018-10-08 RX ADMIN — CEFEPIME HYDROCHLORIDE SCH MLS/HR: 2 INJECTION, POWDER, FOR SOLUTION INTRAVENOUS at 14:06

## 2018-10-08 RX ADMIN — MAGNESIUM SULFATE IN DEXTROSE SCH MLS/HR: 10 INJECTION, SOLUTION INTRAVENOUS at 10:05

## 2018-10-08 RX ADMIN — POTASSIUM CHLORIDE SCH MEQ: 20 TABLET, EXTENDED RELEASE ORAL at 17:32

## 2018-10-08 RX ADMIN — GABAPENTIN SCH MG: 100 CAPSULE ORAL at 20:53

## 2018-10-08 RX ADMIN — MAGNESIUM SULFATE IN DEXTROSE SCH MLS/HR: 10 INJECTION, SOLUTION INTRAVENOUS at 11:25

## 2018-10-08 RX ADMIN — INSULIN ASPART SCH UNIT: 100 INJECTION, SOLUTION INTRAVENOUS; SUBCUTANEOUS at 17:54

## 2018-10-08 RX ADMIN — METRONIDAZOLE SCH MLS/HR: 500 INJECTION, SOLUTION INTRAVENOUS at 08:41

## 2018-10-08 RX ADMIN — INSULIN ASPART SCH UNIT: 100 INJECTION, SOLUTION INTRAVENOUS; SUBCUTANEOUS at 08:40

## 2018-10-08 RX ADMIN — METRONIDAZOLE SCH MLS/HR: 500 INJECTION, SOLUTION INTRAVENOUS at 17:32

## 2018-10-08 RX ADMIN — THERA TABS SCH EACH: TAB at 13:05

## 2018-10-08 RX ADMIN — INSULIN ASPART SCH UNIT: 100 INJECTION, SOLUTION INTRAVENOUS; SUBCUTANEOUS at 20:55

## 2018-10-08 RX ADMIN — POTASSIUM CHLORIDE SCH MEQ: 20 TABLET, EXTENDED RELEASE ORAL at 18:53

## 2018-10-08 RX ADMIN — POTASSIUM CHLORIDE SCH MEQ: 20 TABLET, EXTENDED RELEASE ORAL at 10:06

## 2018-10-08 RX ADMIN — CITALOPRAM HYDROBROMIDE SCH MG: 20 TABLET ORAL at 08:49

## 2018-10-08 RX ADMIN — POTASSIUM CHLORIDE SCH MEQ: 20 TABLET, EXTENDED RELEASE ORAL at 11:25

## 2018-10-08 RX ADMIN — MAGNESIUM SULFATE IN DEXTROSE SCH MLS/HR: 10 INJECTION, SOLUTION INTRAVENOUS at 13:05

## 2018-10-08 RX ADMIN — FUROSEMIDE SCH MG: 20 TABLET ORAL at 20:53

## 2018-10-08 RX ADMIN — INSULIN ASPART SCH UNIT: 100 INJECTION, SOLUTION INTRAVENOUS; SUBCUTANEOUS at 13:05

## 2018-10-08 RX ADMIN — CEFEPIME HYDROCHLORIDE SCH MLS/HR: 2 INJECTION, POWDER, FOR SOLUTION INTRAVENOUS at 02:48

## 2018-10-08 RX ADMIN — METRONIDAZOLE SCH MLS/HR: 500 INJECTION, SOLUTION INTRAVENOUS at 01:09

## 2018-10-08 RX ADMIN — METRONIDAZOLE SCH MLS/HR: 500 INJECTION, SOLUTION INTRAVENOUS at 23:58

## 2018-10-08 RX ADMIN — LORATADINE SCH MG: 10 TABLET ORAL at 20:53

## 2018-10-08 RX ADMIN — HYDROCODONE BITARTRATE AND ACETAMINOPHEN PRN EACH: 7.5; 325 TABLET ORAL at 00:29

## 2018-10-08 RX ADMIN — HYDROCORTISONE SODIUM SUCCINATE SCH MG: 100 INJECTION, POWDER, FOR SOLUTION INTRAMUSCULAR; INTRAVENOUS at 01:13

## 2018-10-08 RX ADMIN — FUROSEMIDE SCH MG: 20 TABLET ORAL at 08:49

## 2018-10-08 RX ADMIN — POTASSIUM CHLORIDE SCH MLS/HR: 14.9 INJECTION, SOLUTION INTRAVENOUS at 22:57

## 2018-10-08 RX ADMIN — HYDROCORTISONE SODIUM SUCCINATE SCH MG: 100 INJECTION, POWDER, FOR SOLUTION INTRAMUSCULAR; INTRAVENOUS at 17:32

## 2018-10-08 RX ADMIN — SODIUM CHLORIDE SCH MLS/HR: 9 INJECTION, SOLUTION INTRAVENOUS at 05:57

## 2018-10-08 NOTE — P.PN
Subjective


Progress Note Date: 10/08/18


Principal diagnosis: 





NHL s/p 1 cycle of R-CEOP





Pt seen todaay in follow up, she does not feel well but, when asked specifics 

she did not have any c/o other then nothing tastes good and BLE swelling.  She 

denies fever, oral irritation, nausea, vomiting, she is SOB on exertion but 

comfortable at rest, no abd pain, diarrhea, constipation, bleeding, BLE 

swelling likely r/t fluids, she is unsteady on her feet, walker is at bedside, 

she is sitting in a chair, no pain. 





Objective





- Vital Signs


Vital signs: 


 Vital Signs











Temp  97.6 F   10/08/18 12:45


 


Pulse  57 L  10/08/18 12:45


 


Resp  16   10/08/18 12:45


 


BP  118/72   10/08/18 12:45


 


Pulse Ox  96   10/08/18 12:45








 Intake & Output











 10/07/18 10/08/18 10/08/18





 18:59 06:59 18:59


 


Intake Total 1160 2500 


 


Balance 1160 2500 


 


Weight   74.7 kg


 


Intake:   


 


   600 


 


    0.9 10  


 


    Dextrose 5% in Water 1, 600 600 





    000 ml @ 75 mls/hr IV .   





    [id number] [facility] with Sodium   





    Bicarb (1 Meq/ml) 150 ml   





    Rx#:[id number]   


 


    metroNIDAZOLE-NS  100  





    mg In Saline 1 100ml.bag   





    @ 100 mls/hr IVPB Q8HR   





    [facility] Rx#:[id number]   


 


  Intake, IV Titration 50 400 





  Amount   


 


    Cefepime 2 gm In Sodium 50 50 





    Chloride 0.9% 50 ml @ 100   





    mls/hr IVPB Q12H [facility] Rx#   





    :[id number]   


 


    Vancomycin 1,250 mg In  250 





    Sodium Chloride 0.9% 250   





    ml @ 125 mls/hr IVPB Q12H   





    [facility] Rx#:[id number]   


 


    metroNIDAZOLE-NS   100 





    mg In Saline 1 100ml.bag   





    @ 100 mls/hr IVPB Q8HR   





    [facility] Rx#:[id number]   


 


  Oral 400 1500 


 


Other:   


 


  Voiding Method Toilet Toilet 


 


  # Voids 2 2 














- Constitutional


General appearance: Present: average body habitus, cooperative, no acute 

distress





- EENT


Eyes: Present: EOMI





- Respiratory


Respiratory: bilateral: CTA





- Cardiovascular


Heart sounds: normal: S1, S2





- Peripheral edema


  ** leg


Peripheral Edema: bilateral: 1+, Pitting





- Gastrointestinal


General gastrointestinal: Present: normal bowel sounds, soft





- Integumentary


Integumentary: Present: pale





- Neurologic


Neurologic: Present: CNII-XII intact





- Musculoskeletal


Musculoskeletal: Present: generalized weakness, strength equal bilaterally





- Psychiatric


Psychiatric: Present: A&O x's 3, appropriate affect, intact judgment & insight





- Labs


CBC & Chem 7: 


 10/08/18 05:32





 10/08/18 14:15


Labs: 


 Abnormal Lab Results - Last 24 Hours (Table)











  10/07/18 10/07/18 10/08/18 Range/Units





  07:14 20:05 05:32 


 


WBC    33.2 H  (3.8-10.6)  k/uL


 


RBC    3.42 L  (3.80-5.40)  m/uL


 


Hgb    8.3 L  (11.4-16.0)  gm/dL


 


Hct    26.5 L  (34.0-46.0)  %


 


MCV    77.6 L  (80.0-100.0)  fL


 


MCH    24.2 L  (25.0-35.0)  pg


 


RDW    17.9 H  (11.5-15.5)  %


 


Neutrophils # (Manual)    27.80 H  (1.3-7.7)  k/uL


 


Monocytes # (Manual)    1.33 H  (0-1.0)  k/uL


 


Metamyelocytes # (Man)    1.00 H  (0)  k/uL


 


Myelocytes # (Manual)    1.66 H  (0)  k/uL


 


Potassium     (3.5-5.1)  mmol/L


 


Chloride     ()  mmol/L


 


Glucose     (74-99)  mg/dL


 


POC Glucose (mg/dL)   288 H   (75-99)  mg/dL


 


Calcium     (8.4-10.2)  mg/dL


 


Phosphorus     (2.5-4.5)  mg/dL


 


Magnesium     (1.6-2.3)  mg/dL


 


IgG  510.0 L    (700.0-1600.0)  mg/dL














  10/08/18 10/08/18 10/08/18 Range/Units





  05:32 07:15 11:31 


 


WBC     (3.8-10.6)  k/uL


 


RBC     (3.80-5.40)  m/uL


 


Hgb     (11.4-16.0)  gm/dL


 


Hct     (34.0-46.0)  %


 


MCV     (80.0-100.0)  fL


 


MCH     (25.0-35.0)  pg


 


RDW     (11.5-15.5)  %


 


Neutrophils # (Manual)     (1.3-7.7)  k/uL


 


Monocytes # (Manual)     (0-1.0)  k/uL


 


Metamyelocytes # (Man)     (0)  k/uL


 


Myelocytes # (Manual)     (0)  k/uL


 


Potassium  3.1 L    (3.5-5.1)  mmol/L


 


Chloride  109 H    ()  mmol/L


 


Glucose  307 H    (74-99)  mg/dL


 


POC Glucose (mg/dL)   330 H  300 H  (75-99)  mg/dL


 


Calcium  7.0 L    (8.4-10.2)  mg/dL


 


Phosphorus  1.7 L    (2.5-4.5)  mg/dL


 


Magnesium  1.4 L    (1.6-2.3)  mg/dL


 


IgG     (700.0-1600.0)  mg/dL














  10/08/18 10/08/18 Range/Units





  14:15 16:58 


 


WBC    (3.8-10.6)  k/uL


 


RBC    (3.80-5.40)  m/uL


 


Hgb    (11.4-16.0)  gm/dL


 


Hct    (34.0-46.0)  %


 


MCV    (80.0-100.0)  fL


 


MCH    (25.0-35.0)  pg


 


RDW    (11.5-15.5)  %


 


Neutrophils # (Manual)    (1.3-7.7)  k/uL


 


Monocytes # (Manual)    (0-1.0)  k/uL


 


Metamyelocytes # (Man)    (0)  k/uL


 


Myelocytes # (Manual)    (0)  k/uL


 


Potassium  3.0 L   (3.5-5.1)  mmol/L


 


Chloride    ()  mmol/L


 


Glucose    (74-99)  mg/dL


 


POC Glucose (mg/dL)   363 H  (75-99)  mg/dL


 


Calcium    (8.4-10.2)  mg/dL


 


Phosphorus    (2.5-4.5)  mg/dL


 


Magnesium    (1.6-2.3)  mg/dL


 


IgG    (700.0-1600.0)  mg/dL








 Microbiology - Last 24 Hours (Table)











 10/04/18 12:56 Blood Culture - Preliminary





 Blood    No Growth after 96 hours














Assessment and Plan


(1) Antineoplastic chemotherapy induced anemia


Narrative/Plan: 


Not requiring transfusion, CBC while inpatient


Current Visit: Yes   Status: Acute   Priority: Medium   Code(s): D64.81 - 

ANEMIA DUE TO ANTINEOPLASTIC CHEMOTHERAPY; T45.1X5A - ADVERSE EFFECT OF 

ANTINEOPLASTIC AND IMMUNOSUP DRUGS, INIT   SNOMED Code(s): 176265825


   





(2) Neutrophilic leukocytosis


Narrative/Plan: 


Secondary to GCSF given post chemo.  


Current Visit: Yes   Status: Acute   Priority: Low   Code(s): D72.9 - DISORDER 

OF WHITE BLOOD CELLS, UNSPECIFIED   SNOMED Code(s): 149737150


   





(3) Unsteady gait


Narrative/Plan: 


PT/asked to see pt for gait training and assistive device training


Current Visit: Yes   Status: Acute   Priority: High   Code(s): R26.81 - 

UNSTEADINESS ON FEET   SNOMED Code(s): 94256237


   





(4) Non Hodgkin's lymphoma


Narrative/Plan: 


Pt is s/p 1stcycle of R-CEOP.  Hematological toxicities moderate and have 

resolved rather quickly.  She physically tolerated treatment fair.  Did 

encourage pt that the 1st treatment for lymphoma does tend to be harder then 

the subsequent because she was compromised coming into treatment.  


Plan to continue treatment once she has recovered from current situation.


Pt verbalized understanding 


Current Visit: No   Status: Acute   Priority: Medium   Code(s): C85.90 - NON-

HODGKIN LYMPHOMA, UNSPECIFIED, UNSPECIFIED SITE   SNOMED Code(s): 149657250

## 2018-10-08 NOTE — P.PN
Subjective


Progress Note Date: 10/08/18





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 

unit, presents to Hospital with increasing weakness as well as fever and 

malaise.  At the time of her admission she had evidence of neutropenia and 

fever and constantly was admitted for sepsis.  She required fluid resuscitation 

as well as his pressor support originally.  She is not having significant 

improvement in is feeling better.  She is very pertinent recent history in that 

she was having some abdominal pain and underwent imaging studies that reveal 

evidence of extensive intra-abdominal lymphadenopathy.  Workup eventually was 

performed revealing the evidence of B cell non-Hodgkin's lymphoma stage IV is 

noted by the bone marrow involvement.  Due to her age and renal dysfunction she 

was treated with R-CEOP.  She is now presenting with evidence of febrile 

neutropenia generalized malaise and feeling quite poorly overall.  She does 

have some chronic cognitive delays.





Also worsened when she was quite ill which are now improving as noted by her 

sister who is present.  The patient at this time is feeling better.  She has 

received growth factor support her white blood cell count is improved.  Her 

fevers have improved.  She is having no chills or rigors.  She had having much 

discomfort at this time.  No evidence of any bleeding is occurring.


10/08/2018 patient is doing somewhat better today.  She is comfortable and 

certainly look forward to going home.  She was having some difficulty with 

acidosis and bicarbonate was being given intravenously by infusion.  With her 

edema this can be discontinued.


She is already recovering from the toxicities of the first round of her 

chemotherapy.  Feeling better today but has generalized weakness.





Objective





- Vital Signs


Vital signs: 


 Vital Signs











Temp  97.6 F   10/08/18 12:45


 


Pulse  57 L  10/08/18 12:45


 


Resp  16   10/08/18 12:45


 


BP  118/72   10/08/18 12:45


 


Pulse Ox  96   10/08/18 12:45








 Intake & Output











 10/08/18 10/08/18 10/09/18





 06:59 18:59 06:59


 


Intake Total 2500  


 


Balance 2500  


 


Weight  74.7 kg 


 


Intake:   


 


    


 


    Dextrose 5% in Water 1, 600  





    000 ml @ 75 mls/hr IV .   





    [id number] LUIS with Sodium   





    Bicarb (1 Meq/ml) 150 ml   





    Rx#:[id number]   


 


  Intake, IV Titration 400  





  Amount   


 


    Cefepime 2 gm In Sodium 50  





    Chloride 0.9% 50 ml @ 100   





    mls/hr IVPB Q12H LUIS Rx#   





    :[id number]   


 


    Vancomycin 1,250 mg In 250  





    Sodium Chloride 0.9% 250   





    ml @ 125 mls/hr IVPB Q12H   





    LUIS Rx#:[id number]   


 


    metroNIDAZOLE-NS  100  





    mg In Saline 1 100ml.bag   





    @ 100 mls/hr IVPB Q8HR   





    LUIS Rx#:[id number]   


 


  Oral 1500  


 


Other:   


 


  Voiding Method Toilet  


 


  # Voids 2 3 














- Exam


Pleasant 63-year-old woman who is in no distress, vasopressor therapy has been 

weaned off


HEENT: Anicteric conjunctiva are pink and moist nasal mucosa grossly intact 

without significant lesions, there is no thrush.  Does have pallor.


Neck: The neck is supple without significant lymphadenopathy or thyromegaly.


Lungs: There are symmetrical air entry without significant crackles or 

wheezing.  No dullness or egophony


Heart: Regular rate and rhythm with an audible S1-S2, no S3 no S4.  There is no 

significant murmur click or rub, PMI was nondisplaced.


Abdomen: Positive bowel sounds soft and nontender without palpable masses or 

organomegaly.  There was no guarding or rebound.


Extremities: The upper extremities have excellent pulses they are symmetric, no 

significant petechiae or telangiectasia.  No splinter hemorrhages were noted.  

The lower extremities have developed bilateral lower extremity edema that is 

symmetric without ulcerations.


Neuro: Awake alert oriented to person place and time.  There are no acute new 

gross focal sensory motor deficits.  Does have a history of head trauma with 

some memory deficits.














- Labs


CBC & Chem 7: 


 10/08/18 05:32





 10/08/18 20:09


Labs: 


 Abnormal Lab Results - Last 24 Hours (Table)











  10/07/18 10/08/18 10/08/18 Range/Units





  07:14 05:32 05:32 


 


WBC   33.2 H   (3.8-10.6)  k/uL


 


RBC   3.42 L   (3.80-5.40)  m/uL


 


Hgb   8.3 L   (11.4-16.0)  gm/dL


 


Hct   26.5 L   (34.0-46.0)  %


 


MCV   77.6 L   (80.0-100.0)  fL


 


MCH   24.2 L   (25.0-35.0)  pg


 


RDW   17.9 H   (11.5-15.5)  %


 


Neutrophils # (Manual)   27.80 H   (1.3-7.7)  k/uL


 


Monocytes # (Manual)   1.33 H   (0-1.0)  k/uL


 


Metamyelocytes # (Man)   1.00 H   (0)  k/uL


 


Myelocytes # (Manual)   1.66 H   (0)  k/uL


 


Potassium    3.1 L  (3.5-5.1)  mmol/L


 


Chloride    109 H  ()  mmol/L


 


Glucose    307 H  (74-99)  mg/dL


 


POC Glucose (mg/dL)     (75-99)  mg/dL


 


Calcium    7.0 L  (8.4-10.2)  mg/dL


 


Phosphorus    1.7 L  (2.5-4.5)  mg/dL


 


Magnesium    1.4 L  (1.6-2.3)  mg/dL


 


IgG  510.0 L    (700.0-1600.0)  mg/dL














  10/08/18 10/08/18 10/08/18 Range/Units





  07:15 11:31 14:15 


 


WBC     (3.8-10.6)  k/uL


 


RBC     (3.80-5.40)  m/uL


 


Hgb     (11.4-16.0)  gm/dL


 


Hct     (34.0-46.0)  %


 


MCV     (80.0-100.0)  fL


 


MCH     (25.0-35.0)  pg


 


RDW     (11.5-15.5)  %


 


Neutrophils # (Manual)     (1.3-7.7)  k/uL


 


Monocytes # (Manual)     (0-1.0)  k/uL


 


Metamyelocytes # (Man)     (0)  k/uL


 


Myelocytes # (Manual)     (0)  k/uL


 


Potassium    3.0 L  (3.5-5.1)  mmol/L


 


Chloride     ()  mmol/L


 


Glucose     (74-99)  mg/dL


 


POC Glucose (mg/dL)  330 H  300 H   (75-99)  mg/dL


 


Calcium     (8.4-10.2)  mg/dL


 


Phosphorus     (2.5-4.5)  mg/dL


 


Magnesium     (1.6-2.3)  mg/dL


 


IgG     (700.0-1600.0)  mg/dL














  10/08/18 10/08/18 10/08/18 Range/Units





  16:58 20:09 20:11 


 


WBC     (3.8-10.6)  k/uL


 


RBC     (3.80-5.40)  m/uL


 


Hgb     (11.4-16.0)  gm/dL


 


Hct     (34.0-46.0)  %


 


MCV     (80.0-100.0)  fL


 


MCH     (25.0-35.0)  pg


 


RDW     (11.5-15.5)  %


 


Neutrophils # (Manual)     (1.3-7.7)  k/uL


 


Monocytes # (Manual)     (0-1.0)  k/uL


 


Metamyelocytes # (Man)     (0)  k/uL


 


Myelocytes # (Manual)     (0)  k/uL


 


Potassium   3.3 L   (3.5-5.1)  mmol/L


 


Chloride     ()  mmol/L


 


Glucose     (74-99)  mg/dL


 


POC Glucose (mg/dL)  363 H   384 H  (75-99)  mg/dL


 


Calcium     (8.4-10.2)  mg/dL


 


Phosphorus     (2.5-4.5)  mg/dL


 


Magnesium     (1.6-2.3)  mg/dL


 


IgG     (700.0-1600.0)  mg/dL








 Microbiology - Last 24 Hours (Table)











 10/04/18 12:56 Blood Culture - Preliminary





 Blood    No Growth after 96 hours








 Laboratory Results











WBC  33.2 k/uL (3.8-10.6)  H  10/08/18  05:32    


 


RBC  3.42 m/uL (3.80-5.40)  L  10/08/18  05:32    


 


Hgb  8.3 gm/dL (11.4-16.0)  L  10/08/18  05:32    


 


Hct  26.5 % (34.0-46.0)  L  10/08/18  05:32    


 


MCV  77.6 fL (80.0-100.0)  L  10/08/18  05:32    


 


MCH  24.2 pg (25.0-35.0)  L  10/08/18  05:32    


 


MCHC  31.2 g/dL (31.0-37.0)   10/08/18  05:32    


 


RDW  17.9 % (11.5-15.5)  H  10/08/18  05:32    


 


Plt Count  290 k/uL (150-450)   10/08/18  05:32    


 


Neutrophils %  91 %  10/07/18  07:14    


 


Neutrophils % (Manual)  70 %  10/08/18  05:32    


 


Band Neutrophils %  14 %  10/08/18  05:32    


 


Lymphocytes %  2 %  10/07/18  07:14    


 


Lymphocytes % (Manual)  4 %  10/08/18  05:32    


 


Monocytes %  4 %  10/07/18  07:14    


 


Monocytes % (Manual)  4 %  10/08/18  05:32    


 


Eosinophils %  0 %  10/07/18  07:14    


 


Eosinophils % (Manual)  3 %  10/04/18  12:56    


 


Basophils %  1 %  10/07/18  07:14    


 


Metamyelocytes %  3 %  10/08/18  05:32    


 


Myelocytes %  5 %  10/08/18  05:32    


 


Neutrophils #  24.0 k/uL (1.3-7.7)  H  10/07/18  07:14    


 


Neutrophils # (Manual)  27.80 k/uL (1.3-7.7)  H  10/08/18  05:32    


 


Lymphocytes #  0.5 k/uL (1.0-4.8)  L  10/07/18  07:14    


 


Lymphocytes # (Manual)  1.33 k/uL (1.0-4.8)   10/08/18  05:32    


 


Monocytes #  1.1 k/uL (0-1.0)  H  10/07/18  07:14    


 


Monocytes # (Manual)  1.33 k/uL (0-1.0)  H  10/08/18  05:32    


 


Eosinophils #  0.1 k/uL (0-0.7)   10/07/18  07:14    


 


Eosinophils # (Manual)  0.05 k/uL (0-0.7)   10/04/18  12:56    


 


Basophils #  0.3 k/uL (0-0.2)  H  10/07/18  07:14    


 


Metamyelocytes # (Man)  1.00 k/uL (0)  H  10/08/18  05:32    


 


Myelocytes # (Manual)  1.66 k/uL (0)  H  10/08/18  05:32    


 


Nucleated RBCs  0 /100 WBC (0-0)   10/08/18  05:32    


 


Manual Slide Review  Performed   10/08/18  05:32    


 


Toxic Granulation  Present   10/05/18  05:04    


 


Dohle Bodies  Present   10/05/18  05:04    


 


Polychromasia  Present   10/05/18  05:04    


 


Hypochromasia  Moderate   10/08/18  05:32    


 


Poikilocytosis  Slight   10/07/18  07:14    


 


Poikilocytosis (manual  Present   10/05/18  05:04    


 


Anisocytosis  Slight   10/08/18  05:32    


 


Microcytosis  Slight   10/08/18  05:32    


 


Crenated Cell  Present   10/05/18  05:04    


 


PT  11.3 sec (9.0-12.0)   10/04/18  12:56    


 


INR  1.2  (<1.2)  H  10/04/18  12:56    


 


APTT  25.0 sec (22.0-30.0)   10/04/18  12:56    


 


Sodium  138 mmol/L (137-145)   10/08/18  05:32    


 


Potassium  3.3 mmol/L (3.5-5.1)  L  10/08/18  20:09    


 


Chloride  109 mmol/L ()  H  10/08/18  05:32    


 


Carbon Dioxide  24 mmol/L (22-30)   10/08/18  05:32    


 


Anion Gap  5 mmol/L  10/08/18  05:32    


 


BUN  16 mg/dL (7-17)   10/08/18  05:32    


 


Creatinine  0.75 mg/dL (0.52-1.04)   10/08/18  05:32    


 


Est GFR (CKD-EPI)AfAm  >90  (>60 ml/min/1.73 sqM)   10/08/18  05:32    


 


Est GFR (CKD-EPI)NonAf  84  (>60 ml/min/1.73 sqM)   10/08/18  05:32    


 


Glucose  307 mg/dL (74-99)  H  10/08/18  05:32    


 


POC Glucose (mg/dL)  384 mg/dL (75-99)  H  10/08/18  20:11    


 


POC Glu Operater ID  Rosario Albarran   10/08/18  20:11    


 


Estimated Ave Glu mg/dL  169   10/05/18  05:04    


 


Hemoglobin A1c  7.5 % (4.0-6.0)  H  10/05/18  05:04    


 


Plasma Lactic Acid Artem  1.7 mmol/L (0.7-2.0)   10/04/18  12:56    


 


Calcium  7.0 mg/dL (8.4-10.2)  L  10/08/18  05:32    


 


Phosphorus  1.7 mg/dL (2.5-4.5)  L  10/08/18  05:32    


 


Magnesium  1.4 mg/dL (1.6-2.3)  L  10/08/18  05:32    


 


Total Bilirubin  0.6 mg/dL (0.2-1.3)   10/04/18  12:56    


 


AST  22 U/L (14-36)   10/04/18  12:56    


 


ALT  36 U/L (9-52)   10/04/18  12:56    


 


Alkaline Phosphatase  147 U/L ()  H  10/04/18  12:56    


 


Troponin I  <0.012 ng/mL (0.000-0.034)   10/05/18  20:00    


 


Total Protein  5.4 g/dL (6.3-8.2)  L  10/04/18  12:56    


 


Albumin  2.6 g/dL (3.5-5.0)  L  10/04/18  12:56    


 


Cortisol  63 ug/dL  10/06/18  05:29    


 


Urine Color  Yellow   10/04/18  18:50    


 


Urine Appearance  Cloudy  (Clear)  H  10/04/18  18:50    


 


Urine pH  5.0  (5.0-8.0)   10/04/18  18:50    


 


Ur Specific Gravity  1.025  (1.001-1.035)   10/04/18  18:50    


 


Urine Protein  Trace  (Negative)  H  10/04/18  18:50    


 


Urine Glucose (UA)  Negative  (Negative)   10/04/18  18:50    


 


Urine Ketones  Trace  (Negative)  H  10/04/18  18:50    


 


Urine Blood  Small  (Negative)  H  10/04/18  18:50    


 


Urine Nitrite  Negative  (Negative)   10/04/18  18:50    


 


Urine Bilirubin  Negative  (Negative)   10/04/18  18:50    


 


Urine Urobilinogen  <2.0 mg/dL (<2.0)   10/04/18  18:50    


 


Ur Leukocyte Esterase  Negative  (Negative)   10/04/18  18:50    


 


Urine RBC  11 /hpf (0-5)  H  10/04/18  18:50    


 


Urine WBC  4 /hpf (0-5)   10/04/18  18:50    


 


Ur Squamous Epith Cells  <1 /hpf (0-4)   10/04/18  18:50    


 


Urine Mucus  Rare /hpf (None)  H  10/04/18  18:50    


 


Vancomycin Trough  29.8 ug/mL  10/08/18  05:32    


 


IgG  510.0 mg/dL (700.0-1600.0)  L  10/07/18  07:14    


 


Blood Type  O Positive   10/04/18  12:56    


 


Blood Type Recheck  CABO Indicated   10/04/18  12:56    


 


Antibody Screen  NEGATIVE   10/04/18  12:56    


 


Spec Expiration Date  10/04/2018 - 1425   10/04/18  12:56    








 Microbiology





10/04/18 12:56   Blood   Blood Culture - Preliminary


                            No Growth after 96 hours


10/04/18 18:50   Urine,Catheterized   Urine Culture - Final











Assessment and Plan


(1) Febrile neutropenia


Narrative/Plan: 


63-year-old  female, retired nurse, presents to Hospital feeling very 

poorly with evidence of sepsis.  She has received her first course of 

chemotherapy and presents with febrile neutropenia.  She was also hypotensive 

requiring fluid resuscitation and vasopressor therapy.  She is now considerably 

improved.  She is no longer requiring vasopressor r support and is feeling 

considerably better.  Hematology oncology is following and transfusions as per 

their protocol.


Cultures are in process and is receiving antibiotic therapy with cefepime, 

metronidazole and vancomycin at this time pending further culture data.


The patient's bone marrow recovery and cultures will further direct the course 

of antibiotic therapy.  IgG level can be obtained to further help direct 

courses of treatment also.


10/08/2018 the patient is feeling better today.  Still has some generalized 

weakness and is about some lower extremity edema.  Her IV fluid was 

discontinued now that her carbon dioxide has normalized.


Continue with elevation the limbs all she is at rest for the edema.


Her febrile neutropenia has resolved and she is doing considerably better at 

this time.  If she gets ready for discharge to home she will be able to 

transition to a course of oral levofloxacin 500 mg a day for 7 days in 

conjunction with the hematologist.  Please note that her ciprofloxacin ALLERGY 

was of heartburn.  Her hypokalemia is being supplemented and hopefully with 

discontinuation of fluid she'll have rapid correction of this.  Oncology is 

following and are making plans for her next round of chemotherapy.


IgG level is minimally low if she has further difficulties with infections may 

benefit from intravenous immunoglobulin infusion.





Current Visit: Yes   Status: Acute   Code(s): D70.9 - NEUTROPENIA, UNSPECIFIED; 

R50.81 - FEVER PRESENTING WITH CONDITIONS CLASSIFIED ELSEWHERE   SNOMED Code(s)

: 322971152


   





(2) Bicytopenia


Current Visit: Yes   Status: Acute   Code(s): D75.89 - OTHER SPECIFIED DISEASES 

OF BLOOD AND BLOOD-FORMING ORGANS   SNOMED Code(s): 611300321


   





(3) Non Hodgkin's lymphoma


Current Visit: No   Status: Acute   Priority: Medium   Code(s): C85.90 - NON-

HODGKIN LYMPHOMA, UNSPECIFIED, UNSPECIFIED SITE   SNOMED Code(s): 132486265

## 2018-10-08 NOTE — P.PN
Subjective





Patient resting in bed without complaint at this time





Objective





- Vital Signs


Vital signs: 


 Vital Signs











Temp  98.2 F   10/08/18 05:00


 


Pulse  47 L  10/08/18 05:00


 


Resp  16   10/08/18 05:00


 


BP  111/63   10/08/18 05:00


 


Pulse Ox  95   10/08/18 05:00








 Intake & Output











 10/07/18 10/08/18 10/08/18





 18:59 06:59 18:59


 


Intake Total 1160 2500 


 


Balance 1160 2500 


 


Intake:   


 


   600 


 


    0.9 10  


 


    Dextrose 5% in Water 1, 600 600 





    000 ml @ 75 mls/hr IV .   





    [id number] LUIS with Sodium   





    Bicarb (1 Meq/ml) 150 ml   





    Rx#:[id number]   


 


    metroNIDAZOLE-NS  100  





    mg In Saline 1 100ml.bag   





    @ 100 mls/hr IVPB Q8HR   





    LUIS Rx#:[id number]   


 


  Intake, IV Titration 50 400 





  Amount   


 


    Cefepime 2 gm In Sodium 50 50 





    Chloride 0.9% 50 ml @ 100   





    mls/hr IVPB Q12H LUIS Rx#   





    :[id number]   


 


    Vancomycin 1,250 mg In  250 





    Sodium Chloride 0.9% 250   





    ml @ 125 mls/hr IVPB Q12H   





    LUIS Rx#:[id number]   


 


    metroNIDAZOLE-NS   100 





    mg In Saline 1 100ml.bag   





    @ 100 mls/hr IVPB Q8HR   





    Counts include 234 beds at the [facility] Rx#:[id number]   


 


  Oral 400 1500 


 


Other:   


 


  Voiding Method Toilet Toilet 


 


  # Voids 2 2 














- Constitutional


General appearance: Present: mild distress, obese





- EENT


Eyes: Present: PERRLA


Ears: bilateral: normal





- Respiratory


Respiratory: bilateral: CTA





- Cardiovascular


Rhythm: regular





- Gastrointestinal


General gastrointestinal: Present: soft





- Integumentary


Integumentary: Present: normal





- Neurologic


Neurologic: Present: CNII-XII intact





- Musculoskeletal


Musculoskeletal: Present: generalized weakness





- Psychiatric


Psychiatric: Present: A&O x's 3, appropriate affect, intact judgment & insight





- Labs


CBC & Chem 7: 


 10/08/18 05:32





 10/08/18 05:32


Labs: 


 Abnormal Lab Results - Last 24 Hours (Table)











  10/07/18 10/07/18 10/07/18 Range/Units





  07:14 16:39 20:05 


 


WBC     (3.8-10.6)  k/uL


 


RBC     (3.80-5.40)  m/uL


 


Hgb     (11.4-16.0)  gm/dL


 


Hct     (34.0-46.0)  %


 


MCV     (80.0-100.0)  fL


 


MCH     (25.0-35.0)  pg


 


RDW     (11.5-15.5)  %


 


Neutrophils # (Manual)     (1.3-7.7)  k/uL


 


Monocytes # (Manual)     (0-1.0)  k/uL


 


Metamyelocytes # (Man)     (0)  k/uL


 


Myelocytes # (Manual)     (0)  k/uL


 


Potassium     (3.5-5.1)  mmol/L


 


Chloride     ()  mmol/L


 


Glucose     (74-99)  mg/dL


 


POC Glucose (mg/dL)   346 H  288 H  (75-99)  mg/dL


 


Calcium     (8.4-10.2)  mg/dL


 


Phosphorus     (2.5-4.5)  mg/dL


 


Magnesium     (1.6-2.3)  mg/dL


 


IgG  510.0 L    (700.0-1600.0)  mg/dL














  10/08/18 10/08/18 10/08/18 Range/Units





  05:32 05:32 07:15 


 


WBC  33.2 H    (3.8-10.6)  k/uL


 


RBC  3.42 L    (3.80-5.40)  m/uL


 


Hgb  8.3 L    (11.4-16.0)  gm/dL


 


Hct  26.5 L    (34.0-46.0)  %


 


MCV  77.6 L    (80.0-100.0)  fL


 


MCH  24.2 L    (25.0-35.0)  pg


 


RDW  17.9 H    (11.5-15.5)  %


 


Neutrophils # (Manual)  27.80 H    (1.3-7.7)  k/uL


 


Monocytes # (Manual)  1.33 H    (0-1.0)  k/uL


 


Metamyelocytes # (Man)  1.00 H    (0)  k/uL


 


Myelocytes # (Manual)  1.66 H    (0)  k/uL


 


Potassium   3.1 L   (3.5-5.1)  mmol/L


 


Chloride   109 H   ()  mmol/L


 


Glucose   307 H   (74-99)  mg/dL


 


POC Glucose (mg/dL)    330 H  (75-99)  mg/dL


 


Calcium   7.0 L   (8.4-10.2)  mg/dL


 


Phosphorus   1.7 L   (2.5-4.5)  mg/dL


 


Magnesium   1.4 L   (1.6-2.3)  mg/dL


 


IgG     (700.0-1600.0)  mg/dL














  10/08/18 Range/Units





  11:31 


 


WBC   (3.8-10.6)  k/uL


 


RBC   (3.80-5.40)  m/uL


 


Hgb   (11.4-16.0)  gm/dL


 


Hct   (34.0-46.0)  %


 


MCV   (80.0-100.0)  fL


 


MCH   (25.0-35.0)  pg


 


RDW   (11.5-15.5)  %


 


Neutrophils # (Manual)   (1.3-7.7)  k/uL


 


Monocytes # (Manual)   (0-1.0)  k/uL


 


Metamyelocytes # (Man)   (0)  k/uL


 


Myelocytes # (Manual)   (0)  k/uL


 


Potassium   (3.5-5.1)  mmol/L


 


Chloride   ()  mmol/L


 


Glucose   (74-99)  mg/dL


 


POC Glucose (mg/dL)  300 H  (75-99)  mg/dL


 


Calcium   (8.4-10.2)  mg/dL


 


Phosphorus   (2.5-4.5)  mg/dL


 


Magnesium   (1.6-2.3)  mg/dL


 


IgG   (700.0-1600.0)  mg/dL








 Microbiology - Last 24 Hours (Table)











 10/04/18 12:56 Blood Culture - Preliminary





 Blood    No Growth after 72 hours














- Imaging and Cardiology


Chest x-ray: report reviewed


CT scan - abdomen: report reviewed





Assessment and Plan


Plan: 





Assessment


Acute neutropenic sepsis as well as severe is septic shock with hypotension


Stage IV non-Hodgkin's lymphoma on the chemotherapy


Obstructive uropathy


Anemia leukopenia secondary to chemotherapy


Hypokalemia


Hyponatremia


Chronic kidney disease stage III


Diabetes type 2


Hypokalemia


Hypo-magnesium


History of congestive heart failure


Hypertension


Hyperlipidemia


Sleep apnea





Plan


Continue consultation with Dr. Tim Huff

## 2018-10-08 NOTE — CDI
Last Revision, December 2017





Documentation Clarification Form



Date: 10/8/2018 4:11:35 PM

From: Whit Barry RN, CCDS

Phone: [phone]

MRN: [id number]

Admit Date: 10/4/2018 4:54:00 PM

Patient Name: Christina Falcon

Visit Number: [id number]

Discharge Date:



ATTENTION: The Clinical Documentation Specialists (CDI) and [facility] Coding Staff 
appreciate your assistance in clarifying documentation. Please respond to the 
clarification below the line at the bottom and electronically sign. The CDI & 
[facility] Coding staff will review the response and follow-up if needed. Please note: 
Queries are made part of the Legal Health Record. If you have any questions, 
please contact the author of this message via ITS.



Faustino Javier MD



History/Risk Factors: Non-Hodgkin's Lymphoma. Diabetes Mellitus, Hypertension, 
Heart failure



Clinical Indicators:  Present with complaints of low blood pressure, elevated 
heart rate, extremely fatigued. She denied cough or difficulty breathing or 
shortness of breath. 

She has a past medical history of heart failure with ongoing treatment with 
lasix. 

VS/Pulse OX on admit: 74/44 109 18 99.2 95 % RA 

BNP: Not noted

Echocardiogram Results: EF 55-60% 

Chest X Ray: Stable nodule left midlung



Treatment:  

Lasix PO



In your professional opinion, can you please clarify the acuity and type of CHF 
if known?



Diastolic Heart Failure:

Acute 

Chronic

Acute on Chronic



Systolic & Diastolic Heart Failure:

Acute 

Chronic

Acute on Chronic Heart Failure



Unable to Determine

Other, please specify

   

Please continue to document in your progress notes and discharge summary in 
order to capture severity of illness and risk of mortality. Include clinical 
findings that support your diagnosis.

___________________________________________________________________
MTDD

## 2018-10-09 LAB
ANION GAP SERPL CALC-SCNC: 10 MMOL/L
BUN SERPL-SCNC: 11 MG/DL (ref 7–17)
CALCIUM SPEC-MCNC: 7.5 MG/DL (ref 8.4–10.2)
CELLS COUNTED: 200
CHLORIDE SERPL-SCNC: 109 MMOL/L (ref 98–107)
CO2 SERPL-SCNC: 22 MMOL/L (ref 22–30)
EOSINOPHIL # BLD MANUAL: 0.37 K/UL (ref 0–0.7)
ERYTHROCYTE [DISTWIDTH] IN BLOOD BY AUTOMATED COUNT: 3.89 M/UL (ref 3.8–5.4)
ERYTHROCYTE [DISTWIDTH] IN BLOOD: 18.2 % (ref 11.5–15.5)
GLUCOSE BLD-MCNC: 312 MG/DL (ref 75–99)
GLUCOSE BLD-MCNC: 346 MG/DL (ref 75–99)
GLUCOSE BLD-MCNC: 367 MG/DL (ref 75–99)
GLUCOSE BLD-MCNC: 396 MG/DL (ref 75–99)
GLUCOSE SERPL-MCNC: 338 MG/DL (ref 74–99)
HCT VFR BLD AUTO: 30.3 % (ref 34–46)
HGB BLD-MCNC: 9.8 GM/DL (ref 11.4–16)
LYMPHOCYTES # BLD MANUAL: 2.23 K/UL (ref 1–4.8)
MAGNESIUM SPEC-SCNC: 1.8 MG/DL (ref 1.6–2.3)
MCH RBC QN AUTO: 25.1 PG (ref 25–35)
MCHC RBC AUTO-ENTMCNC: 32.2 G/DL (ref 31–37)
MCV RBC AUTO: 78 FL (ref 80–100)
METAMYELOCYTES # BLD: 2.23 K/UL
MONOCYTES # BLD MANUAL: 2.23 K/UL (ref 0–1)
MYELOCYTES # BLD MANUAL: 1.48 K/UL
NEUTROPHILS NFR BLD MANUAL: 73 %
NEUTS SEG # BLD MANUAL: 29.3 K/UL (ref 1.3–7.7)
PLATELET # BLD AUTO: 417 K/UL (ref 150–450)
POTASSIUM SERPL-SCNC: 3.6 MMOL/L (ref 3.5–5.1)
PROMYELOCYTES # BLD: 0.37 K/UL
SODIUM SERPL-SCNC: 141 MMOL/L (ref 137–145)
WBC # BLD AUTO: 37.1 K/UL (ref 3.8–10.6)

## 2018-10-09 RX ADMIN — POTASSIUM CHLORIDE SCH MEQ: 750 TABLET, EXTENDED RELEASE ORAL at 08:07

## 2018-10-09 RX ADMIN — CEFEPIME HYDROCHLORIDE SCH MLS/HR: 2 INJECTION, POWDER, FOR SOLUTION INTRAVENOUS at 01:36

## 2018-10-09 RX ADMIN — CEFEPIME HYDROCHLORIDE SCH MLS/HR: 2 INJECTION, POWDER, FOR SOLUTION INTRAVENOUS at 20:27

## 2018-10-09 RX ADMIN — INSULIN ASPART SCH UNIT: 100 INJECTION, SOLUTION INTRAVENOUS; SUBCUTANEOUS at 20:27

## 2018-10-09 RX ADMIN — GABAPENTIN SCH MG: 100 CAPSULE ORAL at 20:27

## 2018-10-09 RX ADMIN — SODIUM CHLORIDE SCH MLS/HR: 9 INJECTION, SOLUTION INTRAVENOUS at 06:03

## 2018-10-09 RX ADMIN — HYDROCORTISONE SODIUM SUCCINATE SCH MG: 100 INJECTION, POWDER, FOR SOLUTION INTRAMUSCULAR; INTRAVENOUS at 00:00

## 2018-10-09 RX ADMIN — CITALOPRAM HYDROBROMIDE SCH MG: 20 TABLET ORAL at 08:07

## 2018-10-09 RX ADMIN — INSULIN ASPART SCH UNIT: 100 INJECTION, SOLUTION INTRAVENOUS; SUBCUTANEOUS at 12:30

## 2018-10-09 RX ADMIN — THERA TABS SCH EACH: TAB at 08:07

## 2018-10-09 RX ADMIN — GABAPENTIN SCH MG: 100 CAPSULE ORAL at 08:07

## 2018-10-09 RX ADMIN — FUROSEMIDE SCH MG: 20 TABLET ORAL at 08:07

## 2018-10-09 RX ADMIN — CEFEPIME HYDROCHLORIDE SCH MLS/HR: 2 INJECTION, POWDER, FOR SOLUTION INTRAVENOUS at 13:23

## 2018-10-09 RX ADMIN — METRONIDAZOLE SCH MLS/HR: 500 INJECTION, SOLUTION INTRAVENOUS at 08:13

## 2018-10-09 RX ADMIN — INSULIN ASPART SCH UNIT: 100 INJECTION, SOLUTION INTRAVENOUS; SUBCUTANEOUS at 08:05

## 2018-10-09 RX ADMIN — LORATADINE SCH MG: 10 TABLET ORAL at 20:27

## 2018-10-09 RX ADMIN — FUROSEMIDE SCH MG: 20 TABLET ORAL at 20:27

## 2018-10-09 RX ADMIN — INSULIN ASPART SCH UNIT: 100 INJECTION, SOLUTION INTRAVENOUS; SUBCUTANEOUS at 17:35

## 2018-10-09 RX ADMIN — HYDROCORTISONE SODIUM SUCCINATE SCH MG: 100 INJECTION, POWDER, FOR SOLUTION INTRAMUSCULAR; INTRAVENOUS at 17:11

## 2018-10-09 RX ADMIN — HYDROCORTISONE SODIUM SUCCINATE SCH MG: 100 INJECTION, POWDER, FOR SOLUTION INTRAMUSCULAR; INTRAVENOUS at 08:06

## 2018-10-09 NOTE — P.PN
Subjective





Patient stating that she would like to go home hopefully we can develop plan 

between Dr. Tmi Huff





Objective





- Vital Signs


Vital signs: 


 Vital Signs











Temp  92.8 F L  10/09/18 11:56


 


Pulse  54 L  10/09/18 11:56


 


Resp  17   10/09/18 11:56


 


BP  138/75   10/09/18 11:56


 


Pulse Ox  96   10/09/18 11:56








 Intake & Output











 10/08/18 10/09/18 10/09/18





 18:59 06:59 18:59


 


Intake Total  940 


 


Balance  940 


 


Weight 74.7 kg 77 kg 


 


Intake:   


 


  IV  100 


 


    metroNIDAZOLE-NS   100 





    mg In Saline 1 100ml.bag   





    @ 100 mls/hr IVPB Q8HR   





    [facility] Rx#:[id number]   


 


  Intake, IV Titration  250 





  Amount   


 


    Cefepime 2 gm In Sodium  50 





    Chloride 0.9% 50 ml @ 100   





    mls/hr IVPB Q12H [facility] Rx#   





    :[id number]   


 


    Potassium Chloride 20 meq  200 





    In Water For Injection 1   





    100ml.bag @ 50 mls/hr   





    IVPB Q2H [facility] Rx#:   





    [id number]   


 


  Oral  590 


 


Other:   


 


  Voiding Method  Toilet Toilet


 


  # Voids 3 3 














- EENT


Eyes: Present: PERRLA


Ears: bilateral: normal





- Neck


Neck: Present: normal ROM





- Respiratory


Respiratory: bilateral: CTA





- Cardiovascular


Rhythm: regular





- Gastrointestinal


General gastrointestinal: Present: soft





- Integumentary


Integumentary: Present: normal





- Neurologic


Neurologic: Present: CNII-XII intact





- Musculoskeletal


Musculoskeletal: Present: generalized weakness





- Psychiatric


Psychiatric: Present: A&O x's 3, appropriate affect, intact judgment & insight





- Labs


CBC & Chem 7: 


 10/09/18 08:08





 10/09/18 08:08


Labs: 


 Abnormal Lab Results - Last 24 Hours (Table)











  10/08/18 10/08/18 10/08/18 Range/Units





  14:15 16:58 20:09 


 


WBC     (3.8-10.6)  k/uL


 


Hgb     (11.4-16.0)  gm/dL


 


Hct     (34.0-46.0)  %


 


MCV     (80.0-100.0)  fL


 


RDW     (11.5-15.5)  %


 


Neutrophils # (Manual)     (1.3-7.7)  k/uL


 


Monocytes # (Manual)     (0-1.0)  k/uL


 


Metamyelocytes # (Man)     (0)  k/uL


 


Myelocytes # (Manual)     (0)  k/uL


 


Promyelocytes # (Man)     (0)  k/uL


 


Potassium  3.0 L   3.3 L  (3.5-5.1)  mmol/L


 


Chloride     ()  mmol/L


 


Glucose     (74-99)  mg/dL


 


POC Glucose (mg/dL)   363 H   (75-99)  mg/dL


 


Calcium     (8.4-10.2)  mg/dL


 


Phosphorus     (2.5-4.5)  mg/dL














  10/08/18 10/09/18 10/09/18 Range/Units





  20:11 07:03 08:08 


 


WBC    37.1 H  (3.8-10.6)  k/uL


 


Hgb    9.8 L D  (11.4-16.0)  gm/dL


 


Hct    30.3 L  (34.0-46.0)  %


 


MCV    78.0 L  (80.0-100.0)  fL


 


RDW    18.2 H  (11.5-15.5)  %


 


Neutrophils # (Manual)    29.30 H  (1.3-7.7)  k/uL


 


Monocytes # (Manual)    2.23 H  (0-1.0)  k/uL


 


Metamyelocytes # (Man)    2.23 H  (0)  k/uL


 


Myelocytes # (Manual)    1.48 H  (0)  k/uL


 


Promyelocytes # (Man)    0.37 H  (0)  k/uL


 


Potassium     (3.5-5.1)  mmol/L


 


Chloride     ()  mmol/L


 


Glucose     (74-99)  mg/dL


 


POC Glucose (mg/dL)  384 H  346 H   (75-99)  mg/dL


 


Calcium     (8.4-10.2)  mg/dL


 


Phosphorus     (2.5-4.5)  mg/dL














  10/09/18 10/09/18 Range/Units





  08:08 11:19 


 


WBC    (3.8-10.6)  k/uL


 


Hgb    (11.4-16.0)  gm/dL


 


Hct    (34.0-46.0)  %


 


MCV    (80.0-100.0)  fL


 


RDW    (11.5-15.5)  %


 


Neutrophils # (Manual)    (1.3-7.7)  k/uL


 


Monocytes # (Manual)    (0-1.0)  k/uL


 


Metamyelocytes # (Man)    (0)  k/uL


 


Myelocytes # (Manual)    (0)  k/uL


 


Promyelocytes # (Man)    (0)  k/uL


 


Potassium    (3.5-5.1)  mmol/L


 


Chloride  109 H   ()  mmol/L


 


Glucose  338 H   (74-99)  mg/dL


 


POC Glucose (mg/dL)   312 H  (75-99)  mg/dL


 


Calcium  7.5 L   (8.4-10.2)  mg/dL


 


Phosphorus  1.6 L   (2.5-4.5)  mg/dL








 Microbiology - Last 24 Hours (Table)











 10/04/18 12:56 Blood Culture - Preliminary





 Blood    No Growth after 96 hours














Assessment and Plan


Plan: 





Assessment


Acute nephrotic sepsis as well is severe septic shock and hypotension


Stage IV non-Hodgkin's lymphoma


Obstructive uropathy


Anemia


Leukopenia secondary to chemotherapy


Hypokalemia


Hyponatremia


Chronic kidney disease stage III


Diabetes type 2


History of congestive heart failure acute on chronic diastolic


Sleep apnea


Plan


Hopeful discharge soon


Continue consultation with Dr. Tim Montez and Dr. Huff

## 2018-10-09 NOTE — P.PN
Subjective


Progress Note Date: 10/09/18


Principal diagnosis: 





NHL s/p 1 cycle of R-CEOP





Pt seen in f/u, she is feeling much better today, more comfortable, she did 

work with PT and feels safer with her wheeled walker. No fevers, drinking 

adequate fluids, appetite is returning, no nausea, diarrhea, lower extremity 

swelling is better.  





Objective





- Vital Signs


Vital signs: 


 Vital Signs











Temp  92.8 F L  10/09/18 11:56


 


Pulse  54 L  10/09/18 11:56


 


Resp  17   10/09/18 11:56


 


BP  138/75   10/09/18 11:56


 


Pulse Ox  96   10/09/18 11:56








 Intake & Output











 10/08/18 10/09/18 10/09/18





 18:59 06:59 18:59


 


Intake Total  940 


 


Balance  940 


 


Weight 74.7 kg 77 kg 


 


Intake:   


 


  IV  100 


 


    metroNIDAZOLE-NS   100 





    mg In Saline 1 100ml.bag   





    @ 100 mls/hr IVPB Q8HR   





    [facility] Rx#:[id number]   


 


  Intake, IV Titration  250 





  Amount   


 


    Cefepime 2 gm In Sodium  50 





    Chloride 0.9% 50 ml @ 100   





    mls/hr IVPB Q12H [facility] Rx#   





    :[id number]   


 


    Potassium Chloride 20 meq  200 





    In Water For Injection 1   





    100ml.bag @ 50 mls/hr   





    IVPB Q2H [facility] Rx#:   





    [id number]   


 


  Oral  590 


 


Other:   


 


  Voiding Method  Toilet Toilet


 


  # Voids 3 3 














- Constitutional


General appearance: Present: average body habitus, cooperative, no acute 

distress





- EENT


Eyes: Present: anicteric sclerae





- Respiratory


Respiratory: bilateral: CTA





- Cardiovascular


Heart sounds: normal: S1, S2





- Gastrointestinal


General gastrointestinal: Present: normal bowel sounds, soft





- Integumentary


Integumentary: Present: normal





- Neurologic


Neurologic: Present: CNII-XII intact





- Musculoskeletal


Musculoskeletal: Present: generalized weakness, strength equal bilaterally





- Psychiatric


Psychiatric: Present: A&O x's 3, appropriate affect, intact judgment & insight





- Labs


CBC & Chem 7: 


 10/09/18 08:08





 10/09/18 08:08


Labs: 


 Abnormal Lab Results - Last 24 Hours (Table)











  10/08/18 10/08/18 10/08/18 Range/Units





  16:58 20:09 20:11 


 


WBC     (3.8-10.6)  k/uL


 


Hgb     (11.4-16.0)  gm/dL


 


Hct     (34.0-46.0)  %


 


MCV     (80.0-100.0)  fL


 


RDW     (11.5-15.5)  %


 


Neutrophils # (Manual)     (1.3-7.7)  k/uL


 


Monocytes # (Manual)     (0-1.0)  k/uL


 


Metamyelocytes # (Man)     (0)  k/uL


 


Myelocytes # (Manual)     (0)  k/uL


 


Promyelocytes # (Man)     (0)  k/uL


 


Potassium   3.3 L   (3.5-5.1)  mmol/L


 


Chloride     ()  mmol/L


 


Glucose     (74-99)  mg/dL


 


POC Glucose (mg/dL)  363 H   384 H  (75-99)  mg/dL


 


Calcium     (8.4-10.2)  mg/dL


 


Phosphorus     (2.5-4.5)  mg/dL














  10/09/18 10/09/18 10/09/18 Range/Units





  07:03 08:08 08:08 


 


WBC   37.1 H   (3.8-10.6)  k/uL


 


Hgb   9.8 L D   (11.4-16.0)  gm/dL


 


Hct   30.3 L   (34.0-46.0)  %


 


MCV   78.0 L   (80.0-100.0)  fL


 


RDW   18.2 H   (11.5-15.5)  %


 


Neutrophils # (Manual)   29.30 H   (1.3-7.7)  k/uL


 


Monocytes # (Manual)   2.23 H   (0-1.0)  k/uL


 


Metamyelocytes # (Man)   2.23 H   (0)  k/uL


 


Myelocytes # (Manual)   1.48 H   (0)  k/uL


 


Promyelocytes # (Man)   0.37 H   (0)  k/uL


 


Potassium     (3.5-5.1)  mmol/L


 


Chloride    109 H  ()  mmol/L


 


Glucose    338 H  (74-99)  mg/dL


 


POC Glucose (mg/dL)  346 H    (75-99)  mg/dL


 


Calcium    7.5 L  (8.4-10.2)  mg/dL


 


Phosphorus    1.6 L  (2.5-4.5)  mg/dL














  10/09/18 Range/Units





  11:19 


 


WBC   (3.8-10.6)  k/uL


 


Hgb   (11.4-16.0)  gm/dL


 


Hct   (34.0-46.0)  %


 


MCV   (80.0-100.0)  fL


 


RDW   (11.5-15.5)  %


 


Neutrophils # (Manual)   (1.3-7.7)  k/uL


 


Monocytes # (Manual)   (0-1.0)  k/uL


 


Metamyelocytes # (Man)   (0)  k/uL


 


Myelocytes # (Manual)   (0)  k/uL


 


Promyelocytes # (Man)   (0)  k/uL


 


Potassium   (3.5-5.1)  mmol/L


 


Chloride   ()  mmol/L


 


Glucose   (74-99)  mg/dL


 


POC Glucose (mg/dL)  312 H  (75-99)  mg/dL


 


Calcium   (8.4-10.2)  mg/dL


 


Phosphorus   (2.5-4.5)  mg/dL








 Microbiology - Last 24 Hours (Table)











 10/04/18 12:56 Blood Culture - Preliminary





 Blood    No Growth after 96 hours














Assessment and Plan


(1) Antineoplastic chemotherapy induced anemia


Narrative/Plan: 


Hgb stable, no transfusions needed today


Current Visit: Yes   Status: Acute   Priority: Medium   Code(s): D64.81 - 

ANEMIA DUE TO ANTINEOPLASTIC CHEMOTHERAPY; T45.1X5A - ADVERSE EFFECT OF 

ANTINEOPLASTIC AND IMMUNOSUP DRUGS, INIT   SNOMED Code(s): 751050836


   





(2) Neutrophilic leukocytosis


Narrative/Plan: 


Secondary to GCS-F, stable


Current Visit: Yes   Status: Acute   Priority: Low   Code(s): D72.9 - DISORDER 

OF WHITE BLOOD CELLS, UNSPECIFIED   SNOMED Code(s): 089745645


   





(3) Unsteady gait


Narrative/Plan: 


Pt did work with PT and feels safer useing her wheeled walker.  She feels 

stronger today as well


Current Visit: Yes   Status: Acute   Priority: High   Code(s): R26.81 - 

UNSTEADINESS ON FEET   SNOMED Code(s): 27270774


   





(4) Non Hodgkin's lymphoma


Narrative/Plan: 


Pt is s/p 1stcycle of R-CEOP.  Hematological toxicities moderate and have 

resolved rather quickly.  She physically tolerated treatment fair.  Reinforced 

with pt that the 1st treatment for lymphoma does tend to be harder then the 

subsequent because she was compromised coming into treatment.  


F/U appt in chart.   


Current Visit: No   Status: Acute   Priority: Medium   Code(s): C85.90 - NON-

HODGKIN LYMPHOMA, UNSPECIFIED, UNSPECIFIED SITE   SNOMED Code(s): 548387469

## 2018-10-10 LAB
ANION GAP SERPL CALC-SCNC: 8 MMOL/L
BUN SERPL-SCNC: 12 MG/DL (ref 7–17)
CALCIUM SPEC-MCNC: 7.4 MG/DL (ref 8.4–10.2)
CHLORIDE SERPL-SCNC: 106 MMOL/L (ref 98–107)
CO2 SERPL-SCNC: 26 MMOL/L (ref 22–30)
GLUCOSE BLD-MCNC: 298 MG/DL (ref 75–99)
GLUCOSE BLD-MCNC: 388 MG/DL (ref 75–99)
GLUCOSE BLD-MCNC: 443 MG/DL (ref 75–99)
GLUCOSE BLD-MCNC: 470 MG/DL (ref 75–99)
GLUCOSE SERPL-MCNC: 366 MG/DL (ref 74–99)
POTASSIUM SERPL-SCNC: 2.7 MMOL/L (ref 3.5–5.1)
SODIUM SERPL-SCNC: 140 MMOL/L (ref 137–145)

## 2018-10-10 RX ADMIN — GABAPENTIN SCH MG: 100 CAPSULE ORAL at 08:41

## 2018-10-10 RX ADMIN — FUROSEMIDE SCH MG: 20 TABLET ORAL at 08:41

## 2018-10-10 RX ADMIN — HYDROCORTISONE SODIUM SUCCINATE SCH MG: 100 INJECTION, POWDER, FOR SOLUTION INTRAMUSCULAR; INTRAVENOUS at 00:52

## 2018-10-10 RX ADMIN — POTASSIUM CHLORIDE SCH MLS/HR: 14.9 INJECTION, SOLUTION INTRAVENOUS at 18:21

## 2018-10-10 RX ADMIN — INSULIN ASPART SCH UNIT: 100 INJECTION, SOLUTION INTRAVENOUS; SUBCUTANEOUS at 21:57

## 2018-10-10 RX ADMIN — POTASSIUM CHLORIDE SCH MLS/HR: 14.9 INJECTION, SOLUTION INTRAVENOUS at 16:00

## 2018-10-10 RX ADMIN — CEFEPIME HYDROCHLORIDE SCH MLS/HR: 2 INJECTION, POWDER, FOR SOLUTION INTRAVENOUS at 05:24

## 2018-10-10 RX ADMIN — INSULIN ASPART SCH UNIT: 100 INJECTION, SOLUTION INTRAVENOUS; SUBCUTANEOUS at 08:39

## 2018-10-10 RX ADMIN — SODIUM CHLORIDE SCH MLS/HR: 9 INJECTION, SOLUTION INTRAVENOUS at 21:47

## 2018-10-10 RX ADMIN — CEFEPIME HYDROCHLORIDE SCH MLS/HR: 2 INJECTION, POWDER, FOR SOLUTION INTRAVENOUS at 21:47

## 2018-10-10 RX ADMIN — POTASSIUM CHLORIDE SCH MLS/HR: 14.9 INJECTION, SOLUTION INTRAVENOUS at 12:47

## 2018-10-10 RX ADMIN — FUROSEMIDE SCH MG: 20 TABLET ORAL at 21:59

## 2018-10-10 RX ADMIN — CITALOPRAM HYDROBROMIDE SCH MG: 20 TABLET ORAL at 08:41

## 2018-10-10 RX ADMIN — INSULIN ASPART SCH UNIT: 100 INJECTION, SOLUTION INTRAVENOUS; SUBCUTANEOUS at 17:21

## 2018-10-10 RX ADMIN — POTASSIUM CHLORIDE SCH MEQ: 750 TABLET, EXTENDED RELEASE ORAL at 08:41

## 2018-10-10 RX ADMIN — CEFEPIME HYDROCHLORIDE SCH MLS/HR: 2 INJECTION, POWDER, FOR SOLUTION INTRAVENOUS at 15:30

## 2018-10-10 RX ADMIN — LORATADINE SCH MG: 10 TABLET ORAL at 21:59

## 2018-10-10 RX ADMIN — INSULIN ASPART SCH UNIT: 100 INJECTION, SOLUTION INTRAVENOUS; SUBCUTANEOUS at 12:49

## 2018-10-10 RX ADMIN — HYDROCORTISONE SODIUM SUCCINATE SCH MG: 100 INJECTION, POWDER, FOR SOLUTION INTRAMUSCULAR; INTRAVENOUS at 08:40

## 2018-10-10 RX ADMIN — THERA TABS SCH EACH: TAB at 14:21

## 2018-10-10 RX ADMIN — GABAPENTIN SCH MG: 100 CAPSULE ORAL at 21:59

## 2018-10-10 RX ADMIN — SODIUM CHLORIDE SCH MLS/HR: 9 INJECTION, SOLUTION INTRAVENOUS at 00:52

## 2018-10-10 RX ADMIN — HYDROCORTISONE SODIUM SUCCINATE SCH MG: 100 INJECTION, POWDER, FOR SOLUTION INTRAMUSCULAR; INTRAVENOUS at 16:01

## 2018-10-10 NOTE — P.PN
Subjective


Progress Note Date: 10/10/18


Principal diagnosis: 





NHL s/p 1 cycle of R-CEOP





Pt seen in f/u, she is doing very well, eating, drinking, no fever, cough, 

nausea, dysuria, diarrhea, constipation or pain.  She is ambulating in the 

hallway.   





Objective





- Vital Signs


Vital signs: 


 Vital Signs











Temp  97.8 F   10/10/18 11:59


 


Pulse  55 L  10/10/18 11:59


 


Resp  18   10/10/18 11:59


 


BP  131/84   10/10/18 11:59


 


Pulse Ox  98   10/10/18 11:59








 Intake & Output











 10/09/18 10/10/18 10/10/18





 18:59 06:59 18:59


 


Intake Total 50 1555 


 


Balance 50 1555 


 


Weight  76.5 kg 


 


Intake:   


 


  IV  545 


 


    0.9  545 


 


  Intake, IV Titration 50  





  Amount   


 


    Cefepime 2 gm In Sodium 50  





    Chloride 0.9% 50 ml @ 100   





    mls/hr IVPB Q8H [facility] Rx#:   





    [id number]   


 


  Oral  1010 


 


Other:   


 


  Voiding Method Toilet Toilet 


 


  # Voids 3  














- Constitutional


General appearance: Present: average body habitus, cooperative, no acute 

distress





- EENT


Eyes: Present: anicteric sclerae





- Respiratory


Details: 





even and unlabored, no dyspnea on exertion





- Neurologic


Neurologic: Present: CNII-XII intact





- Musculoskeletal


Musculoskeletal Comment(s): 





Using wheeled walker to ambulate





- Psychiatric


Psychiatric: Present: A&O x's 3, appropriate affect, intact judgment & insight





- Labs


CBC & Chem 7: 


 10/09/18 08:08





 10/10/18 09:22


Labs: 


 Abnormal Lab Results - Last 24 Hours (Table)











  10/08/18 10/09/18 10/09/18 Range/Units





  05:00 16:41 20:06 


 


Potassium     (3.5-5.1)  mmol/L


 


Glucose     (74-99)  mg/dL


 


POC Glucose (mg/dL)   367 H  396 H  (75-99)  mg/dL


 


Calcium     (8.4-10.2)  mg/dL


 


IgM  <16.9 L    (40.0-280.0)  mg/dL














  10/10/18 10/10/18 10/10/18 Range/Units





  07:15 09:22 11:14 


 


Potassium   2.7 L*   (3.5-5.1)  mmol/L


 


Glucose   366 H   (74-99)  mg/dL


 


POC Glucose (mg/dL)  388 H   298 H  (75-99)  mg/dL


 


Calcium   7.4 L   (8.4-10.2)  mg/dL


 


IgM     (40.0-280.0)  mg/dL








 Microbiology - Last 24 Hours (Table)











 10/04/18 12:56 Blood Culture - Preliminary





 Blood    No Growth after 120 hours














Assessment and Plan


(1) Antineoplastic chemotherapy induced anemia


Current Visit: Yes   Status: Acute   Priority: Medium   Code(s): D64.81 - 

ANEMIA DUE TO ANTINEOPLASTIC CHEMOTHERAPY; T45.1X5A - ADVERSE EFFECT OF 

ANTINEOPLASTIC AND IMMUNOSUP DRUGS, INIT   SNOMED Code(s): 588083931


   





(2) Neutrophilic leukocytosis


Current Visit: Yes   Status: Acute   Priority: Low   Code(s): D72.9 - DISORDER 

OF WHITE BLOOD CELLS, UNSPECIFIED   SNOMED Code(s): 063419542


   





(3) Unsteady gait


Narrative/Plan: 


Ambulating with wheeled walker in halls, PT/OT cont working with pt while 

hospitalized


Current Visit: Yes   Status: Acute   Priority: High   Code(s): R26.81 - 

UNSTEADINESS ON FEET   SNOMED Code(s): 83489313


   





(4) Non Hodgkin's lymphoma


Narrative/Plan: 


Pt will see Dr. Dumont prior to next cycle for CBC evaluation and possibly dose 

adjustment due to pt poor physical tolerance of treatment  


Current Visit: No   Status: Acute   Priority: Medium   Code(s): C85.90 - NON-

HODGKIN LYMPHOMA, UNSPECIFIED, UNSPECIFIED SITE   SNOMED Code(s): 476132231

## 2018-10-10 NOTE — P.DS
Providers


Date of admission: 


10/04/18 16:54





Attending physician: 


Faustino Villegas





Consults: 





 





10/04/18 16:54


Consult Physician Urgent 


   Consulting Provider: Jewel Montez


   Consult Reason/Comments: Critical care management


   Do you want consulting provider notified?: Yes


Consult Physician Urgent 


   Consulting Provider: Michael Dumont


   Consult Reason/Comments: History of lymphoma


   Do you want consulting provider notified?: Yes





10/04/18 17:01


Consult Physician Urgent 


   Consulting Provider: Alfie Abreu


   Consult Reason/Comments: kidney stone


   Do you want consulting provider notified?: Already Contacted





10/05/18 18:16


Consult Physician Routine 


   Consulting Provider: Neymar Huff


   Consult Reason/Comments: sepsis


   Do you want consulting provider notified?: Yes











Primary care physician: 


Faustino Villegas





Hospital Course: 





63-year-old female was admitted through the emergency room with neutropenic 

sepsis as well as hypotension and shock patient was stabilized patient has had 

consultation with Dr. Tim Huff.  Patient had one 

course of her chemotherapy for non-Hodgkin's lymphoma.  Plan is to follow-up 

with Dr. Dumont and family physician Dr. Faustino Villegas








Assessment


Acute neutropenic sepsis as well as severe septic shock and hypotension


Stage IV non-Hodgkin's lymphoma on chemotherapy obstructive uropathy


Anemia leukopenia secondary to chemotherapy


Hypokalemia


Hyponatremia


Chronic skin kidney disease stage III


Diabetes type 2


History of congestive heart failure chronic diastolic


History of hypertension


Hyperlipidemia


Sleep apnea








Plan


Follow-up with Dr. Dumont and -family physician Dr Faustino Villegas.








Plan - Discharge Summary


Discharge Rx Participant: No


New Discharge Prescriptions: 


No Action


   Potassium Chloride [Klor-Con 10] 10 meq PO DAILY


   Gabapentin 100 mg PO QAM


   Furosemide 20 mg PO BID


   Gabapentin [Neurontin] 200 mg PO HS


   Levothyroxine Sodium [Synthroid] 125 mcg PO MOTUWETHFRSA


   metFORMIN HCL 1,000 mg PO BID PRN


     PRN Reason: high blood sugars


   Cetirizine HCl [Zyrtec] 10 mg PO HS


   Multivitamins, Thera [Multivitamin (formulary)] 1 tab PO DAILY


   Glimepiride [Amaryl] 2 mg PO DAILY PRN


     PRN Reason: high blood sugar


   Atorvastatin Calcium [Lipitor] 40 mg PO HS


   ALPRAZolam [Xanax] 0.5 - 1 mg PO DAILY PRN


     PRN Reason: Anxiety


   Citalopram Hydrobromide [CeleXA] 20 mg PO DAILY


   predniSONE 100 mg PO AS DIRECTED


   Ondansetron HCl [Zofran] 8 mg PO Q6H PRN


     PRN Reason: Nausea


   Super B Complex 1 tab PO DAILY


   Pantoprazole [Protonix] 40 mg PO DAILY


Discharge Medication List





Furosemide 20 mg PO BID 07/24/14 [History]


Gabapentin 100 mg PO QAM 07/24/14 [History]


Potassium Chloride [Klor-Con 10] 10 meq PO DAILY 07/24/14 [History]


Gabapentin [Neurontin] 200 mg PO HS 10/09/15 [History]


Levothyroxine Sodium [Synthroid] 125 mcg PO MOTUWETHFRSA 10/09/15 [History]


metFORMIN HCL 1,000 mg PO BID PRN 10/09/15 [History]


Cetirizine HCl [Zyrtec] 10 mg PO HS 03/28/18 [History]


Glimepiride [Amaryl] 2 mg PO DAILY PRN 03/28/18 [History]


Multivitamins, Thera [Multivitamin (formulary)] 1 tab PO DAILY 03/28/18 [History

]


ALPRAZolam [Xanax] 0.5 - 1 mg PO DAILY PRN 04/16/18 [History]


Atorvastatin Calcium [Lipitor] 40 mg PO HS 04/16/18 [History]


Citalopram Hydrobromide [CeleXA] 20 mg PO DAILY 08/26/18 [History]


Ondansetron HCl [Zofran] 8 mg PO Q6H PRN 10/04/18 [History]


Pantoprazole [Protonix] 40 mg PO DAILY 10/04/18 [History]


Super B Complex 1 tab PO DAILY 10/04/18 [History]


predniSONE 100 mg PO AS DIRECTED 10/04/18 [History]








Follow up Appointment(s)/Referral(s): 


Faustino Villegas MD [Primary Care Provider] - 1-2 days


Michael Dumont MD [STAFF PHYSICIAN] - 10/15/18 10:00 am


[facility], [NON-STAFF] -

## 2018-10-11 LAB
ANION GAP SERPL CALC-SCNC: 7 MMOL/L
BUN SERPL-SCNC: 11 MG/DL (ref 7–17)
CALCIUM SPEC-MCNC: 7.8 MG/DL (ref 8.4–10.2)
CHLORIDE SERPL-SCNC: 104 MMOL/L (ref 98–107)
CO2 SERPL-SCNC: 30 MMOL/L (ref 22–30)
GLUCOSE BLD-MCNC: 267 MG/DL (ref 75–99)
GLUCOSE BLD-MCNC: 357 MG/DL (ref 75–99)
GLUCOSE BLD-MCNC: 362 MG/DL (ref 75–99)
GLUCOSE BLD-MCNC: 365 MG/DL (ref 75–99)
GLUCOSE BLD-MCNC: 399 MG/DL (ref 75–99)
GLUCOSE BLD-MCNC: 474 MG/DL (ref 75–99)
GLUCOSE BLD-MCNC: 491 MG/DL (ref 75–99)
GLUCOSE BLD-MCNC: 578 MG/DL (ref 75–99)
GLUCOSE BLD-MCNC: 581 MG/DL (ref 75–99)
GLUCOSE SERPL-MCNC: 224 MG/DL (ref 74–99)
MAGNESIUM SPEC-SCNC: 1.8 MG/DL (ref 1.6–2.3)
POTASSIUM SERPL-SCNC: 2.9 MMOL/L (ref 3.5–5.1)
POTASSIUM SERPL-SCNC: 3 MMOL/L (ref 3.5–5.1)
SODIUM SERPL-SCNC: 141 MMOL/L (ref 137–145)

## 2018-10-11 RX ADMIN — CITALOPRAM HYDROBROMIDE SCH MG: 20 TABLET ORAL at 07:54

## 2018-10-11 RX ADMIN — CEFEPIME HYDROCHLORIDE SCH MLS/HR: 2 INJECTION, POWDER, FOR SOLUTION INTRAVENOUS at 04:42

## 2018-10-11 RX ADMIN — FUROSEMIDE SCH MG: 20 TABLET ORAL at 22:30

## 2018-10-11 RX ADMIN — THERA TABS SCH EACH: TAB at 12:28

## 2018-10-11 RX ADMIN — GABAPENTIN SCH MG: 100 CAPSULE ORAL at 22:29

## 2018-10-11 RX ADMIN — MAGNESIUM SULFATE IN DEXTROSE SCH MLS/HR: 10 INJECTION, SOLUTION INTRAVENOUS at 19:14

## 2018-10-11 RX ADMIN — HYDROCORTISONE SODIUM SUCCINATE SCH MG: 100 INJECTION, POWDER, FOR SOLUTION INTRAMUSCULAR; INTRAVENOUS at 16:10

## 2018-10-11 RX ADMIN — MAGNESIUM SULFATE IN DEXTROSE SCH MLS/HR: 10 INJECTION, SOLUTION INTRAVENOUS at 18:06

## 2018-10-11 RX ADMIN — INSULIN ASPART SCH UNIT: 100 INJECTION, SOLUTION INTRAVENOUS; SUBCUTANEOUS at 12:29

## 2018-10-11 RX ADMIN — GABAPENTIN SCH MG: 100 CAPSULE ORAL at 07:53

## 2018-10-11 RX ADMIN — LORATADINE SCH MG: 10 TABLET ORAL at 22:30

## 2018-10-11 RX ADMIN — INSULIN ASPART SCH UNIT: 100 INJECTION, SOLUTION INTRAVENOUS; SUBCUTANEOUS at 07:50

## 2018-10-11 RX ADMIN — POTASSIUM CHLORIDE SCH MEQ: 750 TABLET, EXTENDED RELEASE ORAL at 07:53

## 2018-10-11 RX ADMIN — POTASSIUM CHLORIDE SCH MEQ: 20 TABLET, EXTENDED RELEASE ORAL at 07:49

## 2018-10-11 RX ADMIN — HYDROCORTISONE SODIUM SUCCINATE SCH MG: 100 INJECTION, POWDER, FOR SOLUTION INTRAMUSCULAR; INTRAVENOUS at 07:52

## 2018-10-11 RX ADMIN — POTASSIUM CHLORIDE SCH MEQ: 20 TABLET, EXTENDED RELEASE ORAL at 16:10

## 2018-10-11 RX ADMIN — INSULIN ASPART SCH UNIT: 100 INJECTION, SOLUTION INTRAVENOUS; SUBCUTANEOUS at 18:10

## 2018-10-11 RX ADMIN — INSULIN HUMAN SCH MLS/HR: 100 INJECTION, SOLUTION PARENTERAL at 22:19

## 2018-10-11 RX ADMIN — CEFEPIME HYDROCHLORIDE SCH MLS/HR: 2 INJECTION, POWDER, FOR SOLUTION INTRAVENOUS at 22:26

## 2018-10-11 RX ADMIN — FUROSEMIDE SCH MG: 20 TABLET ORAL at 07:53

## 2018-10-11 RX ADMIN — HYDROCORTISONE SODIUM SUCCINATE SCH MG: 100 INJECTION, POWDER, FOR SOLUTION INTRAMUSCULAR; INTRAVENOUS at 00:26

## 2018-10-11 RX ADMIN — HYDROCORTISONE SODIUM SUCCINATE SCH MG: 100 INJECTION, POWDER, FOR SOLUTION INTRAMUSCULAR; INTRAVENOUS at 23:37

## 2018-10-11 RX ADMIN — POTASSIUM CHLORIDE SCH MEQ: 20 TABLET, EXTENDED RELEASE ORAL at 18:06

## 2018-10-11 RX ADMIN — CEFEPIME HYDROCHLORIDE SCH MLS/HR: 2 INJECTION, POWDER, FOR SOLUTION INTRAVENOUS at 12:28

## 2018-10-11 RX ADMIN — POTASSIUM CHLORIDE SCH MEQ: 20 TABLET, EXTENDED RELEASE ORAL at 15:35

## 2018-10-11 RX ADMIN — SODIUM CHLORIDE SCH MLS/HR: 9 INJECTION, SOLUTION INTRAVENOUS at 15:51

## 2018-10-11 NOTE — P.PN
Progress Note - Text


Progress Note Date: 10/11/18





Mrs. Falcon is comfortable, and specifically she denies any flank pain.  She 

was advised that the computed tomography scan showed a calculus just distal to 

the ureteropelvic junction on the left, measuring only approximately 2 mm in 

size.  She should not have difficulty passing this calculus, but she was 

advised to contact me if she becomes symptomatic.

## 2018-10-11 NOTE — P.PN
Subjective


Progress Note Date: 10/11/18


Principal diagnosis: 





NHL s/p 1 cycle of R-CEOP





Pt seen in f/u, feels good, eating, drinking, no fever, cough, nausea, dysuria, 

diarrhea, constipation or pain, ambulating and waiting for a ride home.   





Objective





- Vital Signs


Vital signs: 


 Vital Signs











Temp  98 F   10/11/18 07:15


 


Pulse  55 L  10/11/18 08:00


 


Resp  18   10/11/18 07:15


 


BP  149/71   10/11/18 07:15


 


Pulse Ox  99   10/11/18 07:15








 Intake & Output











 10/10/18 10/11/18 10/11/18





 18:59 06:59 18:59


 


Intake Total  2480 


 


Balance  2480 


 


Weight  75.5 kg 


 


Intake:   


 


  IV  800 


 


    0.9  800 


 


  Oral  1680 


 


Other:   


 


  Voiding Method  Toilet Toilet


 


  # Voids 2 3 














- Exam





WDWN, NAD, moves independently, respiratory effort is unlabored, no swelling in 

the legs





- Constitutional


General appearance: Present: average body habitus, cooperative, no acute 

distress





- EENT


Eyes: Present: anicteric sclerae





- Labs


CBC & Chem 7: 


 10/09/18 08:08





 10/10/18 22:06


Labs: 


 Abnormal Lab Results - Last 24 Hours (Table)











  10/08/18 10/10/18 10/10/18 Range/Units





  05:00 09:22 11:14 


 


Potassium   2.7 L*   (3.5-5.1)  mmol/L


 


Glucose   366 H   (74-99)  mg/dL


 


POC Glucose (mg/dL)    298 H  (75-99)  mg/dL


 


Calcium   7.4 L   (8.4-10.2)  mg/dL


 


IgM  <16.9 L    (40.0-280.0)  mg/dL














  10/10/18 10/10/18 10/10/18 Range/Units





  16:47 21:06 22:06 


 


Potassium    3.4 L  (3.5-5.1)  mmol/L


 


Glucose     (74-99)  mg/dL


 


POC Glucose (mg/dL)  443 H  470 H   (75-99)  mg/dL


 


Calcium     (8.4-10.2)  mg/dL


 


IgM     (40.0-280.0)  mg/dL














  10/11/18 10/11/18 Range/Units





  04:19 07:24 


 


Potassium    (3.5-5.1)  mmol/L


 


Glucose    (74-99)  mg/dL


 


POC Glucose (mg/dL)  357 H  362 H  (75-99)  mg/dL


 


Calcium    (8.4-10.2)  mg/dL


 


IgM    (40.0-280.0)  mg/dL








 Microbiology - Last 24 Hours (Table)











 10/04/18 12:56 Blood Culture - Final





 Blood    No Growth after 144 hours














Assessment and Plan


(1) Antineoplastic chemotherapy induced anemia


Narrative/Plan: 


Hgb did not require transfusion while inpatient.  


Current Visit: Yes   Status: Acute   Priority: Medium   Code(s): D64.81 - 

ANEMIA DUE TO ANTINEOPLASTIC CHEMOTHERAPY; T45.1X5A - ADVERSE EFFECT OF 

ANTINEOPLASTIC AND IMMUNOSUP DRUGS, INIT   SNOMED Code(s): 302179492


   





(2) Neutrophilic leukocytosis


Narrative/Plan: 


Secondary to GCSF


Current Visit: Yes   Status: Acute   Priority: Low   Code(s): D72.9 - DISORDER 

OF WHITE BLOOD CELLS, UNSPECIFIED   SNOMED Code(s): 624144032


   





(3) Unsteady gait


Narrative/Plan: 


Pt did very well with PT/OT, ambulating with wheeled walker


Current Visit: Yes   Status: Acute   Priority: High   Code(s): R26.81 - 

UNSTEADINESS ON FEET   SNOMED Code(s): 51843487


   





(4) Non Hodgkin's lymphoma


Narrative/Plan: 


Pt treatment will be delayed 1 week per Dr. Dumont.  Plan is for dose adjustment.  


Updated f/u appt in the DC summary 


Current Visit: No   Status: Acute   Priority: Medium   Code(s): C85.90 - NON-

HODGKIN LYMPHOMA, UNSPECIFIED, UNSPECIFIED SITE   SNOMED Code(s): 588580054

## 2018-10-12 LAB
ANION GAP SERPL CALC-SCNC: 8 MMOL/L
BUN SERPL-SCNC: 18 MG/DL (ref 7–17)
CALCIUM SPEC-MCNC: 7.9 MG/DL (ref 8.4–10.2)
CHLORIDE SERPL-SCNC: 109 MMOL/L (ref 98–107)
CO2 SERPL-SCNC: 26 MMOL/L (ref 22–30)
GLUCOSE BLD-MCNC: 170 MG/DL (ref 75–99)
GLUCOSE BLD-MCNC: 179 MG/DL (ref 75–99)
GLUCOSE BLD-MCNC: 188 MG/DL (ref 75–99)
GLUCOSE BLD-MCNC: 226 MG/DL (ref 75–99)
GLUCOSE BLD-MCNC: 256 MG/DL (ref 75–99)
GLUCOSE BLD-MCNC: 263 MG/DL (ref 75–99)
GLUCOSE BLD-MCNC: 266 MG/DL (ref 75–99)
GLUCOSE BLD-MCNC: 277 MG/DL (ref 75–99)
GLUCOSE BLD-MCNC: 288 MG/DL (ref 75–99)
GLUCOSE BLD-MCNC: 299 MG/DL (ref 75–99)
GLUCOSE BLD-MCNC: 320 MG/DL (ref 75–99)
GLUCOSE BLD-MCNC: 332 MG/DL (ref 75–99)
GLUCOSE BLD-MCNC: 362 MG/DL (ref 75–99)
GLUCOSE BLD-MCNC: 391 MG/DL (ref 75–99)
GLUCOSE BLD-MCNC: 448 MG/DL (ref 75–99)
GLUCOSE BLD-MCNC: 466 MG/DL (ref 75–99)
GLUCOSE BLD-MCNC: 476 MG/DL (ref 75–99)
GLUCOSE SERPL-MCNC: 293 MG/DL (ref 74–99)
POTASSIUM SERPL-SCNC: 3.3 MMOL/L (ref 3.5–5.1)
SODIUM SERPL-SCNC: 143 MMOL/L (ref 137–145)

## 2018-10-12 RX ADMIN — HYDROCORTISONE SODIUM SUCCINATE SCH MG: 100 INJECTION, POWDER, FOR SOLUTION INTRAMUSCULAR; INTRAVENOUS at 17:19

## 2018-10-12 RX ADMIN — INSULIN HUMAN SCH MLS/HR: 100 INJECTION, SOLUTION PARENTERAL at 06:47

## 2018-10-12 RX ADMIN — INSULIN DETEMIR SCH UNIT: 100 INJECTION, SOLUTION SUBCUTANEOUS at 12:32

## 2018-10-12 RX ADMIN — THERA TABS SCH EACH: TAB at 12:12

## 2018-10-12 RX ADMIN — SODIUM CHLORIDE SCH MLS/HR: 9 INJECTION, SOLUTION INTRAVENOUS at 05:54

## 2018-10-12 RX ADMIN — FUROSEMIDE SCH MG: 20 TABLET ORAL at 08:15

## 2018-10-12 RX ADMIN — CITALOPRAM HYDROBROMIDE SCH MG: 20 TABLET ORAL at 08:15

## 2018-10-12 RX ADMIN — CEFEPIME HYDROCHLORIDE SCH MLS/HR: 2 INJECTION, POWDER, FOR SOLUTION INTRAVENOUS at 21:59

## 2018-10-12 RX ADMIN — POTASSIUM CHLORIDE SCH MEQ: 20 TABLET, EXTENDED RELEASE ORAL at 10:43

## 2018-10-12 RX ADMIN — POTASSIUM CHLORIDE SCH MEQ: 20 TABLET, EXTENDED RELEASE ORAL at 12:35

## 2018-10-12 RX ADMIN — LORATADINE SCH MG: 10 TABLET ORAL at 22:00

## 2018-10-12 RX ADMIN — CEFEPIME HYDROCHLORIDE SCH MLS/HR: 2 INJECTION, POWDER, FOR SOLUTION INTRAVENOUS at 04:22

## 2018-10-12 RX ADMIN — GABAPENTIN SCH MG: 100 CAPSULE ORAL at 08:14

## 2018-10-12 RX ADMIN — PANTOPRAZOLE SODIUM SCH MG: 40 TABLET, DELAYED RELEASE ORAL at 10:43

## 2018-10-12 RX ADMIN — HYDROCORTISONE SODIUM SUCCINATE SCH MG: 100 INJECTION, POWDER, FOR SOLUTION INTRAMUSCULAR; INTRAVENOUS at 08:14

## 2018-10-12 RX ADMIN — GLIMEPIRIDE SCH MG: 2 TABLET ORAL at 10:30

## 2018-10-12 RX ADMIN — INSULIN ASPART SCH UNIT: 100 INJECTION, SOLUTION INTRAVENOUS; SUBCUTANEOUS at 12:36

## 2018-10-12 RX ADMIN — LEVOTHYROXINE SODIUM SCH MCG: 0.12 TABLET ORAL at 10:43

## 2018-10-12 RX ADMIN — INSULIN ASPART SCH UNIT: 100 INJECTION, SOLUTION INTRAVENOUS; SUBCUTANEOUS at 18:19

## 2018-10-12 RX ADMIN — CEFEPIME HYDROCHLORIDE SCH MLS/HR: 2 INJECTION, POWDER, FOR SOLUTION INTRAVENOUS at 12:34

## 2018-10-12 RX ADMIN — POTASSIUM CHLORIDE SCH MEQ: 20 TABLET, EXTENDED RELEASE ORAL at 00:42

## 2018-10-12 RX ADMIN — POTASSIUM CHLORIDE SCH MEQ: 20 TABLET, EXTENDED RELEASE ORAL at 00:59

## 2018-10-12 RX ADMIN — FUROSEMIDE SCH MG: 20 TABLET ORAL at 21:59

## 2018-10-12 RX ADMIN — POTASSIUM CHLORIDE SCH MEQ: 750 TABLET, EXTENDED RELEASE ORAL at 08:15

## 2018-10-12 RX ADMIN — GABAPENTIN SCH MG: 100 CAPSULE ORAL at 22:00

## 2018-10-12 NOTE — P.PN
Subjective


Progress Note Date: 10/12/18





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 

unit, presents to Hospital with increasing weakness as well as fever and 

malaise.  At the time of her admission she had evidence of neutropenia and 

fever and constantly was admitted for sepsis.  She required fluid resuscitation 

as well as his pressor support originally.  She is not having significant 

improvement in is feeling better.  She is very pertinent recent history in that 

she was having some abdominal pain and underwent imaging studies that reveal 

evidence of extensive intra-abdominal lymphadenopathy.  Workup eventually was 

performed revealing the evidence of B cell non-Hodgkin's lymphoma stage IV is 

noted by the bone marrow involvement.  Due to her age and renal dysfunction she 

was treated with R-CEOP.  She is now presenting with evidence of febrile 

neutropenia generalized malaise and feeling quite poorly overall.  She does 

have some chronic cognitive delays.





Also worsened when she was quite ill which are now improving as noted by her 

sister who is present.  The patient at this time is feeling better.  She has 

received growth factor support her white blood cell count is improved.  Her 

fevers have improved.  She is having no chills or rigors.  She had having much 

discomfort at this time.  No evidence of any bleeding is occurring.


10/08/2018 patient is doing somewhat better today.  She is comfortable and 

certainly look forward to going home.  She was having some difficulty with 

acidosis and bicarbonate was being given intravenously by infusion.  With her 

edema this can be discontinued.


She is already recovering from the toxicities of the first round of her 

chemotherapy.  Feeling better today but has generalized weakness.


10/12/2018 patient has had some further improvement.  She overs been having 

some metabolic abnormalities with hypokalemia.  Her neutropenia has resolved.  

She's having no further fevers.  She had significant hyperglycemia requiring 

insulin drip and arrangements are being made for her discharge to home soon.





Objective





- Vital Signs


Vital signs: 


 Vital Signs











Temp  98.5 F   10/12/18 20:45


 


Pulse  53 L  10/12/18 20:45


 


Resp  16   10/12/18 20:45


 


BP  152/68   10/12/18 20:45


 


Pulse Ox  95   10/12/18 20:45








 Intake & Output











 10/12/18 10/12/18 10/13/18





 06:59 18:59 06:59


 


Intake Total 276.306 5046.055 


 


Balance 909.548 2268.055 


 


Weight 76 kg 76 kg 


 


Intake:   


 


  Intake, IV Titration 296.960 343.055 





  Amount   


 


    Cefepime 2 gm In Sodium 100 50 





    Chloride 0.9% 50 ml @ 100   





    mls/hr IVPB Q8H [facility] Rx#:   





    [id number]   


 


    Insulin Regular 100 unit 96.960 43.055 





    In Sodium Chloride 0.9%   





    100 ml @ Titrate IV .Q0M   





    [facility] Rx#:[id number]   


 


    Magnesium Sulfate-D5w Pmx 100  





    1 gm In Dextrose/Water 1   





    100ml.bag @ 100 mls/hr   





    IVPB Q1H [facility] Rx#:   





    [id number]   


 


    Vancomycin 1,250 mg In  250 





    Sodium Chloride 0.9% 250   





    ml @ 125 mls/hr IVPB Q18H   





    [facility] Rx#:[id number]   


 


  Oral  1150 


 


Other:   


 


  Voiding Method Toilet Toilet 


 


  # Voids  2 














- Exam


Pleasant 63-year-old woman who is in no distress, vasopressor therapy has been 

weaned off


HEENT: Anicteric conjunctiva are pink and moist nasal mucosa grossly intact 

without significant lesions, there is no thrush.  Does have pallor.


Neck: The neck is supple without significant lymphadenopathy or thyromegaly.


Lungs: There are symmetrical air entry without significant crackles or 

wheezing.  No dullness or egophony


Heart: Regular rate and rhythm with an audible S1-S2, no S3 no S4.  There is no 

significant murmur click or rub, PMI was nondisplaced.


Abdomen: Positive bowel sounds soft and nontender without palpable masses or 

organomegaly.  There was no guarding or rebound.


Extremities: The upper extremities have excellent pulses they are symmetric, no 

significant petechiae or telangiectasia.  No splinter hemorrhages were noted.  

The lower extremities have developed bilateral lower extremity edema that is 

symmetric without ulcerations.


Neuro: Awake alert oriented to person place and time.  There are no acute new 

gross focal sensory motor deficits.  Does have a history of head trauma with 

some memory deficits.














- Labs


CBC & Chem 7: 


 10/09/18 08:08





 10/12/18 07:47


Labs: 


 Abnormal Lab Results - Last 24 Hours (Table)











  10/11/18 10/12/18 10/12/18 Range/Units





  23:34 00:00 00:39 


 


Potassium     (3.5-5.1)  mmol/L


 


Chloride     ()  mmol/L


 


BUN     (7-17)  mg/dL


 


Glucose     (74-99)  mg/dL


 


POC Glucose (mg/dL)  365 H  320 H  266 H  (75-99)  mg/dL


 


Calcium     (8.4-10.2)  mg/dL














  10/12/18 10/12/18 10/12/18 Range/Units





  01:01 01:37 02:18 


 


Potassium     (3.5-5.1)  mmol/L


 


Chloride     ()  mmol/L


 


BUN     (7-17)  mg/dL


 


Glucose     (74-99)  mg/dL


 


POC Glucose (mg/dL)  263 H  256 H  226 H  (75-99)  mg/dL


 


Calcium     (8.4-10.2)  mg/dL














  10/12/18 10/12/18 10/12/18 Range/Units





  04:15 05:57 07:47 


 


Potassium    3.3 L  (3.5-5.1)  mmol/L


 


Chloride    109 H  ()  mmol/L


 


BUN    18 H  (7-17)  mg/dL


 


Glucose    293 H  (74-99)  mg/dL


 


POC Glucose (mg/dL)  170 H  288 H   (75-99)  mg/dL


 


Calcium    7.9 L  (8.4-10.2)  mg/dL














  10/12/18 10/12/18 10/12/18 Range/Units





  08:01 10:12 11:19 


 


Potassium     (3.5-5.1)  mmol/L


 


Chloride     ()  mmol/L


 


BUN     (7-17)  mg/dL


 


Glucose     (74-99)  mg/dL


 


POC Glucose (mg/dL)  332 H  277 H  179 H  (75-99)  mg/dL


 


Calcium     (8.4-10.2)  mg/dL














  10/12/18 10/12/18 10/12/18 Range/Units





  12:09 14:19 16:17 


 


Potassium     (3.5-5.1)  mmol/L


 


Chloride     ()  mmol/L


 


BUN     (7-17)  mg/dL


 


Glucose     (74-99)  mg/dL


 


POC Glucose (mg/dL)  188 H  299 H  391 H  (75-99)  mg/dL


 


Calcium     (8.4-10.2)  mg/dL














  10/12/18 10/12/18 10/12/18 Range/Units





  17:28 20:00 20:02 


 


Potassium     (3.5-5.1)  mmol/L


 


Chloride     ()  mmol/L


 


BUN     (7-17)  mg/dL


 


Glucose     (74-99)  mg/dL


 


POC Glucose (mg/dL)  466 H  448 H  476 H  (75-99)  mg/dL


 


Calcium     (8.4-10.2)  mg/dL














  10/12/18 Range/Units





  21:33 


 


Potassium   (3.5-5.1)  mmol/L


 


Chloride   ()  mmol/L


 


BUN   (7-17)  mg/dL


 


Glucose   (74-99)  mg/dL


 


POC Glucose (mg/dL)  362 H  (75-99)  mg/dL


 


Calcium   (8.4-10.2)  mg/dL








 Laboratory Results











WBC  37.1 k/uL (3.8-10.6)  H  10/09/18  08:08    


 


RBC  3.89 m/uL (3.80-5.40)   10/09/18  08:08    


 


Hgb  9.8 gm/dL (11.4-16.0)  L D 10/09/18  08:08    


 


Hct  30.3 % (34.0-46.0)  L  10/09/18  08:08    


 


MCV  78.0 fL (80.0-100.0)  L  10/09/18  08:08    


 


MCH  25.1 pg (25.0-35.0)   10/09/18  08:08    


 


MCHC  32.2 g/dL (31.0-37.0)   10/09/18  08:08    


 


RDW  18.2 % (11.5-15.5)  H  10/09/18  08:08    


 


Plt Count  417 k/uL (150-450)   10/09/18  08:08    


 


Neutrophils %  91 %  10/07/18  07:14    


 


Neutrophils % (Manual)  73 %  10/09/18  08:08    


 


Band Neutrophils %  6 %  10/09/18  08:08    


 


Lymphocytes %  2 %  10/07/18  07:14    


 


Lymphocytes % (Manual)  6 %  10/09/18  08:08    


 


Monocytes %  4 %  10/07/18  07:14    


 


Monocytes % (Manual)  6 %  10/09/18  08:08    


 


Eosinophils %  0 %  10/07/18  07:14    


 


Eosinophils % (Manual)  1 %  10/09/18  08:08    


 


Basophils %  1 %  10/07/18  07:14    


 


Metamyelocytes %  6 %  10/09/18  08:08    


 


Myelocytes %  4 %  10/09/18  08:08    


 


Promyelocytes %  1 %  10/09/18  08:08    


 


Neutrophils #  24.0 k/uL (1.3-7.7)  H  10/07/18  07:14    


 


Neutrophils # (Manual)  29.30 k/uL (1.3-7.7)  H  10/09/18  08:08    


 


Lymphocytes #  0.5 k/uL (1.0-4.8)  L  10/07/18  07:14    


 


Lymphocytes # (Manual)  2.23 k/uL (1.0-4.8)   10/09/18  08:08    


 


Monocytes #  1.1 k/uL (0-1.0)  H  10/07/18  07:14    


 


Monocytes # (Manual)  2.23 k/uL (0-1.0)  H  10/09/18  08:08    


 


Eosinophils #  0.1 k/uL (0-0.7)   10/07/18  07:14    


 


Eosinophils # (Manual)  0.37 k/uL (0-0.7)   10/09/18  08:08    


 


Basophils #  0.3 k/uL (0-0.2)  H  10/07/18  07:14    


 


Metamyelocytes # (Man)  2.23 k/uL (0)  H  10/09/18  08:08    


 


Myelocytes # (Manual)  1.48 k/uL (0)  H  10/09/18  08:08    


 


Promyelocytes # (Man)  0.37 k/uL (0)  H  10/09/18  08:08    


 


Nucleated RBCs  0 /100 WBC (0-0)   10/09/18  08:08    


 


Manual Slide Review  Performed   10/09/18  08:08    


 


Toxic Granulation  Present   10/09/18  08:08    


 


Dohle Bodies  Present   10/05/18  05:04    


 


Polychromasia  Present   10/09/18  08:08    


 


Hypochromasia  Moderate   10/09/18  08:08    


 


Poikilocytosis  Slight   10/07/18  07:14    


 


Poikilocytosis (manual  Present   10/09/18  08:08    


 


Anisocytosis  Slight   10/09/18  08:08    


 


Microcytosis  Slight   10/09/18  08:08    


 


Crenated Cell  Present   10/05/18  05:04    


 


PT  11.3 sec (9.0-12.0)   10/04/18  12:56    


 


INR  1.2  (<1.2)  H  10/04/18  12:56    


 


APTT  25.0 sec (22.0-30.0)   10/04/18  12:56    


 


Sodium  143 mmol/L (137-145)   10/12/18  07:47    


 


Potassium  3.3 mmol/L (3.5-5.1)  L  10/12/18  07:47    


 


Chloride  109 mmol/L ()  H  10/12/18  07:47    


 


Carbon Dioxide  26 mmol/L (22-30)   10/12/18  07:47    


 


Anion Gap  8 mmol/L  10/12/18  07:47    


 


BUN  18 mg/dL (7-17)  H  10/12/18  07:47    


 


Creatinine  0.83 mg/dL (0.52-1.04)   10/12/18  07:47    


 


Est GFR (CKD-EPI)AfAm  86  (>60 ml/min/1.73 sqM)   10/12/18  07:47    


 


Est GFR (CKD-EPI)NonAf  75  (>60 ml/min/1.73 sqM)   10/12/18  07:47    


 


Glucose  293 mg/dL (74-99)  H  10/12/18  07:47    


 


POC Glucose (mg/dL)  362 mg/dL (75-99)  H  10/12/18  21:33    


 


POC Glu Operater ID  Marissa Guallpa   10/12/18  21:33    


 


Estimated Ave Glu mg/dL  169   10/05/18  05:04    


 


Hemoglobin A1c  7.5 % (4.0-6.0)  H  10/05/18  05:04    


 


Plasma Lactic Acid Artem  1.7 mmol/L (0.7-2.0)   10/04/18  12:56    


 


Calcium  7.9 mg/dL (8.4-10.2)  L  10/12/18  07:47    


 


Phosphorus  1.6 mg/dL (2.5-4.5)  L  10/09/18  08:08    


 


Magnesium  1.7 mg/dL (1.6-2.3)   10/12/18  07:47    


 


Total Bilirubin  0.6 mg/dL (0.2-1.3)   10/04/18  12:56    


 


AST  22 U/L (14-36)   10/04/18  12:56    


 


ALT  36 U/L (9-52)   10/04/18  12:56    


 


Alkaline Phosphatase  147 U/L ()  H  10/04/18  12:56    


 


Troponin I  <0.012 ng/mL (0.000-0.034)   10/05/18  20:00    


 


Total Protein  5.4 g/dL (6.3-8.2)  L  10/04/18  12:56    


 


Albumin  2.6 g/dL (3.5-5.0)  L  10/04/18  12:56    


 


Cortisol  63 ug/dL  10/06/18  05:29    


 


Urine Color  Yellow   10/04/18  18:50    


 


Urine Appearance  Cloudy  (Clear)  H  10/04/18  18:50    


 


Urine pH  5.0  (5.0-8.0)   10/04/18  18:50    


 


Ur Specific Gravity  1.025  (1.001-1.035)   10/04/18  18:50    


 


Urine Protein  Trace  (Negative)  H  10/04/18  18:50    


 


Urine Glucose (UA)  Negative  (Negative)   10/04/18  18:50    


 


Urine Ketones  Trace  (Negative)  H  10/04/18  18:50    


 


Urine Blood  Small  (Negative)  H  10/04/18  18:50    


 


Urine Nitrite  Negative  (Negative)   10/04/18  18:50    


 


Urine Bilirubin  Negative  (Negative)   10/04/18  18:50    


 


Urine Urobilinogen  <2.0 mg/dL (<2.0)   10/04/18  18:50    


 


Ur Leukocyte Esterase  Negative  (Negative)   10/04/18  18:50    


 


Urine RBC  11 /hpf (0-5)  H  10/04/18  18:50    


 


Urine WBC  4 /hpf (0-5)   10/04/18  18:50    


 


Ur Squamous Epith Cells  <1 /hpf (0-4)   10/04/18  18:50    


 


Urine Mucus  Rare /hpf (None)  H  10/04/18  18:50    


 


Vancomycin Trough  23.5 ug/mL  10/11/18  11:01    


 


IgG  510.0 mg/dL (700.0-1600.0)  L  10/07/18  07:14    


 


IgM  <16.9 mg/dL (40.0-280.0)  L  10/08/18  05:00    


 


Blood Type  O Positive   10/04/18  12:56    


 


Blood Type Recheck  CABO Indicated   10/04/18  12:56    


 


Antibody Screen  NEGATIVE   10/04/18  12:56    


 


Spec Expiration Date  10/04/2018 - 1425   10/04/18  12:56    








 Microbiology





10/04/18 12:56   Blood   Blood Culture - Final


                            No Growth after 144 hours


10/04/18 18:50   Urine,Catheterized   Urine Culture - Final











Assessment and Plan


(1) Febrile neutropenia


Narrative/Plan: 


63-year-old  female, retired nurse, presents to Hospital feeling very 

poorly with evidence of sepsis.  She has received her first course of 

chemotherapy and presents with febrile neutropenia.  She was also hypotensive 

requiring fluid resuscitation and vasopressor therapy.  She is now considerably 

improved.  She is no longer requiring vasopressor r support and is feeling 

considerably better.  Hematology oncology is following and transfusions as per 

their protocol.


Cultures are in process and is receiving antibiotic therapy with cefepime, 

metronidazole and vancomycin at this time pending further culture data.


The patient's bone marrow recovery and cultures will further direct the course 

of antibiotic therapy.  IgG level can be obtained to further help direct 

courses of treatment also.


10/08/2018 the patient is feeling better today.  Still has some generalized 

weakness and is about some lower extremity edema.  Her IV fluid was 

discontinued now that her carbon dioxide has normalized.


Continue with elevation the limbs all she is at rest for the edema.


Her febrile neutropenia has resolved and she is doing considerably better at 

this time.  If she gets ready for discharge to home she will be able to 

transition to a course of oral levofloxacin 500 mg a day for 7 days in 

conjunction with the hematologist.  Please note that her ciprofloxacin ALLERGY 

was of heartburn.  Her hypokalemia is being supplemented and hopefully with 

discontinuation of fluid she'll have rapid correction of this.  Oncology is 

following and are making plans for her next round of chemotherapy.


IgG level is minimally low if she has further difficulties with infections may 

benefit from intravenous immunoglobulin infusion.


10/12/2018 patient is had further improvement except for her metabolic 

abnormalities.  She's had hypokalemia and elevated blood glucose requiring short

-term insulin drip.  Medication and being transitioned and likely will be 

discharged home in the next relatively short period of time.  7 days of 

Levaquin will be utilized to finish her treatment of her sepsis and neutropenia.





Current Visit: Yes   Status: Acute   Code(s): D70.9 - NEUTROPENIA, UNSPECIFIED; 

R50.81 - FEVER PRESENTING WITH CONDITIONS CLASSIFIED ELSEWHERE   SNOMED Code(s)

: 335496601


   





(2) Bicytopenia


Current Visit: Yes   Status: Acute   Code(s): D75.89 - OTHER SPECIFIED DISEASES 

OF BLOOD AND BLOOD-FORMING ORGANS   SNOMED Code(s): 048645149


   





(3) Non Hodgkin's lymphoma


Current Visit: No   Status: Acute   Priority: Medium   Code(s): C85.90 - NON-

HODGKIN LYMPHOMA, UNSPECIFIED, UNSPECIFIED SITE   SNOMED Code(s): 945999093

## 2018-10-12 NOTE — P.PN
Subjective


Progress Note Date: 10/12/18


Principal diagnosis: 





NHL





Patient seen and examined, she is sitting up at end of bed and would like to go 

home. She has no acute or new complaints, overall feeling well. 





Objective





- Vital Signs


Vital signs: 


 Vital Signs











Temp  98.2 F   10/12/18 12:24


 


Pulse  62   10/12/18 12:24


 


Resp  16   10/12/18 12:24


 


BP  164/83   10/12/18 12:24


 


Pulse Ox  96   10/12/18 12:24








 Intake & Output











 10/11/18 10/12/18 10/12/18





 18:59 06:59 18:59


 


Intake Total  060.466 0286.055


 


Balance  458.872 3836.055


 


Weight  76 kg 76 kg


 


Intake:   


 


  Intake, IV Titration  296.960 343.055





  Amount   


 


    Cefepime 2 gm In Sodium  100 50





    Chloride 0.9% 50 ml @ 100   





    mls/hr IVPB Q8H [facility] Rx#:   





    [id number]   


 


    Insulin Regular 100 unit  96.960 43.055





    In Sodium Chloride 0.9%   





    100 ml @ Titrate IV .Q0M   





    [facility] Rx#:[id number]   


 


    Magnesium Sulfate-D5w Pmx  100 





    1 gm In Dextrose/Water 1   





    100ml.bag @ 100 mls/hr   





    IVPB Q1H [facility] Rx#:   





    [id number]   


 


    Vancomycin 1,250 mg In   250





    Sodium Chloride 0.9% 250   





    ml @ 125 mls/hr IVPB Q18H   





    [facility] Rx#:[id number]   


 


  Oral   1150


 


Other:   


 


  Voiding Method Toilet Toilet Toilet


 


  # Voids 2  3














- Exam





Constitutional


General appearance: Present: average body habitus, cooperative, no acute 

distress





- EENT


Eyes: Present: EOMI





- Respiratory


Respiratory: bilateral: CTA





- Cardiovascular


Heart sounds: normal: S1, S2





- Peripheral edema


  ** leg


Peripheral Edema: bilateral: 1+, Pitting





- Gastrointestinal


General gastrointestinal: Present: normal bowel sounds, soft





- Integumentary


Integumentary: Present: pale





- Neurologic


Neurologic: Present: CNII-XII intact





- Musculoskeletal


Musculoskeletal: Present: generalized weakness, strength equal bilaterally





- Psychiatric


Psychiatric: Present: A&O x's 3, appropriate affect, intact judgment & insight





- Labs


CBC & Chem 7: 


 10/09/18 08:08





 10/12/18 07:47


Labs: 


 Abnormal Lab Results - Last 24 Hours (Table)











  10/11/18 10/11/18 10/11/18 Range/Units





  11:01 17:21 19:44 


 


Potassium     (3.5-5.1)  mmol/L


 


Chloride     ()  mmol/L


 


BUN     (7-17)  mg/dL


 


Glucose     (74-99)  mg/dL


 


POC Glucose (mg/dL)   491 H  578 H  (75-99)  mg/dL


 


Calcium     (8.4-10.2)  mg/dL


 


Magnesium  1.3 L    (1.6-2.3)  mg/dL














  10/11/18 10/11/18 10/11/18 Range/Units





  20:18 22:20 22:48 


 


Potassium    3.0 L  (3.5-5.1)  mmol/L


 


Chloride     ()  mmol/L


 


BUN     (7-17)  mg/dL


 


Glucose     (74-99)  mg/dL


 


POC Glucose (mg/dL)  581 H  474 H   (75-99)  mg/dL


 


Calcium     (8.4-10.2)  mg/dL


 


Magnesium     (1.6-2.3)  mg/dL














  10/11/18 10/11/18 10/12/18 Range/Units





  23:00 23:34 00:00 


 


Potassium     (3.5-5.1)  mmol/L


 


Chloride     ()  mmol/L


 


BUN     (7-17)  mg/dL


 


Glucose     (74-99)  mg/dL


 


POC Glucose (mg/dL)  399 H  365 H  320 H  (75-99)  mg/dL


 


Calcium     (8.4-10.2)  mg/dL


 


Magnesium     (1.6-2.3)  mg/dL














  10/12/18 10/12/18 10/12/18 Range/Units





  00:39 01:01 01:37 


 


Potassium     (3.5-5.1)  mmol/L


 


Chloride     ()  mmol/L


 


BUN     (7-17)  mg/dL


 


Glucose     (74-99)  mg/dL


 


POC Glucose (mg/dL)  266 H  263 H  256 H  (75-99)  mg/dL


 


Calcium     (8.4-10.2)  mg/dL


 


Magnesium     (1.6-2.3)  mg/dL














  10/12/18 10/12/18 10/12/18 Range/Units





  02:18 04:15 05:57 


 


Potassium     (3.5-5.1)  mmol/L


 


Chloride     ()  mmol/L


 


BUN     (7-17)  mg/dL


 


Glucose     (74-99)  mg/dL


 


POC Glucose (mg/dL)  226 H  170 H  288 H  (75-99)  mg/dL


 


Calcium     (8.4-10.2)  mg/dL


 


Magnesium     (1.6-2.3)  mg/dL














  10/12/18 10/12/18 10/12/18 Range/Units





  07:47 08:01 10:12 


 


Potassium  3.3 L    (3.5-5.1)  mmol/L


 


Chloride  109 H    ()  mmol/L


 


BUN  18 H    (7-17)  mg/dL


 


Glucose  293 H    (74-99)  mg/dL


 


POC Glucose (mg/dL)   332 H  277 H  (75-99)  mg/dL


 


Calcium  7.9 L    (8.4-10.2)  mg/dL


 


Magnesium     (1.6-2.3)  mg/dL














  10/12/18 10/12/18 10/12/18 Range/Units





  11:19 12:09 14:19 


 


Potassium     (3.5-5.1)  mmol/L


 


Chloride     ()  mmol/L


 


BUN     (7-17)  mg/dL


 


Glucose     (74-99)  mg/dL


 


POC Glucose (mg/dL)  179 H  188 H  299 H  (75-99)  mg/dL


 


Calcium     (8.4-10.2)  mg/dL


 


Magnesium     (1.6-2.3)  mg/dL














Assessment and Plan


Plan: 





Assessment and Plan


(1) Antineoplastic chemotherapy induced anemia


Narrative/Plan: 


Hgb did not require transfusion while inpatient.  


 - She will be seen in office next week therefore we will recheck hemoglobin 


Current Visit: Yes   Status: Acute   Priority: Medium   Code(s): D64.81 - 

ANEMIA DUE TO ANTINEOPLASTIC CHEMOTHERAPY; T45.1X5A - ADVERSE EFFECT OF 

ANTINEOPLASTIC AND IMMUNOSUP DRUGS, INIT   SNOMED Code(s): 228992934


   





(2) Neutrophilic leukocytosis


Narrative/Plan: 


Secondary to GCSF


 - Will continue to monitor CBC as outpatient


Current Visit: Yes   Status: Acute   Priority: Low   Code(s): D72.9 - DISORDER 

OF WHITE BLOOD CELLS, UNSPECIFIED   SNOMED Code(s): 627384616


   





(3) Unsteady gait


Narrative/Plan: 


Pt did very well with PT/OT, ambulating with wheeled walker


She is able to transport self safely from bed to chair and chair to standing


Current Visit: Yes   Status: Acute   Priority: High   Code(s): R26.81 - 

UNSTEADINESS ON FEET   SNOMED Code(s): 48997639


   





(4) Non Hodgkin's lymphoma


Narrative/Plan: 


Pt treatment will be delayed 1 week per Dr. Dumont.  Plan is for dose adjustment.  


Updated f/u appt in the DC summary 


Current Visit: No   Status: Acute   Priority: Medium   Code(s): C85.90 - NON-

HODGKIN LYMPHOMA, UNSPECIFIED, UNSPECIFIED SITE   SNOMED Code(s): 028152554





ok for discharge from Oncology standpoint

## 2018-10-13 VITALS — SYSTOLIC BLOOD PRESSURE: 161 MMHG | RESPIRATION RATE: 15 BRPM | TEMPERATURE: 98 F | DIASTOLIC BLOOD PRESSURE: 78 MMHG

## 2018-10-13 VITALS — HEART RATE: 62 BPM

## 2018-10-13 LAB
ANION GAP SERPL CALC-SCNC: 7 MMOL/L
BUN SERPL-SCNC: 16 MG/DL (ref 7–17)
CALCIUM SPEC-MCNC: 8.1 MG/DL (ref 8.4–10.2)
CHLORIDE SERPL-SCNC: 111 MMOL/L (ref 98–107)
CO2 SERPL-SCNC: 26 MMOL/L (ref 22–30)
GLUCOSE BLD-MCNC: 165 MG/DL (ref 75–99)
GLUCOSE BLD-MCNC: 171 MG/DL (ref 75–99)
GLUCOSE BLD-MCNC: 175 MG/DL (ref 75–99)
GLUCOSE BLD-MCNC: 185 MG/DL (ref 75–99)
GLUCOSE SERPL-MCNC: 155 MG/DL (ref 74–99)
POTASSIUM SERPL-SCNC: 3.4 MMOL/L (ref 3.5–5.1)
SODIUM SERPL-SCNC: 144 MMOL/L (ref 137–145)

## 2018-10-13 RX ADMIN — INSULIN DETEMIR SCH UNIT: 100 INJECTION, SOLUTION SUBCUTANEOUS at 08:50

## 2018-10-13 RX ADMIN — HYDROCORTISONE SODIUM SUCCINATE SCH MG: 100 INJECTION, POWDER, FOR SOLUTION INTRAMUSCULAR; INTRAVENOUS at 08:55

## 2018-10-13 RX ADMIN — CEFEPIME HYDROCHLORIDE SCH MLS/HR: 2 INJECTION, POWDER, FOR SOLUTION INTRAVENOUS at 05:57

## 2018-10-13 RX ADMIN — POTASSIUM CHLORIDE SCH MEQ: 750 TABLET, EXTENDED RELEASE ORAL at 08:55

## 2018-10-13 RX ADMIN — SODIUM CHLORIDE SCH MLS/HR: 9 INJECTION, SOLUTION INTRAVENOUS at 00:00

## 2018-10-13 RX ADMIN — GLIMEPIRIDE SCH MG: 2 TABLET ORAL at 08:55

## 2018-10-13 RX ADMIN — INSULIN ASPART SCH UNIT: 100 INJECTION, SOLUTION INTRAVENOUS; SUBCUTANEOUS at 08:49

## 2018-10-13 RX ADMIN — FUROSEMIDE SCH MG: 20 TABLET ORAL at 08:54

## 2018-10-13 RX ADMIN — GABAPENTIN SCH MG: 100 CAPSULE ORAL at 08:54

## 2018-10-13 RX ADMIN — PANTOPRAZOLE SODIUM SCH MG: 40 TABLET, DELAYED RELEASE ORAL at 08:54

## 2018-10-13 RX ADMIN — CITALOPRAM HYDROBROMIDE SCH MG: 20 TABLET ORAL at 08:55

## 2018-10-13 RX ADMIN — HYDROCORTISONE SODIUM SUCCINATE SCH MG: 100 INJECTION, POWDER, FOR SOLUTION INTRAMUSCULAR; INTRAVENOUS at 00:00

## 2018-10-13 RX ADMIN — LEVOTHYROXINE SODIUM SCH MCG: 0.12 TABLET ORAL at 05:58

## 2018-11-27 ENCOUNTER — HOSPITAL ENCOUNTER (INPATIENT)
Dept: HOSPITAL 47 - EC | Age: 63
LOS: 2 days | Discharge: HOME HEALTH | DRG: 193 | End: 2018-11-29
Attending: FAMILY MEDICINE | Admitting: FAMILY MEDICINE
Payer: MEDICARE

## 2018-11-27 VITALS — BODY MASS INDEX: 28.5 KG/M2

## 2018-11-27 DIAGNOSIS — E89.0: ICD-10-CM

## 2018-11-27 DIAGNOSIS — D64.81: ICD-10-CM

## 2018-11-27 DIAGNOSIS — Z87.442: ICD-10-CM

## 2018-11-27 DIAGNOSIS — G47.33: ICD-10-CM

## 2018-11-27 DIAGNOSIS — R29.6: ICD-10-CM

## 2018-11-27 DIAGNOSIS — K52.9: ICD-10-CM

## 2018-11-27 DIAGNOSIS — J18.9: Primary | ICD-10-CM

## 2018-11-27 DIAGNOSIS — C85.90: ICD-10-CM

## 2018-11-27 DIAGNOSIS — Z80.3: ICD-10-CM

## 2018-11-27 DIAGNOSIS — I25.2: ICD-10-CM

## 2018-11-27 DIAGNOSIS — Z90.710: ICD-10-CM

## 2018-11-27 DIAGNOSIS — Z79.899: ICD-10-CM

## 2018-11-27 DIAGNOSIS — Z88.8: ICD-10-CM

## 2018-11-27 DIAGNOSIS — F41.9: ICD-10-CM

## 2018-11-27 DIAGNOSIS — E78.5: ICD-10-CM

## 2018-11-27 DIAGNOSIS — Z79.4: ICD-10-CM

## 2018-11-27 DIAGNOSIS — D70.9: ICD-10-CM

## 2018-11-27 DIAGNOSIS — I11.0: ICD-10-CM

## 2018-11-27 DIAGNOSIS — Z88.1: ICD-10-CM

## 2018-11-27 DIAGNOSIS — Z91.041: ICD-10-CM

## 2018-11-27 DIAGNOSIS — T38.0X5A: ICD-10-CM

## 2018-11-27 DIAGNOSIS — Z88.5: ICD-10-CM

## 2018-11-27 DIAGNOSIS — Z80.8: ICD-10-CM

## 2018-11-27 DIAGNOSIS — I50.9: ICD-10-CM

## 2018-11-27 DIAGNOSIS — J96.01: ICD-10-CM

## 2018-11-27 DIAGNOSIS — Z80.0: ICD-10-CM

## 2018-11-27 DIAGNOSIS — E11.65: ICD-10-CM

## 2018-11-27 DIAGNOSIS — D63.8: ICD-10-CM

## 2018-11-27 DIAGNOSIS — T45.1X5A: ICD-10-CM

## 2018-11-27 DIAGNOSIS — Z79.890: ICD-10-CM

## 2018-11-27 LAB
ALBUMIN SERPL-MCNC: 3 G/DL (ref 3.5–5)
ALP SERPL-CCNC: 184 U/L (ref 38–126)
ALT SERPL-CCNC: 27 U/L (ref 9–52)
ANION GAP SERPL CALC-SCNC: 10 MMOL/L
APTT BLD: 24 SEC (ref 22–30)
AST SERPL-CCNC: 19 U/L (ref 14–36)
BUN SERPL-SCNC: 16 MG/DL (ref 7–17)
CALCIUM SPEC-MCNC: 9.2 MG/DL (ref 8.4–10.2)
CELLS COUNTED: 200
CHLORIDE SERPL-SCNC: 101 MMOL/L (ref 98–107)
CK SERPL-CCNC: <20 U/L (ref 30–135)
CO2 SERPL-SCNC: 28 MMOL/L (ref 22–30)
EOSINOPHIL # BLD MANUAL: 0.76 K/UL (ref 0–0.7)
ERYTHROCYTE [DISTWIDTH] IN BLOOD BY AUTOMATED COUNT: 2.92 M/UL (ref 3.8–5.4)
ERYTHROCYTE [DISTWIDTH] IN BLOOD: 23.1 % (ref 11.5–15.5)
GLUCOSE SERPL-MCNC: 317 MG/DL (ref 74–99)
GLUCOSE UR QL: (no result)
HCT VFR BLD AUTO: 27.1 % (ref 34–46)
HGB BLD-MCNC: 8 GM/DL (ref 11.4–16)
INR PPP: 0.9 (ref ?–1.2)
LYMPHOCYTES # BLD MANUAL: 0.95 K/UL (ref 1–4.8)
MCH RBC QN AUTO: 27.4 PG (ref 25–35)
MCHC RBC AUTO-ENTMCNC: 29.5 G/DL (ref 31–37)
MCV RBC AUTO: 93.1 FL (ref 80–100)
METAMYELOCYTES # BLD: 0.29 K/UL
MONOCYTES # BLD MANUAL: 0.95 K/UL (ref 0–1)
MYELOCYTES # BLD MANUAL: 1.05 K/UL
NEUTROPHILS NFR BLD MANUAL: 56 %
NEUTS SEG # BLD MANUAL: 5.7 K/UL (ref 1.3–7.7)
PH UR: 5.5 [PH] (ref 5–8)
PLATELET # BLD AUTO: 403 K/UL (ref 150–450)
POTASSIUM SERPL-SCNC: 4 MMOL/L (ref 3.5–5.1)
PROT SERPL-MCNC: 5.4 G/DL (ref 6.3–8.2)
PT BLD: 9.5 SEC (ref 9–12)
SODIUM SERPL-SCNC: 139 MMOL/L (ref 137–145)
SP GR UR: 1.01 (ref 1–1.03)
TROPONIN I SERPL-MCNC: <0.012 NG/ML (ref 0–0.03)
UROBILINOGEN UR QL STRIP: 2 MG/DL (ref ?–2)
WBC # BLD AUTO: 9.5 K/UL (ref 3.8–10.6)

## 2018-11-27 PROCEDURE — 36415 COLL VENOUS BLD VENIPUNCTURE: CPT

## 2018-11-27 PROCEDURE — 71046 X-RAY EXAM CHEST 2 VIEWS: CPT

## 2018-11-27 PROCEDURE — 81003 URINALYSIS AUTO W/O SCOPE: CPT

## 2018-11-27 PROCEDURE — 93005 ELECTROCARDIOGRAM TRACING: CPT

## 2018-11-27 PROCEDURE — 96374 THER/PROPH/DIAG INJ IV PUSH: CPT

## 2018-11-27 PROCEDURE — 85025 COMPLETE CBC W/AUTO DIFF WBC: CPT

## 2018-11-27 PROCEDURE — 71275 CT ANGIOGRAPHY CHEST: CPT

## 2018-11-27 PROCEDURE — 99285 EMERGENCY DEPT VISIT HI MDM: CPT

## 2018-11-27 PROCEDURE — 84484 ASSAY OF TROPONIN QUANT: CPT

## 2018-11-27 PROCEDURE — 82553 CREATINE MB FRACTION: CPT

## 2018-11-27 PROCEDURE — 85730 THROMBOPLASTIN TIME PARTIAL: CPT

## 2018-11-27 PROCEDURE — 85379 FIBRIN DEGRADATION QUANT: CPT

## 2018-11-27 PROCEDURE — 70553 MRI BRAIN STEM W/O & W/DYE: CPT

## 2018-11-27 PROCEDURE — 80053 COMPREHEN METABOLIC PANEL: CPT

## 2018-11-27 PROCEDURE — 82550 ASSAY OF CK (CPK): CPT

## 2018-11-27 PROCEDURE — 85610 PROTHROMBIN TIME: CPT

## 2018-11-27 PROCEDURE — 96375 TX/PRO/DX INJ NEW DRUG ADDON: CPT

## 2018-11-27 NOTE — ED
General Adult HPI





<Jaswant Rivera - Last Filed: 11/28/18 00:50>





- General


Source: patient


Mode of arrival: ambulatory


Limitations: no limitations





<Flower Rene - Last Filed: 11/28/18 01:28>





- General


Chief complaint: Shortness of Breath


Stated complaint: Dr islas, low oxygen levels


Time Seen by Provider: 11/27/18 18:00





- History of Present Illness


Initial comments: 


63-year-old female patient presents to emergency department today for 

evaluation of low oxygen saturation.  Patient is currently being treated for non

-Hodgkin's lymphoma and does have physical therapy and home care nurse seeing 

coming into the home.  States that before her physical therapy session this 

finding attempting to get her oxygen saturation however could not get it out of 

the 80s.  Patient denies any significant shortness of breath however states 

when she takes a deep breath she does feel pressure over the chest.  She states 

she is able to perform her usual activities without much difficulty or dyspnea.

  She denies any swelling to the arms or legs.  Patient believes she is 

currently receiving chemotherapy but she is unsure what her exact regimen 

details.  She is reporting some urinary frequency and urinary urgency.  States 

she is voiding frequent small amounts.  She is concerned she may have urinary 

tract infection.  She denies any fevers or chills.  Denies any calf pain or 

tenderness. Patient denies any recent rash, chest pain, abdominal pain, nausea, 

vomiting, diarrhea, constipation, back pain, numbness, tingling, dizziness, 

weakness, headache, visual changes, or any other complaints.


 (Flower Rene)





- Related Data


 Home Medications











 Medication  Instructions  Recorded  Confirmed


 


RX: Furosemide 20 mg PO BID 07/24/14 11/27/18


 


RX: Gabapentin 100 mg PO QAM 07/24/14 11/27/18


 


RX: Potassium Chloride [Klor-Con 10 meq PO DAILY 07/24/14 11/27/18





10]   


 


RX: Gabapentin [Neurontin] 200 mg PO HS 10/09/15 11/27/18


 


RX: Levothyroxine Sodium 125 mcg PO MOTUWETHFRSA 10/09/15 11/27/18





[Synthroid]   


 


RX: Multivitamins, Thera 1 tab PO DAILY 03/28/18 11/27/18





[Multivitamin (formulary)]   


 


RX: ALPRAZolam [Xanax] 0.5 - 1 mg PO DAILY PRN 04/16/18 11/27/18


 


RX: Atorvastatin Calcium [Lipitor] 40 mg PO HS 04/16/18 11/27/18


 


RX: Citalopram Hydrobromide 20 mg PO DAILY 08/26/18 11/27/18





[CeleXA]   


 


RX: Ondansetron HCl [Zofran] 8 mg PO Q6H PRN 10/04/18 11/27/18


 


RX: Pantoprazole [Protonix] 40 mg PO DAILY 10/04/18 11/27/18


 


Super B Complex 1 tab PO DAILY 10/04/18 11/27/18


 


HYDROcodone/APAP 7.5-325MG [Norco 1 tab PO Q6HR PRN 11/27/18 11/27/18





7.5-325]   


 


Loratadine [Claritin] 10 mg PO DAILY 11/27/18 11/27/18


 


RX: INSULIN LISPRO (HumaLOG) See Protocol SQ ACHS PRN 11/27/18 11/27/18





[humaLOG]   


 


RX: Insulin Detemir [Levemir] 30 unit SQ AS DIRECTED 11/27/18 11/27/18








 Previous Rx's











 Medication  Instructions  Recorded


 


RX: Acetaminophen Tab [Tylenol] 650 mg PO Q4HR PRN  tab 10/12/18


 


RX: Glimepiride [Amaryl] 2 mg PO DAILY #0 10/12/18


 


RX: metFORMIN HCL 1,000 mg PO BID #0 10/12/18











 Allergies











Allergy/AdvReac Type Severity Reaction Status Date / Time


 


haloperidol [From Haldol] Allergy  Unknown Verified 11/27/18 18:06


 


haloperidol lactate Allergy  Unknown Verified 11/27/18 18:06





[From Haldol]     


 


Iodinated Contrast- Oral and Allergy  Rash/Hives Verified 11/27/18 18:06





IV Dye     





[Iodinated Contrast Media -     





IV Dye]     


 


ciprofloxacin [From Cipro] AdvReac  Heartburn Verified 11/27/18 18:06


 


steri-strips AdvReac Severe sore Uncoded 10/04/18 12:30














Review of Systems


ROS Other: All systems not noted in ROS Statement are negative.





<Jaswant Rivera - Last Filed: 11/28/18 00:50>


ROS Other: All systems not noted in ROS Statement are negative.





<Flower Rene - Last Filed: 11/28/18 01:28>


ROS Statement: 


Those systems with pertinent positive or pertinent negative responses have been 

documented in the HPI.








Past Medical History


Past Medical History: Cancer, Heart Failure, Diabetes Mellitus, Hyperlipidemia, 

Hypertension, Sleep Apnea/CPAP/BIPAP, Thyroid Disorder


Additional Past Medical History / Comment(s): B-cell non-Hodgkin's lymphoma 

stage IV, diabetes mellitus,daughter stated pt had first chemo mon 9-24-18 

hypertension, hyperlipidemia, obstructive sleep apnea, hypothyroidism post 

thyroidectomy, closed head injury in 1989, difficulty with mobility and 

ambulation the patient has been using a walker. has a history of kidney stones


Last Myocardial Infarction Date:: 1989


History of Any Multi-Drug Resistant Organisms: None Reported


Past Surgical History: Cholecystectomy, Hysterectomy, Tonsillectomy


Additional Past Surgical History / Comment(s): Thyroidectomy, lymph node and 

liver biopsies. port a cath 9-10-18, right ureteroscopy with lithotripsy 11/2015


Past Anesthesia/Blood Transfusion Reactions: Postoperative Nausea & Vomiting (

PONV)


Past Psychological History: Anxiety


Smoking Status: Never smoker


Past Alcohol Use History: None Reported


Past Drug Use History: Marijuana





- Past Family History


  ** Mother


Family Medical History: Cancer


Additional Family Medical History / Comment(s): breast CA





  ** Father


Family Medical History: Cancer


Additional Family Medical History / Comment(s): colon CA





  ** Sister(s)


Family Medical History: Cancer


Additional Family Medical History / Comment(s): Skin cancer





<Flower Rene M - Last Filed: 11/28/18 01:28>





General Exam


Limitations: no limitations


General appearance: alert, in no apparent distress, other (This is a well-

developed, well-nourished adult female patient in no acute distress.  Vital 

signs upon presentation are temperature 98.2F, pulse 95, respirations 18, 

blood pressure 129/76, pulse ox 96% on room air.)


Eye exam: Present: normal appearance, PERRL, EOMI, other (Pale conjunctiva).  

Absent: scleral icterus, conjunctival injection, periorbital swelling


ENT exam: Present: normal exam, normal oropharynx, mucous membranes moist


Respiratory exam: Present: normal lung sounds bilaterally.  Absent: respiratory 

distress, wheezes, rales, rhonchi, stridor


Cardiovascular Exam: Present: regular rate, normal rhythm, normal heart sounds.

  Absent: systolic murmur, diastolic murmur, rubs, gallop, clicks


GI/Abdominal exam: Present: soft, normal bowel sounds.  Absent: distended, 

tenderness, guarding, rebound, rigid


Neurological exam: Present: alert, oriented X3, CN II-XII intact


Psychiatric exam: Present: normal affect, normal mood


Skin exam: Present: warm, dry, intact, normal color.  Absent: rash





<Flower Rene - Last Filed: 11/28/18 01:28>





Course





<Jaswant Rivera - Last Filed: 11/28/18 00:50>





<Flower Rene - Last Filed: 11/28/18 01:28>


 Vital Signs











  11/27/18 11/27/18 11/27/18





  16:15 21:20 22:21


 


Temperature 98 F  


 


Pulse Rate 95  


 


Respiratory 18  





Rate   


 


Blood Pressure 129/76  120/73


 


O2 Sat by Pulse 96 97 86 L





Oximetry   














- Reevaluation(s)


Reevaluation #1: 





11/28/18 00:44


Patient reevaluated by myself, Dr. Rivera.  Patient resting comfortably in bed.  

Lung sounds are clear.  Patient and family updated on results.  Case was 

discussed in detail with Dr. Espinoza who recommends medical admission and he will 

consult.  He recommends no antibiotics at this time.  Does request pulmonary 

consult.  Dr. Villegas has been paged again.


11/28/18 00:50


Case was discussed with Dr. Villegas, who will admit. (Jaswant Rivera)





EKG Findings





- EKG Comments:


EKG Findings:: EKG obtained at 1720 shows normal sinus rhythm with a left axis 

deviation.  Ventricular rate is 92, NC interval 172, QRS duration 90, , 

.





<Flower Rene - Last Filed: 11/28/18 01:28>





Medical Decision Making





- Lab Data


Result diagrams: 


 11/27/18 17:14





 11/27/18 17:14





<Jaswant Rivera - Last Filed: 11/28/18 00:50>





- Lab Data


Result diagrams: 


 11/27/18 17:14





 11/27/18 17:14





- Radiology Data


Radiology results: report reviewed, image reviewed





<Flower Rene - Last Filed: 11/28/18 01:28>





- Medical Decision Making


63-year-old female patient presents to the emergency department today for 

evaluation of decreased oxygen saturation.  Physical examination upon arrival 

patient was breathing without difficulty.  Lungs are clear to auscultation with 

good air movement.  Patient did seem pale and did have Conjunctiva.  Labs 

reviewed and did reveal decreased hemoglobin to 8.0.  Remainder of labs are 

unremarkable.  EKG showed normal sinus rhythm.  Patient did have some oxygen 

saturation in the mid 80s while here in the department.  D-dimer did come back 

at 1.3.  We did CT angio of the chest which showed no evidence of pulmonary 

embolism however did show evidence of pneumonitis.  My attending Dr. Rivera did 

see and evaluate the patient, we will admit to the hospital for pulmonary 

consultation and further evaluation by her physician and oncologist.


 (Flower Rene)





- Lab Data


 Lab Results











  11/27/18 11/27/18 11/27/18 Range/Units





  17:14 17:14 17:14 


 


WBC   9.5   (3.8-10.6)  k/uL


 


RBC   2.92 L   (3.80-5.40)  m/uL


 


Hgb   8.0 L D   (11.4-16.0)  gm/dL


 


Hct   27.1 L   (34.0-46.0)  %


 


MCV   93.1  D   (80.0-100.0)  fL


 


MCH   27.4   (25.0-35.0)  pg


 


MCHC   29.5 L   (31.0-37.0)  g/dL


 


RDW   23.1 H   (11.5-15.5)  %


 


Plt Count   403   (150-450)  k/uL


 


Neutrophils % (Manual)   56   %


 


Band Neutrophils %   5   %


 


Lymphocytes % (Manual)   10   %


 


Monocytes % (Manual)   10   %


 


Eosinophils % (Manual)   8   %


 


Metamyelocytes %   3   %


 


Myelocytes %   11   %


 


Neutrophils # (Manual)   5.70   (1.3-7.7)  k/uL


 


Lymphocytes # (Manual)   0.95 L   (1.0-4.8)  k/uL


 


Monocytes # (Manual)   0.95   (0-1.0)  k/uL


 


Eosinophils # (Manual)   0.76 H   (0-0.7)  k/uL


 


Metamyelocytes # (Man)   0.29 H   (0)  k/uL


 


Myelocytes # (Manual)   1.05 H   (0)  k/uL


 


Nucleated RBCs   0   (0-0)  /100 WBC


 


Manual Slide Review   Performed   


 


Toxic Granulation   Present   


 


Large Platelets   Present   


 


Polychromasia   Present   


 


Hypochromasia   Marked   


 


Poikilocytosis   Slight   


 


Poikilocytosis (manual   Present   


 


Anisocytosis   Moderate   


 


Macrocytosis   Slight   


 


PT     (9.0-12.0)  sec


 


INR     (<1.2)  


 


APTT     (22.0-30.0)  sec


 


D-Dimer     (<0.60)  mg/L FEU


 


Sodium    139  (137-145)  mmol/L


 


Potassium    4.0  (3.5-5.1)  mmol/L


 


Chloride    101  ()  mmol/L


 


Carbon Dioxide    28  (22-30)  mmol/L


 


Anion Gap    10  mmol/L


 


BUN    16  (7-17)  mg/dL


 


Creatinine    0.78  (0.52-1.04)  mg/dL


 


Est GFR (CKD-EPI)AfAm    >90  (>60 ml/min/1.73 sqM)  


 


Est GFR (CKD-EPI)NonAf    80  (>60 ml/min/1.73 sqM)  


 


Glucose    317 H  (74-99)  mg/dL


 


Calcium    9.2  (8.4-10.2)  mg/dL


 


Total Bilirubin    0.5  (0.2-1.3)  mg/dL


 


AST    19  (14-36)  U/L


 


ALT    27  (9-52)  U/L


 


Alkaline Phosphatase    184 H  ()  U/L


 


Total Creatine Kinase  <20 L    ()  U/L


 


CK-MB (CK-2)  <0.2    (0.0-2.4)  ng/mL


 


CK-MB (CK-2) Rel Index      


 


Troponin I  <0.012    (0.000-0.034)  ng/mL


 


Total Protein    5.4 L  (6.3-8.2)  g/dL


 


Albumin    3.0 L  (3.5-5.0)  g/dL


 


Urine Color     


 


Urine Appearance     (Clear)  


 


Urine pH     (5.0-8.0)  


 


Ur Specific Gravity     (1.001-1.035)  


 


Urine Protein     (Negative)  


 


Urine Glucose (UA)     (Negative)  


 


Urine Ketones     (Negative)  


 


Urine Blood     (Negative)  


 


Urine Nitrite     (Negative)  


 


Urine Bilirubin     (Negative)  


 


Urine Urobilinogen     (<2.0)  mg/dL


 


Ur Leukocyte Esterase     (Negative)  














  11/27/18 11/27/18 11/27/18 Range/Units





  17:14 17:14 19:38 


 


WBC     (3.8-10.6)  k/uL


 


RBC     (3.80-5.40)  m/uL


 


Hgb     (11.4-16.0)  gm/dL


 


Hct     (34.0-46.0)  %


 


MCV     (80.0-100.0)  fL


 


MCH     (25.0-35.0)  pg


 


MCHC     (31.0-37.0)  g/dL


 


RDW     (11.5-15.5)  %


 


Plt Count     (150-450)  k/uL


 


Neutrophils % (Manual)     %


 


Band Neutrophils %     %


 


Lymphocytes % (Manual)     %


 


Monocytes % (Manual)     %


 


Eosinophils % (Manual)     %


 


Metamyelocytes %     %


 


Myelocytes %     %


 


Neutrophils # (Manual)     (1.3-7.7)  k/uL


 


Lymphocytes # (Manual)     (1.0-4.8)  k/uL


 


Monocytes # (Manual)     (0-1.0)  k/uL


 


Eosinophils # (Manual)     (0-0.7)  k/uL


 


Metamyelocytes # (Man)     (0)  k/uL


 


Myelocytes # (Manual)     (0)  k/uL


 


Nucleated RBCs     (0-0)  /100 WBC


 


Manual Slide Review     


 


Toxic Granulation     


 


Large Platelets     


 


Polychromasia     


 


Hypochromasia     


 


Poikilocytosis     


 


Poikilocytosis (manual     


 


Anisocytosis     


 


Macrocytosis     


 


PT  9.5    (9.0-12.0)  sec


 


INR  0.9    (<1.2)  


 


APTT  24.0    (22.0-30.0)  sec


 


D-Dimer   1.32 H   (<0.60)  mg/L FEU


 


Sodium     (137-145)  mmol/L


 


Potassium     (3.5-5.1)  mmol/L


 


Chloride     ()  mmol/L


 


Carbon Dioxide     (22-30)  mmol/L


 


Anion Gap     mmol/L


 


BUN     (7-17)  mg/dL


 


Creatinine     (0.52-1.04)  mg/dL


 


Est GFR (CKD-EPI)AfAm     (>60 ml/min/1.73 sqM)  


 


Est GFR (CKD-EPI)NonAf     (>60 ml/min/1.73 sqM)  


 


Glucose     (74-99)  mg/dL


 


Calcium     (8.4-10.2)  mg/dL


 


Total Bilirubin     (0.2-1.3)  mg/dL


 


AST     (14-36)  U/L


 


ALT     (9-52)  U/L


 


Alkaline Phosphatase     ()  U/L


 


Total Creatine Kinase     ()  U/L


 


CK-MB (CK-2)     (0.0-2.4)  ng/mL


 


CK-MB (CK-2) Rel Index     


 


Troponin I     (0.000-0.034)  ng/mL


 


Total Protein     (6.3-8.2)  g/dL


 


Albumin     (3.5-5.0)  g/dL


 


Urine Color    Yellow  


 


Urine Appearance    Clear  (Clear)  


 


Urine pH    5.5  (5.0-8.0)  


 


Ur Specific Gravity    1.015  (1.001-1.035)  


 


Urine Protein    Trace H  (Negative)  


 


Urine Glucose (UA)    1+ H  (Negative)  


 


Urine Ketones    Negative  (Negative)  


 


Urine Blood    Negative  (Negative)  


 


Urine Nitrite    Negative  (Negative)  


 


Urine Bilirubin    Negative  (Negative)  


 


Urine Urobilinogen    2.0  (<2.0)  mg/dL


 


Ur Leukocyte Esterase    Negative  (Negative)  














- Radiology Data





CT chest angiography for PE was obtained.  Report was reviewed in its entirety.

  Impression by Dr. Ericka Ordaz shows negative for pulmonary emboli or other 

acute mediastinal findings.  Positive lung findings include widespread ill-

defined groundglass opacity throughout the pulmonary parenchyma bilaterally, 

correlate for pneumonitis.





Two-view x-ray of the chest was obtained.  Report was reviewed in its entirety.

  Impression by Dr. Ericka Ordaz shows no definite acute process (Flower Rene)





Disposition





<Jaswant Rivera - Last Filed: 11/28/18 00:50>


Decision to Admit Reason: Admit from EC


Decision Date: 11/28/18


Decision Time: 01:09





<Flower Rene - Last Filed: 11/28/18 01:28>


Clinical Impression: 


 Pneumonitis, Hypoxia





Disposition: ADMITTED AS IP TO THIS HOSP


Condition: Serious

## 2018-11-27 NOTE — XR
EXAMINATION: XR chest 2V

 

DATE AND TIME:  11/27/2018 6:41 PM

 

CLINICAL INDICATION: difficulty breathing

 

TECHNIQUE: PA and lateral

 

COMPARISON: October 4, 2018

 

FINDINGS: Right IJ catheter tip superimposes the mid SVC.

 

The lungs are clear of acute pulmonary processes, although the left midlung zone 1 cm calcific opacit
y is redemonstrated.

 

The pleural spaces are negative.

 

The cardiac silhouette is not enlarged. 

 

The remainder of the mediastinal silhouette is unremarkable. 

 

The skeletal structures and soft tissues are negative for acute findings.

 

IMPRESSION:

NO DEFINITE ACUTE PROCESS.

## 2018-11-27 NOTE — CT
EXAMINATION TYPE: CT chest angio for PE with contrast and with 3-D reconstruction renderings

 

DATE OF EXAM: 11/27/2018

 

COMPARISON: 10/4/2018 CT

 

HISTORY: sent by oncologist for abnormal labs, hx of lymphoma

 

CT DLP: 306.3 mGycm. Automated exposure control for dose reduction was used.

 

CONTRAST: CT Chest for pulmonary embolism performed with with IV Contrast, patient injected with 79 m
L of Isovue 370.

 

FINDINGS: 

AIRWAYS: The airways are unremarkable.

 

LUNGS: The lungs show a bilateral heterogeneous groundglass opacity pattern which is nondependent and
 is seen throughout the bilateral upper and mid and lower lung zones. There are also a few scattered 
ill-defined consolidative opacities in the lung bases, measuring 1-2 cm.

 

PLEURAL SPACES: Negative.

 

MEDIASTINUM: The pulmonary arterial tree is widely patent without evidence of pulmonary emboli. The t
he aortic root and ascending aorta are top normal in caliber at 3.9 cm, but no acute aortic findings.
 There is moderate cardiomegaly and coronary calcifications. No pericardial effusion. Mildly bilatera
lly prominent hilar lymph appreciated, particularly on the right.

 

SKELETAL STRUCTURES: No acute skeletal findings.

 

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
sunil in the interim. No new epigastric findings.

 

 

IMPRESSION:

1.  Negative for pulmonary emboli or other acute mediastinal findings.

 

2.  Positive lung findings including widespread ill-defined groundglass opacity throughout the pulmon
tory parenchyma bilaterally; correlate for pneumonitis.

## 2018-11-28 LAB
ALBUMIN SERPL-MCNC: 2.7 G/DL (ref 3.5–5)
ALP SERPL-CCNC: 160 U/L (ref 38–126)
ALT SERPL-CCNC: 23 U/L (ref 9–52)
ANION GAP SERPL CALC-SCNC: 10 MMOL/L
AST SERPL-CCNC: 15 U/L (ref 14–36)
BASOPHILS # BLD AUTO: 0.1 K/UL (ref 0–0.2)
BASOPHILS NFR BLD AUTO: 1 %
BUN SERPL-SCNC: 18 MG/DL (ref 7–17)
CALCIUM SPEC-MCNC: 9.2 MG/DL (ref 8.4–10.2)
CHLORIDE SERPL-SCNC: 100 MMOL/L (ref 98–107)
CO2 SERPL-SCNC: 28 MMOL/L (ref 22–30)
EOSINOPHIL # BLD AUTO: 0 K/UL (ref 0–0.7)
EOSINOPHIL NFR BLD AUTO: 0 %
ERYTHROCYTE [DISTWIDTH] IN BLOOD BY AUTOMATED COUNT: 2.75 M/UL (ref 3.8–5.4)
ERYTHROCYTE [DISTWIDTH] IN BLOOD: 22.7 % (ref 11.5–15.5)
GLUCOSE BLD-MCNC: 112 MG/DL (ref 75–99)
GLUCOSE BLD-MCNC: 204 MG/DL (ref 75–99)
GLUCOSE BLD-MCNC: 417 MG/DL (ref 75–99)
GLUCOSE BLD-MCNC: 462 MG/DL (ref 75–99)
GLUCOSE BLD-MCNC: 504 MG/DL (ref 75–99)
GLUCOSE SERPL-MCNC: 389 MG/DL (ref 74–99)
HCT VFR BLD AUTO: 25.1 % (ref 34–46)
HGB BLD-MCNC: 7.4 GM/DL (ref 11.4–16)
LYMPHOCYTES # SPEC AUTO: 0.9 K/UL (ref 1–4.8)
LYMPHOCYTES NFR SPEC AUTO: 7 %
MCH RBC QN AUTO: 27.1 PG (ref 25–35)
MCHC RBC AUTO-ENTMCNC: 29.6 G/DL (ref 31–37)
MCV RBC AUTO: 91.4 FL (ref 80–100)
MONOCYTES # BLD AUTO: 0.6 K/UL (ref 0–1)
MONOCYTES NFR BLD AUTO: 4 %
NEUTROPHILS # BLD AUTO: 11.4 K/UL (ref 1.3–7.7)
NEUTROPHILS NFR BLD AUTO: 86 %
PLATELET # BLD AUTO: 392 K/UL (ref 150–450)
POTASSIUM SERPL-SCNC: 4 MMOL/L (ref 3.5–5.1)
PROT SERPL-MCNC: 5 G/DL (ref 6.3–8.2)
SODIUM SERPL-SCNC: 138 MMOL/L (ref 137–145)
WBC # BLD AUTO: 13.4 K/UL (ref 3.8–10.6)

## 2018-11-28 RX ADMIN — PANTOPRAZOLE SODIUM SCH MG: 40 TABLET, DELAYED RELEASE ORAL at 10:21

## 2018-11-28 RX ADMIN — LEVOTHYROXINE SODIUM SCH MCG: 0.12 TABLET ORAL at 10:19

## 2018-11-28 RX ADMIN — FUROSEMIDE SCH MG: 20 TABLET ORAL at 17:47

## 2018-11-28 RX ADMIN — CEFAZOLIN SCH: 330 INJECTION, POWDER, FOR SOLUTION INTRAMUSCULAR; INTRAVENOUS at 04:06

## 2018-11-28 RX ADMIN — LORATADINE SCH MG: 10 TABLET ORAL at 10:21

## 2018-11-28 RX ADMIN — AMOXICILLIN AND CLAVULANATE POTASSIUM SCH EACH: 875; 125 TABLET, FILM COATED ORAL at 22:11

## 2018-11-28 RX ADMIN — CITALOPRAM HYDROBROMIDE SCH MG: 20 TABLET ORAL at 10:20

## 2018-11-28 RX ADMIN — CEFAZOLIN SCH: 330 INJECTION, POWDER, FOR SOLUTION INTRAMUSCULAR; INTRAVENOUS at 22:39

## 2018-11-28 RX ADMIN — INSULIN ASPART SCH: 100 INJECTION, SOLUTION INTRAVENOUS; SUBCUTANEOUS at 11:54

## 2018-11-28 RX ADMIN — INSULIN ASPART SCH UNIT: 100 INJECTION, SOLUTION INTRAVENOUS; SUBCUTANEOUS at 17:51

## 2018-11-28 RX ADMIN — GLIMEPIRIDE SCH MG: 2 TABLET ORAL at 10:20

## 2018-11-28 RX ADMIN — CEFAZOLIN SCH MLS/HR: 330 INJECTION, POWDER, FOR SOLUTION INTRAMUSCULAR; INTRAVENOUS at 07:52

## 2018-11-28 RX ADMIN — INSULIN ASPART SCH: 100 INJECTION, SOLUTION INTRAVENOUS; SUBCUTANEOUS at 07:27

## 2018-11-28 RX ADMIN — POTASSIUM CHLORIDE SCH MEQ: 750 TABLET, EXTENDED RELEASE ORAL at 10:21

## 2018-11-28 RX ADMIN — INSULIN ASPART SCH: 100 INJECTION, SOLUTION INTRAVENOUS; SUBCUTANEOUS at 22:05

## 2018-11-28 RX ADMIN — FUROSEMIDE SCH MG: 20 TABLET ORAL at 10:20

## 2018-11-28 RX ADMIN — GABAPENTIN SCH MG: 100 CAPSULE ORAL at 10:20

## 2018-11-28 RX ADMIN — THERA TABS SCH EACH: TAB at 13:01

## 2018-11-28 RX ADMIN — AMOXICILLIN AND CLAVULANATE POTASSIUM SCH EACH: 875; 125 TABLET, FILM COATED ORAL at 13:00

## 2018-11-28 NOTE — P.CNPUL
History of Present Illness


Consult date: 11/28/18


Requesting physician: Faustino Villegas


Reason for consult: hypoxemia


Chief complaint: Shortness of breath


History of present illness: 





This a very pleasant 63-year-old female patient who follows with Dr. Faustino Villegas is her primary care physician.  She has a history of diabetes mellitus, 

hyperlipidemia, hypertension, obstructive sleep apnea utilizing CPAP, 

hypothyroidism post thyroidectomy, anxiety.  She also has a fairly recent 

diagnosis of B-cell non-Hodgkin's lymphoma stage IV.  After her first round of 

chemotherapy she developed sepsis and was in the intensive care unit we had 

seen her at that time.  She had recovered well.  She had a second round of 

chemotherapy and her oncologist felt her treatment should be changed again.  

She does have home care and physical therapy.  They were checking her oxygen 

levels which was in the 80s and she was referred to the emergency room for the 

same.  A chest x-ray showed no acute pulmonary process.  A CT angiogram 

revealed some ill defined groundglass opacities bilaterally and there was 

concern regarding pneumonitis.  The patient was admitted for the same.  We are 

seeing her here today in consultation on the regular medical floor.  She is 

awake and alert in no acute distress.  She is currently maintaining good O2 

saturations in the 90s on room air.  She denies any fever, chills or night 

sweats.  No cough or congestion.  She is afebrile.  Hemodynamically stable.  

White count 13.4.  Hemoglobin 7.4.  Creatinine 0.88.





Review of Systems





14 point review of system was conducted.  All negative other than as mentioned 

in the HPI.





Past Medical History


Past Medical History: Cancer, Heart Failure, Diabetes Mellitus, Hyperlipidemia, 

Hypertension, Sleep Apnea/CPAP/BIPAP, Thyroid Disorder


Additional Past Medical History / Comment(s): B-cell non-Hodgkin's lymphoma 

stage IV, diabetes mellitus,daughter stated pt had first chemo mon 9-24-18 

hypertension, hyperlipidemia, obstructive sleep apnea, hypothyroidism post 

thyroidectomy, closed head injury in 1989, difficulty with mobility and 

ambulation the patient has been using a walker. has a history of kidney stones


Last Myocardial Infarction Date:: 1989


History of Any Multi-Drug Resistant Organisms: None Reported


Past Surgical History: Cholecystectomy, Hysterectomy, Tonsillectomy


Additional Past Surgical History / Comment(s): Thyroidectomy, lymph node and 

liver biopsies. port a cath 9-10-18, right ureteroscopy with lithotripsy 11/2015


Past Anesthesia/Blood Transfusion Reactions: Postoperative Nausea & Vomiting (

PONV)


Past Psychological History: Anxiety


Smoking Status: Never smoker


Past Alcohol Use History: None Reported


Past Drug Use History: Marijuana





- Past Family History


  ** Mother


Family Medical History: Cancer


Additional Family Medical History / Comment(s): breast CA





  ** Father


Family Medical History: Cancer


Additional Family Medical History / Comment(s): colon CA





  ** Sister(s)


Family Medical History: Cancer


Additional Family Medical History / Comment(s): Skin cancer





Medications and Allergies


 Home Medications











 Medication  Instructions  Recorded  Confirmed  Type


 


Furosemide 20 mg PO BID 07/24/14 11/27/18 History


 


Gabapentin 100 mg PO QAM 07/24/14 11/27/18 History


 


Potassium Chloride [Klor-Con 10] 10 meq PO DAILY 07/24/14 11/27/18 History


 


Gabapentin [Neurontin] 200 mg PO HS 10/09/15 11/27/18 History


 


Levothyroxine Sodium [Synthroid] 125 mcg PO MOTUWETHFRSA 10/09/15 11/27/18 

History


 


Multivitamins, Thera [Multivitamin 1 tab PO DAILY 03/28/18 11/27/18 History





(formulary)]    


 


ALPRAZolam [Xanax] 0.5 - 1 mg PO DAILY PRN 04/16/18 11/27/18 History


 


Atorvastatin Calcium [Lipitor] 40 mg PO HS 04/16/18 11/27/18 History


 


Citalopram Hydrobromide [CeleXA] 20 mg PO DAILY 08/26/18 11/27/18 History


 


Ondansetron HCl [Zofran] 8 mg PO Q6H PRN 10/04/18 11/27/18 History


 


Pantoprazole [Protonix] 40 mg PO DAILY 10/04/18 11/27/18 History


 


Super B Complex 1 tab PO DAILY 10/04/18 11/27/18 History


 


Acetaminophen Tab [Tylenol] 650 mg PO Q4HR PRN  tab 10/12/18 11/27/18 Rx


 


Glimepiride [Amaryl] 2 mg PO DAILY #0 10/12/18 11/27/18 Rx


 


metFORMIN HCL 1,000 mg PO BID #0 10/12/18 11/27/18 Rx


 


HYDROcodone/APAP 7.5-325MG [Norco 1 tab PO Q6HR PRN 11/27/18 11/27/18 History





7.5-325]    


 


INSULIN LISPRO (HumaLOG) [humaLOG] See Protocol SQ ACHS PRN 11/27/18 11/27/18 

History


 


Insulin Detemir [Levemir] 30 unit SQ AS DIRECTED 11/27/18 11/27/18 History


 


Loratadine [Claritin] 10 mg PO DAILY 11/27/18 11/27/18 History











 Allergies











Allergy/AdvReac Type Severity Reaction Status Date / Time


 


haloperidol [From Haldol] Allergy  Unknown Verified 11/27/18 18:06


 


haloperidol lactate Allergy  Unknown Verified 11/27/18 18:06





[From Haldol]     


 


Iodinated Contrast- Oral and Allergy  Rash/Hives Verified 11/27/18 18:06





IV Dye     





[Iodinated Contrast Media -     





IV Dye]     


 


ciprofloxacin [From Cipro] AdvReac  Heartburn Verified 11/27/18 18:06


 


steri-strips AdvReac Severe sore Uncoded 10/04/18 12:30














Physical Exam


Vitals: 


 Vital Signs











  Temp Pulse Pulse Resp BP BP Pulse Ox


 


 11/28/18 11:39  97.6 F   76  16   159/70  97


 


 11/28/18 10:00    71    138/70 


 


 11/28/18 08:45    71  18   


 


 11/28/18 03:36  97.7 F   71  16   139/66  97


 


 11/28/18 03:07      139/77   100


 


 11/28/18 02:00   61   16  132/80   98


 


 11/28/18 01:56  97.6 F  69   19  132/80   100


 


 11/28/18 00:20   69   18  133/70   97


 


 11/27/18 22:57   78   20  120/73   97


 


 11/27/18 22:21      120/73   86 L


 


 11/27/18 21:20        97


 


 11/27/18 16:15  98 F  95   18  129/76   96








 Intake and Output











 11/27/18 11/28/18 11/28/18





 22:59 06:59 14:59


 


Intake Total  590 


 


Balance  590 


 


Intake:   


 


  Oral  590 


 


Other:   


 


  Voiding Method   Toilet


 


  # Voids  1 


 


  Weight 63.503 kg  














GENERAL EXAM: Pale.  Alert, active, comfortable in no apparent distress.


HEAD: Normocephalic.


EYES: Normal reaction of pupils, equal size.


NOSE: Clear with pink turbinates.


THROAT: No erythema or exudates.


NECK: No masses, no JVD.


CHEST: No chest wall deformity.


LUNGS: Equal air entry with no crackles, wheeze, rhonchi or dullness.


CVS: S1 and S2 normal with no audible murmur, regular rhythm.


ABDOMEN: No hepatosplenomegaly, normal bowel sounds, no guarding or rigidity.


SPINE: No scoliosis or deformity


SKIN: No rashes


CENTRAL NERVOUS SYSTEM: No focal deficits, tone is normal in all 4 extremities.


EXTREMITIES: There is no peripheral edema.  No clubbing, no cyanosis.  

Peripheral pulses are intact.





Results





- Laboratory Findings


CBC and BMP: 


 11/28/18 12:26





 11/28/18 12:26


PT/INR, D-dimer











PT  9.5 sec (9.0-12.0)   11/27/18  17:14    


 


INR  0.9  (<1.2)   11/27/18  17:14    


 


D-Dimer  1.32 mg/L FEU (<0.60)  H  11/27/18  17:14    








Abnormal lab findings: 


 Abnormal Labs











  11/27/18 11/27/18 11/27/18





  17:14 17:14 17:14


 


WBC   


 


RBC   2.92 L 


 


Hgb   8.0 L D 


 


Hct   27.1 L 


 


MCHC   29.5 L 


 


RDW   23.1 H 


 


Neutrophils #   


 


Lymphocytes #   


 


Lymphocytes # (Manual)   0.95 L 


 


Eosinophils # (Manual)   0.76 H 


 


Metamyelocytes # (Man)   0.29 H 


 


Myelocytes # (Manual)   1.05 H 


 


D-Dimer   


 


BUN   


 


Glucose    317 H


 


POC Glucose (mg/dL)   


 


Alkaline Phosphatase    184 H


 


Total Creatine Kinase  <20 L  


 


Total Protein    5.4 L


 


Albumin    3.0 L


 


Urine Protein   


 


Urine Glucose (UA)   














  11/27/18 11/27/18 11/28/18





  17:14 19:38 01:14


 


WBC   


 


RBC   


 


Hgb   


 


Hct   


 


MCHC   


 


RDW   


 


Neutrophils #   


 


Lymphocytes #   


 


Lymphocytes # (Manual)   


 


Eosinophils # (Manual)   


 


Metamyelocytes # (Man)   


 


Myelocytes # (Manual)   


 


D-Dimer  1.32 H  


 


BUN   


 


Glucose   


 


POC Glucose (mg/dL)    417 H


 


Alkaline Phosphatase   


 


Total Creatine Kinase   


 


Total Protein   


 


Albumin   


 


Urine Protein   Trace H 


 


Urine Glucose (UA)   1+ H 














  11/28/18 11/28/18 11/28/18





  07:09 11:41 12:26


 


WBC    13.4 H


 


RBC    2.75 L


 


Hgb    7.4 L


 


Hct    25.1 L


 


MCHC    29.6 L


 


RDW    22.7 H


 


Neutrophils #    11.4 H


 


Lymphocytes #    0.9 L


 


Lymphocytes # (Manual)   


 


Eosinophils # (Manual)   


 


Metamyelocytes # (Man)   


 


Myelocytes # (Manual)   


 


D-Dimer   


 


BUN   


 


Glucose   


 


POC Glucose (mg/dL)  504 H  462 H 


 


Alkaline Phosphatase   


 


Total Creatine Kinase   


 


Total Protein   


 


Albumin   


 


Urine Protein   


 


Urine Glucose (UA)   














  11/28/18





  12:26


 


WBC 


 


RBC 


 


Hgb 


 


Hct 


 


MCHC 


 


RDW 


 


Neutrophils # 


 


Lymphocytes # 


 


Lymphocytes # (Manual) 


 


Eosinophils # (Manual) 


 


Metamyelocytes # (Man) 


 


Myelocytes # (Manual) 


 


D-Dimer 


 


BUN  18 H


 


Glucose  389 H


 


POC Glucose (mg/dL) 


 


Alkaline Phosphatase  160 H


 


Total Creatine Kinase 


 


Total Protein  5.0 L


 


Albumin  2.7 L


 


Urine Protein 


 


Urine Glucose (UA) 














- Diagnostic Findings


Chest x-ray: image reviewed (No acute pulmonary process)


CT scan - chest: image reviewed (Groundglass opacities bibasilar.)





Assessment and Plan


Assessment: 





Impression:





#1 Acute hypoxemic respiratory failure secondary to suspected pneumonitis.  

Recovered in maintaining good O2 saturations in the mid 90s on room air.





#2 History of B cell non-Hodgkin's lymphoma, stage IV, currently receiving 

chemotherapy.





#3 Acute on chronic anemia.





#4 Recent admission for sepsis following chemotherapy.





#5 Obstructive sleep apnea.





#6 Hypertension.





#7 Hyperlipidemia.





#8 Hypothyroidism following thyroidectomy.





#9 Diabetes mellitus.





#10 History of closed head injury.





Plan:





The patient was seen and evaluated by Dr. Alvarez.  Chest x-ray, CAT scans and 

labs were all reviewed.  She is quite stable from the pulmonary standpoint.  

She could be discharged home today on Augmentin 875 twice a day 7 days.  Close 

follow-up with her PCP or call sooner with any recurrence of symptoms or other 

questions or concerns.





I, the cosigning physician, performed a history & physical examination of the 

patient. Lungs sounds are clear.  Maintaining good O2 saturations in the 90s on 

room air.  I discussed the assessment and plan of care with my nurse 

practitioner, Ambreen Dykes. I attest to the above note as dictated by her.


Time with Patient: Greater than 30

## 2018-11-28 NOTE — P.CONS
History of Present Illness





- Reason for Consult


Consult date: 11/28/18


Currently on Chemotherapy for NHL


Requesting physician: Jaswant Rivera





- Chief Complaint


Increased blood sugars and SOB





- History of Present Illness





 Ms Falcon is a 63 yr old white female, initially seen in consult at [facility] on 3/29/18.  She had been admitted with lower chest/upper 

abdominal pain.  She was evaluated by internal medicine and cardiology with 

symptoms resolving spontaneously.  She had a CT to evaluate in the outer to 

rule out aortic aneurysm.  This incidentally noted retroperitoneal adenopathy, 

left greater than right, largest about 4 cm in size.  The patient had no 

obvious symptoms other than mid back pain for the prior 6-8 weeks.  However 

this was not constant.  She had a CT of the chest abdomen and pelvis which 

showed retroperitoneal adenopathy measuring 4.8 x 2.9 cm with some additional 

upper abdominal adenopathy measuring up to 1.9 cm.  No other adenopathy was 

seen in the thorax or pelvis.  CBC was normal other than mild WBC elevation, 

mostly due to neutropenia.  Chem panel was also negative other than creatinine 

of 1.2.


 The patient had a retroperitoneal core biopsy on 4/23/18.  The biopsy was 

positive for B-cell non-Hodgkin's lymphoma, CD5 negative.  The main 

differentials included follicular, splenic marginal zone, or LPL.  A specimen 

was sent to [facility] with final diagnosis pending post consultation.


  the patient was then referred here and seen for her first office visit on 5/1/ 18.


   the patient had labs and PET scan ordered.  The final pathology was resulted 

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  PET scan showed activity in the area of 

of abdominal adenopathy.  No other areas of suspicious activity are noted, 

except for scattered areas in the liver which could not be well defined.  Labs 

were negative other than elevated Light chain at 10 3 mg/L, versus lambda of 

44.7 mg/L, with mild elevation of ratio at 2.35.





   The patient had an MRI of the liver ordered, which showed multiple 

suspicious lesions, at least 8 in number.  She therefore underwent an ultrasound

-guided liver biopsy in early 6/18.


  This showed a dense lymphocytic infiltrative pattern.  However interestingly 

the majority of the liver cells appear to be reactive T lymphocytes with only a 

small number of B lymphocytes noted.  The pathologic findings are felt to raise 

the possibility of an inflammatory disorder such as primary biliary cirrhosis.  

The specimen was been sent to the [facility] for additional 

consultation, which reported no evidence of malignancy.


  


   She had a bone marrow in 7/18 , revealing involvement, and thus stage 4 

disease. She was thus placed on observation .


  She was admitted with severe fatigue and weakness to [facility] in late 8/18. CT 

scans showed progression in the lymph nodes and liver. She had a liver biopsy 

on 8/28/18, showing presence of a large and small cell lymphoma population. 

Double/triple hit testing was negative.


   she was started on R-CEOP (Adriamycin not use due to diminished ejection 

fraction) on 9/24/18.  She is status post 1 cycle.


   


   She was seen in the office on 10/4/18. She denies any fever/chills. she had 

marked agitation with steroids, which improved with Xanax.  She had markedly 

decreased appetite and progressive weakness. she has been very lethargic, and 

requiring assistance with even minimal activities. She has been nauseous 

persistently, with intermittent vomiting.  Over the last 1-2 days, she has been 

having diarrhea with lower abdominal cramping and pain. according to the family

, she has been intermittently confused, especially at night.  This had improved 

after stopping the steroids but seemed to have become somewhat more prominent 

over the last 1-2 days. her blood pressure was quite low in the office, with 

systolic in the 70 range.  She was therefore sent in to the emergency room.


   The case was discussed in detail with the ER physician.   The patient was 

noted to have tachycardia as well as low blood pressure, that did not respond 

well to fluids.  WBC was 1.6.  She was therefore admitted to the ICU, and 

consult placed for further evaluation and recommendations.


  CT of the abdomen and pelvis revealed evidence of enteritis, and possibly 

left hydronephrosis due to renal calculus





She is status post two cycles of COEP with neulasta as of 11/14/18.  Christina 

presents to the hospital with increased weakness, fatigue, confusion, recurrent 

falls, and elevated blood glucose levels. Her regimen of chemo includes 5 days 

of high dose steroids, prednisone, which accounts for her elevated glucose 

levels. She is feeling a little better today she stated. She is still a poor 

historian and no family at bedside as she originally stated she was short of 

breath when she came in but then told me she think she was admitted for a fall 

or high glucose. She has always seemed to be poor historian at baseline, and 

her daughter who is also her caregiver is usually with her, although not during 

todays assessment. CTA chest failed to show any evidence of acute etiology or 

pulmonary embolism. 





Past Medical History


Past Medical History: Cancer, Heart Failure, Diabetes Mellitus, Hyperlipidemia, 

Hypertension, Sleep Apnea/CPAP/BIPAP, Thyroid Disorder


Additional Past Medical History / Comment(s): B-cell non-Hodgkin's lymphoma 

stage IV, diabetes mellitus,daughter stated pt had first chemo mon 9-24-18 

hypertension, hyperlipidemia, obstructive sleep apnea, hypothyroidism post 

thyroidectomy, closed head injury in 1989, difficulty with mobility and 

ambulation the patient has been using a walker. has a history of kidney stones


Last Myocardial Infarction Date:: 1989


History of Any Multi-Drug Resistant Organisms: None Reported


Past Surgical History: Cholecystectomy, Hysterectomy, Tonsillectomy


Additional Past Surgical History / Comment(s): Thyroidectomy, lymph node and 

liver biopsies. port a cath 9-10-18, right ureteroscopy with lithotripsy 11/2015


Past Anesthesia/Blood Transfusion Reactions: Postoperative Nausea & Vomiting (

PONV)


Past Psychological History: Anxiety


Smoking Status: Never smoker


Past Alcohol Use History: None Reported


Past Drug Use History: Marijuana





- Past Family History


  ** Mother


Family Medical History: Cancer


Additional Family Medical History / Comment(s): breast CA





  ** Father


Family Medical History: Cancer


Additional Family Medical History / Comment(s): colon CA





  ** Sister(s)


Family Medical History: Cancer


Additional Family Medical History / Comment(s): Skin cancer





Medications and Allergies


 Home Medications











 Medication  Instructions  Recorded  Confirmed  Type


 


Furosemide 20 mg PO BID 07/24/14 11/27/18 History


 


Gabapentin 100 mg PO QAM 07/24/14 11/27/18 History


 


Potassium Chloride [Klor-Con 10] 10 meq PO DAILY 07/24/14 11/27/18 History


 


Gabapentin [Neurontin] 200 mg PO HS 10/09/15 11/27/18 History


 


Levothyroxine Sodium [Synthroid] 125 mcg PO MOTUWETHFRSA 10/09/15 11/27/18 

History


 


Multivitamins, Thera [Multivitamin 1 tab PO DAILY 03/28/18 11/27/18 History





(formulary)]    


 


ALPRAZolam [Xanax] 0.5 - 1 mg PO DAILY PRN 04/16/18 11/27/18 History


 


Atorvastatin Calcium [Lipitor] 40 mg PO HS 04/16/18 11/27/18 History


 


Citalopram Hydrobromide [CeleXA] 20 mg PO DAILY 08/26/18 11/27/18 History


 


Ondansetron HCl [Zofran] 8 mg PO Q6H PRN 10/04/18 11/27/18 History


 


Pantoprazole [Protonix] 40 mg PO DAILY 10/04/18 11/27/18 History


 


Super B Complex 1 tab PO DAILY 10/04/18 11/27/18 History


 


Acetaminophen Tab [Tylenol] 650 mg PO Q4HR PRN  tab 10/12/18 11/27/18 Rx


 


Glimepiride [Amaryl] 2 mg PO DAILY #0 10/12/18 11/27/18 Rx


 


metFORMIN HCL 1,000 mg PO BID #0 10/12/18 11/27/18 Rx


 


HYDROcodone/APAP 7.5-325MG [Norco 1 tab PO Q6HR PRN 11/27/18 11/27/18 History





7.5-325]    


 


INSULIN LISPRO (HumaLOG) [humaLOG] See Protocol SQ ACHS PRN 11/27/18 11/27/18 

History


 


Insulin Detemir [Levemir] 30 unit SQ AS DIRECTED 11/27/18 11/27/18 History


 


Loratadine [Claritin] 10 mg PO DAILY 11/27/18 11/27/18 History











 Allergies











Allergy/AdvReac Type Severity Reaction Status Date / Time


 


haloperidol [From Haldol] Allergy  Unknown Verified 11/27/18 18:06


 


haloperidol lactate Allergy  Unknown Verified 11/27/18 18:06





[From Haldol]     


 


Iodinated Contrast- Oral and Allergy  Rash/Hives Verified 11/27/18 18:06





IV Dye     





[Iodinated Contrast Media -     





IV Dye]     


 


ciprofloxacin [From Cipro] AdvReac  Heartburn Verified 11/27/18 18:06


 


steri-strips AdvReac Severe sore Uncoded 10/04/18 12:30














Physical Exam


Vitals: 


 Vital Signs











  Temp Pulse Pulse Resp BP BP Pulse Ox


 


 11/28/18 21:00  98.1 F   74  16   123/67  91 L


 


 11/28/18 11:39  97.6 F   76  16   159/70  97


 


 11/28/18 10:00    71    138/70 


 


 11/28/18 08:45    71  18   


 


 11/28/18 03:36  97.7 F   71  16   139/66  97


 


 11/28/18 03:07      139/77   100


 


 11/28/18 02:00   61   16  132/80   98


 


 11/28/18 01:56  97.6 F  69   19  132/80   100


 


 11/28/18 00:20   69   18  133/70   97


 


 11/27/18 22:57   78   20  120/73   97


 


 11/27/18 22:21      120/73   86 L








 Intake and Output











 11/28/18 11/28/18 11/28/18





 06:59 14:59 22:59


 


Intake Total 590  200


 


Balance 590  200


 


Intake:   


 


  Intake, IV Titration   200





  Amount   


 


    Sodium Chloride 0.9% 1,   200





    000 ml @ 20 mls/hr IV .   





    Q24H [facility] Rx#:[id number]   


 


  Oral 590  


 


Other:   


 


  Voiding Method  Toilet 


 


  # Voids 1 3 2














Constitutional


General appearance: Present: average body habitus, cooperative, no acute 

distress





- EENT


Eyes: Present: EOMI





- Respiratory


Respiratory: bilateral: CTA





- Cardiovascular


Heart sounds: normal: S1, S2





- Peripheral edema


  ** leg


Peripheral Edema: bilateral: 1+, Pitting





- Gastrointestinal


General gastrointestinal: Present: normal bowel sounds, soft





- Integumentary


Integumentary: Present: pale





- Neurologic


Neurologic: Present: CNII-XII intact





- Musculoskeletal


Musculoskeletal: Present: generalized weakness, strength equal bilaterally





- Psychiatric


Psychiatric: Present: A&O x's 3, appropriate affect, intact judgment & insight





Results


CBC & Chem 7: 


 11/28/18 12:26





 11/28/18 12:26


Labs: 


 Abnormal Lab Results - Last 24 Hours (Table)











  11/28/18 11/28/18 11/28/18 Range/Units





  01:14 07:09 11:41 


 


WBC     (3.8-10.6)  k/uL


 


RBC     (3.80-5.40)  m/uL


 


Hgb     (11.4-16.0)  gm/dL


 


Hct     (34.0-46.0)  %


 


MCHC     (31.0-37.0)  g/dL


 


RDW     (11.5-15.5)  %


 


Neutrophils #     (1.3-7.7)  k/uL


 


Lymphocytes #     (1.0-4.8)  k/uL


 


BUN     (7-17)  mg/dL


 


Glucose     (74-99)  mg/dL


 


POC Glucose (mg/dL)  417 H  504 H  462 H  (75-99)  mg/dL


 


Alkaline Phosphatase     ()  U/L


 


Total Protein     (6.3-8.2)  g/dL


 


Albumin     (3.5-5.0)  g/dL














  11/28/18 11/28/18 11/28/18 Range/Units





  12:26 12:26 16:54 


 


WBC  13.4 H    (3.8-10.6)  k/uL


 


RBC  2.75 L    (3.80-5.40)  m/uL


 


Hgb  7.4 L    (11.4-16.0)  gm/dL


 


Hct  25.1 L    (34.0-46.0)  %


 


MCHC  29.6 L    (31.0-37.0)  g/dL


 


RDW  22.7 H    (11.5-15.5)  %


 


Neutrophils #  11.4 H    (1.3-7.7)  k/uL


 


Lymphocytes #  0.9 L    (1.0-4.8)  k/uL


 


BUN   18 H   (7-17)  mg/dL


 


Glucose   389 H   (74-99)  mg/dL


 


POC Glucose (mg/dL)    204 H  (75-99)  mg/dL


 


Alkaline Phosphatase   160 H   ()  U/L


 


Total Protein   5.0 L   (6.3-8.2)  g/dL


 


Albumin   2.7 L   (3.5-5.0)  g/dL














  11/28/18 Range/Units





  21:01 


 


WBC   (3.8-10.6)  k/uL


 


RBC   (3.80-5.40)  m/uL


 


Hgb   (11.4-16.0)  gm/dL


 


Hct   (34.0-46.0)  %


 


MCHC   (31.0-37.0)  g/dL


 


RDW   (11.5-15.5)  %


 


Neutrophils #   (1.3-7.7)  k/uL


 


Lymphocytes #   (1.0-4.8)  k/uL


 


BUN   (7-17)  mg/dL


 


Glucose   (74-99)  mg/dL


 


POC Glucose (mg/dL)  112 H  (75-99)  mg/dL


 


Alkaline Phosphatase   ()  U/L


 


Total Protein   (6.3-8.2)  g/dL


 


Albumin   (3.5-5.0)  g/dL














Assessment and Plan


Plan: 





(1) Antineoplastic chemotherapy induced anemia


Narrative/Plan: 


 - Hemoglobin 7.4, will recheck in am and if less than 7 transfuse one unit 

Irradiated PRBC


Current Visit: Yes   Status: Acute   Priority: Medium   Code(s): D64.81 - 

ANEMIA DUE TO ANTINEOPLASTIC CHEMOTHERAPY; T45.1X5A - ADVERSE EFFECT OF 

ANTINEOPLASTIC AND IMMUNOSUP DRUGS, INIT   SNOMED Code(s): 996495028


   





(2) Neutrophilic leukocytosis


Narrative/Plan: 


 - Secondary to GCSF, was given on 11/15/18


 - Will continue to monitor CBC as outpatient


Current Visit: Yes   Status: Acute   Priority: Low   Code(s): D72.9 - DISORDER 

OF WHITE BLOOD CELLS, UNSPECIFIED   SNOMED Code(s): 000868574


   





(3) Unsteady gait


Narrative/Plan: 


 - Pt did very well with PT/OT, ambulating with wheeled walker


 - She is able to transport self safely from bed to chair and chair to standing


 - THis will continue at home with PT/OT


 - If she continues to fall MRI of the brain is resonable


Current Visit: Yes   Status: Acute   Priority: High   Code(s): R26.81 - 

UNSTEADINESS ON FEET   SNOMED Code(s): 75193116


   





(4) Non Hodgkin's lymphoma


Narrative/Plan: 


 - Pt treatment will be adjusted as outpatient per Dr. Dumont at next follow-up 

after re-assessment





(5) Hyperglycemia - Secondary to high dose steroids required with chemotherapy 

regimen


 - Per Primary Team

## 2018-11-28 NOTE — P.HPIM
History of Present Illness





63-year-old female presented to the hospital with low oxygen saturation 80%.  

Patient is a cough.  Patient is on chemotherapy for non-Hodgkin's lymphoma 

stage IV.  The patient also diabetic with blood glucose 400-500.  Patient also 

has pancytopenia .patient was evaluated by pulmonology and they recommended 

Augmentin 875 twice a day 7 days





Review of Systems


Constitutional: Reports fatigue


Respiratory: Reports cough





Past Medical History


Past Medical History: Cancer, Heart Failure, Diabetes Mellitus, Hyperlipidemia, 

Hypertension, Sleep Apnea/CPAP/BIPAP, Thyroid Disorder


Additional Past Medical History / Comment(s): B-cell non-Hodgkin's lymphoma 

stage IV, diabetes mellitus,daughter stated pt had first chemo mon 9-24-18 

hypertension, hyperlipidemia, obstructive sleep apnea, hypothyroidism post 

thyroidectomy, closed head injury in 1989, difficulty with mobility and 

ambulation the patient has been using a walker. has a history of kidney stones


Last Myocardial Infarction Date:: 1989


History of Any Multi-Drug Resistant Organisms: None Reported


Past Surgical History: Cholecystectomy, Hysterectomy, Tonsillectomy


Additional Past Surgical History / Comment(s): Thyroidectomy, lymph node and 

liver biopsies. port a cath 9-10-18, right ureteroscopy with lithotripsy 11/2015


Past Anesthesia/Blood Transfusion Reactions: Postoperative Nausea & Vomiting (

PONV)


Past Psychological History: Anxiety


Smoking Status: Never smoker


Past Alcohol Use History: None Reported


Past Drug Use History: Marijuana





- Past Family History


  ** Mother


Family Medical History: Cancer


Additional Family Medical History / Comment(s): breast CA





  ** Father


Family Medical History: Cancer


Additional Family Medical History / Comment(s): colon CA





  ** Sister(s)


Family Medical History: Cancer


Additional Family Medical History / Comment(s): Skin cancer





Medications and Allergies


 Home Medications











 Medication  Instructions  Recorded  Confirmed  Type


 


Furosemide 20 mg PO BID 07/24/14 11/27/18 History


 


Gabapentin 100 mg PO QAM 07/24/14 11/27/18 History


 


Potassium Chloride [Klor-Con 10] 10 meq PO DAILY 07/24/14 11/27/18 History


 


Gabapentin [Neurontin] 200 mg PO HS 10/09/15 11/27/18 History


 


Levothyroxine Sodium [Synthroid] 125 mcg PO MOTUWETHFRSA 10/09/15 11/27/18 

History


 


Multivitamins, Thera [Multivitamin 1 tab PO DAILY 03/28/18 11/27/18 History





(formulary)]    


 


ALPRAZolam [Xanax] 0.5 - 1 mg PO DAILY PRN 04/16/18 11/27/18 History


 


Atorvastatin Calcium [Lipitor] 40 mg PO HS 04/16/18 11/27/18 History


 


Citalopram Hydrobromide [CeleXA] 20 mg PO DAILY 08/26/18 11/27/18 History


 


Ondansetron HCl [Zofran] 8 mg PO Q6H PRN 10/04/18 11/27/18 History


 


Pantoprazole [Protonix] 40 mg PO DAILY 10/04/18 11/27/18 History


 


Super B Complex 1 tab PO DAILY 10/04/18 11/27/18 History


 


Acetaminophen Tab [Tylenol] 650 mg PO Q4HR PRN  tab 10/12/18 11/27/18 Rx


 


Glimepiride [Amaryl] 2 mg PO DAILY #0 10/12/18 11/27/18 Rx


 


metFORMIN HCL 1,000 mg PO BID #0 10/12/18 11/27/18 Rx


 


HYDROcodone/APAP 7.5-325MG [Norco 1 tab PO Q6HR PRN 11/27/18 11/27/18 History





7.5-325]    


 


INSULIN LISPRO (HumaLOG) [humaLOG] See Protocol SQ ACHS PRN 11/27/18 11/27/18 

History


 


Insulin Detemir [Levemir] 30 unit SQ AS DIRECTED 11/27/18 11/27/18 History


 


Loratadine [Claritin] 10 mg PO DAILY 11/27/18 11/27/18 History











 Allergies











Allergy/AdvReac Type Severity Reaction Status Date / Time


 


haloperidol [From Haldol] Allergy  Unknown Verified 11/27/18 18:06


 


haloperidol lactate Allergy  Unknown Verified 11/27/18 18:06





[From Haldol]     


 


Iodinated Contrast- Oral and Allergy  Rash/Hives Verified 11/27/18 18:06





IV Dye     





[Iodinated Contrast Media -     





IV Dye]     


 


ciprofloxacin [From Cipro] AdvReac  Heartburn Verified 11/27/18 18:06


 


steri-strips AdvReac Severe sore Uncoded 10/04/18 12:30














Physical Exam


Vitals: 


 Vital Signs











  Temp Pulse Pulse Resp BP BP Pulse Ox


 


 11/28/18 11:39  97.6 F   76  16   159/70  97


 


 11/28/18 10:00    71    138/70 


 


 11/28/18 08:45    71  18   


 


 11/28/18 03:36  97.7 F   71  16   139/66  97


 


 11/28/18 03:07      139/77   100


 


 11/28/18 02:00   61   16  132/80   98


 


 11/28/18 01:56  97.6 F  69   19  132/80   100


 


 11/28/18 00:20   69   18  133/70   97


 


 11/27/18 22:57   78   20  120/73   97


 


 11/27/18 22:21      120/73   86 L


 


 11/27/18 21:20        97


 


 11/27/18 16:15  98 F  95   18  129/76   96








 Intake and Output











 11/27/18 11/28/18 11/28/18





 22:59 06:59 14:59


 


Intake Total  590 


 


Balance  590 


 


Intake:   


 


  Oral  590 


 


Other:   


 


  Voiding Method   Toilet


 


  # Voids  1 


 


  Weight 63.503 kg  














- Constitutional


General appearance: mild distress





- EENT


Eyes: PERRLA


Ears: bilateral: normal





- Neck


Neck: normal ROM





- Respiratory


Respiratory: right: CTA, left: rales





- Cardiovascular


Rhythm: regular





- Gastrointestinal


General gastrointestinal: soft





- Integumentary


Integumentary: normal





- Neurologic


Neurologic: CNII-XII intact





- Musculoskeletal


Musculoskeletal: generalized weakness





- Psychiatric


Psychiatric: A&O x's 3, appropriate affect, intact judgment & insight





Results


CBC & Chem 7: 


 11/27/18 17:14





 11/27/18 17:14


Labs: 


 Abnormal Lab Results - Last 24 Hours (Table)











  11/27/18 11/27/18 11/27/18 Range/Units





  17:14 17:14 17:14 


 


RBC   2.92 L   (3.80-5.40)  m/uL


 


Hgb   8.0 L D   (11.4-16.0)  gm/dL


 


Hct   27.1 L   (34.0-46.0)  %


 


MCHC   29.5 L   (31.0-37.0)  g/dL


 


RDW   23.1 H   (11.5-15.5)  %


 


Lymphocytes # (Manual)   0.95 L   (1.0-4.8)  k/uL


 


Eosinophils # (Manual)   0.76 H   (0-0.7)  k/uL


 


Metamyelocytes # (Man)   0.29 H   (0)  k/uL


 


Myelocytes # (Manual)   1.05 H   (0)  k/uL


 


D-Dimer     (<0.60)  mg/L FEU


 


Glucose    317 H  (74-99)  mg/dL


 


POC Glucose (mg/dL)     (75-99)  mg/dL


 


Alkaline Phosphatase    184 H  ()  U/L


 


Total Creatine Kinase  <20 L    ()  U/L


 


Total Protein    5.4 L  (6.3-8.2)  g/dL


 


Albumin    3.0 L  (3.5-5.0)  g/dL


 


Urine Protein     (Negative)  


 


Urine Glucose (UA)     (Negative)  














  11/27/18 11/27/18 11/28/18 Range/Units





  17:14 19:38 01:14 


 


RBC     (3.80-5.40)  m/uL


 


Hgb     (11.4-16.0)  gm/dL


 


Hct     (34.0-46.0)  %


 


MCHC     (31.0-37.0)  g/dL


 


RDW     (11.5-15.5)  %


 


Lymphocytes # (Manual)     (1.0-4.8)  k/uL


 


Eosinophils # (Manual)     (0-0.7)  k/uL


 


Metamyelocytes # (Man)     (0)  k/uL


 


Myelocytes # (Manual)     (0)  k/uL


 


D-Dimer  1.32 H    (<0.60)  mg/L FEU


 


Glucose     (74-99)  mg/dL


 


POC Glucose (mg/dL)    417 H  (75-99)  mg/dL


 


Alkaline Phosphatase     ()  U/L


 


Total Creatine Kinase     ()  U/L


 


Total Protein     (6.3-8.2)  g/dL


 


Albumin     (3.5-5.0)  g/dL


 


Urine Protein   Trace H   (Negative)  


 


Urine Glucose (UA)   1+ H   (Negative)  














  11/28/18 11/28/18 Range/Units





  07:09 11:41 


 


RBC    (3.80-5.40)  m/uL


 


Hgb    (11.4-16.0)  gm/dL


 


Hct    (34.0-46.0)  %


 


MCHC    (31.0-37.0)  g/dL


 


RDW    (11.5-15.5)  %


 


Lymphocytes # (Manual)    (1.0-4.8)  k/uL


 


Eosinophils # (Manual)    (0-0.7)  k/uL


 


Metamyelocytes # (Man)    (0)  k/uL


 


Myelocytes # (Manual)    (0)  k/uL


 


D-Dimer    (<0.60)  mg/L FEU


 


Glucose    (74-99)  mg/dL


 


POC Glucose (mg/dL)  504 H  462 H  (75-99)  mg/dL


 


Alkaline Phosphatase    ()  U/L


 


Total Creatine Kinase    ()  U/L


 


Total Protein    (6.3-8.2)  g/dL


 


Albumin    (3.5-5.0)  g/dL


 


Urine Protein    (Negative)  


 


Urine Glucose (UA)    (Negative)  











Chest x-ray: report reviewed


CT scan - chest: report reviewed





Thrombosis Risk Factor Assmnt





- Choose All That Apply


Each Risk Factor Represents 2 Points: Age 61-74 years


Other congenital or acquired thrombophilia - If yes, enter type in comment: No


Thrombosis Risk Factor Assessment Total Risk Factor Score: 2


Thrombosis Risk Factor Assessment Level: Low Risk





Assessment and Plan


Plan: 





Assessment


Pneumonitis


Hypoxia





Diabetes type 2


Hypertension


Hyperlipidemia


Sleep apnea


Hypothyroidism


Non-Hodgkin's lymphoma stage IV on chemotherapy


Chronic anemia





Plan


Pulmonology evaluate patient recommended Augmentin 875 twice a day


Consultation with oncology

## 2018-11-29 VITALS — SYSTOLIC BLOOD PRESSURE: 144 MMHG | DIASTOLIC BLOOD PRESSURE: 70 MMHG | TEMPERATURE: 97.8 F | HEART RATE: 82 BPM

## 2018-11-29 VITALS — RESPIRATION RATE: 18 BRPM

## 2018-11-29 LAB
CELLS COUNTED: 200
EOSINOPHIL # BLD MANUAL: 0.35 K/UL (ref 0–0.7)
ERYTHROCYTE [DISTWIDTH] IN BLOOD BY AUTOMATED COUNT: 2.98 M/UL (ref 3.8–5.4)
ERYTHROCYTE [DISTWIDTH] IN BLOOD: 23.9 % (ref 11.5–15.5)
GLUCOSE BLD-MCNC: 116 MG/DL (ref 75–99)
GLUCOSE BLD-MCNC: 142 MG/DL (ref 75–99)
GLUCOSE BLD-MCNC: 151 MG/DL (ref 75–99)
HCT VFR BLD AUTO: 27.2 % (ref 34–46)
HGB BLD-MCNC: 8.2 GM/DL (ref 11.4–16)
LYMPHOCYTES # BLD MANUAL: 1.96 K/UL (ref 1–4.8)
MCH RBC QN AUTO: 27.4 PG (ref 25–35)
MCHC RBC AUTO-ENTMCNC: 30 G/DL (ref 31–37)
MCV RBC AUTO: 91.4 FL (ref 80–100)
METAMYELOCYTES # BLD: 0.23 K/UL
MONOCYTES # BLD MANUAL: 0.69 K/UL (ref 0–1)
MYELOCYTES # BLD MANUAL: 0.69 K/UL
NEUTROPHILS NFR BLD MANUAL: 65 %
NEUTS SEG # BLD MANUAL: 7.8 K/UL (ref 1.3–7.7)
PLATELET # BLD AUTO: 491 K/UL (ref 150–450)
WBC # BLD AUTO: 11.5 K/UL (ref 3.8–10.6)

## 2018-11-29 RX ADMIN — GLIMEPIRIDE SCH MG: 2 TABLET ORAL at 09:15

## 2018-11-29 RX ADMIN — LORATADINE SCH MG: 10 TABLET ORAL at 09:17

## 2018-11-29 RX ADMIN — PANTOPRAZOLE SODIUM SCH MG: 40 TABLET, DELAYED RELEASE ORAL at 09:15

## 2018-11-29 RX ADMIN — THERA TABS SCH EACH: TAB at 14:37

## 2018-11-29 RX ADMIN — FUROSEMIDE SCH MG: 20 TABLET ORAL at 17:36

## 2018-11-29 RX ADMIN — FUROSEMIDE SCH MG: 20 TABLET ORAL at 09:16

## 2018-11-29 RX ADMIN — GABAPENTIN SCH MG: 100 CAPSULE ORAL at 09:16

## 2018-11-29 RX ADMIN — INSULIN ASPART SCH: 100 INJECTION, SOLUTION INTRAVENOUS; SUBCUTANEOUS at 14:36

## 2018-11-29 RX ADMIN — AMOXICILLIN AND CLAVULANATE POTASSIUM SCH EACH: 875; 125 TABLET, FILM COATED ORAL at 17:39

## 2018-11-29 RX ADMIN — INSULIN ASPART SCH UNIT: 100 INJECTION, SOLUTION INTRAVENOUS; SUBCUTANEOUS at 08:06

## 2018-11-29 RX ADMIN — POTASSIUM CHLORIDE SCH MEQ: 750 TABLET, EXTENDED RELEASE ORAL at 09:17

## 2018-11-29 RX ADMIN — LEVOTHYROXINE SODIUM SCH MCG: 0.12 TABLET ORAL at 05:34

## 2018-11-29 RX ADMIN — CITALOPRAM HYDROBROMIDE SCH MG: 20 TABLET ORAL at 09:15

## 2018-11-29 RX ADMIN — AMOXICILLIN AND CLAVULANATE POTASSIUM SCH EACH: 875; 125 TABLET, FILM COATED ORAL at 09:15

## 2018-11-29 NOTE — MR
EXAMINATION TYPE: MR brain wo/w con

 

DATE OF EXAM: 11/29/2018

 

COMPARISON: 1/26/2012

 

HISTORY: Increased Confusion, Recurrent Falls, Hx of CA

 

TECHNIQUE: 

Multiplanar, multisequence images of the brain and brainstem is performed without and with IV contras
t, utilizing 7.5 mL intravenous Gadavist .

 

FINDINGS: On the T2 and FLAIR images there is increased signal in the left frontal lobe white matter 
and cortex consistent with old cortical infarct. There is similar cortical increased signal in the an
terior left temporal lobe. There is increased signal in the periventricular white matter with numerou
s areas that measure up to 1 cm. These are coalescent. The brainstem is intact. Cerebellum is intact.
 There is no midline shift. There is no sign of intracranial hemorrhage. There is cerebral cortical a
trophy. There is thinning of the corpus callosum. Sella turcica appears normal.

 

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 White matter changes consistent with chr
onic small vessel ischemia. There is progression of the white matter disease compared to old exam. Th
ere is slight progression of the left frontal infarct. There is no change in left temporal lobe corti
usha infarct.

## 2018-11-29 NOTE — P.PN
Subjective


Progress Note Date: 11/29/18


Principal diagnosis: 





Recurrent Falls,, Confusion





patient's family very concerned for her worsening mental status changes and 

recurrent falls, they state it is just getting worse and worse and are adamant 

about further evaluation. 





Objective





- Vital Signs


Vital signs: 


 Vital Signs











Temp  97.8 F   11/29/18 11:57


 


Pulse  82   11/29/18 11:57


 


Resp  18   11/29/18 11:57


 


BP  144/70   11/29/18 11:57


 


Pulse Ox  95   11/29/18 11:57








 Intake & Output











 11/28/18 11/29/18 11/29/18





 18:59 06:59 18:59


 


Intake Total  820 


 


Balance  820 


 


Weight  71 kg 70.89 kg


 


Intake:   


 


  Intake, IV Titration  400 





  Amount   


 


    Sodium Chloride 0.9% 1,  400 





    000 ml @ 20 mls/hr IV .   





    Q24H [facility] Rx#:[id number]   


 


  Oral  420 


 


Other:   


 


  Voiding Method Toilet Toilet Toilet


 


  # Voids 3 2 2


 


  # Bowel Movements   0














- Exam





Constitutional


General appearance: Present: average body habitus, cooperative, no acute 

distress





- EENT


Eyes: Present: EOMI





- Respiratory


Respiratory: bilateral: CTA





- Cardiovascular


Heart sounds: normal: S1, S2





- Peripheral edema


  ** leg


Peripheral Edema: bilateral: 1+, Pitting





- Gastrointestinal


General gastrointestinal: Present: normal bowel sounds, soft





- Integumentary


Integumentary: Present: pale





- Neurologic


Neurologic: Present: CNII-XII intact





- Musculoskeletal


Musculoskeletal: Present: generalized weakness, strength equal bilaterally





- Psychiatric


Psychiatric: Present: A&O x's 3, appropriate affect, intact judgment & insight





- Labs


CBC & Chem 7: 


 11/29/18 09:04





 11/28/18 12:26


Labs: 


 Abnormal Lab Results - Last 24 Hours (Table)











  11/28/18 11/28/18 11/28/18 Range/Units





  12:26 12:26 16:54 


 


WBC  13.4 H    (3.8-10.6)  k/uL


 


RBC  2.75 L    (3.80-5.40)  m/uL


 


Hgb  7.4 L    (11.4-16.0)  gm/dL


 


Hct  25.1 L    (34.0-46.0)  %


 


MCHC  29.6 L    (31.0-37.0)  g/dL


 


RDW  22.7 H    (11.5-15.5)  %


 


Plt Count     (150-450)  k/uL


 


Neutrophils #  11.4 H    (1.3-7.7)  k/uL


 


Neutrophils # (Manual)     (1.3-7.7)  k/uL


 


Lymphocytes #  0.9 L    (1.0-4.8)  k/uL


 


Metamyelocytes # (Man)     (0)  k/uL


 


Myelocytes # (Manual)     (0)  k/uL


 


BUN   18 H   (7-17)  mg/dL


 


Glucose   389 H   (74-99)  mg/dL


 


POC Glucose (mg/dL)    204 H  (75-99)  mg/dL


 


Alkaline Phosphatase   160 H   ()  U/L


 


Total Protein   5.0 L   (6.3-8.2)  g/dL


 


Albumin   2.7 L   (3.5-5.0)  g/dL














  11/28/18 11/29/18 11/29/18 Range/Units





  21:01 01:55 06:44 


 


WBC     (3.8-10.6)  k/uL


 


RBC     (3.80-5.40)  m/uL


 


Hgb     (11.4-16.0)  gm/dL


 


Hct     (34.0-46.0)  %


 


MCHC     (31.0-37.0)  g/dL


 


RDW     (11.5-15.5)  %


 


Plt Count     (150-450)  k/uL


 


Neutrophils #     (1.3-7.7)  k/uL


 


Neutrophils # (Manual)     (1.3-7.7)  k/uL


 


Lymphocytes #     (1.0-4.8)  k/uL


 


Metamyelocytes # (Man)     (0)  k/uL


 


Myelocytes # (Manual)     (0)  k/uL


 


BUN     (7-17)  mg/dL


 


Glucose     (74-99)  mg/dL


 


POC Glucose (mg/dL)  112 H  151 H  142 H  (75-99)  mg/dL


 


Alkaline Phosphatase     ()  U/L


 


Total Protein     (6.3-8.2)  g/dL


 


Albumin     (3.5-5.0)  g/dL














  11/29/18 11/29/18 Range/Units





  09:04 11:20 


 


WBC  11.5 H   (3.8-10.6)  k/uL


 


RBC  2.98 L   (3.80-5.40)  m/uL


 


Hgb  8.2 L   (11.4-16.0)  gm/dL


 


Hct  27.2 L   (34.0-46.0)  %


 


MCHC  30.0 L   (31.0-37.0)  g/dL


 


RDW  23.9 H   (11.5-15.5)  %


 


Plt Count  491 H   (150-450)  k/uL


 


Neutrophils #    (1.3-7.7)  k/uL


 


Neutrophils # (Manual)  7.80 H   (1.3-7.7)  k/uL


 


Lymphocytes #    (1.0-4.8)  k/uL


 


Metamyelocytes # (Man)  0.23 H   (0)  k/uL


 


Myelocytes # (Manual)  0.69 H   (0)  k/uL


 


BUN    (7-17)  mg/dL


 


Glucose    (74-99)  mg/dL


 


POC Glucose (mg/dL)   116 H  (75-99)  mg/dL


 


Alkaline Phosphatase    ()  U/L


 


Total Protein    (6.3-8.2)  g/dL


 


Albumin    (3.5-5.0)  g/dL














Assessment and Plan


Plan: 





(1) Antineoplastic chemotherapy induced anemia


Narrative/Plan: 


 - Hemoglobin 7.4, will recheck in am and if less than 7 transfuse one unit 

Irradiated PRBC


Current Visit: Yes   Status: Acute   Priority: Medium   Code(s): D64.81 - 

ANEMIA DUE TO ANTINEOPLASTIC CHEMOTHERAPY; T45.1X5A - ADVERSE EFFECT OF 

ANTINEOPLASTIC AND IMMUNOSUP DRUGS, INIT   SNOMED Code(s): 557342496


   





(2) Neutrophilic leukocytosis


Narrative/Plan: 


 - Secondary to GCSF, was given on 11/15/18


 - Will continue to monitor CBC as outpatient


Current Visit: Yes   Status: Acute   Priority: Low   Code(s): D72.9 - DISORDER 

OF WHITE BLOOD CELLS, UNSPECIFIED   SNOMED Code(s): 973635009


   





(3) Unsteady gait


Narrative/Plan: 


 - Pt did very well with PT/OT, ambulating with wheeled walker


 - She is able to transport self safely from bed to chair and chair to standing


 - THis will continue at home with PT/OT


 - If she continues to fall MRI of the brain is resonable


Current Visit: Yes   Status: Acute   Priority: High   Code(s): R26.81 - 

UNSTEADINESS ON FEET   SNOMED Code(s): 14517686


   





(4) Non Hodgkin's lymphoma


Narrative/Plan: 


 - Pt treatment will be adjusted as outpatient per Dr. Dumont at next follow-up 

after re-assessment





(5) Hyperglycemia - Secondary to high dose steroids required with chemotherapy 

regimen


 - Per Primary Team





Plan:





 - Await MRI Brain then ok for discharge from onc standpoint


 - HOME PT please

## 2018-11-29 NOTE — P.PN
Subjective





Patient sitting in chair at bedside.  States improvement.  Patient has been 

cleared by pulmonology for discharge.  Hemoglobin improved to 8.4.  Family 

requesting MRI of the brain for transient confusion.  Awaiting for clearance 

from oncology for discharge





Objective





- Vital Signs


Vital signs: 


 Vital Signs











Temp  97.9 F   11/29/18 04:05


 


Pulse  69   11/29/18 04:05


 


Resp  18   11/29/18 10:03


 


BP  128/62   11/29/18 04:05


 


Pulse Ox  95   11/29/18 04:05








 Intake & Output











 11/28/18 11/29/18 11/29/18





 18:59 06:59 18:59


 


Intake Total  820 


 


Balance  820 


 


Weight  71 kg 70.89 kg


 


Intake:   


 


  Intake, IV Titration  400 





  Amount   


 


    Sodium Chloride 0.9% 1,  400 





    000 ml @ 20 mls/hr IV .   





    Q24H [facility] Rx#:[id number]   


 


  Oral  420 


 


Other:   


 


  Voiding Method Toilet Toilet Toilet


 


  # Voids 3 2 2


 


  # Bowel Movements   0














- Constitutional


General appearance: Present: average body habitus





- EENT


Eyes: Present: PERRLA


Ears: bilateral: normal





- Neck


Neck: Present: normal ROM





- Respiratory


Respiratory: bilateral: CTA





- Cardiovascular


Rhythm: regular





- Gastrointestinal


General gastrointestinal: Present: soft





- Integumentary


Integumentary: Present: normal





- Neurologic


Neurologic: Present: CNII-XII intact





- Musculoskeletal


Musculoskeletal: Present: generalized weakness





- Psychiatric


Psychiatric: Present: appropriate affect, intact judgment & insight





- Labs


CBC & Chem 7: 


 11/29/18 09:04





 11/28/18 12:26


Labs: 


 Abnormal Lab Results - Last 24 Hours (Table)











  11/28/18 11/28/18 11/28/18 Range/Units





  12:26 12:26 16:54 


 


WBC  13.4 H    (3.8-10.6)  k/uL


 


RBC  2.75 L    (3.80-5.40)  m/uL


 


Hgb  7.4 L    (11.4-16.0)  gm/dL


 


Hct  25.1 L    (34.0-46.0)  %


 


MCHC  29.6 L    (31.0-37.0)  g/dL


 


RDW  22.7 H    (11.5-15.5)  %


 


Plt Count     (150-450)  k/uL


 


Neutrophils #  11.4 H    (1.3-7.7)  k/uL


 


Lymphocytes #  0.9 L    (1.0-4.8)  k/uL


 


BUN   18 H   (7-17)  mg/dL


 


Glucose   389 H   (74-99)  mg/dL


 


POC Glucose (mg/dL)    204 H  (75-99)  mg/dL


 


Alkaline Phosphatase   160 H   ()  U/L


 


Total Protein   5.0 L   (6.3-8.2)  g/dL


 


Albumin   2.7 L   (3.5-5.0)  g/dL














  11/28/18 11/29/18 11/29/18 Range/Units





  21:01 01:55 06:44 


 


WBC     (3.8-10.6)  k/uL


 


RBC     (3.80-5.40)  m/uL


 


Hgb     (11.4-16.0)  gm/dL


 


Hct     (34.0-46.0)  %


 


MCHC     (31.0-37.0)  g/dL


 


RDW     (11.5-15.5)  %


 


Plt Count     (150-450)  k/uL


 


Neutrophils #     (1.3-7.7)  k/uL


 


Lymphocytes #     (1.0-4.8)  k/uL


 


BUN     (7-17)  mg/dL


 


Glucose     (74-99)  mg/dL


 


POC Glucose (mg/dL)  112 H  151 H  142 H  (75-99)  mg/dL


 


Alkaline Phosphatase     ()  U/L


 


Total Protein     (6.3-8.2)  g/dL


 


Albumin     (3.5-5.0)  g/dL














  11/29/18 11/29/18 Range/Units





  09:04 11:20 


 


WBC  11.5 H   (3.8-10.6)  k/uL


 


RBC  2.98 L   (3.80-5.40)  m/uL


 


Hgb  8.2 L   (11.4-16.0)  gm/dL


 


Hct  27.2 L   (34.0-46.0)  %


 


MCHC  30.0 L   (31.0-37.0)  g/dL


 


RDW  23.9 H   (11.5-15.5)  %


 


Plt Count  491 H   (150-450)  k/uL


 


Neutrophils #    (1.3-7.7)  k/uL


 


Lymphocytes #    (1.0-4.8)  k/uL


 


BUN    (7-17)  mg/dL


 


Glucose    (74-99)  mg/dL


 


POC Glucose (mg/dL)   116 H  (75-99)  mg/dL


 


Alkaline Phosphatase    ()  U/L


 


Total Protein    (6.3-8.2)  g/dL


 


Albumin    (3.5-5.0)  g/dL














Assessment and Plan


Plan: 





Assessment


Pneumonitis on Augmentin


Hypoxia improved


Diabetes type 2


Hypertension


Hypothyroidism


Hyperlipidemia


Sleep apnea


Non-Hodgkin's lymphoma stage IV on chemo


Chronic anemia related to chronic disease chemotherapy





Plan


MRI of the brain scheduled


May be discharged when cleared by oncology

## 2018-11-29 NOTE — P.PN
Subjective


Progress Note Date: 11/29/18


Principal diagnosis: 





Acute hypoxic respiratory failure secondary to suspected pneumonitis.





This a very pleasant 63-year-old female patient who follows with Dr. Faustino Villegas is her primary care physician.  She has a history of diabetes mellitus, 

hyperlipidemia, hypertension, obstructive sleep apnea utilizing CPAP, 

hypothyroidism post thyroidectomy, anxiety.  She also has a fairly recent 

diagnosis of B-cell non-Hodgkin's lymphoma stage IV.  After her first round of 

chemotherapy she developed sepsis and was in the intensive care unit we had 

seen her at that time.  She had recovered well.  She had a second round of 

chemotherapy and her oncologist felt her treatment should be changed again.  

She does have home care and physical therapy.  They were checking her oxygen 

levels which was in the 80s and she was referred to the emergency room for the 

same.  A chest x-ray showed no acute pulmonary process.  A CT angiogram 

revealed some ill defined groundglass opacities bilaterally and there was 

concern regarding pneumonitis.  The patient was admitted for the same.  We are 

seeing her here today in consultation on the regular medical floor.  She is 

awake and alert in no acute distress.  She is currently maintaining good O2 

saturations in the 90s on room air.  She denies any fever, chills or night 

sweats.  No cough or congestion.  She is afebrile.  Hemodynamically stable.  

White count 13.4.  Hemoglobin 7.4.  Creatinine 0.88.





The patient is seen again today 11/29/2018 in follow-up on the regular medical 

floor.  She is currently sitting up in a chair at the bedside.  She is awake 

and alert in no acute distress.  She is maintaining good O2 saturations in the 

90s on room air.  Denies any shortness of breath, cough or congestion.  White 

count 11.5.  Hemoglobin 8.2.  MRI of the brain is pending.





Objective





- Vital Signs


Vital signs: 


 Vital Signs











Temp  97.8 F   11/29/18 11:57


 


Pulse  82   11/29/18 11:57


 


Resp  18   11/29/18 11:57


 


BP  144/70   11/29/18 11:57


 


Pulse Ox  95   11/29/18 11:57








 Intake & Output











 11/28/18 11/29/18 11/29/18





 18:59 06:59 18:59


 


Intake Total  820 


 


Balance  820 


 


Weight  71 kg 70.89 kg


 


Intake:   


 


  Intake, IV Titration  400 





  Amount   


 


    Sodium Chloride 0.9% 1,  400 





    000 ml @ 20 mls/hr IV .   





    Q24H [facility] Rx#:[id number]   


 


  Oral  420 


 


Other:   


 


  Voiding Method Toilet Toilet Toilet


 


  # Voids 3 2 2


 


  # Bowel Movements   0














- Exam





GENERAL EXAM: Alert, active, comfortable in no apparent distress.  On room air.


HEAD: Normocephalic.


EYES: Normal reaction of pupils, equal size.


NOSE: Clear with pink turbinates.


THROAT: No erythema or exudates.


NECK: No masses, no JVD.


CHEST: No chest wall deformity.


LUNGS: Equal air entry with no crackles, wheeze, rhonchi or dullness.


CVS: S1 and S2 normal with no audible murmur, regular rhythm.


ABDOMEN: No hepatosplenomegaly, normal bowel sounds, no guarding or rigidity.


SPINE: No scoliosis or deformity


SKIN: No rashes


CENTRAL NERVOUS SYSTEM: No focal deficits, tone is normal in all 4 extremities.


EXTREMITIES: There is no peripheral edema.  No clubbing, no cyanosis.  

Peripheral pulses are intact.





- Labs


CBC & Chem 7: 


 11/29/18 09:04





 11/28/18 12:26


Labs: 


 Abnormal Lab Results - Last 24 Hours (Table)











  11/28/18 11/28/18 11/29/18 Range/Units





  16:54 21:01 01:55 


 


WBC     (3.8-10.6)  k/uL


 


RBC     (3.80-5.40)  m/uL


 


Hgb     (11.4-16.0)  gm/dL


 


Hct     (34.0-46.0)  %


 


MCHC     (31.0-37.0)  g/dL


 


RDW     (11.5-15.5)  %


 


Plt Count     (150-450)  k/uL


 


Neutrophils # (Manual)     (1.3-7.7)  k/uL


 


Metamyelocytes # (Man)     (0)  k/uL


 


Myelocytes # (Manual)     (0)  k/uL


 


POC Glucose (mg/dL)  204 H  112 H  151 H  (75-99)  mg/dL














  11/29/18 11/29/18 11/29/18 Range/Units





  06:44 09:04 11:20 


 


WBC   11.5 H   (3.8-10.6)  k/uL


 


RBC   2.98 L   (3.80-5.40)  m/uL


 


Hgb   8.2 L   (11.4-16.0)  gm/dL


 


Hct   27.2 L   (34.0-46.0)  %


 


MCHC   30.0 L   (31.0-37.0)  g/dL


 


RDW   23.9 H   (11.5-15.5)  %


 


Plt Count   491 H   (150-450)  k/uL


 


Neutrophils # (Manual)   7.80 H   (1.3-7.7)  k/uL


 


Metamyelocytes # (Man)   0.23 H   (0)  k/uL


 


Myelocytes # (Manual)   0.69 H   (0)  k/uL


 


POC Glucose (mg/dL)  142 H   116 H  (75-99)  mg/dL














Assessment and Plan


Assessment: 





Impression:





#1 Acute hypoxemic respiratory failure secondary to suspected pneumonitis.  

Recovered and maintaining good O2 saturations in the mid 90s on room air.





#2 History of B cell non-Hodgkin's lymphoma, stage IV, currently receiving 

chemotherapy.





#3 Acute on chronic anemia.





#4 Recent admission for sepsis following chemotherapy.





#5 Obstructive sleep apnea.





#6 Hypertension.





#7 Hyperlipidemia.





#8 Hypothyroidism following thyroidectomy.





#9 Diabetes mellitus.





#10 History of closed head injury.





Plan:





The patient was seen and evaluated by Dr. Alvarez.  No pulmonary complaints 

today.  She could be discharged home today on Augmentin 875 twice a day 7 

days.  Close follow-up with her PCP or call sooner with any recurrence of 

symptoms or other questions or concerns.





I, the cosigning physician, performed a history & physical examination of the 

patient. Lungs sounds are clear.  Maintaining good O2 saturations in the 90s on 

room air.  I discussed the assessment and plan of care with my nurse 

practitioner, Ambreen Dykes. I attest to the above note as dictated by her.

## 2018-12-17 ENCOUNTER — HOSPITAL ENCOUNTER (OUTPATIENT)
Dept: HOSPITAL 47 - RADCTMAIN | Age: 63
Discharge: HOME | End: 2018-12-17
Attending: INTERNAL MEDICINE
Payer: MEDICARE

## 2018-12-17 DIAGNOSIS — Z53.9: Primary | ICD-10-CM

## 2019-01-11 ENCOUNTER — HOSPITAL ENCOUNTER (EMERGENCY)
Dept: HOSPITAL 47 - EC | Age: 64
Discharge: HOME | End: 2019-01-11
Payer: MEDICARE

## 2019-01-11 VITALS — SYSTOLIC BLOOD PRESSURE: 138 MMHG | HEART RATE: 98 BPM | DIASTOLIC BLOOD PRESSURE: 76 MMHG | RESPIRATION RATE: 16 BRPM

## 2019-01-11 VITALS — TEMPERATURE: 98.4 F

## 2019-01-11 DIAGNOSIS — E78.5: ICD-10-CM

## 2019-01-11 DIAGNOSIS — I45.10: ICD-10-CM

## 2019-01-11 DIAGNOSIS — I11.0: ICD-10-CM

## 2019-01-11 DIAGNOSIS — Z92.21: ICD-10-CM

## 2019-01-11 DIAGNOSIS — Z88.8: ICD-10-CM

## 2019-01-11 DIAGNOSIS — Z79.899: ICD-10-CM

## 2019-01-11 DIAGNOSIS — E11.9: ICD-10-CM

## 2019-01-11 DIAGNOSIS — Z88.1: ICD-10-CM

## 2019-01-11 DIAGNOSIS — D72.829: ICD-10-CM

## 2019-01-11 DIAGNOSIS — Z91.041: ICD-10-CM

## 2019-01-11 DIAGNOSIS — N39.0: Primary | ICD-10-CM

## 2019-01-11 DIAGNOSIS — I50.9: ICD-10-CM

## 2019-01-11 DIAGNOSIS — G47.33: ICD-10-CM

## 2019-01-11 DIAGNOSIS — Z79.4: ICD-10-CM

## 2019-01-11 DIAGNOSIS — E89.0: ICD-10-CM

## 2019-01-11 DIAGNOSIS — Z85.72: ICD-10-CM

## 2019-01-11 DIAGNOSIS — E87.6: ICD-10-CM

## 2019-01-11 DIAGNOSIS — B02.9: ICD-10-CM

## 2019-01-11 DIAGNOSIS — Z91.048: ICD-10-CM

## 2019-01-11 LAB
ALBUMIN SERPL-MCNC: 3.7 G/DL (ref 3.5–5)
ALP SERPL-CCNC: 217 U/L (ref 38–126)
ALT SERPL-CCNC: 26 U/L (ref 9–52)
ANION GAP SERPL CALC-SCNC: 10 MMOL/L
APTT BLD: 20.9 SEC (ref 22–30)
AST SERPL-CCNC: 35 U/L (ref 14–36)
BASOPHILS # BLD AUTO: 0.1 K/UL (ref 0–0.2)
BASOPHILS NFR BLD AUTO: 0 %
BUN SERPL-SCNC: 17 MG/DL (ref 7–17)
CALCIUM SPEC-MCNC: 9.8 MG/DL (ref 8.4–10.2)
CHLORIDE SERPL-SCNC: 95 MMOL/L (ref 98–107)
CO2 SERPL-SCNC: 33 MMOL/L (ref 22–30)
EOSINOPHIL # BLD AUTO: 0.3 K/UL (ref 0–0.7)
EOSINOPHIL NFR BLD AUTO: 1 %
ERYTHROCYTE [DISTWIDTH] IN BLOOD BY AUTOMATED COUNT: 3.74 M/UL (ref 3.8–5.4)
ERYTHROCYTE [DISTWIDTH] IN BLOOD: 17.4 % (ref 11.5–15.5)
GLUCOSE SERPL-MCNC: 254 MG/DL (ref 74–99)
HCT VFR BLD AUTO: 33.8 % (ref 34–46)
HGB BLD-MCNC: 10.9 GM/DL (ref 11.4–16)
HYALINE CASTS UR QL AUTO: 1 /LPF (ref 0–2)
INR PPP: 0.9 (ref ?–1.2)
LYMPHOCYTES # SPEC AUTO: 0.1 K/UL (ref 1–4.8)
LYMPHOCYTES NFR SPEC AUTO: 0 %
MCH RBC QN AUTO: 29 PG (ref 25–35)
MCHC RBC AUTO-ENTMCNC: 32.1 G/DL (ref 31–37)
MCV RBC AUTO: 90.3 FL (ref 80–100)
MONOCYTES # BLD AUTO: 0.7 K/UL (ref 0–1)
MONOCYTES NFR BLD AUTO: 2 %
NEUTROPHILS # BLD AUTO: 36.9 K/UL (ref 1.3–7.7)
NEUTROPHILS NFR BLD AUTO: 97 %
PH UR: 6 [PH] (ref 5–8)
PLATELET # BLD AUTO: 356 K/UL (ref 150–450)
POTASSIUM SERPL-SCNC: 3.3 MMOL/L (ref 3.5–5.1)
PROT SERPL-MCNC: 6.3 G/DL (ref 6.3–8.2)
PROT UR QL: (no result)
PT BLD: 9.6 SEC (ref 9–12)
RBC UR QL: 1 /HPF (ref 0–5)
SODIUM SERPL-SCNC: 138 MMOL/L (ref 137–145)
SP GR UR: 1.01 (ref 1–1.03)
SQUAMOUS UR QL AUTO: 1 /HPF (ref 0–4)
UROBILINOGEN UR QL STRIP: <2 MG/DL (ref ?–2)
WBC # BLD AUTO: 38.2 K/UL (ref 3.8–10.6)
WBC #/AREA URNS HPF: 19 /HPF (ref 0–5)

## 2019-01-11 PROCEDURE — 81001 URINALYSIS AUTO W/SCOPE: CPT

## 2019-01-11 PROCEDURE — 85730 THROMBOPLASTIN TIME PARTIAL: CPT

## 2019-01-11 PROCEDURE — 71046 X-RAY EXAM CHEST 2 VIEWS: CPT

## 2019-01-11 PROCEDURE — 99284 EMERGENCY DEPT VISIT MOD MDM: CPT

## 2019-01-11 PROCEDURE — 87040 BLOOD CULTURE FOR BACTERIA: CPT

## 2019-01-11 PROCEDURE — 87186 SC STD MICRODIL/AGAR DIL: CPT

## 2019-01-11 PROCEDURE — 85025 COMPLETE CBC W/AUTO DIFF WBC: CPT

## 2019-01-11 PROCEDURE — 87502 INFLUENZA DNA AMP PROBE: CPT

## 2019-01-11 PROCEDURE — 36415 COLL VENOUS BLD VENIPUNCTURE: CPT

## 2019-01-11 PROCEDURE — 93005 ELECTROCARDIOGRAM TRACING: CPT

## 2019-01-11 PROCEDURE — 80053 COMPREHEN METABOLIC PANEL: CPT

## 2019-01-11 PROCEDURE — 87077 CULTURE AEROBIC IDENTIFY: CPT

## 2019-01-11 PROCEDURE — 85610 PROTHROMBIN TIME: CPT

## 2019-01-11 PROCEDURE — 96360 HYDRATION IV INFUSION INIT: CPT

## 2019-01-11 PROCEDURE — 83605 ASSAY OF LACTIC ACID: CPT

## 2019-01-11 PROCEDURE — 87086 URINE CULTURE/COLONY COUNT: CPT

## 2019-01-11 RX ADMIN — NICARDIPINE HYDROCHLORIDE SCH MLS/HR: 2.5 INJECTION INTRAVENOUS at 22:43

## 2019-01-11 RX ADMIN — NICARDIPINE HYDROCHLORIDE SCH MLS/HR: 2.5 INJECTION INTRAVENOUS at 21:17

## 2019-01-11 NOTE — XR
EXAMINATION TYPE: XR chest 2V

 

DATE OF EXAM: 1/11/2019

 

COMPARISON: 11/27/2018

 

INDICATION: Fever

 

TECHNIQUE:  Frontal and lateral views of the chest are obtained.

 

FINDINGS:  

The heart size is normal.  

The pulmonary vasculature is normal.

Small granuloma may be within the left midlung appears stable. Port is present on the right with the 
tip in the superior vena cava region. EKG leads overlie the chest..

 

IMPRESSION:  

1. No acute pulmonary process.

2. Findings are stable over the interval.

## 2019-01-11 NOTE — ED
Fever HPI





- General


Chief Complaint: Fever


Stated Complaint: Fever, shingles


Time Seen by Provider: 01/11/19 20:11


Source: patient


Mode of arrival: ambulatory


Limitations: no limitations





- History of Present Illness


Initial Comments: 


63-year-old female patient with past medical history significant for non-Hodgkin

's lymphoma currently receiving chemotherapy with last doses on Monday and 

Tuesday presents to the emergency department today for evaluation of fever.  

Patient states fever was as high as 101.6F at home.  States that she feels 

generally rundown and achy.  Patient was diagnosed with a shingles infection to 

the left low back region yesterday.  She was started on acyclovir.  Patient did 

receive a Neupogen injection on Wednesday.  She denies any nasal congestion, 

cough, nausea, vomiting, abdominal pain, diarrhea, wounds, hematuria, dysuria, 

urinary frequency, urinary urgency.  Patient denies any recent shortness breath

, chest pain, back pain, numbness, tingling, dizziness, weakness, headache, 

visual changes, or any other complaints.





- Related Data


 Home Medications











 Medication  Instructions  Recorded  Confirmed


 


Furosemide 20 mg PO BID 07/24/14 01/11/19


 


Gabapentin 100 mg PO QAM 07/24/14 01/11/19


 


Potassium Chloride [Klor-Con 10] 10 meq PO DAILY 07/24/14 01/11/19


 


Gabapentin [Neurontin] 200 mg PO HS 10/09/15 01/11/19


 


Levothyroxine Sodium [Synthroid] 125 mcg PO MOTUWETHFRSA 10/09/15 01/11/19


 


Multivitamins, Thera [Multivitamin 1 tab PO DAILY 03/28/18 01/11/19





(formulary)]   


 


Atorvastatin Calcium [Lipitor] 40 mg PO HS 04/16/18 01/11/19


 


Citalopram Hydrobromide [CeleXA] 20 mg PO DAILY 08/26/18 01/11/19


 


Ondansetron HCl [Zofran] 8 mg PO Q6H PRN 10/04/18 01/11/19


 


Super B Complex 1 tab PO DAILY 10/04/18 01/11/19


 


INSULIN LISPRO (HumaLOG) [humaLOG] See Protocol SQ ACHS PRN 11/27/18 01/11/19


 


Loratadine [Claritin] 10 mg PO DAILY 11/27/18 01/11/19


 


Acyclovir 400 mg PO TID 01/11/19 01/11/19








 Previous Rx's











 Medication  Instructions  Recorded


 


Acetaminophen Tab [Tylenol] 650 mg PO Q4HR PRN  tab 10/12/18


 


metFORMIN HCL 1,000 mg PO BID #0 10/12/18


 


Cephalexin [Keflex] 500 mg PO Q6H #28 cap 01/11/19











 Allergies











Allergy/AdvReac Type Severity Reaction Status Date / Time


 


haloperidol [From Haldol] Allergy  Unknown Verified 01/11/19 20:51


 


haloperidol lactate Allergy  Unknown Verified 01/11/19 20:51





[From Haldol]     


 


Iodinated Contrast- Oral and Allergy  Rash/Hives Verified 01/11/19 20:51





IV Dye     





[Iodinated Contrast Media -     





IV Dye]     


 


ciprofloxacin [From Cipro] AdvReac  Heartburn Verified 01/11/19 20:51


 


steri-strips AdvReac Severe sore Uncoded 01/11/19 19:55














Review of Systems


ROS Statement: 


Those systems with pertinent positive or pertinent negative responses have been 

documented in the HPI.





ROS Other: All systems not noted in ROS Statement are negative.





Past Medical History


Past Medical History: Cancer, Heart Failure, Diabetes Mellitus, Hyperlipidemia, 

Hypertension, Sleep Apnea/CPAP/BIPAP, Thyroid Disorder


Additional Past Medical History / Comment(s): B-cell non-Hodgkin's lymphoma 

stage IV, diabetes mellitus,daughter stated pt had first chemo mon 9-24-18 

hypertension, hyperlipidemia, obstructive sleep apnea, hypothyroidism post 

thyroidectomy, closed head injury in 1989, difficulty with mobility and 

ambulation the patient has been using a walker. has a history of kidney stones


Last Myocardial Infarction Date:: 1989


History of Any Multi-Drug Resistant Organisms: None Reported


Past Surgical History: Cholecystectomy, Hysterectomy, Tonsillectomy


Additional Past Surgical History / Comment(s): Thyroidectomy, lymph node and 

liver biopsies. port a cath 9-10-18, right ureteroscopy with lithotripsy 11/2015


Past Anesthesia/Blood Transfusion Reactions: Postoperative Nausea & Vomiting (

PONV)


Past Psychological History: Anxiety


Smoking Status: Never smoker


Past Alcohol Use History: None Reported


Past Drug Use History: None Reported, Marijuana





- Past Family History


  ** Mother


Family Medical History: Cancer


Additional Family Medical History / Comment(s): breast CA





  ** Father


Family Medical History: Cancer


Additional Family Medical History / Comment(s): colon CA





  ** Sister(s)


Family Medical History: Cancer


Additional Family Medical History / Comment(s): Skin cancer





General Exam


Limitations: no limitations


General appearance: alert, in no apparent distress, other (This is a well-

developed, well-nourished adult female patient in no acute distress.  Vital 

signs upon presentation are temperature 101.7F, pulse 112, respirations 18, 

blood pressure 155/86, pulse ox 95% on room air.)


Eye exam: Present: normal appearance, PERRL, EOMI.  Absent: scleral icterus, 

conjunctival injection, periorbital swelling


ENT exam: Present: normal exam, normal oropharynx, mucous membranes moist


Respiratory exam: Present: normal lung sounds bilaterally.  Absent: respiratory 

distress, wheezes, rales, rhonchi, stridor


Cardiovascular Exam: Present: regular rate, normal rhythm, normal heart sounds.

  Absent: systolic murmur, diastolic murmur, rubs, gallop, clicks


Neurological exam: Present: alert, oriented X3, CN II-XII intact


Psychiatric exam: Present: normal affect, normal mood


Skin exam: Present: warm, dry, intact, normal color, rash (Patient has 

erythematous vesicular rash noted to the left low back right upper inner onto 

the left side of her abdomen.)





Course


 Vital Signs











  01/11/19 01/11/19 01/11/19





  19:55 22:02 22:44


 


Temperature 101.7 F H 100.2 F H 98.4 F


 


Pulse Rate 112 H 98 


 


Respiratory 18 16 





Rate   


 


Blood Pressure 155/86 138/76 


 


O2 Sat by Pulse 95 95 





Oximetry   














Medical Decision Making





- Medical Decision Making


63-year-old female patient presents to the emergency department today for 

evaluation of fever.  Physical exam is relatively unremarkable other than 

having a rash consistent with shingles over the left low back.  Labs reviewed 

and did reveal elevated white blood cell count at 38.2 with an actual count at 

36.9 which is consistent with recent Neupogen injection.  Other labs reveal 

decreased potassium at 3.3, blood sugar at 254, urinalysis shows 1+ protein, 

trace glucose, small leukocyte esterase, 19 white blood cells, many urine 

bacteria.  Influenza testing is negative.  Chest x-ray showed no acute 

cardiopulmonary process.  Given findings patient's fever is most likely related 

to mild urinary tract infection in addition to shingles infection.  We did 

treat with Tylenol and IV fluids, she does report improvement of her symptoms 

upon reevaluation. Potassium was replaced. We'll discharge with a prescription 

for Keflex.  She started taking acyclovir for the shingles infection yesterday.

  She is instructed to call her oncologist inform them of her emergency visit.  

She is instructed to follow-up as soon as possible.  Return parameters were 

discussed in detail.  She verbalizes understanding and agrees with this plan.








- Lab Data


Result diagrams: 


 01/11/19 21:08





 01/11/19 21:08


 Lab Results











  01/11/19 01/11/19 01/11/19 Range/Units





  21:08 21:08 21:08 


 


WBC  38.2 H    (3.8-10.6)  k/uL


 


RBC  3.74 L    (3.80-5.40)  m/uL


 


Hgb  10.9 L    (11.4-16.0)  gm/dL


 


Hct  33.8 L    (34.0-46.0)  %


 


MCV  90.3    (80.0-100.0)  fL


 


MCH  29.0    (25.0-35.0)  pg


 


MCHC  32.1    (31.0-37.0)  g/dL


 


RDW  17.4 H    (11.5-15.5)  %


 


Plt Count  356    (150-450)  k/uL


 


Neutrophils %  97    %


 


Lymphocytes %  0    %


 


Monocytes %  2    %


 


Eosinophils %  1    %


 


Basophils %  0    %


 


Neutrophils #  36.9 H    (1.3-7.7)  k/uL


 


Lymphocytes #  0.1 L    (1.0-4.8)  k/uL


 


Monocytes #  0.7    (0-1.0)  k/uL


 


Eosinophils #  0.3    (0-0.7)  k/uL


 


Basophils #  0.1    (0-0.2)  k/uL


 


Hypochromasia  Slight    


 


Anisocytosis  Slight    


 


PT     (9.0-12.0)  sec


 


INR     (<1.2)  


 


APTT     (22.0-30.0)  sec


 


Sodium   138   (137-145)  mmol/L


 


Potassium   3.3 L   (3.5-5.1)  mmol/L


 


Chloride   95 L   ()  mmol/L


 


Carbon Dioxide   33 H   (22-30)  mmol/L


 


Anion Gap   10   mmol/L


 


BUN   17   (7-17)  mg/dL


 


Creatinine   0.70   (0.52-1.04)  mg/dL


 


Est GFR (CKD-EPI)AfAm   >90   (>60 ml/min/1.73 sqM)  


 


Est GFR (CKD-EPI)NonAf   >90   (>60 ml/min/1.73 sqM)  


 


Glucose   254 H   (74-99)  mg/dL


 


Plasma Lactic Acid Artem     (0.7-2.0)  mmol/L


 


Calcium   9.8   (8.4-10.2)  mg/dL


 


Total Bilirubin   1.0   (0.2-1.3)  mg/dL


 


AST   35   (14-36)  U/L


 


ALT   26   (9-52)  U/L


 


Alkaline Phosphatase   217 H   ()  U/L


 


Total Protein   6.3   (6.3-8.2)  g/dL


 


Albumin   3.7   (3.5-5.0)  g/dL


 


Urine Color     


 


Urine Appearance     (Clear)  


 


Urine pH     (5.0-8.0)  


 


Ur Specific Gravity     (1.001-1.035)  


 


Urine Protein     (Negative)  


 


Urine Glucose (UA)     (Negative)  


 


Urine Ketones     (Negative)  


 


Urine Blood     (Negative)  


 


Urine Nitrite     (Negative)  


 


Urine Bilirubin     (Negative)  


 


Urine Urobilinogen     (<2.0)  mg/dL


 


Ur Leukocyte Esterase     (Negative)  


 


Urine RBC     (0-5)  /hpf


 


Urine WBC     (0-5)  /hpf


 


Ur Squamous Epith Cells     (0-4)  /hpf


 


Urine Bacteria     (None)  /hpf


 


Hyaline Casts     (0-2)  /lpf


 


Influenza Type A RNA    Not Detected  (Not Detectd)  


 


Influenza Type B (PCR)    Not Detected  (Not Detectd)  














  01/11/19 01/11/19 01/11/19 Range/Units





  21:08 21:08 21:08 


 


WBC     (3.8-10.6)  k/uL


 


RBC     (3.80-5.40)  m/uL


 


Hgb     (11.4-16.0)  gm/dL


 


Hct     (34.0-46.0)  %


 


MCV     (80.0-100.0)  fL


 


MCH     (25.0-35.0)  pg


 


MCHC     (31.0-37.0)  g/dL


 


RDW     (11.5-15.5)  %


 


Plt Count     (150-450)  k/uL


 


Neutrophils %     %


 


Lymphocytes %     %


 


Monocytes %     %


 


Eosinophils %     %


 


Basophils %     %


 


Neutrophils #     (1.3-7.7)  k/uL


 


Lymphocytes #     (1.0-4.8)  k/uL


 


Monocytes #     (0-1.0)  k/uL


 


Eosinophils #     (0-0.7)  k/uL


 


Basophils #     (0-0.2)  k/uL


 


Hypochromasia     


 


Anisocytosis     


 


PT   9.6   (9.0-12.0)  sec


 


INR   0.9   (<1.2)  


 


APTT   20.9 L   (22.0-30.0)  sec


 


Sodium     (137-145)  mmol/L


 


Potassium     (3.5-5.1)  mmol/L


 


Chloride     ()  mmol/L


 


Carbon Dioxide     (22-30)  mmol/L


 


Anion Gap     mmol/L


 


BUN     (7-17)  mg/dL


 


Creatinine     (0.52-1.04)  mg/dL


 


Est GFR (CKD-EPI)AfAm     (>60 ml/min/1.73 sqM)  


 


Est GFR (CKD-EPI)NonAf     (>60 ml/min/1.73 sqM)  


 


Glucose     (74-99)  mg/dL


 


Plasma Lactic Acid Artem  1.7    (0.7-2.0)  mmol/L


 


Calcium     (8.4-10.2)  mg/dL


 


Total Bilirubin     (0.2-1.3)  mg/dL


 


AST     (14-36)  U/L


 


ALT     (9-52)  U/L


 


Alkaline Phosphatase     ()  U/L


 


Total Protein     (6.3-8.2)  g/dL


 


Albumin     (3.5-5.0)  g/dL


 


Urine Color    Yellow  


 


Urine Appearance    Clear  (Clear)  


 


Urine pH    6.0  (5.0-8.0)  


 


Ur Specific Gravity    1.012  (1.001-1.035)  


 


Urine Protein    1+ H  (Negative)  


 


Urine Glucose (UA)    Trace H  (Negative)  


 


Urine Ketones    Negative  (Negative)  


 


Urine Blood    Negative  (Negative)  


 


Urine Nitrite    Negative  (Negative)  


 


Urine Bilirubin    Negative  (Negative)  


 


Urine Urobilinogen    <2.0  (<2.0)  mg/dL


 


Ur Leukocyte Esterase    Small H  (Negative)  


 


Urine RBC    1  (0-5)  /hpf


 


Urine WBC    19 H  (0-5)  /hpf


 


Ur Squamous Epith Cells    1  (0-4)  /hpf


 


Urine Bacteria    Many H  (None)  /hpf


 


Hyaline Casts    1  (0-2)  /lpf


 


Influenza Type A RNA     (Not Detectd)  


 


Influenza Type B (PCR)     (Not Detectd)  














- Radiology Data


Radiology results: report reviewed, image reviewed


Two-view x-ray of the chest is obtained.  Report is reviewed in its entirety.  

Impression by Dr. Valle shows no acute pulmonary process.  Findings are 

stable over the interval.





Disposition


Clinical Impression: 


 Shingles, Urinary tract infection





Disposition: HOME SELF-CARE


Condition: Good


Instructions:  Urinary Tract Infection in Women (ED), Shingles (ED), Fever in 

Adults (ED)


Additional Instructions: 


Complete antibiotic prescription in full. Follow up with oncologist for recheck 

as soon as possible. Return immediately for any new, worsening, or concerning 

symptoms. 


Prescriptions: 


Cephalexin [Keflex] 500 mg PO Q6H #28 cap


Is patient prescribed a controlled substance at d/c from ED?: No


Referrals: 


Faustino Villegas MD [Primary Care Provider] - 1-2 days


Time of Disposition: 22:26

## 2019-01-17 ENCOUNTER — HOSPITAL ENCOUNTER (OUTPATIENT)
Dept: HOSPITAL 47 - RADCTMAIN | Age: 64
Discharge: HOME | End: 2019-01-17
Attending: INTERNAL MEDICINE
Payer: MEDICARE

## 2019-01-17 DIAGNOSIS — C85.93: Primary | ICD-10-CM

## 2019-01-17 DIAGNOSIS — R91.8: ICD-10-CM

## 2019-01-17 DIAGNOSIS — D71: ICD-10-CM

## 2019-01-17 DIAGNOSIS — K57.30: ICD-10-CM

## 2019-01-17 DIAGNOSIS — K76.9: ICD-10-CM

## 2019-01-17 DIAGNOSIS — K44.9: ICD-10-CM

## 2019-01-17 LAB — BUN SERPL-SCNC: 16 MG/DL (ref 7–17)

## 2019-01-17 PROCEDURE — 71260 CT THORAX DX C+: CPT

## 2019-01-17 PROCEDURE — 36415 COLL VENOUS BLD VENIPUNCTURE: CPT

## 2019-01-17 PROCEDURE — 84520 ASSAY OF UREA NITROGEN: CPT

## 2019-01-17 PROCEDURE — 82565 ASSAY OF CREATININE: CPT

## 2019-01-17 PROCEDURE — 74177 CT ABD & PELVIS W/CONTRAST: CPT

## 2019-01-17 NOTE — CT
EXAMINATION TYPE: CT ChestAbdPelvis w con

 

DATE OF EXAM: 1/17/2019

 

COMPARISON: 11/27/2018, 10/4/2018, 8/26/2018

 

HISTORY: 63-year-old female follow-up Non-Hodgkin's Lymphoma

 

TECHNIQUE: Contiguous axial scanning of the chest, abdomen, and pelvis performed with IV Contrast, pa
tient injected with 80 mL of Isovue 300. Delayed images through the kidneys were obtained. Coronal/sa
gittal reconstructions performed.

 

CT DLP: 1715 mGycm

Automated exposure control for dose reduction was used.

 

FINDINGS: 

Chest:

Right anterior chest wall injection port with catheter tip at the mid SVC.

 

Heart upper limits of normal in size without pericardial effusion.

 

Ascending aorta ectatic at 3.6 cm. Conventional arch vessel branching anatomy.

 

The previous left axillary lymph node seen on 8/26/2018 has resolved, similar to 11/27/2018. No thora
cic lymphadenopathy by CT size criteria.

 

Calcified granuloma redemonstrated in the posterior left midlung.

 

Calcified left hilar and left tracheobronchial angle lymph nodes compatible with prior granulomatous 
disease.

 

Some focal patchy subpleural opacity posterior left lower lobe has decreased from 11/27/2018 and coul
d represent some residual scarring. The multifocal groundglass seen at that time has resolved. No new
 consolidation or pleural effusion.

 

 

 

ABDOMEN:

Small hiatal hernia.

 

Segment 4A/4B left liver lobe lesion has decreased in size currently measuring 3.4 x 1.9 cm versus 5.
1 x 3.2 cm on 10/4/2018 and nearly 6.0 x 4.3 cm on 8/26/2018. Other hypodensities also show some impr
ovement. 5-10 cysts subcentimeter hypodense lesions remain

 

Portal venous system is patent. No biliary ductal dilatation.

 

Adrenal glands, spleen, and pancreas are no gross abnormality.

 

Retroperitoneal lymphadenopathy in the retrocaval region measures 1.2 cm short axis versus 1.0 cm on 
10/4/2018 and 1.3 cm on 8/26/2018.

 

Left para-aortic lymphadenopathy measures up to 3.1 x 1.2 cm versus 5.1 x 3.1 cm on 10/4/2018 and 5.9
 x 3.7 cm on 8/26/2018.

 

Some upper abdominal lymph nodes suggested in the gastrohepatic and evonne hepatic region seen on 8/26
/2018 have resolved.

 

Nodularity along the superior margin of the third portion of the duodenum suspected to represent a du
odenal diverticulum. Axial image 66.

 

Cortical defects lateral upper pole right kidney and 1.4 cm cyst lateral left kidney are unchanged. R
etroaortic left renal vein incidentally noted.

 

Normal appendix. Oral contrast progressed to the rectum. Diffuse colonic diverticulosis without peric
olonic inflammatory change.

 

No dilated small bowel, free fluid, or free air.

 

 

Pelvis:

Bladder not distended. Multiple pelvic phleboliths. Bulging laxity of the levator ani muscles suggest
ing pelvic floor relaxation. Uterus surgically absent. Ovaries not clearly identified. No pelvic lymp
hadenopathy seen.

 

 

Bones:

Degenerative changes of the hips and SI joints and throughout the mid to lower lumbar spine. Endplate
 spondylosis mid to lower thoracic spine. No osseous destructive process seen. Grade 1 retrolisthesis
 at L2-L3 and L3-L4 and grade 1 anterolisthesis at L4-L5.

 

 

 

IMPRESSION: 

 

1. TREATMENT RESPONSE. RESIDUAL LEFT PARA-AORTIC RETROPERITONEAL LAQUITA MASS CURRENTLY MEASURES 3.1 X 
1.2 CM (VERSUS 5.1 X 3.1 CM AND 5.9 X 3.7 CM ON 10/4/2018 AND 8/26/2018, RESPECTIVELY).

2. HOWEVER, A SINGLE RETROCAVAL LYMPH NODE IS STABLE TO MINIMALLY LARGER MEASURING 1.2 CM SHORT AXIS 
VERSUS 1.0 CM ON 10/4/2018 AND 1.3 CM ON 8/26/2018.

3. THE OTHER PREVIOUSLY ENLARGED UPPER ABDOMINAL AND LEFT AXILLARY LYMPH NODES HAVE RESOLVED.

4. HEPATIC LESIONS CONTINUE TO DECREASE IN SIZE , LARGEST IN SEGMENT 4A/4B MEASURES 3.4 X 1.9 CM (KALE
DARYL 5.1 X 3.2 CM ON 10/4/2018 AND 6.0 X 4.3 CM ON 8/26/2018).

5. THE PREVIOUS GROUNDGLASS OPACITIES SEEN IN THE LUNGS ON 11/27/2018 HAVE RESOLVED. 

6. PRIOR GRANULOMATOUS DISEASE, SMALL HIATAL HERNIA, AND DIFFUSE COLONIC DIVERTICULOSIS.

## 2019-02-01 ENCOUNTER — HOSPITAL ENCOUNTER (EMERGENCY)
Dept: HOSPITAL 47 - EC | Age: 64
Discharge: HOME | End: 2019-02-01
Payer: MEDICARE

## 2019-02-01 VITALS — TEMPERATURE: 98.7 F | SYSTOLIC BLOOD PRESSURE: 151 MMHG | DIASTOLIC BLOOD PRESSURE: 85 MMHG | HEART RATE: 91 BPM

## 2019-02-01 VITALS — RESPIRATION RATE: 16 BRPM

## 2019-02-01 DIAGNOSIS — Z79.890: ICD-10-CM

## 2019-02-01 DIAGNOSIS — E78.5: ICD-10-CM

## 2019-02-01 DIAGNOSIS — R19.00: Primary | ICD-10-CM

## 2019-02-01 DIAGNOSIS — I50.9: ICD-10-CM

## 2019-02-01 DIAGNOSIS — Z88.1: ICD-10-CM

## 2019-02-01 DIAGNOSIS — I11.0: ICD-10-CM

## 2019-02-01 DIAGNOSIS — I25.2: ICD-10-CM

## 2019-02-01 DIAGNOSIS — C85.90: ICD-10-CM

## 2019-02-01 DIAGNOSIS — E03.9: ICD-10-CM

## 2019-02-01 DIAGNOSIS — Z79.899: ICD-10-CM

## 2019-02-01 DIAGNOSIS — Z91.048: ICD-10-CM

## 2019-02-01 DIAGNOSIS — F41.9: ICD-10-CM

## 2019-02-01 DIAGNOSIS — Z79.51: ICD-10-CM

## 2019-02-01 DIAGNOSIS — G47.33: ICD-10-CM

## 2019-02-01 DIAGNOSIS — Z79.82: ICD-10-CM

## 2019-02-01 DIAGNOSIS — Z88.8: ICD-10-CM

## 2019-02-01 DIAGNOSIS — Z91.041: ICD-10-CM

## 2019-02-01 DIAGNOSIS — K57.30: ICD-10-CM

## 2019-02-01 LAB
ALBUMIN SERPL-MCNC: 3.9 G/DL (ref 3.5–5)
ALP SERPL-CCNC: 195 U/L (ref 38–126)
ALT SERPL-CCNC: 21 U/L (ref 9–52)
AMYLASE SERPL-CCNC: 44 U/L (ref 30–110)
ANION GAP SERPL CALC-SCNC: 9 MMOL/L
AST SERPL-CCNC: 15 U/L (ref 14–36)
BASOPHILS # BLD AUTO: 0 K/UL (ref 0–0.2)
BASOPHILS NFR BLD AUTO: 0 %
BUN SERPL-SCNC: 12 MG/DL (ref 7–17)
CALCIUM SPEC-MCNC: 10.5 MG/DL (ref 8.4–10.2)
CHLORIDE SERPL-SCNC: 98 MMOL/L (ref 98–107)
CO2 SERPL-SCNC: 32 MMOL/L (ref 22–30)
EOSINOPHIL # BLD AUTO: 0.1 K/UL (ref 0–0.7)
EOSINOPHIL NFR BLD AUTO: 1 %
ERYTHROCYTE [DISTWIDTH] IN BLOOD BY AUTOMATED COUNT: 3.84 M/UL (ref 3.8–5.4)
ERYTHROCYTE [DISTWIDTH] IN BLOOD: 17.8 % (ref 11.5–15.5)
GLUCOSE SERPL-MCNC: 399 MG/DL (ref 74–99)
GLUCOSE UR QL: (no result)
HCT VFR BLD AUTO: 36 % (ref 34–46)
HGB BLD-MCNC: 11.3 GM/DL (ref 11.4–16)
LIPASE SERPL-CCNC: 159 U/L (ref 23–300)
LYMPHOCYTES # SPEC AUTO: 0.3 K/UL (ref 1–4.8)
LYMPHOCYTES NFR SPEC AUTO: 4 %
MCH RBC QN AUTO: 29.5 PG (ref 25–35)
MCHC RBC AUTO-ENTMCNC: 31.5 G/DL (ref 31–37)
MCV RBC AUTO: 93.6 FL (ref 80–100)
MONOCYTES # BLD AUTO: 0.5 K/UL (ref 0–1)
MONOCYTES NFR BLD AUTO: 6 %
NEUTROPHILS # BLD AUTO: 7.4 K/UL (ref 1.3–7.7)
NEUTROPHILS NFR BLD AUTO: 87 %
PH UR: 6 [PH] (ref 5–8)
PLATELET # BLD AUTO: 416 K/UL (ref 150–450)
POTASSIUM SERPL-SCNC: 3.8 MMOL/L (ref 3.5–5.1)
PROT SERPL-MCNC: 6.7 G/DL (ref 6.3–8.2)
SODIUM SERPL-SCNC: 139 MMOL/L (ref 137–145)
SP GR UR: 1.03 (ref 1–1.03)
UROBILINOGEN UR QL STRIP: 2 MG/DL (ref ?–2)
WBC # BLD AUTO: 8.5 K/UL (ref 3.8–10.6)

## 2019-02-01 PROCEDURE — 80053 COMPREHEN METABOLIC PANEL: CPT

## 2019-02-01 PROCEDURE — 83690 ASSAY OF LIPASE: CPT

## 2019-02-01 PROCEDURE — 99284 EMERGENCY DEPT VISIT MOD MDM: CPT

## 2019-02-01 PROCEDURE — 85025 COMPLETE CBC W/AUTO DIFF WBC: CPT

## 2019-02-01 PROCEDURE — 82150 ASSAY OF AMYLASE: CPT

## 2019-02-01 PROCEDURE — 74176 CT ABD & PELVIS W/O CONTRAST: CPT

## 2019-02-01 PROCEDURE — 81003 URINALYSIS AUTO W/O SCOPE: CPT

## 2019-02-01 PROCEDURE — 36415 COLL VENOUS BLD VENIPUNCTURE: CPT

## 2019-02-01 NOTE — ED
General Adult HPI





- General


Source: patient


Mode of arrival: ambulatory


Limitations: no limitations





<Flower Rene - Last Filed: 02/02/19 01:19>





<Rai Turcios - Last Filed: 02/02/19 08:35>





- General


Chief complaint: Recheck/Abnormal Lab/Rx


Stated complaint: left side swelling, Ca patient


Time Seen by Provider: 02/01/19 19:29





- History of Present Illness


Initial comments: 


63-year-old female patient with past medical history significant for non-Hodgkin

's lymphoma currently being treated with chemotherapy presents to the emergency 

department today for evaluation of swelling to the left side of her abdomen.  

Patient states that she noticed this starting last evening.  Patient states she 

has an intermittent burning pain to the area.  Patient states that the area 

feels warm to touch.  Patient was recently treated for shingles of left flank.  

Patient denies any nausea, vomiting, constipation, or diarrhea.  Denies any 

difficulty with urination.  Denies any history of similar symptoms. Patient 

denies any recent rash, fever, chills, shortness breath, chest pain, back pain, 

numbness, tingling, dizziness, weakness, hematuria, dysuria, urinary urgency, 

urinary frequency, headache, visual changes, or any other complaints.


 (Flower Rene)





- Related Data


 Home Medications











 Medication  Instructions  Recorded  Confirmed


 


Furosemide 20 mg PO BID 07/24/14 02/01/19


 


Gabapentin 100 mg PO QAM 07/24/14 02/01/19


 


Gabapentin [Neurontin] 200 mg PO HS 10/09/15 02/01/19


 


Levothyroxine Sodium [Synthroid] 125 mcg PO MOTUWETHFRSA 10/09/15 02/01/19


 


Multivitamins, Thera [Multivitamin 1 tab PO DAILY 03/28/18 02/01/19





(formulary)]   


 


Atorvastatin Calcium [Lipitor] 40 mg PO HS 04/16/18 02/01/19


 


Citalopram Hydrobromide [CeleXA] 20 mg PO DAILY 08/26/18 02/01/19


 


Ondansetron HCl [Zofran] 8 mg PO Q6H PRN 10/04/18 02/01/19


 


Aspirin [[place] Aspirin EC] 81 mg PO DAILY 02/01/19 02/01/19


 


Cetirizine HCl 10 mg PO DAILY 02/01/19 02/01/19


 


Pantoprazole [Protonix] 40 mg PO DAILY 02/01/19 02/01/19


 


Potassium Chloride ER [K-Dur 10] 10 meq PO BID 02/01/19 02/01/19


 


predniSONE 100 mg PO AS DIRECTED 02/01/19 02/01/19








 Previous Rx's











 Medication  Instructions  Recorded


 


Acetaminophen Tab [Tylenol] 650 mg PO Q4HR PRN  tab 10/12/18


 


metFORMIN HCL 1,000 mg PO BID #0 10/12/18











 Allergies











Allergy/AdvReac Type Severity Reaction Status Date / Time


 


haloperidol [From Haldol] Allergy  Unknown Verified 02/01/19 20:06


 


haloperidol lactate Allergy  Unknown Verified 02/01/19 20:06





[From Haldol]     


 


Iodinated Contrast- Oral and Allergy  Rash/Hives Verified 02/01/19 20:06





IV Dye     





[Iodinated Contrast Media -     





IV Dye]     


 


ciprofloxacin [From Cipro] AdvReac  Heartburn Verified 02/01/19 20:06


 


steri-strips AdvReac Severe sore Uncoded 02/01/19 19:16














Review of Systems


ROS Other: All systems not noted in ROS Statement are negative.





<Flower Rene - Last Filed: 02/02/19 01:19>


ROS Other: All systems not noted in ROS Statement are negative.





<Rai Turcios - Last Filed: 02/02/19 08:35>


ROS Statement: 


Those systems with pertinent positive or pertinent negative responses have been 

documented in the HPI.








Past Medical History


Past Medical History: Cancer, Heart Failure, Diabetes Mellitus, Hyperlipidemia, 

Hypertension, Sleep Apnea/CPAP/BIPAP, Thyroid Disorder


Additional Past Medical History / Comment(s): B-cell non-Hodgkin's lymphoma 

stage IV, diabetes mellitus,daughter stated pt had first chemo mon 9-24-18 

hypertension, hyperlipidemia, obstructive sleep apnea, hypothyroidism post 

thyroidectomy, closed head injury in 1989, difficulty with mobility and 

ambulation the patient has been using a walker. has a history of kidney stones


Last Myocardial Infarction Date:: 1989


History of Any Multi-Drug Resistant Organisms: None Reported


Past Surgical History: Cholecystectomy, Hysterectomy, Tonsillectomy


Additional Past Surgical History / Comment(s): Thyroidectomy, lymph node and 

liver biopsies. port a cath 9-10-18, right ureteroscopy with lithotripsy 11/2015


Past Anesthesia/Blood Transfusion Reactions: Postoperative Nausea & Vomiting (

PONV)


Past Psychological History: Anxiety


Smoking Status: Never smoker


Past Alcohol Use History: None Reported


Past Drug Use History: Marijuana





- Past Family History


  ** Mother


Family Medical History: Cancer


Additional Family Medical History / Comment(s): breast CA





  ** Father


Family Medical History: Cancer


Additional Family Medical History / Comment(s): colon CA





  ** Sister(s)


Family Medical History: Cancer


Additional Family Medical History / Comment(s): Skin cancer





<Flower Rene M - Last Filed: 02/02/19 01:19>





General Exam


Limitations: no limitations


General appearance: alert, in no apparent distress, other (Physical well-

developed, well-nourished adult female patient in no acute distress.  Vital 

signs upon presentation are temperature 98.3F, pulse 101, respirations 22, 

blood pressure 159/95, pulse ox 98% on room air.)


Eye exam: Present: normal appearance, PERRL, EOMI.  Absent: scleral icterus, 

conjunctival injection, periorbital swelling


ENT exam: Present: normal exam, normal oropharynx, mucous membranes moist


Respiratory exam: Present: normal lung sounds bilaterally.  Absent: respiratory 

distress, wheezes, rales, rhonchi, stridor


Cardiovascular Exam: Present: regular rate, normal rhythm, normal heart sounds.

  Absent: systolic murmur, diastolic murmur, rubs, gallop, clicks


GI/Abdominal exam: Present: soft, distended (Left-sided abdominal distention 

and fullness, firm to touch), normal bowel sounds.  Absent: tenderness, guarding

, rebound, rigid


Neurological exam: Present: alert, oriented X3, CN II-XII intact


Psychiatric exam: Present: normal affect, normal mood


Skin exam: Present: warm, dry, intact, normal color.  Absent: rash





<Flower Rene M - Last Filed: 02/02/19 01:19>





 Vital Signs











  02/01/19 02/01/19 02/01/19





  19:12 19:47 21:38


 


Temperature 98.3 F  


 


Pulse Rate 101 H 94 99


 


Respiratory 22 16 16





Rate   


 


Blood Pressure 159/95 148/84 156/91


 


O2 Sat by Pulse 98 98 98





Oximetry   














  02/01/19 02/01/19





  22:52 23:29


 


Temperature  98.7 F


 


Pulse Rate 105 H 91


 


Respiratory  16





Rate  


 


Blood Pressure 89/60 151/85


 


O2 Sat by Pulse  96





Oximetry  














Medical Decision Making





- Lab Data


Result diagrams: 


 02/01/19 19:40





 02/01/19 19:40





- Radiology Data


Radiology results: report reviewed, image reviewed





<Flower Rene - Last Filed: 02/02/19 01:19>





- Lab Data


Result diagrams: 


 02/01/19 19:40





 02/01/19 19:40





<Rai Turcios - Last Filed: 02/02/19 08:35>





- Medical Decision Making


63-year-old female patient presents to the emergency department today for 

evaluation of left-sided abdominal swelling and discomfort.  Physical 

examination did reveal left-sided abdominal swelling.  No abdominal tenderness.

  Labs reviewed and did reveal an elevated blood sugar.  CT abdomen and pelvis 

without contrast was obtained and did show some inflammatory changes but no 

evidence of acute abdomen and pelvis.  Patient is afebrile, vital signs are 

stable.  She'll be discharged home at this time.  With her oncologist and her 

primary care physician for recheck in 1-2 days.  Return parameters discussed in 

detail.  She verbalizes understanding and agrees with this plan.


 (Flower Rene)





I saw this patient in conjunction with the physician assistant.  I performed 

independent history and physical exam.  Agree with case management. (Rai Turcios)





- Lab Data





 Lab Results











  02/01/19 02/01/19 02/01/19 Range/Units





  19:40 19:40 19:40 


 


WBC   8.5   (3.8-10.6)  k/uL


 


RBC   3.84   (3.80-5.40)  m/uL


 


Hgb   11.3 L   (11.4-16.0)  gm/dL


 


Hct   36.0   (34.0-46.0)  %


 


MCV   93.6   (80.0-100.0)  fL


 


MCH   29.5   (25.0-35.0)  pg


 


MCHC   31.5   (31.0-37.0)  g/dL


 


RDW   17.8 H   (11.5-15.5)  %


 


Plt Count   416   (150-450)  k/uL


 


Neutrophils %   87   %


 


Lymphocytes %   4   %


 


Monocytes %   6   %


 


Eosinophils %   1   %


 


Basophils %   0   %


 


Neutrophils #   7.4   (1.3-7.7)  k/uL


 


Lymphocytes #   0.3 L   (1.0-4.8)  k/uL


 


Monocytes #   0.5   (0-1.0)  k/uL


 


Eosinophils #   0.1   (0-0.7)  k/uL


 


Basophils #   0.0   (0-0.2)  k/uL


 


Hypochromasia   Moderate   


 


Anisocytosis   Slight   


 


Sodium  139    (137-145)  mmol/L


 


Potassium  3.8    (3.5-5.1)  mmol/L


 


Chloride  98    ()  mmol/L


 


Carbon Dioxide  32 H    (22-30)  mmol/L


 


Anion Gap  9    mmol/L


 


BUN  12    (7-17)  mg/dL


 


Creatinine  0.73    (0.52-1.04)  mg/dL


 


Est GFR (CKD-EPI)AfAm  >90    (>60 ml/min/1.73 sqM)  


 


Est GFR (CKD-EPI)NonAf  87    (>60 ml/min/1.73 sqM)  


 


Glucose  399 H    (74-99)  mg/dL


 


Calcium  10.5 H    (8.4-10.2)  mg/dL


 


Total Bilirubin  0.5    (0.2-1.3)  mg/dL


 


AST  15    (14-36)  U/L


 


ALT  21    (9-52)  U/L


 


Alkaline Phosphatase  195 H    ()  U/L


 


Total Protein  6.7    (6.3-8.2)  g/dL


 


Albumin  3.9    (3.5-5.0)  g/dL


 


Amylase  44    ()  U/L


 


Lipase  159    ()  U/L


 


Urine Color    Yellow  


 


Urine Appearance    Clear  (Clear)  


 


Urine pH    6.0  (5.0-8.0)  


 


Ur Specific Gravity    1.034  (1.001-1.035)  


 


Urine Protein    Trace H  (Negative)  


 


Urine Glucose (UA)    4+ H  (Negative)  


 


Urine Ketones    Negative  (Negative)  


 


Urine Blood    Negative  (Negative)  


 


Urine Nitrite    Negative  (Negative)  


 


Urine Bilirubin    Negative  (Negative)  


 


Urine Urobilinogen    2.0  (<2.0)  mg/dL


 


Ur Leukocyte Esterase    Negative  (Negative)  














- Radiology Data


CT abdomen and pelvis without contrast was obtained.  Report was reviewed in 

its entirety.  Impression by Dr. Pimentel shows extensive colonic 

diverticulosis without evidence of diverticulitis.  There is retroperitoneal 

inflammatory changes lymphadenopathy it is increased compared to recent exam of 

01/17/2019 a could relate to recurrent tumor. (Flower Rene)





Disposition


Is patient prescribed a controlled substance at d/c from ED?: No


Time of Disposition: 23:17





<Flower Rene - Last Filed: 02/02/19 01:19>





<Rai Turcios - Last Filed: 02/02/19 08:35>


Clinical Impression: 


 Abdominal swelling





Disposition: HOME SELF-CARE


Condition: Good


Additional Instructions: 


Follow up through primary care physician and your oncologist for recheck as 

soon as possible.  Return to the emergency department immediately for any new, 

worsening, or concerning symptoms.


Referrals: 


Faustino Villegas MD [Primary Care Provider] - 1-2 days

## 2019-02-01 NOTE — CT
EXAMINATION TYPE: CT abdomen pelvis wo con

 

DATE OF EXAM: 2/1/2019

 

COMPARISON: 1/17/2019

 

HISTORY: left sided abdominal pain and swelling. hx of lymphoma

 

CT DLP: 434.9 mGycm

Automated exposure control for dose reduction was used.

 

TECHNIQUE:  Helical acquisition of images was performed from the lung bases through the pelvis.

 

FINDINGS: 

 

There is subsegmental atelectasis at the posterior lung bases. Heart is enlarged. There is no pericar
dial effusion.

 

There are clips from cholecystectomy. Liver shows no focal defect. Spleen appears normal. Stomach ant
ears normal. There is no evidence of pancreatic mass. There are enlarged abdominal pelvic lymph nodes
 that measure up to 2.5 cm. There is mild fat stranding in the retroperitoneum. There is no adrenal m
ass. The kidneys show no hydronephrosis. There is low-density 5 mm calcification posterior left kidne
y. There is no ascites. Appendix appears normal.

 

There is extensive diverticulosis throughout the large bowel. The bladder distends smoothly. There is
 no inguinal hernia. There is no free fluid in the pelvis. There is a 5 mm anterior subluxation of L4
 in relation L5. There is degenerative disc space narrowing throughout the lumbar spine. There is no 
compression fracture. The bony pelvis is intact.

IMPRESSION: 

EXTENSIVE COLONIC DIVERTICULOSIS WITHOUT EVIDENCE OF DIVERTICULITIS.

 

 THERE IS RETROPERITONEAL INFLAMMATORY CHANGES AND LYMPHADENOPATHY THAT IS INCREASED COMPARED TO RECE
NT EXAM OF 1/17/2019 THAT COULD RELATE TO RECURRENT TUMOR.

## 2019-02-09 ENCOUNTER — HOSPITAL ENCOUNTER (OUTPATIENT)
Dept: HOSPITAL 47 - RADPETMAIN | Age: 64
End: 2019-02-09
Attending: INTERNAL MEDICINE
Payer: MEDICARE

## 2019-02-09 DIAGNOSIS — R93.3: Primary | ICD-10-CM

## 2019-02-09 DIAGNOSIS — C85.83: ICD-10-CM

## 2019-02-09 PROCEDURE — 78815 PET IMAGE W/CT SKULL-THIGH: CPT

## 2019-02-11 NOTE — PE
EXAMINATION TYPE: PET CT fusion skull to thigh

 

DATE OF EXAM: 2/9/2019

 

COMPARISON: CT abdomen pelvis 2/1/2019, CT chest abdomen pelvis 1/17/2019

                            Prior PET/CT: 5/5/2018

 

HISTORY: Lymphoma   

 

TECHNIQUE:  Following the intravenous administration of 12.363 mCi of F-18 FDG, whole body images are
 performed from the skull base to the midthigh.  Images are reviewed on the computer in the coronal, 
axial, and sagittal planes.  Reconstructed rotating images are created on independent workstation and
 reviewed on the computer.   A localization and attenuation correction CT is performed in conjunction
 with the PET scan.

 

DLP: 298.61 mGycm

 

SCAN: Subsequent

 

Blood glucose: 200 mg/dL

 

Average Mediastinum SUV: 1.34

Average Liver SUV: 2.02

 

FINDINGS: 

NECK:  No suspicious uptake

 

THORAX: No suspicious uptake

 

ABDOMEN: There is focal radiotracer accumulation in the retrocaval region in the upper abdomen. This 
has intense activity of 13.3. This area has increased in size from the prior examination. Intensity h
owever has diminished from 16.83. Extension into the vertebral bodies and interval finding. Left otf
aortic adenopathy is not visualized on the current exam uptake within bowel loops was present previou
sly. This may has some extension into the vertebral body at this level with an SUV value of 3.0.  The
re appears to be scattered areas of uptake within loops of bowel.

 

PELVIS: No suspicious uptake

 

OSSEOUS STRUCTURES: No suspicious uptake

 

LOCALIZATION CT: Ascending thoracic aorta at the level the main pulmonary artery is 3.9 cm patent cristofer
n pulmonary bifurcation is 2.2 cm. There is a dense calcification, likely granuloma in the posterior 
lateral left midlung. Calcification may be at the left infrahilar region as well. Mild coronary arter
y calcification is noted.  Multiple diverticuli are within the colon which contains small residual am
ounts of barium. There is a nonobstructing renal stone on the left kidney measuring 0.5 cm. Vascular 
calcifications within the aorta.

 

COMPARISON: Appears to be a focal area of increased uptake with direct extension into the vertebral b
radha within the upper abdomen. This has increased in volume but is diminished density the over the int
erval.

 

IMPRESSION:

1. There is some residual uptake within the retrocaval area in the upper abdomen. SUV has diminished 
from 16 to13. Findings however appears increased in size and there may be direct extension into the a
djacent vertebral body.

2. Intense activity throughout the bowel loops may be related to bowel activity. Neoplasm within loop
s of bowel which was present previously, is less likely.

3. Additional areas of abnormal uptake in the periaortic region from previous examination do not have
 active uptake on the current examination.

## 2019-03-18 ENCOUNTER — HOSPITAL ENCOUNTER (OUTPATIENT)
Dept: HOSPITAL 47 - EC | Age: 64
Setting detail: OBSERVATION
LOS: 4 days | Discharge: HOME | End: 2019-03-22
Attending: FAMILY MEDICINE | Admitting: FAMILY MEDICINE
Payer: MEDICARE

## 2019-03-18 DIAGNOSIS — E86.0: ICD-10-CM

## 2019-03-18 DIAGNOSIS — Z80.8: ICD-10-CM

## 2019-03-18 DIAGNOSIS — E11.65: ICD-10-CM

## 2019-03-18 DIAGNOSIS — K21.9: ICD-10-CM

## 2019-03-18 DIAGNOSIS — M54.41: ICD-10-CM

## 2019-03-18 DIAGNOSIS — R63.0: ICD-10-CM

## 2019-03-18 DIAGNOSIS — T45.1X5A: ICD-10-CM

## 2019-03-18 DIAGNOSIS — J90: ICD-10-CM

## 2019-03-18 DIAGNOSIS — Z91.048: ICD-10-CM

## 2019-03-18 DIAGNOSIS — I11.0: ICD-10-CM

## 2019-03-18 DIAGNOSIS — Z88.8: ICD-10-CM

## 2019-03-18 DIAGNOSIS — Z80.0: ICD-10-CM

## 2019-03-18 DIAGNOSIS — E89.0: ICD-10-CM

## 2019-03-18 DIAGNOSIS — E78.5: ICD-10-CM

## 2019-03-18 DIAGNOSIS — C85.90: ICD-10-CM

## 2019-03-18 DIAGNOSIS — Z91.041: ICD-10-CM

## 2019-03-18 DIAGNOSIS — Z79.84: ICD-10-CM

## 2019-03-18 DIAGNOSIS — Z88.1: ICD-10-CM

## 2019-03-18 DIAGNOSIS — Z90.710: ICD-10-CM

## 2019-03-18 DIAGNOSIS — R41.3: ICD-10-CM

## 2019-03-18 DIAGNOSIS — R26.9: ICD-10-CM

## 2019-03-18 DIAGNOSIS — Z80.3: ICD-10-CM

## 2019-03-18 DIAGNOSIS — Z92.21: ICD-10-CM

## 2019-03-18 DIAGNOSIS — G47.33: ICD-10-CM

## 2019-03-18 DIAGNOSIS — K29.70: Primary | ICD-10-CM

## 2019-03-18 DIAGNOSIS — F41.9: ICD-10-CM

## 2019-03-18 DIAGNOSIS — R63.4: ICD-10-CM

## 2019-03-18 DIAGNOSIS — D64.81: ICD-10-CM

## 2019-03-18 DIAGNOSIS — G89.29: ICD-10-CM

## 2019-03-18 DIAGNOSIS — I50.9: ICD-10-CM

## 2019-03-18 DIAGNOSIS — Z79.890: ICD-10-CM

## 2019-03-18 DIAGNOSIS — Z79.899: ICD-10-CM

## 2019-03-18 DIAGNOSIS — D70.9: ICD-10-CM

## 2019-03-18 DIAGNOSIS — C85.10: ICD-10-CM

## 2019-03-18 DIAGNOSIS — Z90.49: ICD-10-CM

## 2019-03-18 DIAGNOSIS — Z99.89: ICD-10-CM

## 2019-03-18 DIAGNOSIS — Z87.442: ICD-10-CM

## 2019-03-18 DIAGNOSIS — I25.2: ICD-10-CM

## 2019-03-18 DIAGNOSIS — D63.8: ICD-10-CM

## 2019-03-18 LAB
ALBUMIN SERPL-MCNC: 3.1 G/DL (ref 3.5–5)
ALP SERPL-CCNC: 352 U/L (ref 38–126)
ALT SERPL-CCNC: 30 U/L (ref 9–52)
AMYLASE SERPL-CCNC: 36 U/L (ref 30–110)
ANION GAP SERPL CALC-SCNC: 12 MMOL/L
AST SERPL-CCNC: 21 U/L (ref 14–36)
BUN SERPL-SCNC: 16 MG/DL (ref 7–17)
CALCIUM SPEC-MCNC: 10.4 MG/DL (ref 8.4–10.2)
CELLS COUNTED: 200
CHLORIDE SERPL-SCNC: 98 MMOL/L (ref 98–107)
CO2 SERPL-SCNC: 27 MMOL/L (ref 22–30)
EOSINOPHIL # BLD MANUAL: 0.35 K/UL (ref 0–0.7)
ERYTHROCYTE [DISTWIDTH] IN BLOOD BY AUTOMATED COUNT: 3.35 M/UL (ref 3.8–5.4)
ERYTHROCYTE [DISTWIDTH] IN BLOOD: 17.3 % (ref 11.5–15.5)
GLUCOSE BLD-MCNC: 290 MG/DL (ref 75–99)
GLUCOSE SERPL-MCNC: 363 MG/DL (ref 74–99)
GLUCOSE UR QL: (no result)
HCT VFR BLD AUTO: 29.2 % (ref 34–46)
HGB BLD-MCNC: 8.5 GM/DL (ref 11.4–16)
LIPASE SERPL-CCNC: 135 U/L (ref 23–300)
LYMPHOCYTES # BLD MANUAL: 0.53 K/UL (ref 1–4.8)
MCH RBC QN AUTO: 25.4 PG (ref 25–35)
MCHC RBC AUTO-ENTMCNC: 29.2 G/DL (ref 31–37)
MCV RBC AUTO: 86.9 FL (ref 80–100)
METAMYELOCYTES # BLD: 0.88 K/UL
MONOCYTES # BLD MANUAL: 1.58 K/UL (ref 0–1)
MYELOCYTES # BLD MANUAL: 0.35 K/UL
NEUTROPHILS NFR BLD MANUAL: 71 %
NEUTS SEG # BLD MANUAL: 14.2 K/UL (ref 1.3–7.7)
PH UR: 5 [PH] (ref 5–8)
PLATELET # BLD AUTO: 589 K/UL (ref 150–450)
POTASSIUM SERPL-SCNC: 4.7 MMOL/L (ref 3.5–5.1)
PROT SERPL-MCNC: 5.9 G/DL (ref 6.3–8.2)
SODIUM SERPL-SCNC: 137 MMOL/L (ref 137–145)
SP GR UR: 1.01 (ref 1–1.03)
UROBILINOGEN UR QL STRIP: <2 MG/DL (ref ?–2)
WBC # BLD AUTO: 17.6 K/UL (ref 3.8–10.6)

## 2019-03-18 PROCEDURE — 80053 COMPREHEN METABOLIC PANEL: CPT

## 2019-03-18 PROCEDURE — 96374 THER/PROPH/DIAG INJ IV PUSH: CPT

## 2019-03-18 PROCEDURE — 71260 CT THORAX DX C+: CPT

## 2019-03-18 PROCEDURE — 82009 KETONE BODYS QUAL: CPT

## 2019-03-18 PROCEDURE — 99284 EMERGENCY DEPT VISIT MOD MDM: CPT

## 2019-03-18 PROCEDURE — 81003 URINALYSIS AUTO W/O SCOPE: CPT

## 2019-03-18 PROCEDURE — 82728 ASSAY OF FERRITIN: CPT

## 2019-03-18 PROCEDURE — 80048 BASIC METABOLIC PNL TOTAL CA: CPT

## 2019-03-18 PROCEDURE — 82150 ASSAY OF AMYLASE: CPT

## 2019-03-18 PROCEDURE — 83690 ASSAY OF LIPASE: CPT

## 2019-03-18 PROCEDURE — 96376 TX/PRO/DX INJ SAME DRUG ADON: CPT

## 2019-03-18 PROCEDURE — 96375 TX/PRO/DX INJ NEW DRUG ADDON: CPT

## 2019-03-18 PROCEDURE — 36415 COLL VENOUS BLD VENIPUNCTURE: CPT

## 2019-03-18 PROCEDURE — 83550 IRON BINDING TEST: CPT

## 2019-03-18 PROCEDURE — 85025 COMPLETE CBC W/AUTO DIFF WBC: CPT

## 2019-03-18 PROCEDURE — 71045 X-RAY EXAM CHEST 1 VIEW: CPT

## 2019-03-18 PROCEDURE — 83540 ASSAY OF IRON: CPT

## 2019-03-18 PROCEDURE — 83615 LACTATE (LD) (LDH) ENZYME: CPT

## 2019-03-18 PROCEDURE — 88305 TISSUE EXAM BY PATHOLOGIST: CPT

## 2019-03-18 PROCEDURE — 74177 CT ABD & PELVIS W/CONTRAST: CPT

## 2019-03-18 PROCEDURE — 43239 EGD BIOPSY SINGLE/MULTIPLE: CPT

## 2019-03-18 PROCEDURE — 96361 HYDRATE IV INFUSION ADD-ON: CPT

## 2019-03-18 PROCEDURE — 84550 ASSAY OF BLOOD/URIC ACID: CPT

## 2019-03-18 RX ADMIN — ATORVASTATIN CALCIUM SCH MG: 40 TABLET, FILM COATED ORAL at 20:06

## 2019-03-18 RX ADMIN — CEFAZOLIN SCH MLS/HR: 330 INJECTION, POWDER, FOR SOLUTION INTRAMUSCULAR; INTRAVENOUS at 17:36

## 2019-03-18 RX ADMIN — GABAPENTIN SCH MG: 100 CAPSULE ORAL at 20:06

## 2019-03-18 RX ADMIN — HYDROCODONE BITARTRATE AND ACETAMINOPHEN PRN EACH: 5; 325 TABLET ORAL at 23:27

## 2019-03-18 NOTE — ED
General Adult HPI





- General


Chief complaint: Recheck/Abnormal Lab/Rx


Stated complaint: dehydration, abn labs


Time Seen by Provider: 03/18/19 14:33


Source: patient, RN notes reviewed


Mode of arrival: ambulatory


Limitations: no limitations





- History of Present Illness


Initial comments: 





This a 63-year-old female presents emergency Department with family from PCPs 

office chief complaint of possible dehydration.  Patient has a chronic back 

issues and went to PCPs office for increased low back pain.  They did a 

urinalysis was found to have multiple ketones, glucose in her urine.  Patient 

has been having decreased oral intake and ongoing nausea related to 

chemotherapy.  Patient has B-cell lymphoma non-Hodgkin's.  Patient has not had 

any recent chemotherapy last chemotherapy approximately 10 weeks ago.  Patient 

has been referred for an EGD and colonoscopy secondary ongoing nausea and 

decreased oral appetite.  Patient denies chest pain, shortness breath, headache,

dizziness.  Patient had no fever no URI symptoms.





- Related Data


                                Home Medications











 Medication  Instructions  Recorded  Confirmed


 


Furosemide 20 mg PO DAILY 07/24/14 03/18/19


 


Gabapentin 100 mg PO QAM 07/24/14 03/18/19


 


Gabapentin [Neurontin] 200 mg PO HS 10/09/15 03/18/19


 


Levothyroxine Sodium [Synthroid] 125 mcg PO MOTUWETHFRSA 10/09/15 03/18/19


 


Multivitamins, Thera [Multivitamin 1 tab PO DAILY 03/28/18 03/18/19





(formulary)]   


 


Atorvastatin Calcium [Lipitor] 40 mg PO HS 04/16/18 03/18/19


 


Citalopram Hydrobromide [CeleXA] 20 mg PO DAILY 08/26/18 03/18/19


 


Cetirizine HCl 10 mg PO DAILY 02/01/19 03/18/19


 


Pantoprazole [Protonix] 40 mg PO DAILY 02/01/19 03/18/19


 


Potassium Chloride ER [K-Dur 10] 10 meq PO BID 02/01/19 03/18/19


 


HYDROcodone/APAP 7.5-325MG [Norco 1 tab PO Q6HR PRN 03/18/19 03/18/19





7.5-325]   








                                  Previous Rx's











 Medication  Instructions  Recorded


 


metFORMIN HCL 1,000 mg PO BID #0 10/12/18











                                    Allergies











Allergy/AdvReac Type Severity Reaction Status Date / Time


 


haloperidol [From Haldol] Allergy  Unknown Verified 03/18/19 14:36


 


haloperidol lactate Allergy  Unknown Verified 03/18/19 14:36





[From Haldol]     


 


Iodinated Contrast- Oral and Allergy  Rash/Hives Verified 03/18/19 14:36





IV Dye     





[Iodinated Contrast Media -     





IV Dye]     


 


ciprofloxacin [From Cipro] AdvReac  Heartburn Verified 03/18/19 14:36


 


steri-strips AdvReac Severe sore Uncoded 03/18/19 14:03














Review of Systems


ROS Statement: 


Those systems with pertinent positive or pertinent negative responses have been 

documented in the HPI.





ROS Other: All systems not noted in ROS Statement are negative.





Past Medical History


Past Medical History: Cancer, Heart Failure, Diabetes Mellitus, Hyperlipidemia, 

Hypertension, Sleep Apnea/CPAP/BIPAP, Thyroid Disorder


Additional Past Medical History / Comment(s): B-cell non-Hodgkin's lymphoma 

stage IV, diabetes mellitus,daughter stated pt had first chemo mon 9-24-18 

hypertension, hyperlipidemia, obstructive sleep apnea, hypothyroidism post 

thyroidectomy, closed head injury in 1989, difficulty with mobility and 

ambulation the patient has been using a walker. has a history of kidney stones


Last Myocardial Infarction Date:: 1989


History of Any Multi-Drug Resistant Organisms: None Reported


Past Surgical History: Cholecystectomy, Hysterectomy, Tonsillectomy


Additional Past Surgical History / Comment(s): Thyroidectomy, lymph node and 

liver biopsies. port a cath 9-10-18, right ureteroscopy with lithotripsy 11/2015


Past Anesthesia/Blood Transfusion Reactions: Postoperative Nausea & Vomiting 

(PONV)


Past Psychological History: Anxiety


Smoking Status: Never smoker


Past Alcohol Use History: None Reported


Past Drug Use History: Marijuana





- Past Family History


  ** Mother


Family Medical History: Cancer


Additional Family Medical History / Comment(s): breast CA





  ** Father


Family Medical History: Cancer


Additional Family Medical History / Comment(s): colon CA





  ** Sister(s)


Family Medical History: Cancer


Additional Family Medical History / Comment(s): Skin cancer





General Exam


Limitations: no limitations


General appearance: alert, in no apparent distress


Head exam: Present: atraumatic, normocephalic, normal inspection


Eye exam: Present: normal appearance, PERRL, EOMI.  Absent: scleral icterus, 

conjunctival injection, periorbital swelling


ENT exam: Present: normal exam, normal oropharynx, mucous membranes moist, TM's 

normal bilaterally


Neck exam: Present: normal inspection, full ROM.  Absent: tenderness, 

meningismus, lymphadenopathy


Respiratory exam: Present: normal lung sounds bilaterally.  Absent: respiratory 

distress, wheezes, rales, rhonchi, stridor


Cardiovascular Exam: Present: regular rate, normal rhythm, normal heart sounds. 

Absent: systolic murmur, diastolic murmur, rubs, gallop, clicks


GI/Abdominal exam: Present: soft, normal bowel sounds.  Absent: distended, 

tenderness, guarding, rebound, rigid


Neurological exam: Present: alert, oriented X3, CN II-XII intact


Skin exam: Present: warm, dry, intact, normal color.  Absent: rash





Course


                                   Vital Signs











  03/18/19





  13:58


 


Temperature 98.3 F


 


Pulse Rate 89


 


Respiratory 18





Rate 


 


Blood Pressure 115/71


 


O2 Sat by Pulse 98





Oximetry 














Medical Decision Making





- Medical Decision Making





63-year-old female presented from for generalized weakness, dehydration.  

Patient was hydrated with initial 2 L of fluid.  Patient states she still does 

not feel well.  Patient does have an underlying lymphoma.  Patient has a mild 

leukocytosis no evidence of infection at this time.  Patiently admitted for IV 

hydration.,  Evaluation by oncology.





- Lab Data


Result diagrams: 


                                 03/18/19 14:58





                                 03/18/19 14:58


                                   Lab Results











  03/18/19 03/18/19 Range/Units





  14:58 14:58 


 


WBC   17.6 H  (3.8-10.6)  k/uL


 


RBC   3.35 L  (3.80-5.40)  m/uL


 


Hgb   8.5 L  (11.4-16.0)  gm/dL


 


Hct   29.2 L  (34.0-46.0)  %


 


MCV   86.9  D  (80.0-100.0)  fL


 


MCH   25.4  (25.0-35.0)  pg


 


MCHC   29.2 L  (31.0-37.0)  g/dL


 


RDW   17.3 H  (11.5-15.5)  %


 


Plt Count   589 H  (150-450)  k/uL


 


Neutrophils %   NP  


 


Neutrophils % (Manual)   71  %


 


Band Neutrophils %   10  %


 


Lymphocytes %   NP  


 


Lymphocytes % (Manual)   3  %


 


Monocytes %   NP  


 


Monocytes % (Manual)   9  %


 


Eosinophils %   NP  


 


Eosinophils % (Manual)   2  %


 


Basophils %   NP  


 


Metamyelocytes %   5  %


 


Myelocytes %   2  %


 


Neutrophils #   NP  


 


Neutrophils # (Manual)   14.20 H  (1.3-7.7)  k/uL


 


Lymphocytes #   NP  


 


Lymphocytes # (Manual)   0.53 L  (1.0-4.8)  k/uL


 


Monocytes #   NP  


 


Monocytes # (Manual)   1.58 H  (0-1.0)  k/uL


 


Eosinophils #   NP  


 


Eosinophils # (Manual)   0.35  (0-0.7)  k/uL


 


Basophils #   NP  


 


Metamyelocytes # (Man)   0.88 H  (0)  k/uL


 


Myelocytes # (Manual)   0.35 H  (0)  k/uL


 


Nucleated RBCs   0  (0-0)  /100 WBC


 


Manual Slide Review   Performed  


 


Large Platelets   Present  


 


Hypochromasia   Marked  


 


Anisocytosis   Slight  


 


Sodium  137   (137-145)  mmol/L


 


Potassium  4.7   (3.5-5.1)  mmol/L


 


Chloride  98   ()  mmol/L


 


Carbon Dioxide  27   (22-30)  mmol/L


 


Anion Gap  12   mmol/L


 


BUN  16   (7-17)  mg/dL


 


Creatinine  0.59   (0.52-1.04)  mg/dL


 


Est GFR (CKD-EPI)AfAm  >90   (>60 ml/min/1.73 sqM)  


 


Est GFR (CKD-EPI)NonAf  >90   (>60 ml/min/1.73 sqM)  


 


Glucose  363 H   (74-99)  mg/dL


 


Calcium  10.4 H   (8.4-10.2)  mg/dL


 


Total Bilirubin  0.5   (0.2-1.3)  mg/dL


 


AST  21   (14-36)  U/L


 


ALT  30   (9-52)  U/L


 


Alkaline Phosphatase  352 H   ()  U/L


 


Total Protein  5.9 L   (6.3-8.2)  g/dL


 


Albumin  3.1 L   (3.5-5.0)  g/dL


 


Amylase  36   ()  U/L


 


Lipase  135   ()  U/L


 


Acetone, Qual  Negative   (Negative)  














Disposition


Clinical Impression: 


 Weakness, Non Hodgkin's lymphoma, Anemia, Nausea, Dehydration





Disposition: ADMITTED AS IP TO THIS HOSP


Condition: Fair


Referrals: 


Faustino Villegas MD [Primary Care Provider] - 1-2 days

## 2019-03-19 LAB
ANION GAP SERPL CALC-SCNC: 9 MMOL/L
BASOPHILS # BLD MANUAL: 0.16 K/UL (ref 0–0.2)
BUN SERPL-SCNC: 14 MG/DL (ref 7–17)
CALCIUM SPEC-MCNC: 9.7 MG/DL (ref 8.4–10.2)
CELLS COUNTED: 200
CHLORIDE SERPL-SCNC: 103 MMOL/L (ref 98–107)
CO2 SERPL-SCNC: 28 MMOL/L (ref 22–30)
EOSINOPHIL # BLD MANUAL: 0.32 K/UL (ref 0–0.7)
ERYTHROCYTE [DISTWIDTH] IN BLOOD BY AUTOMATED COUNT: 3.2 M/UL (ref 3.8–5.4)
ERYTHROCYTE [DISTWIDTH] IN BLOOD: 17 % (ref 11.5–15.5)
GLUCOSE BLD-MCNC: 285 MG/DL (ref 75–99)
GLUCOSE BLD-MCNC: 308 MG/DL (ref 75–99)
GLUCOSE BLD-MCNC: 308 MG/DL (ref 75–99)
GLUCOSE BLD-MCNC: 348 MG/DL (ref 75–99)
GLUCOSE SERPL-MCNC: 300 MG/DL (ref 74–99)
HCT VFR BLD AUTO: 28.2 % (ref 34–46)
HGB BLD-MCNC: 8.3 GM/DL (ref 11.4–16)
LYMPHOCYTES # BLD MANUAL: 0.49 K/UL (ref 1–4.8)
MCH RBC QN AUTO: 25.8 PG (ref 25–35)
MCHC RBC AUTO-ENTMCNC: 29.3 G/DL (ref 31–37)
MCV RBC AUTO: 88 FL (ref 80–100)
METAMYELOCYTES # BLD: 0.16 K/UL
MONOCYTES # BLD MANUAL: 0.81 K/UL (ref 0–1)
MYELOCYTES # BLD MANUAL: 0.32 K/UL
NEUTROPHILS NFR BLD MANUAL: 79 %
NEUTS SEG # BLD MANUAL: 14.2 K/UL (ref 1.3–7.7)
PLATELET # BLD AUTO: 577 K/UL (ref 150–450)
POTASSIUM SERPL-SCNC: 4.4 MMOL/L (ref 3.5–5.1)
SODIUM SERPL-SCNC: 140 MMOL/L (ref 137–145)
WBC # BLD AUTO: 16.2 K/UL (ref 3.8–10.6)

## 2019-03-19 RX ADMIN — CITALOPRAM HYDROBROMIDE SCH MG: 20 TABLET ORAL at 07:09

## 2019-03-19 RX ADMIN — CEFAZOLIN SCH MLS/HR: 330 INJECTION, POWDER, FOR SOLUTION INTRAMUSCULAR; INTRAVENOUS at 04:04

## 2019-03-19 RX ADMIN — HYDROCODONE BITARTRATE AND ACETAMINOPHEN PRN EACH: 5; 325 TABLET ORAL at 21:08

## 2019-03-19 RX ADMIN — GABAPENTIN SCH MG: 100 CAPSULE ORAL at 07:09

## 2019-03-19 RX ADMIN — HYDROCODONE BITARTRATE AND ACETAMINOPHEN PRN EACH: 5; 325 TABLET ORAL at 10:36

## 2019-03-19 RX ADMIN — BARIUM SULFATE PRN ML: 21 SUSPENSION ORAL at 12:18

## 2019-03-19 RX ADMIN — INSULIN ASPART SCH UNIT: 100 INJECTION, SOLUTION INTRAVENOUS; SUBCUTANEOUS at 07:16

## 2019-03-19 RX ADMIN — GABAPENTIN SCH MG: 100 CAPSULE ORAL at 20:15

## 2019-03-19 RX ADMIN — ATORVASTATIN CALCIUM SCH MG: 40 TABLET, FILM COATED ORAL at 20:15

## 2019-03-19 RX ADMIN — LEVOTHYROXINE SODIUM SCH MCG: 0.12 TABLET ORAL at 05:34

## 2019-03-19 RX ADMIN — CEFAZOLIN SCH MLS/HR: 330 INJECTION, POWDER, FOR SOLUTION INTRAMUSCULAR; INTRAVENOUS at 21:08

## 2019-03-19 RX ADMIN — BARIUM SULFATE PRN ML: 21 SUSPENSION ORAL at 15:29

## 2019-03-19 RX ADMIN — PANTOPRAZOLE SODIUM SCH MG: 40 INJECTION, POWDER, FOR SOLUTION INTRAVENOUS at 07:09

## 2019-03-19 RX ADMIN — INSULIN ASPART SCH UNIT: 100 INJECTION, SOLUTION INTRAVENOUS; SUBCUTANEOUS at 17:24

## 2019-03-19 RX ADMIN — HYDROCODONE BITARTRATE AND ACETAMINOPHEN PRN EACH: 5; 325 TABLET ORAL at 04:04

## 2019-03-19 RX ADMIN — CEFAZOLIN SCH MLS/HR: 330 INJECTION, POWDER, FOR SOLUTION INTRAMUSCULAR; INTRAVENOUS at 10:36

## 2019-03-19 RX ADMIN — INSULIN ASPART SCH UNIT: 100 INJECTION, SOLUTION INTRAVENOUS; SUBCUTANEOUS at 21:04

## 2019-03-19 RX ADMIN — INSULIN ASPART SCH UNIT: 100 INJECTION, SOLUTION INTRAVENOUS; SUBCUTANEOUS at 12:18

## 2019-03-19 NOTE — P.HPIM
History of Present Illness





63-year-old female known non-Hodgkin's lymphoma presented to family physician 

with complaints of poor appetite and not feeling well.  Patient states back pain

is been worsej.  Patient has been admitted for rehydration and follow-up with 

Dr. Dumont





Review of Systems


Constitutional: Reports fatigue, Reports weakness


Gastrointestinal: Reports loss of appetite, Reports nausea


Musculoskeletal: Reports low back pain





Past Medical History


Past Medical History: Cancer, Heart Failure, Diabetes Mellitus, Hyperlipidemia, 

Hypertension, Sleep Apnea/CPAP/BIPAP, Thyroid Disorder


Additional Past Medical History / Comment(s): B-cell non-Hodgkin's lymphoma 

stage IV, diabetes mellitus,daughter stated pt had first chemo mon 9-24-18 

hypertension, hyperlipidemia, obstructive sleep apnea, hypothyroidism post 

thyroidectomy, closed head injury in 1989, difficulty with mobility and 

ambulation the patient has been using a walker. has a history of kidney stones


Last Myocardial Infarction Date:: 1989


History of Any Multi-Drug Resistant Organisms: None Reported


Past Surgical History: Cholecystectomy, Hysterectomy, Tonsillectomy


Additional Past Surgical History / Comment(s): Thyroidectomy, lymph node and 

liver biopsies. port a cath 9-10-18, right ureteroscopy with lithotripsy 11/2015


Past Anesthesia/Blood Transfusion Reactions: Postoperative Nausea & Vomiting 

(PONV)


Past Psychological History: Anxiety


Additional Psychological History / Comment(s): Single.  Retired nurse.  Travels 

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  No international travel.  No

 experience.  No animal exposures


Smoking Status: Never smoker


Past Alcohol Use History: None Reported


Additional Past Alcohol Use History / Comment(s): Patient is a lifelong 

nonsmoker.  She denies any medical marijuana, marijuana, street drug or alcohol 

use.  She lives at home with her family. Grand daughter acting like caregiver. 

There are no pets in the home  She has worked at [employer] as 

a nurse on the fifth floor.


Past Drug Use History: Marijuana





- Past Family History


  ** Mother


Family Medical History: Cancer


Additional Family Medical History / Comment(s): breast CA





  ** Father


Family Medical History: Cancer


Additional Family Medical History / Comment(s): colon CA





  ** Sister(s)


Family Medical History: Cancer


Additional Family Medical History / Comment(s): Skin cancer





Medications and Allergies


                                Home Medications











 Medication  Instructions  Recorded  Confirmed  Type


 


Furosemide 20 mg PO DAILY 07/24/14 03/18/19 History


 


Gabapentin 100 mg PO QAM 07/24/14 03/18/19 History


 


Gabapentin [Neurontin] 200 mg PO HS 10/09/15 03/18/19 History


 


Levothyroxine Sodium [Synthroid] 125 mcg PO MOTUWETHFRSA 10/09/15 03/18/19 

History


 


Multivitamins, Thera [Multivitamin 1 tab PO DAILY 03/28/18 03/18/19 History





(formulary)]    


 


Atorvastatin Calcium [Lipitor] 40 mg PO HS 04/16/18 03/18/19 History


 


Citalopram Hydrobromide [CeleXA] 20 mg PO DAILY 08/26/18 03/18/19 History


 


metFORMIN HCL 1,000 mg PO BID #0 10/12/18 03/18/19 Rx


 


Cetirizine HCl 10 mg PO DAILY 02/01/19 03/18/19 History


 


Pantoprazole [Protonix] 40 mg PO DAILY 02/01/19 03/18/19 History


 


Potassium Chloride ER [K-Dur 10] 10 meq PO BID 02/01/19 03/18/19 History


 


HYDROcodone/APAP 7.5-325MG [Norco 1 tab PO Q6HR PRN 03/18/19 03/18/19 History





7.5-325]    








                                    Allergies











Allergy/AdvReac Type Severity Reaction Status Date / Time


 


haloperidol [From Haldol] Allergy  Unknown Verified 03/18/19 14:36


 


haloperidol lactate Allergy  Unknown Verified 03/18/19 14:36





[From Haldol]     


 


Iodinated Contrast- Oral and Allergy  Rash/Hives Verified 03/18/19 14:36





IV Dye     





[Iodinated Contrast Media -     





IV Dye]     


 


ciprofloxacin [From Cipro] AdvReac  Heartburn Verified 03/18/19 14:36


 


steri-strips AdvReac Severe sore Uncoded 03/18/19 14:03














Physical Exam


Vitals: 


                                   Vital Signs











  Temp Pulse Pulse Resp BP BP BP


 


 03/19/19 06:58  98.3 F   89  18   107/63 


 


 03/18/19 22:30  100 F H   100  20    110/67


 


 03/18/19 17:00  98.0 F  84   18  108/72  


 


 03/18/19 13:58  98.3 F  89   18  115/71  














  Pulse Ox


 


 03/19/19 06:58  93 L


 


 03/18/19 22:30  95


 


 03/18/19 17:00  98


 


 03/18/19 13:58  98








                                Intake and Output











 03/18/19 03/19/19 03/19/19





 22:59 06:59 14:59


 


Intake Total 400 100 240


 


Balance 400 100 240


 


Intake:   


 


  Oral 400 100 240


 


Other:   


 


  Voiding Method   Toilet


 


  # Voids 1 1 


 


  Weight  61.6 kg 














- Constitutional


General appearance: mild distress





- EENT


Eyes: PERRLA


Ears: bilateral: normal





- Neck


Neck: normal ROM





- Respiratory


Respiratory: bilateral: CTA





- Cardiovascular


Rhythm: regular





- Gastrointestinal


General gastrointestinal: soft





- Integumentary


Integumentary: normal





- Neurologic


Neurologic: CNII-XII intact





- Musculoskeletal


Musculoskeletal: generalized weakness





Results


CBC & Chem 7: 


                                 03/19/19 08:00





                                 03/19/19 08:00


Labs: 


                  Abnormal Lab Results - Last 24 Hours (Table)











  03/18/19 03/18/19 03/18/19 Range/Units





  14:58 14:58 17:42 


 


WBC   17.6 H   (3.8-10.6)  k/uL


 


RBC   3.35 L   (3.80-5.40)  m/uL


 


Hgb   8.5 L   (11.4-16.0)  gm/dL


 


Hct   29.2 L   (34.0-46.0)  %


 


MCHC   29.2 L   (31.0-37.0)  g/dL


 


RDW   17.3 H   (11.5-15.5)  %


 


Plt Count   589 H   (150-450)  k/uL


 


Neutrophils # (Manual)   14.20 H   (1.3-7.7)  k/uL


 


Lymphocytes # (Manual)   0.53 L   (1.0-4.8)  k/uL


 


Monocytes # (Manual)   1.58 H   (0-1.0)  k/uL


 


Metamyelocytes # (Man)   0.88 H   (0)  k/uL


 


Myelocytes # (Manual)   0.35 H   (0)  k/uL


 


Glucose  363 H    (74-99)  mg/dL


 


POC Glucose (mg/dL)    290 H  (75-99)  mg/dL


 


Calcium  10.4 H    (8.4-10.2)  mg/dL


 


Alkaline Phosphatase  352 H    ()  U/L


 


Total Protein  5.9 L    (6.3-8.2)  g/dL


 


Albumin  3.1 L    (3.5-5.0)  g/dL


 


Urine Glucose (UA)     (Negative)  














  03/18/19 03/19/19 03/19/19 Range/Units





  17:45 07:00 08:00 


 


WBC    16.2 H  (3.8-10.6)  k/uL


 


RBC    3.20 L  (3.80-5.40)  m/uL


 


Hgb    8.3 L  (11.4-16.0)  gm/dL


 


Hct    28.2 L  (34.0-46.0)  %


 


MCHC    29.3 L  (31.0-37.0)  g/dL


 


RDW    17.0 H  (11.5-15.5)  %


 


Plt Count    577 H  (150-450)  k/uL


 


Neutrophils # (Manual)    14.20 H  (1.3-7.7)  k/uL


 


Lymphocytes # (Manual)    0.49 L  (1.0-4.8)  k/uL


 


Monocytes # (Manual)     (0-1.0)  k/uL


 


Metamyelocytes # (Man)    0.16 H  (0)  k/uL


 


Myelocytes # (Manual)    0.32 H  (0)  k/uL


 


Glucose     (74-99)  mg/dL


 


POC Glucose (mg/dL)   348 H   (75-99)  mg/dL


 


Calcium     (8.4-10.2)  mg/dL


 


Alkaline Phosphatase     ()  U/L


 


Total Protein     (6.3-8.2)  g/dL


 


Albumin     (3.5-5.0)  g/dL


 


Urine Glucose (UA)  1+ H    (Negative)  














  03/19/19 Range/Units





  08:00 


 


WBC   (3.8-10.6)  k/uL


 


RBC   (3.80-5.40)  m/uL


 


Hgb   (11.4-16.0)  gm/dL


 


Hct   (34.0-46.0)  %


 


MCHC   (31.0-37.0)  g/dL


 


RDW   (11.5-15.5)  %


 


Plt Count   (150-450)  k/uL


 


Neutrophils # (Manual)   (1.3-7.7)  k/uL


 


Lymphocytes # (Manual)   (1.0-4.8)  k/uL


 


Monocytes # (Manual)   (0-1.0)  k/uL


 


Metamyelocytes # (Man)   (0)  k/uL


 


Myelocytes # (Manual)   (0)  k/uL


 


Glucose  300 H  (74-99)  mg/dL


 


POC Glucose (mg/dL)   (75-99)  mg/dL


 


Calcium   (8.4-10.2)  mg/dL


 


Alkaline Phosphatase   ()  U/L


 


Total Protein   (6.3-8.2)  g/dL


 


Albumin   (3.5-5.0)  g/dL


 


Urine Glucose (UA)   (Negative)  














Thrombosis Risk Factor Assmnt





- Choose All That Apply


Each Risk Factor Represents 2 Points: Age 61-74 years


Thrombosis Risk Factor Assessment Total Risk Factor Score: 2


Thrombosis Risk Factor Assessment Level: Low Risk





Assessment and Plan


Plan: 





Assessment


Weakness


Non-Hodgkin's lymphoma


Anemia chronic disease


Leukocytosis


Nausea


Dehydration


History diabetes type 2


Hypertension


Hyperlipidemia


Sleep apnea with CPAP


Hypothyroidism





Plan


CT of the chest and abdomen ordered


We'll continue consultation with Dr. Tim Huff

## 2019-03-19 NOTE — P.CONS
History of Present Illness





- Reason for Consult


Consult date: 03/19/19


Hx: NHL


Requesting physician: Dipesh Soria





- Chief Complaint


Pain, Weakness, decreased appetite and nausea





- History of Present Illness








 Ms Falcon is a 63 yr old white female, initially seen in consult at [facility] on 3/29/18.  She had been admitted with lower chest/upper 

abdominal pain.  She was evaluated by internal medicine and cardiology with 

symptoms resolving spontaneously.  She had a CT to evaluate in the outer to rule

out aortic aneurysm.  This incidentally noted retroperitoneal adenopathy, left 

greater than right, largest about 4 cm in size.  The patient had no obvious 

symptoms other than mid back pain for the prior 6-8 weeks.  However this was not

constant.  She had a CT of the chest abdomen and pelvis which showed re

troperitoneal adenopathy measuring 4.8 x 2.9 cm with some additional upper 

abdominal adenopathy measuring up to 1.9 cm.  No other adenopathy was seen in 

the thorax or pelvis.  CBC was normal other than mild WBC elevation, mostly due 

to neutropenia.  Chem panel was also negative other than creatinine of 1.2.


 The patient had a retroperitoneal core biopsy on 4/23/18.  The biopsy was 

positive for B-cell non-Hodgkin's lymphoma, CD5 negative.  The main 

differentials included follicular, splenic marginal zone, or LPL.  A specimen 

was sent to [facility] with final diagnosis pending post consultation.


  the patient was then referred here and seen for her first office visit on 

5/1/18.


   the patient had labs and PET scan ordered.  The final pathology was resulted 

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  PET scan showed activity in the area of 

of abdominal adenopathy.  No other areas of suspicious activity are noted, 

except for scattered areas in the liver which could not be well defined.  Labs 

were negative other than elevated Light chain at 10 3 mg/L, versus lambda of 

44.7 mg/L, with mild elevation of ratio at 2.35.





   The patient had an MRI of the liver ordered, which showed multiple suspicious

lesions, at least 8 in number.  She therefore underwent an ultrasound-guided 

liver biopsy in early 6/18.


  This showed a dense lymphocytic infiltrative pattern.  However interestingly 

the majority of the liver cells appear to be reactive T lymphocytes with only a 

small number of B lymphocytes noted.  The pathologic findings are felt to raise 

the possibility of an inflammatory disorder such as primary biliary cirrhosis.  

The specimen was been sent to the [facility] for additional 

consultation, which reported no evidence of malignancy.


  


   She had a bone marrow in 7/18 , revealing involvement, and thus stage 4 

disease. She was thus placed on observation .


  She was admitted with severe fatigue and weakness to [facility] in late 8/18. CT 

scans showed progression in the lymph nodes and liver. She had a liver biopsy on

8/28/18, showing presence of a large and small cell lymphoma population. 

Double/triple hit testing was negative.


   she was started on R-CEOP (Adriamycin not use due to diminished ejection 

fraction) on 9/24/18.  She is status post 1 cycle.


   


   She was seen in the office on 10/4/18. She denies any fever/chills. she had 

marked agitation with steroids, which improved with Xanax.  She had markedly 

decreased appetite and progressive weakness. she has been very lethargic, and 

requiring assistance with even minimal activities. She has been nauseous 

persistently, with intermittent vomiting.  Over the last 1-2 days, she has been 

having diarrhea with lower abdominal cramping and pain. according to the family,

she has been intermittently confused, especially at night.  This had improved 

after stopping the steroids but seemed to have become somewhat more prominent 

over the last 1-2 days. her blood pressure was quite low in the office, with 

systolic in the 70 range.  She was therefore sent in to the emergency room.


   The case was discussed in detail with the ER physician.   The patient was 

noted to have tachycardia as well as low blood pressure, that did not respond 

well to fluids.  WBC was 1.6.  She was therefore admitted to the ICU, and 

consult placed for further evaluation and recommendations.


  CT of the abdomen and pelvis revealed evidence of enteritis, and possibly left

hydronephrosis due to renal calculus





She completed treatment withCOEP with neulasta.  In November 2018 she was 

admitted with increased weakness, fatigue, confusion, recurrent falls, and 

elevated blood glucose levels. Her regimen of chemo includes 5 days of high dose

steroids, prednisone, which accounts for her elevated glucose levels. She is 

feeling a little better today she stated. She is still a poor historian and no 

family at bedside as she originally stated she was short of breath when she came

in but then told me she think she was admitted for a fall or high glucose. She 

has always seemed to be poor historian at baseline, and her daughter who is also

her caregiver is usually with her, although not during todays assessment. CTA 

chest failed to show any evidence of acute etiology or pulmonary embolism. 





Ms. Falcon Now presents to [facility] with complaints of increased pain and 

overall feeling worse over the past weeks. She was seen in office on March 13 

and at that time was undergoing treatment for assumed infection UTI, although UA

negative unknown if truly infectious. Dr. Dumont recommended patient undergo GI 

work-up with upper and lower endoscopy as he was concerned for potential bowel 

wall involvement of lymphoma, at that time she was agreeable but has not 

followed up with the recommendation. She was referred to Dr. Refugio DIAS as well 

at that time regarding potential UA versus colinization. 





Review of Systems





A 14 point review of systems assessed and completed and all negative except HPI





Past Medical History


Past Medical History: Cancer, Heart Failure, Diabetes Mellitus, Hyperlipidemia, 

Hypertension, Sleep Apnea/CPAP/BIPAP, Thyroid Disorder


Additional Past Medical History / Comment(s): B-cell non-Hodgkin's lymphoma 

stage IV, diabetes mellitus,daughter stated pt had first chemo mon 9-24-18 

hypertension, hyperlipidemia, obstructive sleep apnea, hypothyroidism post 

thyroidectomy, closed head injury in 1989, difficulty with mobility and 

ambulation the patient has been using a walker. has a history of kidney stones


Last Myocardial Infarction Date:: 1989


History of Any Multi-Drug Resistant Organisms: None Reported


Past Surgical History: Cholecystectomy, Hysterectomy, Tonsillectomy


Additional Past Surgical History / Comment(s): Thyroidectomy, lymph node and 

liver biopsies. port a cath 9-10-18, right ureteroscopy with lithotripsy 11/2015


Past Anesthesia/Blood Transfusion Reactions: Postoperative Nausea & Vomiting 

(PONV)


Past Psychological History: Anxiety


Additional Psychological History / Comment(s): Single.  Retired nurse.  Travels 

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  No international travel.  No

 experience.  No animal exposures


Smoking Status: Never smoker


Past Alcohol Use History: None Reported


Additional Past Alcohol Use History / Comment(s): Patient is a lifelong 

nonsmoker.  She denies any medical marijuana, marijuana, street drug or alcohol 

use.  She lives at home with her family. Grand daughter acting like caregiver. 

There are no pets in the home  She has worked at [employer] as 

a nurse on the fifth floor.


Past Drug Use History: Marijuana





- Past Family History


  ** Mother


Family Medical History: Cancer


Additional Family Medical History / Comment(s): breast CA





  ** Father


Family Medical History: Cancer


Additional Family Medical History / Comment(s): colon CA





  ** Sister(s)


Family Medical History: Cancer


Additional Family Medical History / Comment(s): Skin cancer





Medications and Allergies


                                Home Medications











 Medication  Instructions  Recorded  Confirmed  Type


 


Furosemide 20 mg PO DAILY 07/24/14 03/18/19 History


 


Gabapentin 100 mg PO QAM 07/24/14 03/18/19 History


 


Gabapentin [Neurontin] 200 mg PO HS 10/09/15 03/18/19 History


 


Levothyroxine Sodium [Synthroid] 125 mcg PO MOTUWETHFRSA 10/09/15 03/18/19 

History


 


Multivitamins, Thera [Multivitamin 1 tab PO DAILY 03/28/18 03/18/19 History





(formulary)]    


 


Atorvastatin Calcium [Lipitor] 40 mg PO HS 04/16/18 03/18/19 History


 


Citalopram Hydrobromide [CeleXA] 20 mg PO DAILY 08/26/18 03/18/19 History


 


metFORMIN HCL 1,000 mg PO BID #0 10/12/18 03/18/19 Rx


 


Cetirizine HCl 10 mg PO DAILY 02/01/19 03/18/19 History


 


Pantoprazole [Protonix] 40 mg PO DAILY 02/01/19 03/18/19 History


 


Potassium Chloride ER [K-Dur 10] 10 meq PO BID 02/01/19 03/18/19 History


 


HYDROcodone/APAP 7.5-325MG [Norco 1 tab PO Q6HR PRN 03/18/19 03/18/19 History





7.5-325]    








                                    Allergies











Allergy/AdvReac Type Severity Reaction Status Date / Time


 


haloperidol [From Haldol] Allergy  Unknown Verified 03/18/19 14:36


 


haloperidol lactate Allergy  Unknown Verified 03/18/19 14:36





[From Haldol]     


 


Iodinated Contrast- Oral and Allergy  Rash/Hives Verified 03/18/19 14:36





IV Dye     





[Iodinated Contrast Media -     





IV Dye]     


 


ciprofloxacin [From Cipro] AdvReac  Heartburn Verified 03/18/19 14:36


 


steri-strips AdvReac Severe sore Uncoded 03/18/19 14:03














Physical Exam


Vitals: 


                                   Vital Signs











  Temp Pulse Pulse Resp BP BP BP


 


 03/19/19 14:12  98.2 F   86  18    105/66


 


 03/19/19 06:58  98.3 F   89  18   107/63 


 


 03/18/19 22:30  100 F H   100  20    110/67


 


 03/18/19 17:00  98.0 F  84   18  108/72  














  Pulse Ox


 


 03/19/19 14:12  92 L


 


 03/19/19 06:58  93 L


 


 03/18/19 22:30  95


 


 03/18/19 17:00  98








                                Intake and Output











 03/19/19 03/19/19 03/19/19





 06:59 14:59 22:59


 


Intake Total 100 480 


 


Balance 100 480 


 


Intake:   


 


  Oral 100 480 


 


Other:   


 


  Voiding Method  Toilet Toilet


 


  # Voids 1 1 


 


  Weight 61.6 kg  














Gen: ALert, No acute distress


Head: NC, NT


Neck Suppple


No adenopathy on palpation


Lungs: Diminished bibasilar, no increased effort


Heart RRR, S1


Abdomen: Soft, Tender to palpiation


Neuro: No sensory or motor deficits





Results


CBC & Chem 7: 


                                 03/19/19 08:00





                                 03/19/19 08:00


Labs: 


                  Abnormal Lab Results - Last 24 Hours (Table)











  03/18/19 03/18/19 03/19/19 Range/Units





  17:42 17:45 07:00 


 


WBC     (3.8-10.6)  k/uL


 


RBC     (3.80-5.40)  m/uL


 


Hgb     (11.4-16.0)  gm/dL


 


Hct     (34.0-46.0)  %


 


MCHC     (31.0-37.0)  g/dL


 


RDW     (11.5-15.5)  %


 


Plt Count     (150-450)  k/uL


 


Neutrophils # (Manual)     (1.3-7.7)  k/uL


 


Lymphocytes # (Manual)     (1.0-4.8)  k/uL


 


Metamyelocytes # (Man)     (0)  k/uL


 


Myelocytes # (Manual)     (0)  k/uL


 


Glucose     (74-99)  mg/dL


 


POC Glucose (mg/dL)  290 H   348 H  (75-99)  mg/dL


 


Urine Glucose (UA)   1+ H   (Negative)  














  03/19/19 03/19/19 03/19/19 Range/Units





  08:00 08:00 12:12 


 


WBC  16.2 H    (3.8-10.6)  k/uL


 


RBC  3.20 L    (3.80-5.40)  m/uL


 


Hgb  8.3 L    (11.4-16.0)  gm/dL


 


Hct  28.2 L    (34.0-46.0)  %


 


MCHC  29.3 L    (31.0-37.0)  g/dL


 


RDW  17.0 H    (11.5-15.5)  %


 


Plt Count  577 H    (150-450)  k/uL


 


Neutrophils # (Manual)  14.20 H    (1.3-7.7)  k/uL


 


Lymphocytes # (Manual)  0.49 L    (1.0-4.8)  k/uL


 


Metamyelocytes # (Man)  0.16 H    (0)  k/uL


 


Myelocytes # (Manual)  0.32 H    (0)  k/uL


 


Glucose   300 H   (74-99)  mg/dL


 


POC Glucose (mg/dL)    308 H  (75-99)  mg/dL


 


Urine Glucose (UA)     (Negative)  











CT scan - abdomen: report reviewed


CT scan - chest: report reviewed


CT scan - pelvis: report reviewed





Assessment and Plan


Plan: 





Assessment and Recommendations:


1. NHL:


 - Most recently status post treatment with Hycela and bendamustine, status Post

Cycle 2 1/8/19


 - Prior to above status post treatment with R-Ceop


 - Concern for progrssion: CT C/A/P


 - GI Work-up assess for bowel wall involvement


 - LDH, Uric Acid





2. Anorexia and weight loss:


 - Megace recently ordered





3. Worsening weakness and fatigue and debility





4. Persisitent Nausea and Abdominal Pain:


 - GI WOrk-up and CT Scans





5. Normocytic Anemia: Secondary to chemo and lymphoma


 - Recheck Iron Studies


 - CBC and CMP in am





Physcian Attest: I have completed the full history and physical and developed 

the complete impression and plan, agree with above, dictated as a scribe

## 2019-03-20 LAB
ALBUMIN SERPL-MCNC: 3 G/DL (ref 3.5–5)
ALP SERPL-CCNC: 322 U/L (ref 38–126)
ALT SERPL-CCNC: 25 U/L (ref 9–52)
ANION GAP SERPL CALC-SCNC: 11 MMOL/L
AST SERPL-CCNC: 21 U/L (ref 14–36)
BUN SERPL-SCNC: 17 MG/DL (ref 7–17)
CALCIUM SPEC-MCNC: 10.2 MG/DL (ref 8.4–10.2)
CELLS COUNTED: 200
CHLORIDE SERPL-SCNC: 103 MMOL/L (ref 98–107)
CO2 SERPL-SCNC: 25 MMOL/L (ref 22–30)
ERYTHROCYTE [DISTWIDTH] IN BLOOD BY AUTOMATED COUNT: 3.28 M/UL (ref 3.8–5.4)
ERYTHROCYTE [DISTWIDTH] IN BLOOD: 16.8 % (ref 11.5–15.5)
GLUCOSE BLD-MCNC: 238 MG/DL (ref 75–99)
GLUCOSE BLD-MCNC: 256 MG/DL (ref 75–99)
GLUCOSE BLD-MCNC: 276 MG/DL (ref 75–99)
GLUCOSE BLD-MCNC: 353 MG/DL (ref 75–99)
GLUCOSE SERPL-MCNC: 279 MG/DL (ref 74–99)
HCT VFR BLD AUTO: 29 % (ref 34–46)
HGB BLD-MCNC: 8.3 GM/DL (ref 11.4–16)
LDH SPEC-CCNC: 400 U/L (ref 313–618)
LYMPHOCYTES # BLD MANUAL: 0.44 K/UL (ref 1–4.8)
MCH RBC QN AUTO: 25.3 PG (ref 25–35)
MCHC RBC AUTO-ENTMCNC: 28.7 G/DL (ref 31–37)
MCV RBC AUTO: 88.4 FL (ref 80–100)
METAMYELOCYTES # BLD: 0.44 K/UL
MONOCYTES # BLD MANUAL: 1.53 K/UL (ref 0–1)
MYELOCYTES # BLD MANUAL: 0.22 K/UL
NEUTROPHILS NFR BLD MANUAL: 85 %
NEUTS SEG # BLD MANUAL: 19.7 K/UL (ref 1.3–7.7)
PLATELET # BLD AUTO: 676 K/UL (ref 150–450)
POTASSIUM SERPL-SCNC: 4.1 MMOL/L (ref 3.5–5.1)
PROT SERPL-MCNC: 5.6 G/DL (ref 6.3–8.2)
SODIUM SERPL-SCNC: 139 MMOL/L (ref 137–145)
URATE SERPL-MCNC: 5.1 MG/DL (ref 3.7–7.4)
WBC # BLD AUTO: 21.9 K/UL (ref 3.8–10.6)

## 2019-03-20 RX ADMIN — INSULIN ASPART SCH UNIT: 100 INJECTION, SOLUTION INTRAVENOUS; SUBCUTANEOUS at 17:41

## 2019-03-20 RX ADMIN — PANTOPRAZOLE SODIUM SCH: 40 INJECTION, POWDER, FOR SOLUTION INTRAVENOUS at 07:34

## 2019-03-20 RX ADMIN — INSULIN ASPART SCH UNIT: 100 INJECTION, SOLUTION INTRAVENOUS; SUBCUTANEOUS at 22:33

## 2019-03-20 RX ADMIN — LEVOTHYROXINE SODIUM SCH MCG: 0.12 TABLET ORAL at 05:39

## 2019-03-20 RX ADMIN — HYDROCODONE BITARTRATE AND ACETAMINOPHEN PRN EACH: 5; 325 TABLET ORAL at 05:42

## 2019-03-20 RX ADMIN — MORPHINE SULFATE PRN MG: 4 INJECTION, SOLUTION INTRAMUSCULAR; INTRAVENOUS at 19:00

## 2019-03-20 RX ADMIN — ATORVASTATIN CALCIUM SCH MG: 40 TABLET, FILM COATED ORAL at 20:26

## 2019-03-20 RX ADMIN — CITALOPRAM HYDROBROMIDE SCH MG: 20 TABLET ORAL at 07:35

## 2019-03-20 RX ADMIN — ONDANSETRON PRN MG: 2 INJECTION INTRAMUSCULAR; INTRAVENOUS at 19:00

## 2019-03-20 RX ADMIN — INSULIN ASPART SCH UNIT: 100 INJECTION, SOLUTION INTRAVENOUS; SUBCUTANEOUS at 12:41

## 2019-03-20 RX ADMIN — HYDROCODONE BITARTRATE AND ACETAMINOPHEN PRN EACH: 5; 325 TABLET ORAL at 11:50

## 2019-03-20 RX ADMIN — FUROSEMIDE SCH MG: 20 TABLET ORAL at 06:22

## 2019-03-20 RX ADMIN — ONDANSETRON PRN MG: 2 INJECTION INTRAMUSCULAR; INTRAVENOUS at 11:51

## 2019-03-20 RX ADMIN — INSULIN ASPART SCH UNIT: 100 INJECTION, SOLUTION INTRAVENOUS; SUBCUTANEOUS at 07:35

## 2019-03-20 RX ADMIN — GABAPENTIN SCH MG: 100 CAPSULE ORAL at 07:35

## 2019-03-20 RX ADMIN — GABAPENTIN SCH MG: 100 CAPSULE ORAL at 20:26

## 2019-03-20 NOTE — P.PN
Subjective


Progress Note Date: 03/20/19


Principal diagnosis: 





Worsening weakness, fatigue and debility


CT chest abdomen and pelvis appears to show worsening mid abdominal 

retroperitoneal adenopathy which correlates with ulceration CT hypermetabolic 

uptake.  New small bilateral pleural effusions and mild interstitial edema 

identified bilaterally.  No new thoracic or pelvic adenopathy seen. Unable to 

differentiate these findings. Awaiting GI input and possible endoscopy for 

further work-up. Uric acid and LDH ok. 











Objective





- Vital Signs


Vital signs: 


                                   Vital Signs











Temp  98.2 F   03/20/19 14:50


 


Pulse  100   03/20/19 14:50


 


Resp  16   03/20/19 14:50


 


BP  116/64   03/20/19 14:50


 


Pulse Ox  92 L  03/20/19 14:50








                                 Intake & Output











 03/20/19 03/20/19 03/21/19





 06:59 18:59 06:59


 


Output Total   0


 


Balance   0


 


Weight 64.546 kg  


 


Output:   


 


  Emesis   0


 


Other:   


 


  Voiding Method Toilet Toilet 


 


  # Voids 1 2 0


 


  # Bowel Movements 1  0














- Exam





Gen: ALert, No acute distress


Head: NC, NT


Neck Suppple


No adenopathy on palpation


Lungs: Diminished bibasilar, no increased effort


Heart RRR, S1


Abdomen: Soft, Tender to palpiation


Neuro: No sensory or motor deficits





- Labs


CBC & Chem 7: 


                                 03/20/19 10:31





                                 03/20/19 10:31


Labs: 


                  Abnormal Lab Results - Last 24 Hours (Table)











  03/20/19 03/20/19 03/20/19 Range/Units





  07:15 10:31 10:31 


 


WBC    21.9 H  (3.8-10.6)  k/uL


 


RBC    3.28 L  (3.80-5.40)  m/uL


 


Hgb    8.3 L  (11.4-16.0)  gm/dL


 


Hct    29.0 L  (34.0-46.0)  %


 


MCHC    28.7 L  (31.0-37.0)  g/dL


 


RDW    16.8 H  (11.5-15.5)  %


 


Plt Count    676 H  (150-450)  k/uL


 


Neutrophils # (Manual)    19.70 H  (1.3-7.7)  k/uL


 


Lymphocytes # (Manual)    0.44 L  (1.0-4.8)  k/uL


 


Monocytes # (Manual)    1.53 H  (0-1.0)  k/uL


 


Metamyelocytes # (Man)    0.44 H  (0)  k/uL


 


Myelocytes # (Manual)    0.22 H  (0)  k/uL


 


Glucose     (74-99)  mg/dL


 


POC Glucose (mg/dL)  353 H    (75-99)  mg/dL


 


Iron   19 L   ()  ug/dL


 


Iron Saturation   7.31 L   (12.00-45.00)   


 


Ferritin   1183.1 H   (10.0-291.0)  ng/mL


 


Alkaline Phosphatase     ()  U/L


 


Total Protein     (6.3-8.2)  g/dL


 


Albumin     (3.5-5.0)  g/dL














  03/20/19 03/20/19 03/20/19 Range/Units





  10:31 11:40 17:13 


 


WBC     (3.8-10.6)  k/uL


 


RBC     (3.80-5.40)  m/uL


 


Hgb     (11.4-16.0)  gm/dL


 


Hct     (34.0-46.0)  %


 


MCHC     (31.0-37.0)  g/dL


 


RDW     (11.5-15.5)  %


 


Plt Count     (150-450)  k/uL


 


Neutrophils # (Manual)     (1.3-7.7)  k/uL


 


Lymphocytes # (Manual)     (1.0-4.8)  k/uL


 


Monocytes # (Manual)     (0-1.0)  k/uL


 


Metamyelocytes # (Man)     (0)  k/uL


 


Myelocytes # (Manual)     (0)  k/uL


 


Glucose  279 H    (74-99)  mg/dL


 


POC Glucose (mg/dL)   276 H  238 H  (75-99)  mg/dL


 


Iron     ()  ug/dL


 


Iron Saturation     (12.00-45.00)   


 


Ferritin     (10.0-291.0)  ng/mL


 


Alkaline Phosphatase  322 H    ()  U/L


 


Total Protein  5.6 L    (6.3-8.2)  g/dL


 


Albumin  3.0 L    (3.5-5.0)  g/dL














  03/20/19 Range/Units





  20:46 


 


WBC   (3.8-10.6)  k/uL


 


RBC   (3.80-5.40)  m/uL


 


Hgb   (11.4-16.0)  gm/dL


 


Hct   (34.0-46.0)  %


 


MCHC   (31.0-37.0)  g/dL


 


RDW   (11.5-15.5)  %


 


Plt Count   (150-450)  k/uL


 


Neutrophils # (Manual)   (1.3-7.7)  k/uL


 


Lymphocytes # (Manual)   (1.0-4.8)  k/uL


 


Monocytes # (Manual)   (0-1.0)  k/uL


 


Metamyelocytes # (Man)   (0)  k/uL


 


Myelocytes # (Manual)   (0)  k/uL


 


Glucose   (74-99)  mg/dL


 


POC Glucose (mg/dL)  256 H  (75-99)  mg/dL


 


Iron   ()  ug/dL


 


Iron Saturation   (12.00-45.00)   


 


Ferritin   (10.0-291.0)  ng/mL


 


Alkaline Phosphatase   ()  U/L


 


Total Protein   (6.3-8.2)  g/dL


 


Albumin   (3.5-5.0)  g/dL














Assessment and Plan


Plan: 





Assessment and Recommendations:


1. NHL:


 - Most recently status post treatment with Hycela and bendamustine, status Post

Cycle 2 1/8/19


 - Prior to above status post treatment with R-Ceop


 - Concern for progrssion: CT C/A/P reviewed and discussed with patient, defi

nite progression cannot be ruled out. 


 - GI Work-up assess for bowel wall involvement, possible EGD 


 - LDH, Uric Acid





2. Anorexia and weight loss:


 - Megace recently ordered





3. Worsening weakness and fatigue and debility





4. Persisitent Nausea and Abdominal Pain:


 - GI WOrk-up and CT Scans





5. Normocytic Anemia: Secondary to chemo and lymphoma


 - CBC and CMP in am





Physcian Attest: I have completed the full history and physical and developed 

the complete impression and plan, agree with above, dictated as a scribe

## 2019-03-20 NOTE — P.PN
Subjective


Progress Note Date: 03/20/19





Patient sitting in chair at bedside complaining of severe lower back pain point 

tenderness to right SI joint.  Patient is scheduled for EGD and colonoscopy 

tomorrow.  Patient had CAT scan showing increase in the size of lymph nodes to 

abdomen.  Patient continues to complain of nausea although states she feels 

number





Objective





- Vital Signs


Vital signs: 


                                   Vital Signs











Temp  98.3 F   03/20/19 06:08


 


Pulse  74   03/20/19 06:08


 


Resp  17   03/20/19 06:08


 


BP  111/63   03/20/19 06:08


 


Pulse Ox  91 L  03/20/19 06:08








                                 Intake & Output











 03/19/19 03/20/19 03/20/19





 18:59 06:59 18:59


 


Intake Total 720  


 


Balance 720  


 


Weight  64.546 kg 


 


Intake:   


 


  Oral 720  


 


Other:   


 


  Voiding Method Toilet Toilet Toilet


 


  # Voids 1 1 2


 


  # Bowel Movements  1 














- Constitutional


General appearance: Present: mild distress





- EENT


Eyes: Present: PERRLA


Ears: bilateral: normal





- Neck


Neck: Present: normal ROM





- Respiratory


Respiratory: bilateral: CTA





- Cardiovascular


Rhythm: regular





- Gastrointestinal


General gastrointestinal: Present: soft





- Musculoskeletal


Musculoskeletal Comment(s): 





Point tenderness to right SI joint


Musculoskeletal: Present: generalized weakness





- Psychiatric


Psychiatric: Present: A&O x's 3, appropriate affect, intact judgment & insight





- Labs


CBC & Chem 7: 


                                 03/20/19 10:31





                                 03/20/19 10:31


Labs: 


                  Abnormal Lab Results - Last 24 Hours (Table)











  03/19/19 03/19/19 03/19/19 Range/Units





  12:12 17:05 20:35 


 


WBC     (3.8-10.6)  k/uL


 


RBC     (3.80-5.40)  m/uL


 


Hgb     (11.4-16.0)  gm/dL


 


Hct     (34.0-46.0)  %


 


MCHC     (31.0-37.0)  g/dL


 


RDW     (11.5-15.5)  %


 


Plt Count     (150-450)  k/uL


 


Glucose     (74-99)  mg/dL


 


POC Glucose (mg/dL)  308 H  308 H  285 H  (75-99)  mg/dL


 


Alkaline Phosphatase     ()  U/L


 


Total Protein     (6.3-8.2)  g/dL


 


Albumin     (3.5-5.0)  g/dL














  03/20/19 03/20/19 03/20/19 Range/Units





  07:15 10:31 10:31 


 


WBC   21.9 H   (3.8-10.6)  k/uL


 


RBC   3.28 L   (3.80-5.40)  m/uL


 


Hgb   8.3 L   (11.4-16.0)  gm/dL


 


Hct   29.0 L   (34.0-46.0)  %


 


MCHC   28.7 L   (31.0-37.0)  g/dL


 


RDW   16.8 H   (11.5-15.5)  %


 


Plt Count   676 H   (150-450)  k/uL


 


Glucose    279 H  (74-99)  mg/dL


 


POC Glucose (mg/dL)  353 H    (75-99)  mg/dL


 


Alkaline Phosphatase    322 H  ()  U/L


 


Total Protein    5.6 L  (6.3-8.2)  g/dL


 


Albumin    3.0 L  (3.5-5.0)  g/dL














  03/20/19 Range/Units





  11:40 


 


WBC   (3.8-10.6)  k/uL


 


RBC   (3.80-5.40)  m/uL


 


Hgb   (11.4-16.0)  gm/dL


 


Hct   (34.0-46.0)  %


 


MCHC   (31.0-37.0)  g/dL


 


RDW   (11.5-15.5)  %


 


Plt Count   (150-450)  k/uL


 


Glucose   (74-99)  mg/dL


 


POC Glucose (mg/dL)  276 H  (75-99)  mg/dL


 


Alkaline Phosphatase   ()  U/L


 


Total Protein   (6.3-8.2)  g/dL


 


Albumin   (3.5-5.0)  g/dL














- Imaging and Cardiology


CT scan - abdomen: report reviewed


CT scan - chest: report reviewed





Assessment and Plan


Plan: 





Assessment


Weakness fatigue


Non-Hodgkin's lymphoma


Anorexia weight loss


Leukocytosis


Anemia of chronic disease lymphoma and chemotherapy


Diabetes type 2


Hypertension


Hyperlipidemia


Sleep apnea with CPAP


Hypothyroidism





Plan


Continue consultation with Dr. Dumont


Consultation with gastroenterology for EGD and colonoscopy tomorrow

## 2019-03-20 NOTE — CT
EXAMINATION TYPE: CT ChestAbdPelvis w con

 

DATE OF EXAM: 3/19/2019

 

COMPARISON: PET CT February 9, 2019 and older CTs

 

HISTORY: f/u lymphoma originally diagnosed April 2018 and abdomen completed chemotherapy December 201
8

 

CT DLP: 711.2 mGycm. Automated Exposure Control for Dose Reduction was Utilized.

 

CONTRAST: 

CT scan of the thorax, abdomen and pelvis is performed with oral and with IV Contrast, patient inject
ed with 100 mL of Isovue 300.

 

FINDINGS:

 

LUNGS: There are new small bilateral pleural effusions with associated compressive atelectasis near d
iaphragm and mild diffuse interstitial edema. There is redemonstration of a 1 cm calcified nodule or 
granuloma in the lateral left mid lung axial image 23. No pneumothorax is seen bilaterally. No suspic
ious focal consolidation is present. 

 

MEDIASTINUM: There are no new greater than 1 cm noncalcified hilar or mediastinal lymph nodes.   No p
ericardial effusion is seen.  Cardiomegaly is present. There is redemonstration of calcified AP windo
w and left hilar lymph nodes.

 

OTHER: Stable right internal jugular Mediport catheter.

 

LIVER/GB: Cholecystectomy clips are redemonstrated..

 

PANCREAS: No significant abnormality is seen.

 

SPLEEN: No significant abnormality is seen.

 

ADRENALS: Slight asymmetric thickening to left adrenal gland is presumed benign and stable..

 

KIDNEYS: Some cortical thinning in the right kidney is present with slight diminished size versus lef
t kidney. There is redemonstration of 2 mm calculus lower pole level left kidney seen best coronal im
age 51. There is symmetric cortical medullary uptake and excretion from both kidneys without hydronep
hrosis bilaterally. There is stable 1.4 cm simple appearing cyst laterally upper to mid pole of the l
eft kidney series 5 image 31.

 

BOWEL: Normal-appearing appendix or cecum right lower quadrant and upper pelvis is seen. Scattered di
verticula throughout the entire colon are present. There is no suspicious small or large bowel dilata
tion. No CT evidence for acute diverticulitis.

 

GENITAL ORGANS: Uterus is surgically absent or markedly atrophic. A few scattered pelvic phleboliths 
are redemonstrated.

 

LYMPH NODES: There is developed interval progression of retroperitoneal adenopathy. For reference aor
tocaval adenopathy near level of right renal artery measures 4.3 x 3.0 cm current study increased in 
size from most recent CT. There now appears to be encasement of portions of the right renal artery wh
ich is patent and blurring of fat plane along posterior margin of IVC. Left periaortic adenopathy axi
al image 66 also appears more bulky from most recent CT but is difficult to accurately measure and wa
s recently PET negative. Incidental retroaortic left renal vein which is normal variant.

 

OSSEOUS STRUCTURES: Multilevel spurring in the spine is present. Multilevel facet arthropathy mid to 
lower lumbar levels is seen.

 

OTHER: No significant additional abnormality is seen.

 

IMPRESSION: 

1. Worsening mid abdominal retroperitoneal adenopathy which correlates with most recent PET/CT, vandana
nued hypermetabolic uptake.  No new thoracic or pelvic adenopathy clearly seen.

2. Correlate for CHF exacerbation as there is cardiomegaly with new small bilateral pleural effusions
 and mild interstitial edema identified bilaterally.

## 2019-03-20 NOTE — P.CONS
History of Present Illness





- Reason for Consult


Consult date: 03/20/19


History lymphoma EGD colonoscopy screening


Requesting physician: Michael Dumont





- Chief Complaint


Dehydration





- History of Present Illness


63-year-old female history of non-Hodgkin's B-cell lymphoma confirmed via liver 

biopsy last summer.  Patient presents with concerns for dehydration.  She has a 

past history of memory impairment.  Patient has had decreased oral intake and on

going nausea secondary to her chemotherapy.  Last chemotherapy early January 2019.





Oncology requested EGD colonoscopy surveillance to rule out bowel wall lymphoma 

concern for progression of disease.  Patient denies abdominal pain she reports 

lower back pain most in the right retroperitoneal region.  Denies hematemesis 

hematochezia melena.  No fever or chills.





Last EGD colonoscopy of record July 2014; upper endoscopy no evidence of peptic 

ulcer disease lower endoscopy evidence of torturous colon colonoscopy incomplete

multiple colonic diverticuli internal hemorrhoids.  Follow-up barium study 

demonstrated extensive diverticulosis of the entire colon.  Afebrile 24 hours 

T-max 100.0 on 03/18/2019.











Presently white count 21.9.  Hemoglobin 8.3.  Platelets 676.  Patient received 

IV steroids yesterday.





No abdominal complaints presently.  Tolerating diet.








CT chest abdomen and pelvis worsening mid abdominal retroperitoneal adenopathy 

which correlates with ulceration CT hypermetabolic uptake.  New small bilateral 

pleural effusions and mild interstitial edema identified bilaterally.  No new 

thoracic or pelvic adenopathy seen.











Review of Systems


Constitutional: Denies fever, chills, sweats, weight gain, or loss.  Increased 

appetite.  History of memory impairment


HEENT: Negative for migraines, blurred vision or loss, earaches, drainage, 

tinnitus, oral mucosal lesions, dysphagia, or odynophagia.


CARDIAC: Negative for chest pain, arrhythmias, or palpitation.


RESPIRATORY: Negative for shortness of breath, hemoptysis, cough, or sputum 

production.


GI: See HPI for pertinent findings.


: Negative for hematuria, urgency, frequency, polyuria, or dysuria.


GYNc: Negative vaginal discharge.


MUSCULOSKELETAL: Negative for muscle aches, swelling, arthritis, and 

arthralgias.  


NEUROLOGIC: Negative for stroke or TIA.


ENDOCRINE: Negative for thyroid problems.


SKIN: Negative for rash or itching.


PSYCHIATRIC: Negative history for depression and anxiety








Past Medical History


Past Medical History: Cancer, Heart Failure, Diabetes Mellitus, Hyperlipidemia, 

Hypertension, Sleep Apnea/CPAP/BIPAP, Thyroid Disorder


Additional Past Medical History / Comment(s): B-cell non-Hodgkin's lymphoma 

stage IV, diabetes mellitus,daughter stated pt had first chemo mon 9-24-18 

hypertension, hyperlipidemia, obstructive sleep apnea, hypothyroidism post 

thyroidectomy, closed head injury in 1989, difficulty with mobility and 

ambulation the patient has been using a walker. has a history of kidney stones


Last Myocardial Infarction Date:: 1989


History of Any Multi-Drug Resistant Organisms: None Reported


Past Surgical History: Cholecystectomy, Hysterectomy, Tonsillectomy


Additional Past Surgical History / Comment(s): Thyroidectomy, lymph node and 

liver biopsies. port a cath 9-10-18, right ureteroscopy with lithotripsy 11/2015


Past Anesthesia/Blood Transfusion Reactions: Postoperative Nausea & Vomiting 

(PONV)


Past Psychological History: Anxiety


Additional Psychological History / Comment(s): Single.  Retired nurse.  Travels 

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  No international travel.  No

 experience.  No animal exposures


Smoking Status: Never smoker


Past Alcohol Use History: None Reported


Additional Past Alcohol Use History / Comment(s): Patient is a lifelong 

nonsmoker.  She denies any medical marijuana, marijuana, street drug or alcohol 

use.  She lives at home with her family. Grand daughter acting like caregiver. 

There are no pets in the home  She has worked at [employer] as 

a nurse on the fifth floor.


Past Drug Use History: Marijuana





- Past Family History


  ** Mother


Family Medical History: Cancer


Additional Family Medical History / Comment(s): breast CA





  ** Father


Family Medical History: Cancer


Additional Family Medical History / Comment(s): colon CA





  ** Sister(s)


Family Medical History: Cancer


Additional Family Medical History / Comment(s): Skin cancer





Medications and Allergies


                                Home Medications











 Medication  Instructions  Recorded  Confirmed  Type


 


Furosemide 20 mg PO DAILY 07/24/14 03/18/19 History


 


Gabapentin 100 mg PO QAM 07/24/14 03/18/19 History


 


Gabapentin [Neurontin] 200 mg PO HS 10/09/15 03/18/19 History


 


Levothyroxine Sodium [Synthroid] 125 mcg PO [id number] 10/09/15 03/18/19 

History


 


Multivitamins, Thera [Multivitamin 1 tab PO DAILY 03/28/18 03/18/19 History





(formulary)]    


 


Atorvastatin Calcium [Lipitor] 40 mg PO HS 04/16/18 03/18/19 History


 


Citalopram Hydrobromide [CeleXA] 20 mg PO DAILY 08/26/18 03/18/19 History


 


metFORMIN HCL 1,000 mg PO BID #0 10/12/18 03/18/19 Rx


 


Cetirizine HCl 10 mg PO DAILY 02/01/19 03/18/19 History


 


Pantoprazole [Protonix] 40 mg PO DAILY 02/01/19 03/18/19 History


 


Potassium Chloride ER [K-Dur 10] 10 meq PO BID 02/01/19 03/18/19 History


 


HYDROcodone/APAP 7.5-325MG [Norco 1 tab PO Q6HR PRN 03/18/19 03/18/19 History





7.5-325]    








                                    Allergies











Allergy/AdvReac Type Severity Reaction Status Date / Time


 


haloperidol [From Haldol] Allergy  Unknown Verified 03/18/19 14:36


 


haloperidol lactate Allergy  Unknown Verified 03/18/19 14:36





[From Haldol]     


 


Iodinated Contrast- Oral and Allergy  Rash/Hives Verified 03/18/19 14:36





IV Dye     





[Iodinated Contrast Media -     





IV Dye]     


 


ciprofloxacin [From Cipro] AdvReac  Heartburn Verified 03/18/19 14:36


 


steri-strips AdvReac Severe sore Uncoded 03/18/19 14:03














Physical Exam


Vitals: 


                                   Vital Signs











  Temp Pulse Resp BP BP Pulse Ox


 


 03/20/19 06:08  98.3 F  74  17  111/63   91 L


 


 03/19/19 22:46  98.4 F  98  18  102/58   94 L


 


 03/19/19 14:12  98.2 F  86  18   105/66  92 L








                                Intake and Output











 03/19/19 03/20/19 03/20/19





 22:59 06:59 14:59


 


Intake Total 240  


 


Balance 240  


 


Intake:   


 


  Oral 240  


 


Other:   


 


  Voiding Method Toilet  Toilet


 


  # Voids 2 1 2


 


  # Bowel Movements 1  


 


  Weight  64.546 kg 











General appearance: The patient is alert, oriented, in no acute distress.


HET: Head is normocephalic and atraumatic.  Pupils are equal and reactive.  [place]

pharynx is clear without lesions.


Neck: Supple without lymphadenopathy.  Trachea midline.


Heart: S1 S2.  Regular rate and rhythm.


Lungs: No crackles or wheezes are heard.


Abdomen: Soft, nontender, nondistended with  bowel sounds.  Tenderness to the 

right retroperitoneal region.  No peritoneal signs.  No palpable organomegaly or

masses.


Extremities: Normal skin color and turgor.  No cyanosis, rash, ulceration, 

clubbing, or edema.  Radial and pedal pulses are 2/4 bilaterally.


Neurological: No focal deficits.  Strength and sensation are grossly intact.








Results


CBC & Chem 7: 


                                 03/20/19 10:31





                                 03/20/19 10:31


Labs: 


                  Abnormal Lab Results - Last 24 Hours (Table)











  03/19/19 03/19/19 03/20/19 Range/Units





  17:05 20:35 07:15 


 


WBC     (3.8-10.6)  k/uL


 


RBC     (3.80-5.40)  m/uL


 


Hgb     (11.4-16.0)  gm/dL


 


Hct     (34.0-46.0)  %


 


MCHC     (31.0-37.0)  g/dL


 


RDW     (11.5-15.5)  %


 


Plt Count     (150-450)  k/uL


 


Neutrophils # (Manual)     (1.3-7.7)  k/uL


 


Lymphocytes # (Manual)     (1.0-4.8)  k/uL


 


Monocytes # (Manual)     (0-1.0)  k/uL


 


Metamyelocytes # (Man)     (0)  k/uL


 


Myelocytes # (Manual)     (0)  k/uL


 


Glucose     (74-99)  mg/dL


 


POC Glucose (mg/dL)  308 H  285 H  353 H  (75-99)  mg/dL


 


Alkaline Phosphatase     ()  U/L


 


Total Protein     (6.3-8.2)  g/dL


 


Albumin     (3.5-5.0)  g/dL














  03/20/19 03/20/19 03/20/19 Range/Units





  10:31 10:31 11:40 


 


WBC  21.9 H    (3.8-10.6)  k/uL


 


RBC  3.28 L    (3.80-5.40)  m/uL


 


Hgb  8.3 L    (11.4-16.0)  gm/dL


 


Hct  29.0 L    (34.0-46.0)  %


 


MCHC  28.7 L    (31.0-37.0)  g/dL


 


RDW  16.8 H    (11.5-15.5)  %


 


Plt Count  676 H    (150-450)  k/uL


 


Neutrophils # (Manual)  19.70 H    (1.3-7.7)  k/uL


 


Lymphocytes # (Manual)  0.44 L    (1.0-4.8)  k/uL


 


Monocytes # (Manual)  1.53 H    (0-1.0)  k/uL


 


Metamyelocytes # (Man)  0.44 H    (0)  k/uL


 


Myelocytes # (Manual)  0.22 H    (0)  k/uL


 


Glucose   279 H   (74-99)  mg/dL


 


POC Glucose (mg/dL)    276 H  (75-99)  mg/dL


 


Alkaline Phosphatase   322 H   ()  U/L


 


Total Protein   5.6 L   (6.3-8.2)  g/dL


 


Albumin   3.0 L   (3.5-5.0)  g/dL











CT scan - abdomen: report reviewed (Dr. Abraham)


CT scan - chest: report reviewed (Dr. Abraham)


CT scan - pelvis: report reviewed (Dr. Abraham)





Assessment and Plan


(1) Non Hodgkin's lymphoma


Narrative/Plan: 


63-year-old female admitted with dehydration underlying non-Hodgkin's B-cell 

lymphoma per liver biopsy most recently status post treatment January 2019 

oncology request EGD colonoscopy for lymphoma surveillance concerning for 

progression of disease.


Current Visit: Yes   Status: Acute   Priority: Medium   Code(s): C85.90 - NON-

HODGKIN LYMPHOMA, UNSPECIFIED, UNSPECIFIED SITE   SNOMED Code(s): 880698394


   





(2) Leukocytosis


Current Visit: Yes   Status: Acute   Code(s): D72.829 - ELEVATED WHITE BLOOD 

CELL COUNT, UNSPECIFIED   SNOMED Code(s): 222228542


   





(3) Anemia


Current Visit: Yes   Status: Acute   Code(s): D64.9 - ANEMIA, UNSPECIFIED   

SNOMED Code(s): 849982743


   





(4) Dehydration


Current Visit: Yes   Status: Acute   Code(s): E86.0 - DEHYDRATION   SNOMED 

Code(s): 30579654


   


Plan: 


1.  Clear liquid diet.  EGD colonoscopy scheduled tomorrow.





The gastroenterologist has discussed the risks, benefits and alternative 

therapies for the above-mentioned procedure and for both sedation/analgesia as 

well as necessary blood product administration, if indicated, as they pertain to

this patient.  The patient has indicated understanding and acceptance of the 

risks and procedures discussed.





Thank you for this kind referral and the opportunity to participate in the care 

of your patient.  This consultation was discussed with Dr. Abraham.  The 

impression and plan of care have been directed as dictated.

## 2019-03-21 LAB
ALBUMIN SERPL-MCNC: 2.6 G/DL (ref 3.5–5)
ALP SERPL-CCNC: 290 U/L (ref 38–126)
ALT SERPL-CCNC: 26 U/L (ref 9–52)
ANION GAP SERPL CALC-SCNC: 6 MMOL/L
AST SERPL-CCNC: 18 U/L (ref 14–36)
BASOPHILS # BLD MANUAL: 0.13 K/UL (ref 0–0.2)
BUN SERPL-SCNC: 14 MG/DL (ref 7–17)
CALCIUM SPEC-MCNC: 9.9 MG/DL (ref 8.4–10.2)
CELLS COUNTED: 200
CHLORIDE SERPL-SCNC: 104 MMOL/L (ref 98–107)
CO2 SERPL-SCNC: 29 MMOL/L (ref 22–30)
EOSINOPHIL # BLD MANUAL: 0.26 K/UL (ref 0–0.7)
ERYTHROCYTE [DISTWIDTH] IN BLOOD BY AUTOMATED COUNT: 2.99 M/UL (ref 3.8–5.4)
ERYTHROCYTE [DISTWIDTH] IN BLOOD: 17 % (ref 11.5–15.5)
GLUCOSE BLD-MCNC: 204 MG/DL (ref 75–99)
GLUCOSE BLD-MCNC: 246 MG/DL (ref 75–99)
GLUCOSE BLD-MCNC: 266 MG/DL (ref 75–99)
GLUCOSE BLD-MCNC: 360 MG/DL (ref 75–99)
GLUCOSE SERPL-MCNC: 175 MG/DL (ref 74–99)
HCT VFR BLD AUTO: 25.9 % (ref 34–46)
HGB BLD-MCNC: 7.6 GM/DL (ref 11.4–16)
LYMPHOCYTES # BLD MANUAL: 0.64 K/UL (ref 1–4.8)
MCH RBC QN AUTO: 25.4 PG (ref 25–35)
MCHC RBC AUTO-ENTMCNC: 29.2 G/DL (ref 31–37)
MCV RBC AUTO: 86.8 FL (ref 80–100)
MONOCYTES # BLD MANUAL: 0.64 K/UL (ref 0–1)
NEUTROPHILS NFR BLD MANUAL: 66 %
NEUTS SEG # BLD MANUAL: 11.3 K/UL (ref 1.3–7.7)
PLATELET # BLD AUTO: 547 K/UL (ref 150–450)
POTASSIUM SERPL-SCNC: 4 MMOL/L (ref 3.5–5.1)
PROT SERPL-MCNC: 5.1 G/DL (ref 6.3–8.2)
SODIUM SERPL-SCNC: 139 MMOL/L (ref 137–145)
WBC # BLD AUTO: 12.8 K/UL (ref 3.8–10.6)

## 2019-03-21 RX ADMIN — GABAPENTIN SCH MG: 100 CAPSULE ORAL at 08:04

## 2019-03-21 RX ADMIN — ONDANSETRON PRN MG: 2 INJECTION INTRAMUSCULAR; INTRAVENOUS at 04:35

## 2019-03-21 RX ADMIN — INSULIN ASPART SCH UNIT: 100 INJECTION, SOLUTION INTRAVENOUS; SUBCUTANEOUS at 18:00

## 2019-03-21 RX ADMIN — HYDROCODONE BITARTRATE AND ACETAMINOPHEN PRN EACH: 5; 325 TABLET ORAL at 09:15

## 2019-03-21 RX ADMIN — GABAPENTIN SCH MG: 100 CAPSULE ORAL at 19:49

## 2019-03-21 RX ADMIN — INSULIN ASPART SCH UNIT: 100 INJECTION, SOLUTION INTRAVENOUS; SUBCUTANEOUS at 08:04

## 2019-03-21 RX ADMIN — PANTOPRAZOLE SODIUM SCH MG: 40 TABLET, DELAYED RELEASE ORAL at 08:04

## 2019-03-21 RX ADMIN — INSULIN ASPART SCH UNIT: 100 INJECTION, SOLUTION INTRAVENOUS; SUBCUTANEOUS at 12:01

## 2019-03-21 RX ADMIN — HYDROCODONE BITARTRATE AND ACETAMINOPHEN PRN EACH: 5; 325 TABLET ORAL at 15:46

## 2019-03-21 RX ADMIN — INSULIN ASPART SCH UNIT: 100 INJECTION, SOLUTION INTRAVENOUS; SUBCUTANEOUS at 20:55

## 2019-03-21 RX ADMIN — HYDROCODONE BITARTRATE AND ACETAMINOPHEN PRN EACH: 5; 325 TABLET ORAL at 20:58

## 2019-03-21 RX ADMIN — FUROSEMIDE SCH MG: 20 TABLET ORAL at 08:04

## 2019-03-21 RX ADMIN — CITALOPRAM HYDROBROMIDE SCH MG: 20 TABLET ORAL at 08:04

## 2019-03-21 RX ADMIN — ATORVASTATIN CALCIUM SCH MG: 40 TABLET, FILM COATED ORAL at 19:45

## 2019-03-21 RX ADMIN — LEVOTHYROXINE SODIUM SCH MCG: 0.12 TABLET ORAL at 08:04

## 2019-03-21 RX ADMIN — MORPHINE SULFATE PRN MG: 4 INJECTION, SOLUTION INTRAMUSCULAR; INTRAVENOUS at 04:39

## 2019-03-21 NOTE — P.PN
Subjective





EGD planned for this afternoon.  Patient nothing by mouth.  Patient declined 

prep for colonoscopy we'll consider doing outpatient.  States back pain improved





Objective





- Vital Signs


Vital signs: 


                                   Vital Signs











Temp  98.7 F   03/21/19 07:07


 


Pulse  99   03/21/19 07:07


 


Resp  18   03/21/19 07:07


 


BP  117/65   03/21/19 07:07


 


Pulse Ox  93 L  03/21/19 07:07








                                 Intake & Output











 03/20/19 03/21/19 03/21/19





 18:59 06:59 18:59


 


Output Total  0 


 


Balance  0 


 


Weight  63.7 kg 


 


Output:   


 


  Emesis  0 


 


Other:   


 


  Voiding Method Toilet Toilet 


 


  # Voids 2 1 


 


  # Bowel Movements  0 














- Constitutional


General appearance: Present: mild distress





- EENT


Eyes: Present: PERRLA


Ears: bilateral: normal





- Neck


Neck: Present: normal ROM





- Respiratory


Respiratory: bilateral: CTA





- Cardiovascular


Rhythm: regular





- Gastrointestinal


General gastrointestinal: Present: normal bowel sounds, soft





- Integumentary


Integumentary: Present: normal





- Neurologic


Neurologic: Present: CNII-XII intact





- Musculoskeletal


Musculoskeletal: Present: generalized weakness





- Psychiatric


Psychiatric: Present: A&O x's 3, appropriate affect, intact judgment & insight





- Labs


CBC & Chem 7: 


                                 03/20/19 10:31





                                 03/20/19 10:31


Labs: 


                  Abnormal Lab Results - Last 24 Hours (Table)











  03/20/19 03/20/19 03/20/19 Range/Units





  10:31 10:31 10:31 


 


WBC   21.9 H   (3.8-10.6)  k/uL


 


RBC   3.28 L   (3.80-5.40)  m/uL


 


Hgb   8.3 L   (11.4-16.0)  gm/dL


 


Hct   29.0 L   (34.0-46.0)  %


 


MCHC   28.7 L   (31.0-37.0)  g/dL


 


RDW   16.8 H   (11.5-15.5)  %


 


Plt Count   676 H   (150-450)  k/uL


 


Neutrophils # (Manual)   19.70 H   (1.3-7.7)  k/uL


 


Lymphocytes # (Manual)   0.44 L   (1.0-4.8)  k/uL


 


Monocytes # (Manual)   1.53 H   (0-1.0)  k/uL


 


Metamyelocytes # (Man)   0.44 H   (0)  k/uL


 


Myelocytes # (Manual)   0.22 H   (0)  k/uL


 


Glucose    279 H  (74-99)  mg/dL


 


POC Glucose (mg/dL)     (75-99)  mg/dL


 


Iron  19 L    ()  ug/dL


 


Iron Saturation  7.31 L    (12.00-45.00)   


 


Ferritin  1183.1 H    (10.0-291.0)  ng/mL


 


Alkaline Phosphatase    322 H  ()  U/L


 


Total Protein    5.6 L  (6.3-8.2)  g/dL


 


Albumin    3.0 L  (3.5-5.0)  g/dL














  03/20/19 03/20/19 03/20/19 Range/Units





  11:40 17:13 20:46 


 


WBC     (3.8-10.6)  k/uL


 


RBC     (3.80-5.40)  m/uL


 


Hgb     (11.4-16.0)  gm/dL


 


Hct     (34.0-46.0)  %


 


MCHC     (31.0-37.0)  g/dL


 


RDW     (11.5-15.5)  %


 


Plt Count     (150-450)  k/uL


 


Neutrophils # (Manual)     (1.3-7.7)  k/uL


 


Lymphocytes # (Manual)     (1.0-4.8)  k/uL


 


Monocytes # (Manual)     (0-1.0)  k/uL


 


Metamyelocytes # (Man)     (0)  k/uL


 


Myelocytes # (Manual)     (0)  k/uL


 


Glucose     (74-99)  mg/dL


 


POC Glucose (mg/dL)  276 H  238 H  256 H  (75-99)  mg/dL


 


Iron     ()  ug/dL


 


Iron Saturation     (12.00-45.00)   


 


Ferritin     (10.0-291.0)  ng/mL


 


Alkaline Phosphatase     ()  U/L


 


Total Protein     (6.3-8.2)  g/dL


 


Albumin     (3.5-5.0)  g/dL














  03/21/19 Range/Units





  07:18 


 


WBC   (3.8-10.6)  k/uL


 


RBC   (3.80-5.40)  m/uL


 


Hgb   (11.4-16.0)  gm/dL


 


Hct   (34.0-46.0)  %


 


MCHC   (31.0-37.0)  g/dL


 


RDW   (11.5-15.5)  %


 


Plt Count   (150-450)  k/uL


 


Neutrophils # (Manual)   (1.3-7.7)  k/uL


 


Lymphocytes # (Manual)   (1.0-4.8)  k/uL


 


Monocytes # (Manual)   (0-1.0)  k/uL


 


Metamyelocytes # (Man)   (0)  k/uL


 


Myelocytes # (Manual)   (0)  k/uL


 


Glucose   (74-99)  mg/dL


 


POC Glucose (mg/dL)  266 H  (75-99)  mg/dL


 


Iron   ()  ug/dL


 


Iron Saturation   (12.00-45.00)   


 


Ferritin   (10.0-291.0)  ng/mL


 


Alkaline Phosphatase   ()  U/L


 


Total Protein   (6.3-8.2)  g/dL


 


Albumin   (3.5-5.0)  g/dL














Assessment and Plan


Plan: 





Assessment


Weakness


Anorexia with weight loss


Anemia chronic disease lymphoma on chemotherapy


Leukocytosis


Non-Hodgkin's lymphoma


Nausea dehydration


Diabetes type 2


Hypertension


Hyperlipidemia sleep apnea with CPAP machine


Hypothyroidism


Sciatica right side





Plan


Continue consultation with Dr. Dumont


Continue consultation with gastroenterology


EGD plan for this afternoon


Hopeful discharge soon

## 2019-03-21 NOTE — P.PCN
Date of Procedure: 03/21/19


Description of Procedure: 





BRIEF HISTORY: 


63-year-old female history of non-Hodgkin's B-cell lymphoma confirmed via liver 

biopsy last summer.  Patient presents with concerns for dehydration.  She has a 

past history of memory impairment.  Patient has had decreased oral intake and 

ongoing nausea secondary to her chemotherapy.  Last chemotherapy early January 2019. Oncology requested EGD colonoscopy surveillance to rule out bowel wall 

lymphoma concern for progression of disease.  Patient denies abdominal pain she 

reports lower back pain most in the right retroperitoneal region.  Denies 

hematemesis hematochezia melena.  No fever or chills. Last EGD colonoscopy of 

record July 2014; upper endoscopy no evidence of peptic ulcer disease lower 

endoscopy evidence of torturous colon colonoscopy incomplete multiple colonic 

diverticuli internal hemorrhoids. Patient was unable to tolerate prep for a 

colonoscopy. CT chest abdomen and pelvis worsening mid abdominal retroperitoneal

adenopathy which correlates with ulceration CT hypermetabolic uptake.  New small

bilateral pleural effusions and mild interstitial edema identified bilaterally. 

No new thoracic or pelvic adenopathy seen.. 





PROCEDURE PERFORMED: 


Esophagogastroduodenoscopy with biopsy.





PREOPERATIVE DIAGNOSIS: 


Anemia, history of non-Hodgkin's B-cell lymphoma. 





ESTIMATED BLOOD LOSS: 


Minimal.





IV sedation per anesthesia. 





PROCEDURE: 


After informed consent was obtained, the patient  was brought into the endoscopy

unit. IV sedation was administered by Anesthesia under continuous monitoring. 

Initially the Olympus GIF-190 video endoscope was inserted into the mouth. 

Esophagus intubated without any difficulty. It was gradually advanced into the 

stomach and duodenum and carefully examined. The bulb and the second part of the

duodenum appeared normal, with numerous biopsies taken to rule out lymphoma. The

scope at this time was withdrawn to the stomach, adequately insufflated with 

air, and upon careful examination, mucosa of the antrum, body, cardia and the 

fundus appeared normal except for some mild scattered erythema in the antrum and

body suggestive of gastritis.  Extensive biopsies taken of the antrum and body, 

as well as see cardia and fundus. The scope was then withdrawn into the 

esophagus. The GE junction was located at 39 cm from the incisors. The esophagus

appeared normal. There were no erosions or ulcerations seen, and biopsies were 

taken. The patient tolerated the procedure well. 





IMPRESSION: 


1.  Mild gastritis.


2.  Random biopsies of the duodenum, antrum and body, cardia and fundus, and 

esophagus.





RECOMMENDATIONS: The findings of this examination were discussed with the 

patient.  Okay to resume diet.  Patient on interested in inpatient colonoscopy 

at this time due to inability to tolerate prep.  Await pathology from biopsies.

## 2019-03-21 NOTE — P.PN
Subjective


Progress Note Date: 03/21/19


Principal diagnosis: 





Worsening weakness, fatigue and debility


Status Post endoscopy today. No apparent mass, biopsy were taken and gastritis 

mentioned. No CBC today, Will order








Objective





- Vital Signs


Vital signs: 


                                   Vital Signs











Temp  98.6 F   03/21/19 14:57


 


Pulse  67   03/21/19 14:57


 


Resp  16   03/21/19 14:57


 


BP  94/58   03/21/19 14:57


 


Pulse Ox  94 L  03/21/19 14:57








                                 Intake & Output











 03/20/19 03/21/19 03/21/19





 18:59 06:59 18:59


 


Intake Total   300


 


Output Total  0 


 


Balance  0 300


 


Weight  63.7 kg 


 


Intake:   


 


  IV   300


 


Output:   


 


  Emesis  0 


 


Other:   


 


  Voiding Method Toilet Toilet 


 


  # Voids 2 1 2


 


  # Bowel Movements  0 














- Exam





Gen: ALert, No acute distress


Head: NC, NT


Neck Suppple


No adenopathy on palpation


Lungs: Diminished bibasilar, no increased effort


Heart RRR, S1


Abdomen: Soft, Tender to palpiation


Neuro: No sensory or motor deficits





- Labs


CBC & Chem 7: 


                                 03/22/19 14:11





                                 03/22/19 14:11


Labs: 


                  Abnormal Lab Results - Last 24 Hours (Table)











  03/20/19 03/20/19 03/20/19 Range/Units





  10:31 17:13 20:46 


 


POC Glucose (mg/dL)   238 H  256 H  (75-99)  mg/dL


 


Iron  19 L    ()  ug/dL


 


Iron Saturation  7.31 L    (12.00-45.00)   


 


Ferritin  1183.1 H    (10.0-291.0)  ng/mL














  03/21/19 03/21/19 Range/Units





  07:18 11:20 


 


POC Glucose (mg/dL)  266 H  246 H  (75-99)  mg/dL


 


Iron    ()  ug/dL


 


Iron Saturation    (12.00-45.00)   


 


Ferritin    (10.0-291.0)  ng/mL














Assessment and Plan


Plan: 





Assessment and Recommendations:


1. NHL:


 - Most recently status post treatment with Hycela and bendamustine, status Post

Cycle 2 1/8/19


 - Prior to above status post treatment with R-Ceop


 - Concern for progrssion: CT C/A/P reviewed and discussed with patient, 

definite progression cannot be ruled out. 


 - Status Post EGD today with GI


 - LDH, Uric Acid





2. Anorexia and weight loss:


 - Megace recently ordered





3. Worsening weakness and fatigue and debility





4. Persisitent Nausea and Abdominal Pain:


 - GI WOrk-up and CT Scans





5. Normocytic Anemia: Secondary to chemo and lymphoma


 - CBC and CMP in am





Await CBC and CMP today


Await Pathology results from [facility]


PT/OT evaluation and treat foe debility





Physcian Attest: I have completed the full history and physical and developed 

the complete impression and plan, agree with above, dictated as a scribe

## 2019-03-22 VITALS — TEMPERATURE: 98 F | DIASTOLIC BLOOD PRESSURE: 56 MMHG | HEART RATE: 87 BPM | SYSTOLIC BLOOD PRESSURE: 102 MMHG

## 2019-03-22 VITALS — RESPIRATION RATE: 16 BRPM

## 2019-03-22 LAB
ALBUMIN SERPL-MCNC: 2.6 G/DL (ref 3.5–5)
ALP SERPL-CCNC: 306 U/L (ref 38–126)
ALT SERPL-CCNC: 26 U/L (ref 9–52)
ANION GAP SERPL CALC-SCNC: 10 MMOL/L
AST SERPL-CCNC: 16 U/L (ref 14–36)
BUN SERPL-SCNC: 16 MG/DL (ref 7–17)
CALCIUM SPEC-MCNC: 9.6 MG/DL (ref 8.4–10.2)
CELLS COUNTED: 100
CHLORIDE SERPL-SCNC: 100 MMOL/L (ref 98–107)
CO2 SERPL-SCNC: 26 MMOL/L (ref 22–30)
ERYTHROCYTE [DISTWIDTH] IN BLOOD BY AUTOMATED COUNT: 3.12 M/UL (ref 3.8–5.4)
ERYTHROCYTE [DISTWIDTH] IN BLOOD: 17.2 % (ref 11.5–15.5)
GLUCOSE BLD-MCNC: 312 MG/DL (ref 75–99)
GLUCOSE BLD-MCNC: 377 MG/DL (ref 75–99)
GLUCOSE BLD-MCNC: 406 MG/DL (ref 75–99)
GLUCOSE SERPL-MCNC: 422 MG/DL (ref 74–99)
HCT VFR BLD AUTO: 27.5 % (ref 34–46)
HGB BLD-MCNC: 7.7 GM/DL (ref 11.4–16)
LYMPHOCYTES # BLD MANUAL: 0.7 K/UL (ref 1–4.8)
MCH RBC QN AUTO: 24.5 PG (ref 25–35)
MCHC RBC AUTO-ENTMCNC: 27.9 G/DL (ref 31–37)
MCV RBC AUTO: 88 FL (ref 80–100)
MONOCYTES # BLD MANUAL: 0.93 K/UL (ref 0–1)
NEUTROPHILS NFR BLD MANUAL: 72 %
NEUTS SEG # BLD MANUAL: 9.9 K/UL (ref 1.3–7.7)
PLATELET # BLD AUTO: 538 K/UL (ref 150–450)
POTASSIUM SERPL-SCNC: 3.5 MMOL/L (ref 3.5–5.1)
PROT SERPL-MCNC: 5 G/DL (ref 6.3–8.2)
SODIUM SERPL-SCNC: 136 MMOL/L (ref 137–145)
WBC # BLD AUTO: 11.6 K/UL (ref 3.8–10.6)

## 2019-03-22 RX ADMIN — INSULIN ASPART SCH UNIT: 100 INJECTION, SOLUTION INTRAVENOUS; SUBCUTANEOUS at 07:57

## 2019-03-22 RX ADMIN — CITALOPRAM HYDROBROMIDE SCH MG: 20 TABLET ORAL at 07:57

## 2019-03-22 RX ADMIN — LEVOTHYROXINE SODIUM SCH MCG: 0.12 TABLET ORAL at 05:06

## 2019-03-22 RX ADMIN — HYDROCODONE BITARTRATE AND ACETAMINOPHEN PRN EACH: 5; 325 TABLET ORAL at 05:06

## 2019-03-22 RX ADMIN — INSULIN ASPART SCH UNIT: 100 INJECTION, SOLUTION INTRAVENOUS; SUBCUTANEOUS at 12:46

## 2019-03-22 RX ADMIN — FUROSEMIDE SCH MG: 20 TABLET ORAL at 07:57

## 2019-03-22 RX ADMIN — GABAPENTIN SCH MG: 100 CAPSULE ORAL at 07:58

## 2019-03-22 RX ADMIN — PANTOPRAZOLE SODIUM SCH MG: 40 TABLET, DELAYED RELEASE ORAL at 07:57

## 2019-03-22 RX ADMIN — HYDROCODONE BITARTRATE AND ACETAMINOPHEN PRN EACH: 5; 325 TABLET ORAL at 11:54

## 2019-03-22 NOTE — XR
EXAMINATION TYPE: XR chest 1V

 

DATE OF EXAM: 3/22/2019

 

CLINICAL HISTORY: Nonconscious lymphoma with shortness of breath and anemia.  

 

TECHNIQUE: Single AP portable upright view of the chest is obtained.

 

COMPARISON: Chest x-ray from January 11, 2019. Most recent CT 3 days ago.

 

FINDINGS:  There is stable right internal jugular Mediport catheter. There is persistent cardiomegaly
 with small bilateral pleural effusions and right greater than left bibasilar opacities. Upper lungs 
remain clear without pneumothorax. Osseous structures are intact.

 

IMPRESSION: Correlate for CHF exacerbation or fluid overload state as there is cardiomegaly with smal
l bilateral pleural effusions and bibasilar acute infiltrate and/or atelectasis all redemonstrated. N
o significant change from recent CT.

## 2019-03-22 NOTE — P.PN
Subjective


Progress Note Date: 03/22/19


Principal diagnosis: 





Worsening weakness, fatigue and debility


Status Post endoscopy today. No apparent mass, biopsy were taken and gastritis 

mentioned. No CBC today, 7.7 stable








Objective





- Vital Signs


Vital signs: 


                                   Vital Signs











Temp  98.0 F   03/22/19 14:25


 


Pulse  87   03/22/19 14:25


 


Resp  16   03/22/19 14:25


 


BP  102/56   03/22/19 14:25


 


Pulse Ox  91 L  03/22/19 14:25








                                 Intake & Output











 03/21/19 03/22/19 03/22/19





 18:59 06:59 18:59


 


Intake Total 900  


 


Output Total 0  


 


Balance 900  


 


Weight   63.9 kg


 


Intake:   


 


    


 


  Oral 600  


 


Output:   


 


  Emesis 0  


 


Other:   


 


  Voiding Method Toilet  


 


  # Voids 1 1 2


 


  # Bowel Movements 0  














- Exam





Gen: ALert, No acute distress


Head: NC, NT


Neck Suppple


No adenopathy on palpation


Lungs: Diminished bibasilar, no increased effort


Heart RRR, S1


Abdomen: Soft, Tender to palpiation


Neuro: No sensory or motor deficits





- Labs


CBC & Chem 7: 


                                 03/22/19 14:11





                                 03/22/19 14:11


Labs: 


                  Abnormal Lab Results - Last 24 Hours (Table)











  03/21/19 03/21/19 03/22/19 Range/Units





  17:10 20:32 06:44 


 


WBC     (3.8-10.6)  k/uL


 


RBC     (3.80-5.40)  m/uL


 


Hgb     (11.4-16.0)  gm/dL


 


Hct     (34.0-46.0)  %


 


MCH     (25.0-35.0)  pg


 


MCHC     (31.0-37.0)  g/dL


 


RDW     (11.5-15.5)  %


 


Plt Count     (150-450)  k/uL


 


Neutrophils # (Manual)     (1.3-7.7)  k/uL


 


Lymphocytes # (Manual)     (1.0-4.8)  k/uL


 


Sodium     (137-145)  mmol/L


 


Glucose     (74-99)  mg/dL


 


POC Glucose (mg/dL)  204 H  360 H  312 H  (75-99)  mg/dL


 


Alkaline Phosphatase     ()  U/L


 


Total Protein     (6.3-8.2)  g/dL


 


Albumin     (3.5-5.0)  g/dL














  03/22/19 03/22/19 03/22/19 Range/Units





  11:28 14:11 14:11 


 


WBC   11.6 H   (3.8-10.6)  k/uL


 


RBC   3.12 L   (3.80-5.40)  m/uL


 


Hgb   7.7 L   (11.4-16.0)  gm/dL


 


Hct   27.5 L   (34.0-46.0)  %


 


MCH   24.5 L   (25.0-35.0)  pg


 


MCHC   27.9 L   (31.0-37.0)  g/dL


 


RDW   17.2 H   (11.5-15.5)  %


 


Plt Count   538 H   (150-450)  k/uL


 


Neutrophils # (Manual)   9.90 H   (1.3-7.7)  k/uL


 


Lymphocytes # (Manual)   0.70 L   (1.0-4.8)  k/uL


 


Sodium    136 L  (137-145)  mmol/L


 


Glucose    422 H  (74-99)  mg/dL


 


POC Glucose (mg/dL)  406 H    (75-99)  mg/dL


 


Alkaline Phosphatase    306 H  ()  U/L


 


Total Protein    5.0 L  (6.3-8.2)  g/dL


 


Albumin    2.6 L  (3.5-5.0)  g/dL














  03/22/19 Range/Units





  16:35 


 


WBC   (3.8-10.6)  k/uL


 


RBC   (3.80-5.40)  m/uL


 


Hgb   (11.4-16.0)  gm/dL


 


Hct   (34.0-46.0)  %


 


MCH   (25.0-35.0)  pg


 


MCHC   (31.0-37.0)  g/dL


 


RDW   (11.5-15.5)  %


 


Plt Count   (150-450)  k/uL


 


Neutrophils # (Manual)   (1.3-7.7)  k/uL


 


Lymphocytes # (Manual)   (1.0-4.8)  k/uL


 


Sodium   (137-145)  mmol/L


 


Glucose   (74-99)  mg/dL


 


POC Glucose (mg/dL)  377 H  (75-99)  mg/dL


 


Alkaline Phosphatase   ()  U/L


 


Total Protein   (6.3-8.2)  g/dL


 


Albumin   (3.5-5.0)  g/dL














Assessment and Plan


Plan: 





Assessment and Recommendations:


1. NHL:


 - Most recently status post treatment with Hycela and bendamustine, status Post

Cycle 2 1/8/19


 - Prior to above status post treatment with R-Ceop


 - Concern for progrssion: CT C/A/P reviewed and discussed with patient, 

definite progression cannot be ruled out. 


 - Status Post EGD today with GI


 - LDH, Uric Acid





2. Anorexia and weight loss:


 - Megace recently ordered





3. Worsening weakness and fatigue and debility





4. Persisitent Nausea and Abdominal Pain:


 - GI WOrk-up and CT Scans





5. Normocytic Anemia: Secondary to chemo and lymphoma


 - CBC and CMP in am





Await CBC and CMP today


Await Pathology results from [facility]


PT/OT evaluation and treat foe debility


Follow-up  :Tim out patient nxt week





Physcian Attest: I have completed the full history and physical and developed 

the complete impression and plan, agree with above, dictated as a scribe

## 2019-03-22 NOTE — P.DS
Providers


Date of admission: 


03/18/19 16:51





Expected date of discharge: 03/22/19


Attending physician: 


Faustino Villegas





Consults: 





                                        





03/18/19 17:07


Consult Physician Urgent 


   Consulting Provider: Michael Dumont


   Consult Reason/Comments: Non-Hodgkin's lymphoma, established patient


   Do you want consulting provider notified?: Yes











Primary care physician: 


Faustino Villgeas





Hospital Course: 





63-year-old female history of non-Hodgkin's B-cell lymphoma confirmed via liver 

biopsy last summer.  Patient presents with concerns for dehydration.  She has a 

past history of memory impairment.  Patient has had decreased oral intake and 

ongoing nausea secondary to her chemotherapy.  Last chemotherapy early January 2019. Oncology requested EGD colonoscopy surveillance to rule out bowel wall 

lymphoma concern for progression of disease.  Patient denies abdominal pain she 

reports lower back pain most in the right retroperitoneal region.  Denies 

hematemesis hematochezia melena.  No fever or chills. Last EGD colonoscopy of 

record July 2014; upper endoscopy no evidence of peptic ulcer disease lower 

endoscopy evidence of torturous colon colonoscopy incomplete multiple colonic 

diverticuli internal hemorrhoids. Patient was unable to tolerate prep for a 

colonoscopy. CT chest abdomen and pelvis worsening mid abdominal retroperitoneal

adenopathy which correlates with ulceration CT hypermetabolic uptake.  New small

bilateral pleural effusions and mild interstitial edema identified bilaterally. 

No new thoracic or pelvic adenopathy seen.. 





Patient underwent EGD with biopsy on 03/21/2019 showing mild gastritis; patient 

was recommended inpatient colonoscopy patient refused secondary to being able to

tolerate preparation; patient will be discharged home with recommendations to 

follow-up with GI and oncology as an outpatient


Patient Condition at Discharge: Fair





Plan - Discharge Summary


Discharge Rx Participant: No


New Discharge Prescriptions: 


Continue


   Gabapentin 100 mg PO QAM


   Furosemide 20 mg PO DAILY


   Gabapentin [Neurontin] 200 mg PO HS


   Levothyroxine Sodium [Synthroid] 125 mcg PO MOTUWETHFRSA


   Multivitamins, Thera [Multivitamin (formulary)] 1 tab PO DAILY


   Atorvastatin Calcium [Lipitor] 40 mg PO HS


   Citalopram Hydrobromide [CeleXA] 20 mg PO DAILY


   metFORMIN HCL 1,000 mg PO BID #0


   Potassium Chloride ER [K-Dur 10] 10 meq PO BID


   Cetirizine HCl 10 mg PO DAILY


   Pantoprazole [Protonix] 40 mg PO DAILY


   HYDROcodone/APAP 7.5-325MG [Norco 7.5-325] 1 tab PO Q6HR PRN


     PRN Reason: Pain


Discharge Medication List





Furosemide 20 mg PO DAILY 07/24/14 [History]


Gabapentin 100 mg PO QAM 07/24/14 [History]


Gabapentin [Neurontin] 200 mg PO HS 10/09/15 [History]


Levothyroxine Sodium [Synthroid] 125 mcg PO MOTUWETHFRSA 10/09/15 [History]


Multivitamins, Thera [Multivitamin (formulary)] 1 tab PO DAILY 03/28/18 

[History]


Atorvastatin Calcium [Lipitor] 40 mg PO HS 04/16/18 [History]


Citalopram Hydrobromide [CeleXA] 20 mg PO DAILY 08/26/18 [History]


metFORMIN HCL 1,000 mg PO BID #0 10/12/18 [Rx]


Cetirizine HCl 10 mg PO DAILY 02/01/19 [History]


Pantoprazole [Protonix] 40 mg PO DAILY 02/01/19 [History]


Potassium Chloride ER [K-Dur 10] 10 meq PO BID 02/01/19 [History]


HYDROcodone/APAP 7.5-325MG [Norco 7.5-325] 1 tab PO Q6HR PRN 03/18/19 [History]








Follow up Appointment(s)/Referral(s): 


Faustino Villegas MD [Primary Care Provider] - 03/27/19 11:20 am


Patient Instructions/Handouts:  Anemia (DC)


Discharge Disposition: HOME SELF-CARE

## 2019-03-23 NOTE — P.PN
Subjective


Progress Note Date: 03/23/19





 The patient has improved with hydration.  She states that nausea has decreased.

 She is tolerating her diet well and is to be discharged today.  No 

fever/chills/diarrhea.





Objective





- Vital Signs


Vital signs: 


                                   Vital Signs











Temp  98.0 F   03/22/19 14:25


 


Pulse  87   03/22/19 14:25


 


Resp  16   03/22/19 14:25


 


BP  102/56   03/22/19 14:25


 


Pulse Ox  91 L  03/22/19 14:25








                                 Intake & Output











 03/22/19 03/22/19 03/23/19





 06:59 18:59 06:59


 


Weight  63.9 kg 


 


Other:   


 


  # Voids 1 1 














- Constitutional


General appearance: Present: no acute distress





- EENT


Eyes: Present: EOMI


ENT: Present: hearing grossly normal, normal oropharynx





- Respiratory


Respiratory: bilateral: CTA





- Cardiovascular


Rhythm: regular


Heart sounds: normal: S1, S2





- Integumentary


Integumentary: Present: normal





- Neurologic


Neurologic: Present: CNII-XII intact





- Musculoskeletal


Musculoskeletal: Present: generalized weakness, strength equal bilaterally





- Psychiatric


Psychiatric: Present: A&O x's 3, appropriate affect





- Labs


CBC & Chem 7: 


                                 03/22/19 14:11





                                 03/22/19 14:11


Labs: 


                  Abnormal Lab Results - Last 24 Hours (Table)











  03/22/19 03/22/19 03/22/19 Range/Units





  06:44 11:28 14:11 


 


WBC    11.6 H  (3.8-10.6)  k/uL


 


RBC    3.12 L  (3.80-5.40)  m/uL


 


Hgb    7.7 L  (11.4-16.0)  gm/dL


 


Hct    27.5 L  (34.0-46.0)  %


 


MCH    24.5 L  (25.0-35.0)  pg


 


MCHC    27.9 L  (31.0-37.0)  g/dL


 


RDW    17.2 H  (11.5-15.5)  %


 


Plt Count    538 H  (150-450)  k/uL


 


Neutrophils # (Manual)    9.90 H  (1.3-7.7)  k/uL


 


Lymphocytes # (Manual)    0.70 L  (1.0-4.8)  k/uL


 


Sodium     (137-145)  mmol/L


 


Glucose     (74-99)  mg/dL


 


POC Glucose (mg/dL)  312 H  406 H   (75-99)  mg/dL


 


Alkaline Phosphatase     ()  U/L


 


Total Protein     (6.3-8.2)  g/dL


 


Albumin     (3.5-5.0)  g/dL














  03/22/19 03/22/19 Range/Units





  14:11 16:35 


 


WBC    (3.8-10.6)  k/uL


 


RBC    (3.80-5.40)  m/uL


 


Hgb    (11.4-16.0)  gm/dL


 


Hct    (34.0-46.0)  %


 


MCH    (25.0-35.0)  pg


 


MCHC    (31.0-37.0)  g/dL


 


RDW    (11.5-15.5)  %


 


Plt Count    (150-450)  k/uL


 


Neutrophils # (Manual)    (1.3-7.7)  k/uL


 


Lymphocytes # (Manual)    (1.0-4.8)  k/uL


 


Sodium  136 L   (137-145)  mmol/L


 


Glucose  422 H   (74-99)  mg/dL


 


POC Glucose (mg/dL)   377 H  (75-99)  mg/dL


 


Alkaline Phosphatase  306 H   ()  U/L


 


Total Protein  5.0 L   (6.3-8.2)  g/dL


 


Albumin  2.6 L   (3.5-5.0)  g/dL














Assessment and Plan


(1) Nausea


Narrative/Plan: 


  This has been persistent intermittently as an outpatient, leading to decreased

oral intake and dehydration, causing this admission.  Patient's symptoms are 

improved.  She had an endoscopic workup, which revealed no obvious abnormality. 

Biopsies were done to rule out submucosal lymphoma involvement.  These are 

pending.


 Ok to discharge the patient on oral anti emetics when necessary.


 The patient CT scan appears to show more progression compared to his recent 

imaging.  This could be the cause of her nausea, if mucosal biopsies come back 

negative


Status: Acute   Code(s): R11.0 - NAUSEA   SNOMED Code(s): 740448958


   





(2) History of lymphoma


Narrative/Plan: 


  The patient's imaging prior to this admission had not shown any definite 

evidence of progression.  However CAT scans during this admission does appear to

indicate progression.  These results were discussed with the patient, along with

implications.  Await results of the biopsies.  If these are positive this would 

indicate progression.  If negative, we will need to consider biopsy of 

retroperitoneal nodes to define the specific lymphoma type better.  The patient 

has had both small and large B-cell lymphoma in the past


 In case of progression, resumption of treatment may be challenging, given her 

poor tolerance of regimens so far


Status: Acute   Code(s): Z85.79 - PRSNL HX OF MALIG NEOPLM OF LYMPHOID, 

HEMATPOETC & REL TISS   SNOMED Code(s): 252764154

## 2019-03-27 ENCOUNTER — HOSPITAL ENCOUNTER (OUTPATIENT)
Dept: HOSPITAL 47 - RADXRMAIN | Age: 64
End: 2019-03-27
Attending: INTERNAL MEDICINE
Payer: MEDICARE

## 2019-03-27 DIAGNOSIS — D80.1: ICD-10-CM

## 2019-03-27 DIAGNOSIS — J90: Primary | ICD-10-CM

## 2019-03-27 DIAGNOSIS — J98.11: ICD-10-CM

## 2019-03-27 DIAGNOSIS — I50.9: ICD-10-CM

## 2019-03-27 DIAGNOSIS — C83.33: ICD-10-CM

## 2019-03-27 DIAGNOSIS — C83.39: ICD-10-CM

## 2019-03-27 PROCEDURE — 71046 X-RAY EXAM CHEST 2 VIEWS: CPT

## 2019-03-27 NOTE — XR
EXAMINATION TYPE: XR chest 2V

 

DATE OF EXAM: 3/27/2019

 

COMPARISON: 3/22/2019

 

HISTORY: 63 year-old female shortness of breath, C83.33, C83.39, D80.1, I50.9

 

 

TECHNIQUE:  AP and lateral views

 

FINDINGS:  

Right anterior chest wall injection port with catheter tip at the upper to mid SVC. Heart is upper li
mits of normal in size. Mild diffuse interstitial prominence. Continued small bilateral pleural effus
ions with adjacent bibasilar opacity. Suspect a calcified granuloma in the left midlung. Overall find
ings are relatively stable.

 

 

IMPRESSION:  

Borderline heart size, mild interstitial changes, and continued small pleural effusions with adjacent
 atelectasis and/or consolidation. Correlate for possible mild CHF as an etiology.

## 2019-04-08 ENCOUNTER — HOSPITAL ENCOUNTER (INPATIENT)
Dept: HOSPITAL 47 - EC | Age: 64
LOS: 7 days | Discharge: HOSPICE | DRG: 840 | End: 2019-04-15
Attending: FAMILY MEDICINE | Admitting: FAMILY MEDICINE
Payer: MEDICARE

## 2019-04-08 VITALS — BODY MASS INDEX: 28 KG/M2

## 2019-04-08 DIAGNOSIS — E89.0: ICD-10-CM

## 2019-04-08 DIAGNOSIS — Z90.710: ICD-10-CM

## 2019-04-08 DIAGNOSIS — Z79.84: ICD-10-CM

## 2019-04-08 DIAGNOSIS — E78.5: ICD-10-CM

## 2019-04-08 DIAGNOSIS — Z87.828: ICD-10-CM

## 2019-04-08 DIAGNOSIS — Z51.5: ICD-10-CM

## 2019-04-08 DIAGNOSIS — Z90.49: ICD-10-CM

## 2019-04-08 DIAGNOSIS — Z79.899: ICD-10-CM

## 2019-04-08 DIAGNOSIS — Z88.9: ICD-10-CM

## 2019-04-08 DIAGNOSIS — E86.0: ICD-10-CM

## 2019-04-08 DIAGNOSIS — E83.52: ICD-10-CM

## 2019-04-08 DIAGNOSIS — Z79.890: ICD-10-CM

## 2019-04-08 DIAGNOSIS — Z86.73: ICD-10-CM

## 2019-04-08 DIAGNOSIS — Z90.89: ICD-10-CM

## 2019-04-08 DIAGNOSIS — Z88.1: ICD-10-CM

## 2019-04-08 DIAGNOSIS — T45.1X5A: ICD-10-CM

## 2019-04-08 DIAGNOSIS — D64.81: ICD-10-CM

## 2019-04-08 DIAGNOSIS — C85.10: Primary | ICD-10-CM

## 2019-04-08 DIAGNOSIS — Z91.041: ICD-10-CM

## 2019-04-08 DIAGNOSIS — R10.9: ICD-10-CM

## 2019-04-08 DIAGNOSIS — G93.41: ICD-10-CM

## 2019-04-08 DIAGNOSIS — Z80.0: ICD-10-CM

## 2019-04-08 DIAGNOSIS — R50.81: ICD-10-CM

## 2019-04-08 DIAGNOSIS — F41.9: ICD-10-CM

## 2019-04-08 DIAGNOSIS — Z80.8: ICD-10-CM
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
## 2019-04-08 DIAGNOSIS — E44.0: ICD-10-CM

## 2019-04-08 DIAGNOSIS — I25.2: ICD-10-CM

## 2019-04-08 DIAGNOSIS — I11.0: ICD-10-CM

## 2019-04-08 DIAGNOSIS — D63.0: ICD-10-CM

## 2019-04-08 DIAGNOSIS — Z87.442: ICD-10-CM

## 2019-04-08 DIAGNOSIS — E11.649: ICD-10-CM

## 2019-04-08 DIAGNOSIS — E83.42: ICD-10-CM

## 2019-04-08 DIAGNOSIS — I49.3: ICD-10-CM

## 2019-04-08 DIAGNOSIS — R26.9: ICD-10-CM

## 2019-04-08 DIAGNOSIS — G47.33: ICD-10-CM

## 2019-04-08 DIAGNOSIS — Z80.3: ICD-10-CM

## 2019-04-08 DIAGNOSIS — I50.21: ICD-10-CM

## 2019-04-08 DIAGNOSIS — I42.9: ICD-10-CM

## 2019-04-08 LAB
ALBUMIN SERPL-MCNC: 2.7 G/DL (ref 3.5–5)
ALP SERPL-CCNC: 558 U/L (ref 38–126)
ALT SERPL-CCNC: 34 U/L (ref 9–52)
ANION GAP SERPL CALC-SCNC: 10 MMOL/L
APTT BLD: 26.6 SEC (ref 22–30)
AST SERPL-CCNC: 30 U/L (ref 14–36)
BASOPHILS # BLD AUTO: 0 K/UL (ref 0–0.2)
BASOPHILS NFR BLD AUTO: 0 %
BUN SERPL-SCNC: 13 MG/DL (ref 7–17)
CALCIUM SPEC-MCNC: 10.3 MG/DL (ref 8.4–10.2)
CHLORIDE SERPL-SCNC: 97 MMOL/L (ref 98–107)
CO2 SERPL-SCNC: 27 MMOL/L (ref 22–30)
EOSINOPHIL # BLD AUTO: 0.2 K/UL (ref 0–0.7)
EOSINOPHIL NFR BLD AUTO: 1 %
ERYTHROCYTE [DISTWIDTH] IN BLOOD BY AUTOMATED COUNT: 5.14 M/UL (ref 3.8–5.4)
ERYTHROCYTE [DISTWIDTH] IN BLOOD: 16.9 % (ref 11.5–15.5)
GLUCOSE BLD-MCNC: 367 MG/DL (ref 75–99)
GLUCOSE SERPL-MCNC: 366 MG/DL (ref 74–99)
GLUCOSE UR QL: (no result)
HCT VFR BLD AUTO: 40.6 % (ref 34–46)
HGB BLD-MCNC: 12.2 GM/DL (ref 11.4–16)
INR PPP: 1 (ref ?–1.2)
LYMPHOCYTES # SPEC AUTO: 0.2 K/UL (ref 1–4.8)
LYMPHOCYTES NFR SPEC AUTO: 1 %
MCH RBC QN AUTO: 23.8 PG (ref 25–35)
MCHC RBC AUTO-ENTMCNC: 30.1 G/DL (ref 31–37)
MCV RBC AUTO: 79 FL (ref 80–100)
MONOCYTES # BLD AUTO: 0.9 K/UL (ref 0–1)
MONOCYTES NFR BLD AUTO: 4 %
NEUTROPHILS # BLD AUTO: 19.8 K/UL (ref 1.3–7.7)
NEUTROPHILS NFR BLD AUTO: 93 %
PH UR: 6 [PH] (ref 5–8)
PLATELET # BLD AUTO: 450 K/UL (ref 150–450)
POTASSIUM SERPL-SCNC: 4.5 MMOL/L (ref 3.5–5.1)
PROT SERPL-MCNC: 5.4 G/DL (ref 6.3–8.2)
PROT UR QL: (no result)
PT BLD: 10.9 SEC (ref 9–12)
RBC UR QL: <1 /HPF (ref 0–5)
SODIUM SERPL-SCNC: 134 MMOL/L (ref 137–145)
SP GR UR: 1.03 (ref 1–1.03)
SQUAMOUS UR QL AUTO: 1 /HPF (ref 0–4)
UROBILINOGEN UR QL STRIP: 4 MG/DL (ref ?–2)
WBC # BLD AUTO: 21.3 K/UL (ref 3.8–10.6)
WBC #/AREA URNS HPF: 7 /HPF (ref 0–5)

## 2019-04-08 PROCEDURE — 49180 BIOPSY ABDOMINAL MASS: CPT

## 2019-04-08 PROCEDURE — 83605 ASSAY OF LACTIC ACID: CPT

## 2019-04-08 PROCEDURE — 83921 ORGANIC ACID SINGLE QUANT: CPT

## 2019-04-08 PROCEDURE — 85610 PROTHROMBIN TIME: CPT

## 2019-04-08 PROCEDURE — 87040 BLOOD CULTURE FOR BACTERIA: CPT

## 2019-04-08 PROCEDURE — 86900 BLOOD TYPING SEROLOGIC ABO: CPT

## 2019-04-08 PROCEDURE — 83735 ASSAY OF MAGNESIUM: CPT

## 2019-04-08 PROCEDURE — 96361 HYDRATE IV INFUSION ADD-ON: CPT

## 2019-04-08 PROCEDURE — 82607 VITAMIN B-12: CPT

## 2019-04-08 PROCEDURE — 94760 N-INVAS EAR/PLS OXIMETRY 1: CPT

## 2019-04-08 PROCEDURE — 93005 ELECTROCARDIOGRAM TRACING: CPT

## 2019-04-08 PROCEDURE — 96366 THER/PROPH/DIAG IV INF ADDON: CPT

## 2019-04-08 PROCEDURE — 86901 BLOOD TYPING SEROLOGIC RH(D): CPT

## 2019-04-08 PROCEDURE — 80048 BASIC METABOLIC PNL TOTAL CA: CPT

## 2019-04-08 PROCEDURE — 96365 THER/PROPH/DIAG IV INF INIT: CPT

## 2019-04-08 PROCEDURE — 93306 TTE W/DOPPLER COMPLETE: CPT

## 2019-04-08 PROCEDURE — 88341 IMHCHEM/IMCYTCHM EA ADD ANTB: CPT

## 2019-04-08 PROCEDURE — 83615 LACTATE (LD) (LDH) ENZYME: CPT

## 2019-04-08 PROCEDURE — 80202 ASSAY OF VANCOMYCIN: CPT

## 2019-04-08 PROCEDURE — 96368 THER/DIAG CONCURRENT INF: CPT

## 2019-04-08 PROCEDURE — 80053 COMPREHEN METABOLIC PANEL: CPT

## 2019-04-08 PROCEDURE — 87502 INFLUENZA DNA AMP PROBE: CPT

## 2019-04-08 PROCEDURE — 84550 ASSAY OF BLOOD/URIC ACID: CPT

## 2019-04-08 PROCEDURE — 82330 ASSAY OF CALCIUM: CPT

## 2019-04-08 PROCEDURE — 84100 ASSAY OF PHOSPHORUS: CPT

## 2019-04-08 PROCEDURE — 99284 EMERGENCY DEPT VISIT MOD MDM: CPT

## 2019-04-08 PROCEDURE — 96375 TX/PRO/DX INJ NEW DRUG ADDON: CPT

## 2019-04-08 PROCEDURE — 87086 URINE CULTURE/COLONY COUNT: CPT

## 2019-04-08 PROCEDURE — 82784 ASSAY IGA/IGD/IGG/IGM EACH: CPT

## 2019-04-08 PROCEDURE — 36415 COLL VENOUS BLD VENIPUNCTURE: CPT

## 2019-04-08 PROCEDURE — 71046 X-RAY EXAM CHEST 2 VIEWS: CPT

## 2019-04-08 PROCEDURE — 88342 IMHCHEM/IMCYTCHM 1ST ANTB: CPT

## 2019-04-08 PROCEDURE — 85730 THROMBOPLASTIN TIME PARTIAL: CPT

## 2019-04-08 PROCEDURE — 85025 COMPLETE CBC W/AUTO DIFF WBC: CPT

## 2019-04-08 PROCEDURE — 77012 CT SCAN FOR NEEDLE BIOPSY: CPT

## 2019-04-08 PROCEDURE — 88305 TISSUE EXAM BY PATHOLOGIST: CPT

## 2019-04-08 PROCEDURE — 83880 ASSAY OF NATRIURETIC PEPTIDE: CPT

## 2019-04-08 PROCEDURE — 86850 RBC ANTIBODY SCREEN: CPT

## 2019-04-08 PROCEDURE — 81001 URINALYSIS AUTO W/SCOPE: CPT

## 2019-04-08 NOTE — ED
Fever HPI





- General


Chief Complaint: Fever


Stated Complaint: fever/dehydration


Time Seen by Provider: 04/08/19 17:29


Source: patient


Mode of arrival: ambulatory


Limitations: no limitations





- History of Present Illness


Initial Comments: 


63-year-old female presenting today for chief complaint of fever, pallor.  

Patient is recently diagnosed one year prior with non-Hodgkin's lymphoma.  

Patient has port in place.  Patient has been on chemotherapy with last dose 13 

weeks prior administered by Dr. Dumont.  For the past week patient has had a low-

grade fever at night as well as night sweats.  Patient has a home care nurse who

states throughout the day patient has had low-grade temperature as well as 

slight cough.  Earlier patient was complaining of some shortness of breath.  

Patient denies any current source of breath states it was temporary and subside.

 Patient denies chest pain or hemoptysis.  Patient states at times both her legs

swell.  Patient states she did outpatient labs last week with a white count of 

20.4.  When home care nurse left patient's home today she recommended 

presentation to the emergency department she states she possibly heard crackles 

on lung exam concerning for pneumonia, she also stated patient appeared 

dehydrated.  Patient states she had one episode of emesis today denies 

hematemesis.  Patient denies abdominal pain diarrhea.  She states last week she 

had a transfusion including one unit of blood for anemia.  Remaining review of 

systems negative, patient denies any recent chest pain, back pain, abdominal 

pain, nausea, numbness or tingling, dysuria or hematuria, constipation or 

diarrhea, headaches or visual changes, or any other complaints. Last BM this 

morning normal, denies melena or hematochezia.








- Related Data


                                Home Medications











 Medication  Instructions  Recorded  Confirmed


 


Furosemide 20 mg PO DAILY 07/24/14 04/08/19


 


Gabapentin 100 mg PO QAM 07/24/14 04/08/19


 


Gabapentin [Neurontin] 200 mg PO HS 10/09/15 04/08/19


 


Levothyroxine Sodium [Synthroid] 125 mcg PO [id number] 10/09/15 04/08/19


 


Multivitamins, Thera [Multivitamin 1 tab PO DAILY 03/28/18 04/08/19





(formulary)]   


 


Atorvastatin Calcium [Lipitor] 40 mg PO HS 04/16/18 04/08/19


 


Citalopram Hydrobromide [CeleXA] 20 mg PO HS 08/26/18 04/08/19


 


Cetirizine HCl 10 mg PO HS 02/01/19 04/08/19


 


Pantoprazole [Protonix] 40 mg PO DAILY 02/01/19 04/08/19


 


Potassium Chloride ER [K-Dur 10] 10 meq PO BID 02/01/19 04/08/19


 


HYDROcodone/APAP 7.5-325MG [Norco 1 tab PO Q6HR PRN 03/18/19 04/08/19





7.5-325]   


 


Ondansetron [Zofran] 4 mg PO Q6H PRN 03/27/19 04/08/19


 


ALPRAZolam [Xanax] 0.33 mg PO DAILY PRN 04/08/19 04/08/19








                                  Previous Rx's











 Medication  Instructions  Recorded


 


metFORMIN HCL 1,000 mg PO BID #0 10/12/18











                                    Allergies











Allergy/AdvReac Type Severity Reaction Status Date / Time


 


haloperidol [From Haldol] Allergy  Unknown Verified 04/08/19 18:46


 


haloperidol lactate Allergy  Unknown Verified 04/08/19 18:46





[From Haldol]     


 


Iodinated Contrast- Oral and Allergy  Rash/Hives Verified 04/08/19 18:46





IV Dye     





[Iodinated Contrast Media -     





IV Dye]     


 


ciprofloxacin [From Cipro] AdvReac  Heartburn Verified 04/08/19 18:46


 


steri-strips AdvReac Severe sore Uncoded 03/27/19 10:57














Review of Systems


ROS Statement: 


Those systems with pertinent positive or pertinent negative responses have been 

documented in the HPI.





ROS Other: All systems not noted in ROS Statement are negative.





Past Medical History


Past Medical History: Cancer, Heart Failure, Diabetes Mellitus, Hyperlipidemia, 

Hypertension, Sleep Apnea/CPAP/BIPAP, Thyroid Disorder


Additional Past Medical History / Comment(s): B-cell non-Hodgkin's lymphoma 

stage IV, diabetes mellitus,daughter stated pt had first chemo mon 9-24-18 hyp

ertension, hyperlipidemia, obstructive sleep apnea, hypothyroidism post 

thyroidectomy, closed head injury in 1989, difficulty with mobility and 

ambulation the patient has been using a walker. has a history of kidney stones


Last Myocardial Infarction Date:: 1989


History of Any Multi-Drug Resistant Organisms: None Reported


Past Surgical History: Cholecystectomy, Hysterectomy, Tonsillectomy


Additional Past Surgical History / Comment(s): Thyroidectomy, lymph node and 

liver biopsies. port a cath 9-10-18, right ureteroscopy with lithotripsy 11/2015


Past Anesthesia/Blood Transfusion Reactions: Postoperative Nausea & Vomiting 

(PONV)


Past Psychological History: Anxiety


Smoking Status: Never smoker


Past Alcohol Use History: None Reported


Past Drug Use History: Marijuana





- Past Family History


  ** Mother


Family Medical History: Cancer


Additional Family Medical History / Comment(s): breast CA





  ** Father


Family Medical History: Cancer


Additional Family Medical History / Comment(s): colon CA





  ** Sister(s)


Family Medical History: Cancer


Additional Family Medical History / Comment(s): Skin cancer





General Exam





- General Exam Comments


Initial Comments: 


General:  The patient is awake and alert, in no distress, pallor


Eye:  +3 mm pupils are equal, round and reactive to light, extra-ocular 

movements are intact.  No nystagmus.  There is normal conjunctiva bilaterally.  

No signs of icterus.  No photophobia


Ears, nose, mouth and throat:  There are moist mucous membranes and no oral 

lesions.  Oropharynx was not erythematous there is no tonsillar enlargement 

exudates or lesions.  Uvula midline.  Tympanic membranes are not erythematous or

is no effusions bulging or retraction.  No tenderness to palpation of the 

mastoid.  No anterior cervical lymphadenopathy.  Rhinorrhea, clear and bilateral

nares.  No tripoding, no drooling.


Neck:  The neck is supple, there is no tenderness or JVD.  No nuchal rigidity.


Cardiovascular:  There is a regular rate and rhythm. No murmur, rub or gallop is

appreciated.


Respiratory:  Lungs are clear to auscultation, respirations are non-labored, 

breath sounds are equal.  No wheezes, stridor, rales, or rhonchi.  No 

retractions or abdominal breathing.


Gastrointestinal:  Soft, non-distended, non-tender abdomen without masses or 

organomegaly noted. There is no rebound or guarding present.  Bowel sounds are 

unremarkable.


Musculoskeletal:  Normal ROM, no tenderness.  Strength 5/5. Sensation intact. 

Radial pulses equal bilaterally 2+.  


Neurological:  A&O x 3. CN II-XII intact, There are no obvious motor or sensory 

deficits. Coordination appears grossly intact. Speech appears normal, no muf

fling.


Skin:  Skin is warm and dry and no rashes or lesions are noted.  Mild b/l 

extremity edema


Psychiatric:  Cooperative 





Limitations: no limitations





Course


                                   Vital Signs











  04/08/19 04/08/19 04/08/19





  17:14 17:26 17:30


 


Temperature 99.2 F  


 


Pulse Rate 95  95


 


Respiratory 16  





Rate   


 


Blood Pressure 118/73  121/76


 


O2 Sat by Pulse 96 95 94 L





Oximetry   














  04/08/19 04/08/19 04/08/19





  17:40 17:50 18:00


 


Temperature   


 


Pulse Rate   99


 


Respiratory   





Rate   


 


Blood Pressure 123/73 118/81 118/81


 


O2 Sat by Pulse 95 94 L 94 L





Oximetry   














  04/08/19 04/08/19 04/08/19





  18:10 18:20 18:30


 


Temperature   


 


Pulse Rate 94  


 


Respiratory   





Rate   


 


Blood Pressure 118/70 120/71 120/71


 


O2 Sat by Pulse 94 L 94 L 92 L





Oximetry   














  04/08/19 04/08/19 04/08/19





  18:40 18:50 19:00


 


Temperature   


 


Pulse Rate 98  


 


Respiratory   





Rate   


 


Blood Pressure 120/57 123/74 123/74


 


O2 Sat by Pulse 93 L 93 L 





Oximetry   














  04/08/19 04/08/19 04/08/19





  19:10 19:20 19:30


 


Temperature   


 


Pulse Rate   


 


Respiratory   





Rate   


 


Blood Pressure 123/74 127/76 127/76


 


O2 Sat by Pulse 97 98 98





Oximetry   














  04/08/19 04/08/19 04/08/19





  19:40 19:50 20:00


 


Temperature   


 


Pulse Rate  99 


 


Respiratory   





Rate   


 


Blood Pressure 131/104 121/72 121/72


 


O2 Sat by Pulse 97 95 95





Oximetry   














  04/08/19 04/08/19 04/08/19





  20:10 20:20 20:30


 


Temperature   


 


Pulse Rate 99  


 


Respiratory   





Rate   


 


Blood Pressure 115/70 115/70 115/70


 


O2 Sat by Pulse 95 95 94 L





Oximetry   














  04/08/19 04/08/19 04/08/19





  20:40 20:50 21:00


 


Temperature   


 


Pulse Rate   


 


Respiratory   





Rate   


 


Blood Pressure 116/76 180/141 180/141


 


O2 Sat by Pulse 96  





Oximetry   














  04/08/19 04/08/19 04/08/19





  21:09 21:10 21:20


 


Temperature 102.3 F H  


 


Pulse Rate 108 H 108 H 


 


Respiratory 16  





Rate   


 


Blood Pressure 124/90 124/90 122/80


 


O2 Sat by Pulse 93 L 93 L 94 L





Oximetry   














  04/08/19 04/08/19 04/08/19





  21:30 21:40 22:58


 


Temperature   99.2 F


 


Pulse Rate   95


 


Respiratory   16





Rate   


 


Blood Pressure 122/80 120/79 107/61


 


O2 Sat by Pulse 94 L 95 99





Oximetry   














Medical Decision Making





- Medical Decision Making


63-year-old feel presented for generalized weakness, fever at home cough.  Lungs

clear to auscultation.  Chest x-ray revealed pleural effusions unchanged from 

previous x-ray.  EKG revealed sinus rhythm with frequent PVCs, left axis 

deviation.  No ST elevation or depression.  Nonspecific T-wave.  Lactic acid 

within normal limits.  Elevated white blood cell count.  At this time is unclear

the etiology of fever at home. Pt afebrile in the emergency department.  

Urinalysis is pending straight catheterization.  At this time I'll sign out 

patient to attending provider Dr. Lindsay to my current recommendations and 

disposition is admission, for IV fluids.  Family would prefer and agreeable with

admission.  Patient is given IV fluids in the emergency department.








- Lab Data


Result diagrams: 


                                 04/08/19 18:00





                                 04/08/19 18:00


                                   Lab Results











  04/08/19 04/08/19 04/08/19 Range/Units





  18:00 18:00 18:00 


 


WBC   21.3 H   (3.8-10.6)  k/uL


 


RBC   5.14   (3.80-5.40)  m/uL


 


Hgb   12.2  D   (11.4-16.0)  gm/dL


 


Hct   40.6   (34.0-46.0)  %


 


MCV   79.0 L   (80.0-100.0)  fL


 


MCH   23.8 L   (25.0-35.0)  pg


 


MCHC   30.1 L   (31.0-37.0)  g/dL


 


RDW   16.9 H   (11.5-15.5)  %


 


Plt Count   450   (150-450)  k/uL


 


Neutrophils %   93   %


 


Lymphocytes %   1   %


 


Monocytes %   4   %


 


Eosinophils %   1   %


 


Basophils %   0   %


 


Neutrophils #   19.8 H   (1.3-7.7)  k/uL


 


Lymphocytes #   0.2 L   (1.0-4.8)  k/uL


 


Monocytes #   0.9   (0-1.0)  k/uL


 


Eosinophils #   0.2   (0-0.7)  k/uL


 


Basophils #   0.0   (0-0.2)  k/uL


 


Hypochromasia   Marked   


 


Poikilocytosis   Slight   


 


Anisocytosis   Slight   


 


Microcytosis   Slight   


 


PT     (9.0-12.0)  sec


 


INR     (<1.2)  


 


APTT     (22.0-30.0)  sec


 


Sodium    134 L  (137-145)  mmol/L


 


Potassium    4.5  (3.5-5.1)  mmol/L


 


Chloride    97 L  ()  mmol/L


 


Carbon Dioxide    27  (22-30)  mmol/L


 


Anion Gap    10  mmol/L


 


BUN    13  (7-17)  mg/dL


 


Creatinine    0.66  (0.52-1.04)  mg/dL


 


Est GFR (CKD-EPI)AfAm    >90  (>60 ml/min/1.73 sqM)  


 


Est GFR (CKD-EPI)NonAf    >90  (>60 ml/min/1.73 sqM)  


 


Glucose    366 H  (74-99)  mg/dL


 


POC Glucose (mg/dL)     (75-99)  mg/dL


 


POC Glu Operater ID     


 


Plasma Lactic Acid Artem     (0.7-2.0)  mmol/L


 


Calcium    10.3 H  (8.4-10.2)  mg/dL


 


Total Bilirubin    0.7  (0.2-1.3)  mg/dL


 


AST    30  (14-36)  U/L


 


ALT    34  (9-52)  U/L


 


Alkaline Phosphatase    558 H  ()  U/L


 


NT-Pro-B Natriuret Pep     pg/mL


 


Total Protein    5.4 L  (6.3-8.2)  g/dL


 


Albumin    2.7 L  (3.5-5.0)  g/dL


 


Urine Color     


 


Urine Appearance     (Clear)  


 


Urine pH     (5.0-8.0)  


 


Ur Specific Gravity     (1.001-1.035)  


 


Urine Protein     (Negative)  


 


Urine Glucose (UA)     (Negative)  


 


Urine Ketones     (Negative)  


 


Urine Blood     (Negative)  


 


Urine Nitrite     (Negative)  


 


Urine Bilirubin     (Negative)  


 


Urine Urobilinogen     (<2.0)  mg/dL


 


Ur Leukocyte Esterase     (Negative)  


 


Urine RBC     (0-5)  /hpf


 


Urine WBC     (0-5)  /hpf


 


Ur Squamous Epith Cells     (0-4)  /hpf


 


Amorphous Sediment     (None)  /hpf


 


Urine Mucus     (None)  /hpf


 


Influenza Type A RNA     (Not Detectd)  


 


Influenza Type B (PCR)     (Not Detectd)  


 


Blood Type  O Positive    


 


Blood Type Recheck  No    


 


Antibody Screen  NEGATIVE    


 


Spec Expiration Date  04/11/2019 - 2300    














  04/08/19 04/08/19 04/08/19 Range/Units





  18:00 18:00 18:00 


 


WBC     (3.8-10.6)  k/uL


 


RBC     (3.80-5.40)  m/uL


 


Hgb     (11.4-16.0)  gm/dL


 


Hct     (34.0-46.0)  %


 


MCV     (80.0-100.0)  fL


 


MCH     (25.0-35.0)  pg


 


MCHC     (31.0-37.0)  g/dL


 


RDW     (11.5-15.5)  %


 


Plt Count     (150-450)  k/uL


 


Neutrophils %     %


 


Lymphocytes %     %


 


Monocytes %     %


 


Eosinophils %     %


 


Basophils %     %


 


Neutrophils #     (1.3-7.7)  k/uL


 


Lymphocytes #     (1.0-4.8)  k/uL


 


Monocytes #     (0-1.0)  k/uL


 


Eosinophils #     (0-0.7)  k/uL


 


Basophils #     (0-0.2)  k/uL


 


Hypochromasia     


 


Poikilocytosis     


 


Anisocytosis     


 


Microcytosis     


 


PT   10.9   (9.0-12.0)  sec


 


INR   1.0   (<1.2)  


 


APTT   26.6   (22.0-30.0)  sec


 


Sodium     (137-145)  mmol/L


 


Potassium     (3.5-5.1)  mmol/L


 


Chloride     ()  mmol/L


 


Carbon Dioxide     (22-30)  mmol/L


 


Anion Gap     mmol/L


 


BUN     (7-17)  mg/dL


 


Creatinine     (0.52-1.04)  mg/dL


 


Est GFR (CKD-EPI)AfAm     (>60 ml/min/1.73 sqM)  


 


Est GFR (CKD-EPI)NonAf     (>60 ml/min/1.73 sqM)  


 


Glucose     (74-99)  mg/dL


 


POC Glucose (mg/dL)     (75-99)  mg/dL


 


POC Glu Operater ID     


 


Plasma Lactic Acid Artem  1.8    (0.7-2.0)  mmol/L


 


Calcium     (8.4-10.2)  mg/dL


 


Total Bilirubin     (0.2-1.3)  mg/dL


 


AST     (14-36)  U/L


 


ALT     (9-52)  U/L


 


Alkaline Phosphatase     ()  U/L


 


NT-Pro-B Natriuret Pep     pg/mL


 


Total Protein     (6.3-8.2)  g/dL


 


Albumin     (3.5-5.0)  g/dL


 


Urine Color     


 


Urine Appearance     (Clear)  


 


Urine pH     (5.0-8.0)  


 


Ur Specific Gravity     (1.001-1.035)  


 


Urine Protein     (Negative)  


 


Urine Glucose (UA)     (Negative)  


 


Urine Ketones     (Negative)  


 


Urine Blood     (Negative)  


 


Urine Nitrite     (Negative)  


 


Urine Bilirubin     (Negative)  


 


Urine Urobilinogen     (<2.0)  mg/dL


 


Ur Leukocyte Esterase     (Negative)  


 


Urine RBC     (0-5)  /hpf


 


Urine WBC     (0-5)  /hpf


 


Ur Squamous Epith Cells     (0-4)  /hpf


 


Amorphous Sediment     (None)  /hpf


 


Urine Mucus     (None)  /hpf


 


Influenza Type A RNA    Not Detected  (Not Detectd)  


 


Influenza Type B (PCR)    Not Detected  (Not Detectd)  


 


Blood Type     


 


Blood Type Recheck     


 


Antibody Screen     


 


Spec Expiration Date     














  04/08/19 04/08/19 04/08/19 Range/Units





  18:00 19:29 21:08 


 


WBC     (3.8-10.6)  k/uL


 


RBC     (3.80-5.40)  m/uL


 


Hgb     (11.4-16.0)  gm/dL


 


Hct     (34.0-46.0)  %


 


MCV     (80.0-100.0)  fL


 


MCH     (25.0-35.0)  pg


 


MCHC     (31.0-37.0)  g/dL


 


RDW     (11.5-15.5)  %


 


Plt Count     (150-450)  k/uL


 


Neutrophils %     %


 


Lymphocytes %     %


 


Monocytes %     %


 


Eosinophils %     %


 


Basophils %     %


 


Neutrophils #     (1.3-7.7)  k/uL


 


Lymphocytes #     (1.0-4.8)  k/uL


 


Monocytes #     (0-1.0)  k/uL


 


Eosinophils #     (0-0.7)  k/uL


 


Basophils #     (0-0.2)  k/uL


 


Hypochromasia     


 


Poikilocytosis     


 


Anisocytosis     


 


Microcytosis     


 


PT     (9.0-12.0)  sec


 


INR     (<1.2)  


 


APTT     (22.0-30.0)  sec


 


Sodium     (137-145)  mmol/L


 


Potassium     (3.5-5.1)  mmol/L


 


Chloride     ()  mmol/L


 


Carbon Dioxide     (22-30)  mmol/L


 


Anion Gap     mmol/L


 


BUN     (7-17)  mg/dL


 


Creatinine     (0.52-1.04)  mg/dL


 


Est GFR (CKD-EPI)AfAm     (>60 ml/min/1.73 sqM)  


 


Est GFR (CKD-EPI)NonAf     (>60 ml/min/1.73 sqM)  


 


Glucose     (74-99)  mg/dL


 


POC Glucose (mg/dL)   367 H   (75-99)  mg/dL


 


POC Glu Operater ID   Sheridan Ghotra A   


 


Plasma Lactic Acid Artem     (0.7-2.0)  mmol/L


 


Calcium     (8.4-10.2)  mg/dL


 


Total Bilirubin     (0.2-1.3)  mg/dL


 


AST     (14-36)  U/L


 


ALT     (9-52)  U/L


 


Alkaline Phosphatase     ()  U/L


 


NT-Pro-B Natriuret Pep  4720    pg/mL


 


Total Protein     (6.3-8.2)  g/dL


 


Albumin     (3.5-5.0)  g/dL


 


Urine Color    Yellow  


 


Urine Appearance    Cloudy H  (Clear)  


 


Urine pH    6.0  (5.0-8.0)  


 


Ur Specific Gravity    1.030  (1.001-1.035)  


 


Urine Protein    1+ H  (Negative)  


 


Urine Glucose (UA)    3+ H  (Negative)  


 


Urine Ketones    Negative  (Negative)  


 


Urine Blood    Negative  (Negative)  


 


Urine Nitrite    Negative  (Negative)  


 


Urine Bilirubin    Negative  (Negative)  


 


Urine Urobilinogen    4.0  (<2.0)  mg/dL


 


Ur Leukocyte Esterase    Negative  (Negative)  


 


Urine RBC    <1  (0-5)  /hpf


 


Urine WBC    7 H  (0-5)  /hpf


 


Ur Squamous Epith Cells    1  (0-4)  /hpf


 


Amorphous Sediment    Occasional H  (None)  /hpf


 


Urine Mucus    Occasional H  (None)  /hpf


 


Influenza Type A RNA     (Not Detectd)  


 


Influenza Type B (PCR)     (Not Detectd)  


 


Blood Type     


 


Blood Type Recheck     


 


Antibody Screen     


 


Spec Expiration Date     














- EKG Data


EKG Comments: 


Ventricular rate 92 bpm, AL interval 166 ms, to restoration 88 ms, QT/QTC 

374/462 most seconds.  Sinus rhythm with frequent PVCs.  Left axis deviation.  

Low voltage QRS.  Nonspecific T-wave.  No ST elevation or depression.  EKG was 

reviewed by myself.  EKG was reviewed by attending provider Dr. Lindsay.








Disposition


Clinical Impression: 


 Dehydration, Generalized weakness, Cough





Disposition: ADMITTED AS IP TO THIS HOSP


Condition: Stable


Time of Disposition: 10:44


Decision to Admit Reason: Admit from EC


Decision Date: 04/08/19


Decision Time: 20:30

## 2019-04-08 NOTE — XR
EXAMINATION TYPE: XR chest 2V

 

DATE OF EXAM: 4/8/2019

 

COMPARISON: 3/27/2019

 

HISTORY: Fever

 

TECHNIQUE:  Frontal and lateral views of the chest are obtained.

 

FINDINGS:  Heart is enlarged. There is mild pulmonary vascular congestion. There is blunting of costo
phrenic angles. There is right central venous catheter with the tip in the superior vena cava. There 
is no pneumothorax. Bony thorax is intact.

 

IMPRESSION:  Congestive heart failure with pleural effusions. No significant change compared to last 
exam.

## 2019-04-09 LAB
ALBUMIN SERPL-MCNC: 2.8 G/DL (ref 3.5–5)
ALP SERPL-CCNC: 519 U/L (ref 38–126)
ALT SERPL-CCNC: 34 U/L (ref 9–52)
ANION GAP SERPL CALC-SCNC: 10 MMOL/L
AST SERPL-CCNC: 27 U/L (ref 14–36)
BASOPHILS # BLD AUTO: 0 K/UL (ref 0–0.2)
BASOPHILS NFR BLD AUTO: 0 %
BUN SERPL-SCNC: 14 MG/DL (ref 7–17)
CALCIUM SPEC-MCNC: 10.3 MG/DL (ref 8.4–10.2)
CHLORIDE SERPL-SCNC: 103 MMOL/L (ref 98–107)
CO2 SERPL-SCNC: 25 MMOL/L (ref 22–30)
EOSINOPHIL # BLD AUTO: 0.1 K/UL (ref 0–0.7)
EOSINOPHIL NFR BLD AUTO: 0 %
ERYTHROCYTE [DISTWIDTH] IN BLOOD BY AUTOMATED COUNT: 3.82 M/UL (ref 3.8–5.4)
ERYTHROCYTE [DISTWIDTH] IN BLOOD: 17.3 % (ref 11.5–15.5)
GLUCOSE BLD-MCNC: 256 MG/DL (ref 75–99)
GLUCOSE BLD-MCNC: 291 MG/DL (ref 75–99)
GLUCOSE BLD-MCNC: 303 MG/DL (ref 75–99)
GLUCOSE BLD-MCNC: 341 MG/DL (ref 75–99)
GLUCOSE SERPL-MCNC: 290 MG/DL (ref 74–99)
HCT VFR BLD AUTO: 31 % (ref 34–46)
HGB BLD-MCNC: 8.8 GM/DL (ref 11.4–16)
LDH SPEC-CCNC: 533 U/L (ref 313–618)
LYMPHOCYTES # SPEC AUTO: 0.4 K/UL (ref 1–4.8)
LYMPHOCYTES NFR SPEC AUTO: 1 %
MCH RBC QN AUTO: 23 PG (ref 25–35)
MCHC RBC AUTO-ENTMCNC: 28.4 G/DL (ref 31–37)
MCV RBC AUTO: 81.1 FL (ref 80–100)
MONOCYTES # BLD AUTO: 1.3 K/UL (ref 0–1)
MONOCYTES NFR BLD AUTO: 3 %
NEUTROPHILS # BLD AUTO: 35.5 K/UL (ref 1.3–7.7)
NEUTROPHILS NFR BLD AUTO: 94 %
PLATELET # BLD AUTO: 541 K/UL (ref 150–450)
POTASSIUM SERPL-SCNC: 4.6 MMOL/L (ref 3.5–5.1)
PROT SERPL-MCNC: 5.8 G/DL (ref 6.3–8.2)
SODIUM SERPL-SCNC: 138 MMOL/L (ref 137–145)
URATE SERPL-MCNC: 4.1 MG/DL (ref 3.7–7.4)
WBC # BLD AUTO: 37.7 K/UL (ref 3.8–10.6)

## 2019-04-09 RX ADMIN — CEFAZOLIN SCH MLS/HR: 330 INJECTION, POWDER, FOR SOLUTION INTRAMUSCULAR; INTRAVENOUS at 03:45

## 2019-04-09 RX ADMIN — PIPERACILLIN AND TAZOBACTAM SCH MLS/HR: 3; .375 INJECTION, POWDER, FOR SOLUTION INTRAVENOUS at 21:12

## 2019-04-09 RX ADMIN — SODIUM CHLORIDE SCH MLS/HR: 9 INJECTION, SOLUTION INTRAVENOUS at 08:08

## 2019-04-09 RX ADMIN — POTASSIUM CHLORIDE SCH: 750 TABLET, EXTENDED RELEASE ORAL at 22:36

## 2019-04-09 RX ADMIN — GABAPENTIN SCH MG: 100 CAPSULE ORAL at 22:22

## 2019-04-09 RX ADMIN — CITALOPRAM HYDROBROMIDE SCH MG: 20 TABLET ORAL at 22:23

## 2019-04-09 RX ADMIN — SODIUM CHLORIDE SCH MLS/HR: 9 INJECTION, SOLUTION INTRAVENOUS at 22:22

## 2019-04-09 RX ADMIN — ATORVASTATIN CALCIUM SCH MG: 40 TABLET, FILM COATED ORAL at 22:22

## 2019-04-09 RX ADMIN — LORATADINE SCH MG: 10 TABLET ORAL at 22:23

## 2019-04-09 RX ADMIN — PIPERACILLIN AND TAZOBACTAM SCH MLS/HR: 3; .375 INJECTION, POWDER, FOR SOLUTION INTRAVENOUS at 03:35

## 2019-04-09 RX ADMIN — LEVOTHYROXINE SODIUM SCH MCG: 0.12 TABLET ORAL at 11:06

## 2019-04-09 RX ADMIN — INSULIN ASPART SCH UNIT: 100 INJECTION, SOLUTION INTRAVENOUS; SUBCUTANEOUS at 17:49

## 2019-04-09 RX ADMIN — INSULIN ASPART SCH UNIT: 100 INJECTION, SOLUTION INTRAVENOUS; SUBCUTANEOUS at 08:13

## 2019-04-09 RX ADMIN — HYDROCODONE BITARTRATE AND ACETAMINOPHEN PRN EACH: 7.5; 325 TABLET ORAL at 19:44

## 2019-04-09 RX ADMIN — INSULIN ASPART SCH UNIT: 100 INJECTION, SOLUTION INTRAVENOUS; SUBCUTANEOUS at 22:21

## 2019-04-09 RX ADMIN — PIPERACILLIN AND TAZOBACTAM SCH MLS/HR: 3; .375 INJECTION, POWDER, FOR SOLUTION INTRAVENOUS at 11:06

## 2019-04-09 RX ADMIN — HYDROCODONE BITARTRATE AND ACETAMINOPHEN PRN EACH: 7.5; 325 TABLET ORAL at 11:35

## 2019-04-09 RX ADMIN — INSULIN ASPART SCH UNIT: 100 INJECTION, SOLUTION INTRAVENOUS; SUBCUTANEOUS at 12:16

## 2019-04-09 RX ADMIN — CEFAZOLIN SCH MLS/HR: 330 INJECTION, POWDER, FOR SOLUTION INTRAMUSCULAR; INTRAVENOUS at 21:13

## 2019-04-09 RX ADMIN — INSULIN DETEMIR SCH UNIT: 100 INJECTION, SOLUTION SUBCUTANEOUS at 22:21

## 2019-04-09 NOTE — P.HPIM
History of Present Illness





63-year-old female presented to the emergency room with complaints of shortness 

of breath for 2 days fatigue.  Complains of some mental confusion.  States she 

has poor appetite.  Patient has history of B-cell non-Hodgkin's lymphoma





Review of Systems


Constitutional: Reports fatigue, Reports fever


Respiratory: Reports cough, Reports dyspnea


Gastrointestinal: Reports loss of appetite





Past Medical History


Past Medical History: Cancer, Heart Failure, Diabetes Mellitus, Hyperlipidemia, 

Hypertension, Sleep Apnea/CPAP/BIPAP, Thyroid Disorder


Additional Past Medical History / Comment(s): B-cell non-Hodgkin's lymphoma 

stage IV, diabetes mellitus,daughter stated pt had first chemo mon 9-24-18 

hypertension, hyperlipidemia, obstructive sleep apnea, hypothyroidism post 

thyroidectomy, closed head injury in 1989, difficulty with mobility and 

ambulation the patient has been using a walker. has a history of kidney stones


Last Myocardial Infarction Date:: 1989


History of Any Multi-Drug Resistant Organisms: None Reported


Past Surgical History: Cholecystectomy, Hysterectomy, Tonsillectomy


Additional Past Surgical History / Comment(s): Thyroidectomy, lymph node and 

liver biopsies. port a cath 9-10-18, right ureteroscopy with lithotripsy 11/2015


Past Anesthesia/Blood Transfusion Reactions: Postoperative Nausea & Vomiting 

(PONV)


Past Psychological History: Anxiety


Smoking Status: Never smoker


Past Alcohol Use History: None Reported


Past Drug Use History: Marijuana





- Past Family History


  ** Mother


Family Medical History: Cancer


Additional Family Medical History / Comment(s): breast CA





  ** Father


Family Medical History: Cancer


Additional Family Medical History / Comment(s): colon CA





  ** Sister(s)


Family Medical History: Cancer


Additional Family Medical History / Comment(s): Skin cancer





Medications and Allergies


                                Home Medications











 Medication  Instructions  Recorded  Confirmed  Type


 


Furosemide 20 mg PO DAILY 07/24/14 04/08/19 History


 


Gabapentin 100 mg PO QAM 07/24/14 04/08/19 History


 


Gabapentin [Neurontin] 200 mg PO HS 10/09/15 04/08/19 History


 


Levothyroxine Sodium [Synthroid] 125 mcg PO MOTUWETHFRSA 10/09/15 04/08/19 

History


 


Multivitamins, Thera [Multivitamin 1 tab PO DAILY 03/28/18 04/08/19 History





(formulary)]    


 


Atorvastatin Calcium [Lipitor] 40 mg PO HS 04/16/18 04/08/19 History


 


Citalopram Hydrobromide [CeleXA] 20 mg PO HS 08/26/18 04/08/19 History


 


metFORMIN HCL 1,000 mg PO BID #0 10/12/18 04/08/19 Rx


 


Cetirizine HCl 10 mg PO HS 02/01/19 04/08/19 History


 


Pantoprazole [Protonix] 40 mg PO DAILY 02/01/19 04/08/19 History


 


Potassium Chloride ER [K-Dur 10] 10 meq PO BID 02/01/19 04/08/19 History


 


HYDROcodone/APAP 7.5-325MG [Norco 1 tab PO Q6HR PRN 03/18/19 04/08/19 History





7.5-325]    


 


Ondansetron [Zofran] 4 mg PO Q6H PRN 03/27/19 04/08/19 History


 


ALPRAZolam [Xanax] 0.33 mg PO DAILY PRN 04/08/19 04/08/19 History








                                    Allergies











Allergy/AdvReac Type Severity Reaction Status Date / Time


 


haloperidol [From Haldol] Allergy  Unknown Verified 04/08/19 18:46


 


haloperidol lactate Allergy  Unknown Verified 04/08/19 18:46





[From Haldol]     


 


Iodinated Contrast- Oral and Allergy  Rash/Hives Verified 04/08/19 18:46





IV Dye     





[Iodinated Contrast Media -     





IV Dye]     


 


ciprofloxacin [From Cipro] AdvReac  Heartburn Verified 04/08/19 18:46


 


steri-strips AdvReac Severe sore Uncoded 03/27/19 10:57














Physical Exam


Vitals: 


                                   Vital Signs











  Temp Pulse Pulse Resp BP BP Pulse Ox


 


 04/09/19 11:56  98.3 F   82  17   109/70  97


 


 04/09/19 09:13        97


 


 04/09/19 05:36  97.3 F L   63  16   96/59  99


 


 04/09/19 00:00     16   


 


 04/08/19 23:41  100.0 F H   86  16   101/61  96


 


 04/08/19 22:58  99.2 F  95   16  107/61   99


 


 04/08/19 21:40      120/79   95


 


 04/08/19 21:30      122/80   94 L


 


 04/08/19 21:20      122/80   94 L


 


 04/08/19 21:10   108 H    124/90   93 L


 


 04/08/19 21:09  102.3 F H  108 H   16  124/90   93 L


 


 04/08/19 21:00      180/141  


 


 04/08/19 20:50      180/141  


 


 04/08/19 20:40      116/76   96


 


 04/08/19 20:30      115/70   94 L


 


 04/08/19 20:20      115/70   95


 


 04/08/19 20:10   99    115/70   95


 


 04/08/19 20:00      121/72   95


 


 04/08/19 19:50   99    121/72   95


 


 04/08/19 19:40      131/104   97


 


 04/08/19 19:30      127/76   98


 


 04/08/19 19:20      127/76   98


 


 04/08/19 19:10      123/74   97


 


 04/08/19 19:00      123/74  


 


 04/08/19 18:50      123/74   93 L


 


 04/08/19 18:40   98    120/57   93 L


 


 04/08/19 18:30      120/71   92 L


 


 04/08/19 18:20      120/71   94 L


 


 04/08/19 18:10   94    118/70   94 L


 


 04/08/19 18:00   99    118/81   94 L


 


 04/08/19 17:50      118/81   94 L


 


 04/08/19 17:40      123/73   95


 


 04/08/19 17:30   95    121/76   94 L


 


 04/08/19 17:26        95


 


 04/08/19 17:14  99.2 F  95   16  118/73   96








                                Intake and Output











 04/08/19 04/09/19 04/09/19





 22:59 06:59 14:59


 


Intake Total 1250 650 


 


Output Total 100  


 


Balance 1150 650 


 


Intake:   


 


  Amount of Fluid Infused ( 1250  





  ml)   


 


  Intake, IV Titration  650 





  Amount   


 


    Piperacillin-Tazobactam 3  100 





    .375 gm In Sodium   





    Chloride 0.9% 100 ml @ 25   





    mls/hr IVPB Q8H [facility] Rx#:   





    [id number]   


 


    Sodium Chloride 0.9% 1,  300 





    000 ml @ 50 mls/hr IV .   





    Q20H [facility] Rx#:[id number]   


 


    Vancomycin 1,250 mg In  250 





    Sodium Chloride 0.9% 250   





    ml @ 125 mls/hr IVPB ONCE   





    STA Rx#:[id number]   


 


Output:   


 


  Urine 100  


 


    Uretheral (Riley) 100  


 


Other:   


 


  Voiding Method  Incontinent Incontinent


 


  Weight 58.967 kg  58 kg














- Constitutional


General appearance: mild distress





- EENT


Eyes: PERRLA


Ears: bilateral: normal





- Neck


Neck: normal ROM





- Respiratory


Respiratory: bilateral: diminished





- Cardiovascular


Rhythm: regular





- Gastrointestinal


General gastrointestinal: soft





- Integumentary


Integumentary: pale





- Neurologic


Neurologic: CNII-XII intact





- Musculoskeletal


Musculoskeletal: generalized weakness





- Psychiatric


Psychiatric: A&O x's 3, appropriate affect, intact judgment & insight





Results


CBC & Chem 7: 


                                 04/09/19 10:45





                                 04/09/19 10:45


Labs: 


                  Abnormal Lab Results - Last 24 Hours (Table)











  04/08/19 04/08/19 04/08/19 Range/Units





  18:00 18:00 19:29 


 


WBC  21.3 H    (3.8-10.6)  k/uL


 


Hgb     (11.4-16.0)  gm/dL


 


Hct     (34.0-46.0)  %


 


MCV  79.0 L    (80.0-100.0)  fL


 


MCH  23.8 L    (25.0-35.0)  pg


 


MCHC  30.1 L    (31.0-37.0)  g/dL


 


RDW  16.9 H    (11.5-15.5)  %


 


Plt Count     (150-450)  k/uL


 


Neutrophils #  19.8 H    (1.3-7.7)  k/uL


 


Lymphocytes #  0.2 L    (1.0-4.8)  k/uL


 


Sodium   134 L   (137-145)  mmol/L


 


Chloride   97 L   ()  mmol/L


 


Glucose   366 H   (74-99)  mg/dL


 


POC Glucose (mg/dL)    367 H  (75-99)  mg/dL


 


Calcium   10.3 H   (8.4-10.2)  mg/dL


 


Alkaline Phosphatase   558 H   ()  U/L


 


Total Protein   5.4 L   (6.3-8.2)  g/dL


 


Albumin   2.7 L   (3.5-5.0)  g/dL


 


Urine Appearance     (Clear)  


 


Urine Protein     (Negative)  


 


Urine Glucose (UA)     (Negative)  


 


Urine WBC     (0-5)  /hpf


 


Amorphous Sediment     (None)  /hpf


 


Urine Mucus     (None)  /hpf














  04/08/19 04/09/19 04/09/19 Range/Units





  21:08 07:29 10:45 


 


WBC    37.7 H  (3.8-10.6)  k/uL


 


Hgb    8.8 L D  (11.4-16.0)  gm/dL


 


Hct    31.0 L  (34.0-46.0)  %


 


MCV     (80.0-100.0)  fL


 


MCH    23.0 L  (25.0-35.0)  pg


 


MCHC    28.4 L  (31.0-37.0)  g/dL


 


RDW    17.3 H  (11.5-15.5)  %


 


Plt Count    541 H  (150-450)  k/uL


 


Neutrophils #     (1.3-7.7)  k/uL


 


Lymphocytes #     (1.0-4.8)  k/uL


 


Sodium     (137-145)  mmol/L


 


Chloride     ()  mmol/L


 


Glucose     (74-99)  mg/dL


 


POC Glucose (mg/dL)   341 H   (75-99)  mg/dL


 


Calcium     (8.4-10.2)  mg/dL


 


Alkaline Phosphatase     ()  U/L


 


Total Protein     (6.3-8.2)  g/dL


 


Albumin     (3.5-5.0)  g/dL


 


Urine Appearance  Cloudy H    (Clear)  


 


Urine Protein  1+ H    (Negative)  


 


Urine Glucose (UA)  3+ H    (Negative)  


 


Urine WBC  7 H    (0-5)  /hpf


 


Amorphous Sediment  Occasional H    (None)  /hpf


 


Urine Mucus  Occasional H    (None)  /hpf














  04/09/19 04/09/19 Range/Units





  10:45 11:04 


 


WBC    (3.8-10.6)  k/uL


 


Hgb    (11.4-16.0)  gm/dL


 


Hct    (34.0-46.0)  %


 


MCV    (80.0-100.0)  fL


 


MCH    (25.0-35.0)  pg


 


MCHC    (31.0-37.0)  g/dL


 


RDW    (11.5-15.5)  %


 


Plt Count    (150-450)  k/uL


 


Neutrophils #    (1.3-7.7)  k/uL


 


Lymphocytes #    (1.0-4.8)  k/uL


 


Sodium    (137-145)  mmol/L


 


Chloride    ()  mmol/L


 


Glucose  290 H   (74-99)  mg/dL


 


POC Glucose (mg/dL)   303 H  (75-99)  mg/dL


 


Calcium  10.3 H   (8.4-10.2)  mg/dL


 


Alkaline Phosphatase  519 H   ()  U/L


 


Total Protein  5.8 L   (6.3-8.2)  g/dL


 


Albumin  2.8 L   (3.5-5.0)  g/dL


 


Urine Appearance    (Clear)  


 


Urine Protein    (Negative)  


 


Urine Glucose (UA)    (Negative)  


 


Urine WBC    (0-5)  /hpf


 


Amorphous Sediment    (None)  /hpf


 


Urine Mucus    (None)  /hpf








                      Microbiology - Last 24 Hours (Table)











 04/08/19 21:08 Urine Culture - Preliminary





 Urine,Catheterized 











Chest x-ray: report reviewed





Thrombosis Risk Factor Assmnt





- Choose All That Apply


Any of the Below Risk Factors Present?: Yes


Each Factor Represents 1 point: Heart failure (<1month)


Other Risk Factors: Yes


Each Risk Factor Represents 2 Points: Age 61-74 years, Central venous access, 

Malignancy


Other congenital or acquired thrombophilia - If yes, enter type in comment: No


Thrombosis Risk Factor Assessment Total Risk Factor Score: 7


Thrombosis Risk Factor Assessment Level: High Risk





Assessment and Plan


Plan: 





Assessment


Fever of undetermined origin


Sepsis pneumonia


Normocytic anemia


Leukocytosis


History of B-cell non-Hodgkin's lymphoma stage IV


Diabetes type 2


Hypertension


Hyperlipidemia


Hypothyroidism


Sleep apnea with BiPAP


Moderate protein calorie deficiency malnutrition


Anorexia


History of congestive heart failure and pleural effusions





Plan


Patient on vancomycin and Zosyn


Consultation with pulmonology and oncology

## 2019-04-09 NOTE — P.CNPUL
History of Present Illness


Chief complaint: Fever


History of present illness: 





 63-year-old female patient with known history of stage IV high-grade 

lymphoma who is coming in for episodes of fever and pneumonia was a 

consideration for that reason a pulmonary consultation was requested.  Please 

refer to the detailed oncology note regarding her cancer history.  The patient 

was initially found to have incidental retroperitoneal lymphadenopathy left more

than right with the largest lymph node measuring 4 cm in size.  The computed 

tomography scan of the abdomen and pelvis showed retroperitoneal adenopathy and 

some additional lymph nodes in the upper abdomen.  CBC was normal with some mild

white cell count elevation and neutropenia.  The patient underwent a 

retroperitoneal core biopsy back in 2018 and the results were positive for

B-cell non-Hodgkin's lymphoma, CD5 negative.  The patient was diagnosed having a

marginal zone lymphoma.  PET scan showed the abdominal activity and addition to 

that there was some scattered areas in the liver that was also involved with the

disease.  MRI of the liver was of normal and a biopsy of the liver lesion 

confirmed the presence of small numbers of B-cell lymphocytes and was consistent

with lymphoma.  The bone marrow I'll see that was done in 2018 was also 

consistent with lymphoma.  Upon initial monitoring, the patient progressed and a

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 

population and that point the patient was started on systemic chemotherapy and 

she received R-CEOP and the patient was not given Adriamycin as the patient was 

suspected of congestion heart failure with poor ejection fraction.  The patient 

completed her systemic chemotherapy and she had received Neulasta for 

neutropenia.  Back in 2018 she had a brief hospitalization for 

generalized weakness and fatigue and confusion and she subsequently improved and

she was discharged home.  Most recent CAT scan of the abdomen all this and chest

and the PET scan showed progression of her disease specifically the CAT scan 

that was done on 2019 showed worsening in the mid abdominal 

retroperitoneal adenopathy that was consistent with her most recent PET scan 

that showed increased uptake at the involved area.  No new thoracic or pelvic 

adenopathy seen.  There was also CHF with some small bilateral pleural effusion 

and interstitial edema.





Currently the patient is having no significant shortness of breath.  She has 

low-grade fever.  Her cough is dry.  No significant sputum production.  No 

pleurisy.  No hemoptysis.  She had diminished appetite.  No nausea vomiting or 

diarrhea.  No aspiration.  Initial white count was 21.3 and currently is up to 

37.7.  The renal function is within normal limits.  BNP level is 4720 and 

influenza screen was negative and the patient was placed on a combination of 

Zosyn and vancomycin.  She is hemodynamically stable at this point in time.  On 

a separate note, the most and echocardiogram that was done on 10/05/2018 showed 

preserved LV function with an ejection fraction of 55%.  No valvular abnormal

ities noted.





Review of Systems


Constitutional: Reports fatigue, Reports fever, Reports lethargy, Reports poor 

appetite, Reports weakness, Reports weight loss


Eyes: denies as per HPI, denies blurred vision, denies bulging eye, denies de

creased vision, denies diplopia, denies discharge, denies dry eye, denies 

irritation, denies itching, denies pain, denies photophobia, denies loss of 

peripheral vision, denies loss of vision, denies tunnel vision/blind spots


Ears: deny: decreased hearing, ear discharge, earache, tinnitus


Ears, nose, mouth and throat: Denies headache, Denies sore throat


Breasts: absent: as per HPI, change in shape, gynecomastia, masses, nipple 

discharge, pain, skin changes, swelling


Cardiovascular: Reports decreased exercise tolerance, Reports shortness of 

breath


Respiratory: Reports as per HPI


Gastrointestinal: Reports as per HPI


Genitourinary: Reports as per HPI


Menstruation: Reports as per HPI


Musculoskeletal: Reports as per HPI


Musculoskeletal: bilateral: ankle swelling, absent: ankle pain, ankle stiffness


Integumentary: Denies pruritus, Denies rash


Neurological: Reports weakness


Psychiatric: Reports as per HPI


Endocrine: Reports fatigue


Hematologic/Lymphatic: Reports as per HPI


Allergic/Immunologic: Reports as per HPI





Past Medical History


Past Medical History: Cancer, Heart Failure, Diabetes Mellitus, Hyperlipidemia, 

Hypertension, Sleep Apnea/CPAP/BIPAP, Thyroid Disorder


Additional Past Medical History / Comment(s): B-cell non-Hodgkin's lymphoma 

stage IV, diabetes mellitus,daughter stated pt had first chemo 18 

hypertension, hyperlipidemia, obstructive sleep apnea, hypothyroidism post 

thyroidectomy, closed head injury in , difficulty with mobility and 

ambulation the patient has been using a walker. has a history of kidney stones


Last Myocardial Infarction Date:: 


History of Any Multi-Drug Resistant Organisms: None Reported


Past Surgical History: Cholecystectomy, Hysterectomy, Tonsillectomy


Additional Past Surgical History / Comment(s): Thyroidectomy, lymph node and 

liver biopsies. port a cath 9-10-18, right ureteroscopy with lithotripsy 2015


Past Anesthesia/Blood Transfusion Reactions: Postoperative Nausea & Vomiting 

(PONV)


Past Psychological History: Anxiety


Smoking Status: Never smoker


Past Alcohol Use History: None Reported


Past Drug Use History: Marijuana





- Past Family History


  ** Mother


Family Medical History: Cancer


Additional Family Medical History / Comment(s): breast CA





  ** Father


Family Medical History: Cancer


Additional Family Medical History / Comment(s): colon CA





  ** Sister(s)


Family Medical History: Cancer


Additional Family Medical History / Comment(s): Skin cancer





Medications and Allergies


                                Home Medications











 Medication  Instructions  Recorded  Confirmed  Type


 


Furosemide 20 mg PO DAILY 14 History


 


Gabapentin 100 mg PO QAM 14 History


 


Gabapentin [Neurontin] 200 mg PO HS 10/09/15 04/08/19 History


 


Levothyroxine Sodium [Synthroid] 125 mcg PO MOTUWETHFRSA 10/09/15 04/08/19 

History


 


Multivitamins, Thera [Multivitamin 1 tab PO DAILY 18 History





(formulary)]    


 


Atorvastatin Calcium [Lipitor] 40 mg PO HS 18 History


 


Citalopram Hydrobromide [CeleXA] 20 mg PO HS 18 History


 


metFORMIN HCL 1,000 mg PO BID #0 10/12/18 04/08/19 Rx


 


Cetirizine HCl 10 mg PO HS 19 History


 


Pantoprazole [Protonix] 40 mg PO DAILY 19 History


 


Potassium Chloride ER [K-Dur 10] 10 meq PO BID 19 History


 


HYDROcodone/APAP 7.5-325MG [Norco 1 tab PO Q6HR PRN 19 History





7.5-325]    


 


Ondansetron [Zofran] 4 mg PO Q6H PRN 19 History


 


ALPRAZolam [Xanax] 0.33 mg PO DAILY PRN 19 History








                                    Allergies











Allergy/AdvReac Type Severity Reaction Status Date / Time


 


haloperidol [From Haldol] Allergy  Unknown Verified 19 18:46


 


haloperidol lactate Allergy  Unknown Verified 19 18:46





[From Haldol]     


 


Iodinated Contrast- Oral and Allergy  Rash/Hives Verified 19 18:46





IV Dye     





[Iodinated Contrast Media -     





IV Dye]     


 


ciprofloxacin [From Cipro] AdvReac  Heartburn Verified 19 18:46


 


steri-strips AdvReac Severe sore Uncoded 19 10:57














Physical Exam


Vitals: 


                                   Vital Signs











  Temp Pulse Pulse Resp BP BP Pulse Ox


 


 19 11:56  98.3 F   82  17   109/70  97


 


 19 09:13        97


 


 19 05:36  97.3 F L   63  16   96/59  99


 


 19 00:00     16   


 


 19 23:41  100.0 F H   86  16   101/61  96


 


 19 22:58  99.2 F  95   16  107/61   99


 


 19 21:40      120/79   95


 


 19 21:30      122/80   94 L


 


 19 21:20      122/80   94 L


 


 19 21:10   108 H    124/90   93 L


 


 19 21:09  102.3 F H  108 H   16  124/90   93 L


 


 19 21:00      180/141  


 


 19 20:50      180/141  


 


 19 20:40      116/76   96


 


 19 20:30      115/70   94 L


 


 19 20:20      115/70   95


 


 19 20:10   99    115/70   95


 


 19 20:00      121/72   95


 


 19 19:50   99    121/72   95


 


 19 19:40      131/104   97


 


 19 19:30      127/76   98


 


 19 19:20      127/76   98


 


 19 19:10      123/74   97


 


 19 19:00      123/74  


 


 19 18:50      123/74   93 L


 


 19 18:40   98    120/57   93 L


 


 19 18:30      120/71   92 L


 


 19 18:20      120/71   94 L


 


 19 18:10   94    118/70   94 L


 


 19 18:00   99    118/81   94 L


 


 19 17:50      118/81   94 L


 


 19 17:40      123/73   95


 


 19 17:30   95    121/76   94 L


 


 19 17:26        95


 


 19 17:14  99.2 F  95   16  118/73   96








                                Intake and Output











 19





 22:59 06:59 14:59


 


Intake Total 1250 650 


 


Output Total 100  


 


Balance 1150 650 


 


Intake:   


 


  Amount of Fluid Infused ( 1250  





  ml)   


 


  Intake, IV Titration  650 





  Amount   


 


    Piperacillin-Tazobactam 3  100 





    .375 gm In Sodium   





    Chloride 0.9% 100 ml @ 25   





    mls/hr IVPB Q8H [facility] Rx#:   





    [id number]   


 


    Sodium Chloride 0.9% 1,  300 





    000 ml @ 50 mls/hr IV .   





    Q20H [facility] Rx#:[id number]   


 


    Vancomycin 1,250 mg In  250 





    Sodium Chloride 0.9% 250   





    ml @ 125 mls/hr IVPB ONCE   





    STA Rx#:[id number]   


 


Output:   


 


  Urine 100  


 


    Uretheral (Riley) 100  


 


Other:   


 


  Voiding Method  Incontinent Incontinent


 


  Weight 58.967 kg  58 kg














The patient appeared well nourished and normally developed. Vital signs as 

documented. Head exam is unremarkable. No scleral icterus or corneal arcus 

noted. Neck is without jugular venous distension, thyromegaly, or carotid 

bruits. Carotid upstrokes are brisk bilaterally. Lungs diminished breath sounds 

bilaterally especially in the lung bases.. Cardiac exam reveals the PMI to be 

normally sized and situated. Rhythm is regular. First and second heart sounds 

normal. No murmurs, rubs or gallops. Abdominal exam reveals normal bowel sounds,

no masses, no organomegaly and no aortic enlargement. Extremities are +1 pitting

edematous and both femoral and pedal pulses are normal.  The patient has some 

edema in lower extremities bilaterally.Examination of the skin revealed no 

evidence of significant rashes, suspicious appearing nevi or other concerning 

lesions.  Neurologically awake and alert and is no focal neurological deficits.





Results





- Laboratory Findings


CBC and BMP: 


                                 19 10:45





                                 19 10:45


PT/INR, D-dimer











PT  10.9 sec (9.0-12.0)   19  18:00    


 


INR  1.0  (<1.2)   19  18:00    








Abnormal lab findings: 


                                  Abnormal Labs











  19





  18:00 18:00 19:29


 


WBC  21.3 H  


 


Hgb   


 


Hct   


 


MCV  79.0 L  


 


MCH  23.8 L  


 


MCHC  30.1 L  


 


RDW  16.9 H  


 


Plt Count   


 


Neutrophils #  19.8 H  


 


Lymphocytes #  0.2 L  


 


Sodium   134 L 


 


Chloride   97 L 


 


Glucose   366 H 


 


POC Glucose (mg/dL)    367 H


 


Calcium   10.3 H 


 


Alkaline Phosphatase   558 H 


 


Total Protein   5.4 L 


 


Albumin   2.7 L 


 


Urine Appearance   


 


Urine Protein   


 


Urine Glucose (UA)   


 


Urine WBC   


 


Amorphous Sediment   


 


Urine Mucus   














  19





  21:08 07:29 10:45


 


WBC    37.7 H


 


Hgb    8.8 L D


 


Hct    31.0 L


 


MCV   


 


MCH    23.0 L


 


MCHC    28.4 L


 


RDW    17.3 H


 


Plt Count    541 H


 


Neutrophils #   


 


Lymphocytes #   


 


Sodium   


 


Chloride   


 


Glucose   


 


POC Glucose (mg/dL)   341 H 


 


Calcium   


 


Alkaline Phosphatase   


 


Total Protein   


 


Albumin   


 


Urine Appearance  Cloudy H  


 


Urine Protein  1+ H  


 


Urine Glucose (UA)  3+ H  


 


Urine WBC  7 H  


 


Amorphous Sediment  Occasional H  


 


Urine Mucus  Occasional H  














  19





  10:45 11:04


 


WBC  


 


Hgb  


 


Hct  


 


MCV  


 


MCH  


 


MCHC  


 


RDW  


 


Plt Count  


 


Neutrophils #  


 


Lymphocytes #  


 


Sodium  


 


Chloride  


 


Glucose  290 H 


 


POC Glucose (mg/dL)   303 H


 


Calcium  10.3 H 


 


Alkaline Phosphatase  519 H 


 


Total Protein  5.8 L 


 


Albumin  2.8 L 


 


Urine Appearance  


 


Urine Protein  


 


Urine Glucose (UA)  


 


Urine WBC  


 


Amorphous Sediment  


 


Urine Mucus  














- Diagnostic Findings


Chest x-ray: image reviewed





Assessment and Plan


Plan: 











Assessment





1 fever under investigation, consider fever secondary to malignancy, she will 

fever very much likely.  Infections are felt to be less likely at this point 

although cultures of been sent and broad-spectrum antibiotics have been 

initiated





2 bilateral pleural effusion, chronic, consider lymphomatous effusion





3 leukocytosis





4 stage IV non-Hodgkin's lymphoma and the patient has completed R-CEOP and 

subsequently the disease progress and the patient has received Hycela and  

bendamustine, status Post Cycle 2 19





5 anorexia





6 normocytic anemia secondary to systemic chemotherapy/lymphoma





7 hypertension





8 hyperlipidemia





9 hypothyroidism





10 diabetes mellitus





PLAN





Monitor the fever pattern.  Awaiting results of the culture.  Continue same 

antibiotic coverage.  My impression is that the patient is consistent with 

lymphoma progression with lymphoma related fever and pleural effusions and 

overall debility secondary to underlying hematologic malignancy.  We'll continue

to follow.  Oncology is on the case.

## 2019-04-09 NOTE — P.CONS
History of Present Illness





- Reason for Consult


Consult date: 04/09/19


Hx: NHL - High Suspicion for recurrence


Requesting physician: Jhoan Lindsay





- Chief Complaint


Fever





- History of Present Illness





Ms Falcon is a 63 yr old white female, initially seen in consult at [facility] on 3/29/18.  She had been admitted with lower chest/upper 

abdominal pain.  She was evaluated by internal medicine and cardiology with 

symptoms resolving spontaneously.  She had a CT to evaluate in the outer to rule

out aortic aneurysm.  This incidentally noted retroperitoneal adenopathy, left 

greater than right, largest about 4 cm in size.  The patient had no obvious 

symptoms other than mid back pain for the prior 6-8 weeks.  However this was not

constant.  She had a CT of the chest abdomen and pelvis which showed retroperi

toneal adenopathy measuring 4.8 x 2.9 cm with some additional upper abdominal 

adenopathy measuring up to 1.9 cm.  No other adenopathy was seen in the thorax 

or pelvis.  CBC was normal other than mild WBC elevation, mostly due to 

neutropenia.  Chem panel was also negative other than creatinine of 1.2.


 The patient had a retroperitoneal core biopsy on 4/23/18.  The biopsy was 

positive for B-cell non-Hodgkin's lymphoma, CD5 negative.  The main 

differentials included follicular, splenic marginal zone, or LPL.  A specimen 

was sent to [facility] with final diagnosis pending post consultation.


  the patient was then referred here and seen for her first office visit on 

5/1/18.


   the patient had labs and PET scan ordered.  The final pathology was resulted 

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  PET scan showed activity in the area of 

of abdominal adenopathy.  No other areas of suspicious activity are noted, 

except for scattered areas in the liver which could not be well defined.  Labs 

were negative other than elevated Light chain at 10 3 mg/L, versus lambda of 

44.7 mg/L, with mild elevation of ratio at 2.35.





   The patient had an MRI of the liver ordered, which showed multiple suspicious

lesions, at least 8 in number.  She therefore underwent an ultrasound-guided 

liver biopsy in early 6/18.


  This showed a dense lymphocytic infiltrative pattern.  However interestingly 

the majority of the liver cells appear to be reactive T lymphocytes with only a 

small number of B lymphocytes noted.  The pathologic findings are felt to raise 

the possibility of an inflammatory disorder such as primary biliary cirrhosis.  

The specimen was been sent to the [facility] for additional 

consultation, which reported no evidence of malignancy.


  


   She had a bone marrow in 7/18 , revealing involvement, and thus stage 4 

disease. She was thus placed on observation .


  She was admitted with severe fatigue and weakness to [facility] in late 8/18. CT 

scans showed progression in the lymph nodes and liver. She had a liver biopsy on

8/28/18, showing presence of a large and small cell lymphoma population. 

Double/triple hit testing was negative.


   she was started on R-CEOP (Adriamycin not use due to diminished ejection 

fraction) on 9/24/18.  She is status post 1 cycle.


   


   She was seen in the office on 10/4/18. She denies any fever/chills. she had 

marked agitation with steroids, which improved with Xanax.  She had markedly 

decreased appetite and progressive weakness. she has been very lethargic, and 

requiring assistance with even minimal activities. She has been nauseous persis

tently, with intermittent vomiting.  Over the last 1-2 days, she has been having

diarrhea with lower abdominal cramping and pain. according to the family, she 

has been intermittently confused, especially at night.  This had improved after 

stopping the steroids but seemed to have become somewhat more prominent over the

last 1-2 days. her blood pressure was quite low in the office, with systolic in 

the 70 range.  She was therefore sent in to the emergency room.


   The case was discussed in detail with the ER physician.   The patient was 

noted to have tachycardia as well as low blood pressure, that did not respond 

well to fluids.  WBC was 1.6.  She was therefore admitted to the ICU, and 

consult placed for further evaluation and recommendations.


  CT of the abdomen and pelvis revealed evidence of enteritis, and possibly left

hydronephrosis due to renal calculus





She completed treatment withCOEP with neulasta.  In November 2018 she was 

admitted with increased weakness, fatigue, confusion, recurrent falls, and 

elevated blood glucose levels. Her regimen of chemo includes 5 days of high dose

steroids, prednisone, which accounts for her elevated glucose levels. She is 

feeling a little better today she stated. She is still a poor historian and no 

family at bedside as she originally stated she was short of breath when she came

in but then told me she think she was admitted for a fall or high glucose. She 

has always seemed to be poor historian at baseline, and her daughter who is also

her caregiver is usually with her, although not during todays assessment. CTA 

chest failed to show any evidence of acute etiology or pulmonary embolism. 





Ms. Falcon recently admitted after presenting to [facility] with complaints of 

increased pain and overall feeling worse over the past weeks. She was seen in 

office on March 13 and at that time was undergoing treatment for assumed 

infection UTI, although UA negative unknown if truly infectious. Dr. Dumont 

recommended patient undergo GI work-up with upper and lower endoscopy as he was 

concerned for potential bowel wall involvement of lymphoma, at that time she was

agreeable but has not followed up with the recommendation. She was referred to 

Dr. Refugio DIAS as well at that time regarding potential UA versus colinization. 





4/9/19: Patient presents now to Emergency with complaints of fevers. The past 

week patient has had a low-grade fever at night as well as worsening baseline 

night sweats and chills.  Patient has a home care nurse who expressed concern 

through EMR for low-grade temperature (100-101) as well as persistent non-prod

uctive cough.  She admits to intermittent shortness of breath, worse with 

exertion.  Intermittent Bilateral Lower Extremity Edema, Decreased appetite, 

increased fatigue, and just feels worse each day. Denies chest pain, hemoptysis 

signs of bleeding, rash. both her legs swell. WBC is increased, baseline 11-15, 

primarily neutrophils. 4/8/19 21.3, awaiting todays labs. She has been afebrile 

since beginning antibiotics.  She states she has no urinary frequency or 

hesitency or dysuria and BM this am without diarrhea or constipation. 




















Review of Systems





A 14 point review of systems assessed and completed and all negative except HPI





Past Medical History


Past Medical History: Cancer, Heart Failure, Diabetes Mellitus, Hyperlipidemia, 

Hypertension, Sleep Apnea/CPAP/BIPAP, Thyroid Disorder


Additional Past Medical History / Comment(s): B-cell non-Hodgkin's lymphoma 

stage IV, diabetes mellitus,daughter stated pt had first chemo mon 9-24-18 

hypertension, hyperlipidemia, obstructive sleep apnea, hypothyroidism post 

thyroidectomy, closed head injury in 1989, difficulty with mobility and 

ambulation the patient has been using a walker. has a history of kidney stones


Last Myocardial Infarction Date:: 1989


History of Any Multi-Drug Resistant Organisms: None Reported


Past Surgical History: Cholecystectomy, Hysterectomy, Tonsillectomy


Additional Past Surgical History / Comment(s): Thyroidectomy, lymph node and 

liver biopsies. port a cath 9-10-18, right ureteroscopy with lithotripsy 11/2015


Past Anesthesia/Blood Transfusion Reactions: Postoperative Nausea & Vomiting 

(PONV)


Past Psychological History: Anxiety


Smoking Status: Never smoker


Past Alcohol Use History: None Reported


Past Drug Use History: Marijuana





- Past Family History


  ** Mother


Family Medical History: Cancer


Additional Family Medical History / Comment(s): breast CA





  ** Father


Family Medical History: Cancer


Additional Family Medical History / Comment(s): colon CA





  ** Sister(s)


Family Medical History: Cancer


Additional Family Medical History / Comment(s): Skin cancer





Medications and Allergies


                                Home Medications











 Medication  Instructions  Recorded  Confirmed  Type


 


Furosemide 20 mg PO DAILY 07/24/14 04/08/19 History


 


Gabapentin 100 mg PO QAM 07/24/14 04/08/19 History


 


Gabapentin [Neurontin] 200 mg PO HS 10/09/15 04/08/19 History


 


Levothyroxine Sodium [Synthroid] 125 mcg PO [id number] 10/09/15 04/08/19 

History


 


Multivitamins, Thera [Multivitamin 1 tab PO DAILY 03/28/18 04/08/19 History





(formulary)]    


 


Atorvastatin Calcium [Lipitor] 40 mg PO HS 04/16/18 04/08/19 History


 


Citalopram Hydrobromide [CeleXA] 20 mg PO HS 08/26/18 04/08/19 History


 


metFORMIN HCL 1,000 mg PO BID #0 10/12/18 04/08/19 Rx


 


Cetirizine HCl 10 mg PO HS 02/01/19 04/08/19 History


 


Pantoprazole [Protonix] 40 mg PO DAILY 02/01/19 04/08/19 History


 


Potassium Chloride ER [K-Dur 10] 10 meq PO BID 02/01/19 04/08/19 History


 


HYDROcodone/APAP 7.5-325MG [Norco 1 tab PO Q6HR PRN 03/18/19 04/08/19 History





7.5-325]    


 


Ondansetron [Zofran] 4 mg PO Q6H PRN 03/27/19 04/08/19 History


 


ALPRAZolam [Xanax] 0.33 mg PO DAILY PRN 04/08/19 04/08/19 History








                                    Allergies











Allergy/AdvReac Type Severity Reaction Status Date / Time


 


haloperidol [From Haldol] Allergy  Unknown Verified 04/08/19 18:46


 


haloperidol lactate Allergy  Unknown Verified 04/08/19 18:46





[From Haldol]     


 


Iodinated Contrast- Oral and Allergy  Rash/Hives Verified 04/08/19 18:46





IV Dye     





[Iodinated Contrast Media -     





IV Dye]     


 


ciprofloxacin [From Cipro] AdvReac  Heartburn Verified 04/08/19 18:46


 


steri-strips AdvReac Severe sore Uncoded 03/27/19 10:57














Physical Exam


Vitals: 


                                   Vital Signs











  Temp Pulse Pulse Resp BP BP Pulse Ox


 


 04/09/19 09:13        97


 


 04/09/19 05:36  97.3 F L   63  16   96/59  99


 


 04/09/19 00:00     16   


 


 04/08/19 23:41  100.0 F H   86  16   101/61  96


 


 04/08/19 22:58  99.2 F  95   16  107/61   99


 


 04/08/19 21:40      120/79   95


 


 04/08/19 21:30      122/80   94 L


 


 04/08/19 21:20      122/80   94 L


 


 04/08/19 21:10   108 H    124/90   93 L


 


 04/08/19 21:09  102.3 F H  108 H   16  124/90   93 L


 


 04/08/19 21:00      180/141  


 


 04/08/19 20:50      180/141  


 


 04/08/19 20:40      116/76   96


 


 04/08/19 20:30      115/70   94 L


 


 04/08/19 20:20      115/70   95


 


 04/08/19 20:10   99    115/70   95


 


 04/08/19 20:00      121/72   95


 


 04/08/19 19:50   99    121/72   95


 


 04/08/19 19:40      131/104   97


 


 04/08/19 19:30      127/76   98


 


 04/08/19 19:20      127/76   98


 


 04/08/19 19:10      123/74   97


 


 04/08/19 19:00      123/74  


 


 04/08/19 18:50      123/74   93 L


 


 04/08/19 18:40   98    120/57   93 L


 


 04/08/19 18:30      120/71   92 L


 


 04/08/19 18:20      120/71   94 L


 


 04/08/19 18:10   94    118/70   94 L


 


 04/08/19 18:00   99    118/81   94 L


 


 04/08/19 17:50      118/81   94 L


 


 04/08/19 17:40      123/73   95


 


 04/08/19 17:30   95    121/76   94 L


 


 04/08/19 17:26        95


 


 04/08/19 17:14  99.2 F  95   16  118/73   96








                                Intake and Output











 04/08/19 04/09/19 04/09/19





 22:59 06:59 14:59


 


Intake Total 1250 650 


 


Output Total 100  


 


Balance 1150 650 


 


Intake:   


 


  Amount of Fluid Infused ( 1250  





  ml)   


 


  Intake, IV Titration  650 





  Amount   


 


    Piperacillin-Tazobactam 3  100 





    .375 gm In Sodium   





    Chloride 0.9% 100 ml @ 25   





    mls/hr IVPB Q8H [facility] Rx#:   





    [id number]   


 


    Sodium Chloride 0.9% 1,  300 





    000 ml @ 50 mls/hr IV .   





    Q20H [facility] Rx#:[id number]   


 


    Vancomycin 1,250 mg In  250 





    Sodium Chloride 0.9% 250   





    ml @ 125 mls/hr IVPB ONCE   





    STA Rx#:[id number]   


 


Output:   


 


  Urine 100  


 


    Uretheral (Riley) 100  


 


Other:   


 


  Voiding Method  Incontinent Incontinent


 


  Weight 58.967 kg  58 kg














Gen: Alert and Oriented, NAD


Head: NC, NT


Neck: Supple


Mouth: Dry no lesions


Lungs: Diminished bibasilar, no increased effort noted


Heart RRR, S1


Abdomen S/ND/NT


Ext: BLE 1+ Edema


Neuro: No sensory or motor Deficits. 





Results


CBC & Chem 7: 


                                 04/09/19 10:45





                                 04/09/19 10:45


Labs: 


                  Abnormal Lab Results - Last 24 Hours (Table)











  04/08/19 04/08/19 04/08/19 Range/Units





  18:00 18:00 19:29 


 


WBC  21.3 H    (3.8-10.6)  k/uL


 


MCV  79.0 L    (80.0-100.0)  fL


 


MCH  23.8 L    (25.0-35.0)  pg


 


MCHC  30.1 L    (31.0-37.0)  g/dL


 


RDW  16.9 H    (11.5-15.5)  %


 


Neutrophils #  19.8 H    (1.3-7.7)  k/uL


 


Lymphocytes #  0.2 L    (1.0-4.8)  k/uL


 


Sodium   134 L   (137-145)  mmol/L


 


Chloride   97 L   ()  mmol/L


 


Glucose   366 H   (74-99)  mg/dL


 


POC Glucose (mg/dL)    367 H  (75-99)  mg/dL


 


Calcium   10.3 H   (8.4-10.2)  mg/dL


 


Alkaline Phosphatase   558 H   ()  U/L


 


Total Protein   5.4 L   (6.3-8.2)  g/dL


 


Albumin   2.7 L   (3.5-5.0)  g/dL


 


Urine Appearance     (Clear)  


 


Urine Protein     (Negative)  


 


Urine Glucose (UA)     (Negative)  


 


Urine WBC     (0-5)  /hpf


 


Amorphous Sediment     (None)  /hpf


 


Urine Mucus     (None)  /hpf














  04/08/19 04/09/19 04/09/19 Range/Units





  21:08 07:29 11:04 


 


WBC     (3.8-10.6)  k/uL


 


MCV     (80.0-100.0)  fL


 


MCH     (25.0-35.0)  pg


 


MCHC     (31.0-37.0)  g/dL


 


RDW     (11.5-15.5)  %


 


Neutrophils #     (1.3-7.7)  k/uL


 


Lymphocytes #     (1.0-4.8)  k/uL


 


Sodium     (137-145)  mmol/L


 


Chloride     ()  mmol/L


 


Glucose     (74-99)  mg/dL


 


POC Glucose (mg/dL)   341 H  303 H  (75-99)  mg/dL


 


Calcium     (8.4-10.2)  mg/dL


 


Alkaline Phosphatase     ()  U/L


 


Total Protein     (6.3-8.2)  g/dL


 


Albumin     (3.5-5.0)  g/dL


 


Urine Appearance  Cloudy H    (Clear)  


 


Urine Protein  1+ H    (Negative)  


 


Urine Glucose (UA)  3+ H    (Negative)  


 


Urine WBC  7 H    (0-5)  /hpf


 


Amorphous Sediment  Occasional H    (None)  /hpf


 


Urine Mucus  Occasional H    (None)  /hpf








                      Microbiology - Last 24 Hours (Table)











 04/08/19 21:08 Urine Culture - Preliminary





 Urine,Catheterized 











Chest x-ray: report reviewed


CT scan - abdomen: report reviewed


CT scan - chest: report reviewed


CT scan - pelvis: report reviewed





Assessment and Plan


Plan: 














Fevers:


 - Unknown Etiology, potential tumor fever although T-max in 24 hours 102.5


 - Pan cultures ordered


 - Chest Xray Negative


 - Zosyn Ordered





Leukocytosis: Likely reactive


 - Work-up for infectious process with fever, could be related to malignancy


 - Baseline 11-15


 - Primarily neutrophils





NHL:


 - Most recently status post treatment with Hycela and bendamustine, status Post

Cycle 2 1/8/19


 - Prior to above status post treatment with R-Ceop


 - Highly suspicious for progrssion: CT C/A/P reviewed and discussed with 

patient, definite progression cannot be ruled out. 


 - Status Post EGD for concern of penetrative lymphoma to Gastric/Esophageal 

wall - Path did not identify evidence of malignancy


 - Will attempt to obtain tissue biopsy from Retroperitoneal Mass via 

Interventional Radiology


 - LDH, Uric Acid





Anorexia and weight loss:


 - Megace recently ordered








Persisitent Nausea and Abdominal Pain:


 - GI Work-up and CT Scans Reviewed from previous admission





Normocytic Anemia: Secondary to chemo and lymphoma


 - CBC and CMP in am





Myopathy and Debility:


 - Rec PT/OT wwork with Patient 











Physcian Attest: I have completed the full history and physical and developed 

the complete impression and plan, agree with above, dictated as a scribe

## 2019-04-10 LAB
ANION GAP SERPL CALC-SCNC: 10 MMOL/L
BASOPHILS # BLD AUTO: 0.1 K/UL (ref 0–0.2)
BASOPHILS NFR BLD AUTO: 0 %
BUN SERPL-SCNC: 16 MG/DL (ref 7–17)
CALCIUM SPEC-MCNC: 10.3 MG/DL (ref 8.4–10.2)
CHLORIDE SERPL-SCNC: 103 MMOL/L (ref 98–107)
CO2 SERPL-SCNC: 25 MMOL/L (ref 22–30)
EOSINOPHIL # BLD AUTO: 0.1 K/UL (ref 0–0.7)
EOSINOPHIL NFR BLD AUTO: 0 %
ERYTHROCYTE [DISTWIDTH] IN BLOOD BY AUTOMATED COUNT: 3.43 M/UL (ref 3.8–5.4)
ERYTHROCYTE [DISTWIDTH] IN BLOOD: 16.5 % (ref 11.5–15.5)
GLUCOSE BLD-MCNC: 207 MG/DL (ref 75–99)
GLUCOSE BLD-MCNC: 224 MG/DL (ref 75–99)
GLUCOSE BLD-MCNC: 272 MG/DL (ref 75–99)
GLUCOSE BLD-MCNC: 317 MG/DL (ref 75–99)
GLUCOSE BLD-MCNC: 367 MG/DL (ref 75–99)
GLUCOSE SERPL-MCNC: 173 MG/DL (ref 74–99)
HCT VFR BLD AUTO: 29 % (ref 34–46)
HGB BLD-MCNC: 8 GM/DL (ref 11.4–16)
LYMPHOCYTES # SPEC AUTO: 0.3 K/UL (ref 1–4.8)
LYMPHOCYTES NFR SPEC AUTO: 1 %
MCH RBC QN AUTO: 23.2 PG (ref 25–35)
MCHC RBC AUTO-ENTMCNC: 27.4 G/DL (ref 31–37)
MCV RBC AUTO: 84.6 FL (ref 80–100)
MONOCYTES # BLD AUTO: 0.6 K/UL (ref 0–1)
MONOCYTES NFR BLD AUTO: 2 %
NEUTROPHILS # BLD AUTO: 32.2 K/UL (ref 1.3–7.7)
NEUTROPHILS NFR BLD AUTO: 96 %
PLATELET # BLD AUTO: 550 K/UL (ref 150–450)
POTASSIUM SERPL-SCNC: 3.9 MMOL/L (ref 3.5–5.1)
SODIUM SERPL-SCNC: 138 MMOL/L (ref 137–145)
WBC # BLD AUTO: 33.4 K/UL (ref 3.8–10.6)

## 2019-04-10 PROCEDURE — 0WBH3ZX EXCISION OF RETROPERITONEUM, PERCUTANEOUS APPROACH, DIAGNOSTIC: ICD-10-PCS

## 2019-04-10 RX ADMIN — GABAPENTIN SCH MG: 100 CAPSULE ORAL at 23:12

## 2019-04-10 RX ADMIN — HYDROCODONE BITARTRATE AND ACETAMINOPHEN PRN EACH: 7.5; 325 TABLET ORAL at 23:16

## 2019-04-10 RX ADMIN — INSULIN ASPART SCH UNIT: 100 INJECTION, SOLUTION INTRAVENOUS; SUBCUTANEOUS at 23:16

## 2019-04-10 RX ADMIN — HYDROCODONE BITARTRATE AND ACETAMINOPHEN PRN EACH: 7.5; 325 TABLET ORAL at 10:36

## 2019-04-10 RX ADMIN — THERA TABS SCH EACH: TAB at 08:32

## 2019-04-10 RX ADMIN — CEFAZOLIN SCH: 330 INJECTION, POWDER, FOR SOLUTION INTRAMUSCULAR; INTRAVENOUS at 17:42

## 2019-04-10 RX ADMIN — POTASSIUM CHLORIDE SCH MEQ: 750 TABLET, EXTENDED RELEASE ORAL at 08:32

## 2019-04-10 RX ADMIN — HYDROCODONE BITARTRATE AND ACETAMINOPHEN PRN EACH: 7.5; 325 TABLET ORAL at 04:29

## 2019-04-10 RX ADMIN — PIPERACILLIN AND TAZOBACTAM SCH MLS/HR: 3; .375 INJECTION, POWDER, FOR SOLUTION INTRAVENOUS at 18:20

## 2019-04-10 RX ADMIN — LORATADINE SCH MG: 10 TABLET ORAL at 23:16

## 2019-04-10 RX ADMIN — HYDROCODONE BITARTRATE AND ACETAMINOPHEN PRN EACH: 7.5; 325 TABLET ORAL at 17:44

## 2019-04-10 RX ADMIN — SODIUM CHLORIDE SCH MLS/HR: 9 INJECTION, SOLUTION INTRAVENOUS at 09:21

## 2019-04-10 RX ADMIN — LEVOTHYROXINE SODIUM SCH MCG: 0.12 TABLET ORAL at 06:16

## 2019-04-10 RX ADMIN — CITALOPRAM HYDROBROMIDE SCH MG: 20 TABLET ORAL at 23:12

## 2019-04-10 RX ADMIN — INSULIN ASPART SCH UNIT: 100 INJECTION, SOLUTION INTRAVENOUS; SUBCUTANEOUS at 13:38

## 2019-04-10 RX ADMIN — PANTOPRAZOLE SODIUM SCH MG: 40 TABLET, DELAYED RELEASE ORAL at 08:32

## 2019-04-10 RX ADMIN — PIPERACILLIN AND TAZOBACTAM SCH MLS/HR: 3; .375 INJECTION, POWDER, FOR SOLUTION INTRAVENOUS at 04:29

## 2019-04-10 RX ADMIN — GABAPENTIN SCH MG: 100 CAPSULE ORAL at 08:32

## 2019-04-10 RX ADMIN — POTASSIUM CHLORIDE SCH MEQ: 750 TABLET, EXTENDED RELEASE ORAL at 23:16

## 2019-04-10 RX ADMIN — INSULIN DETEMIR SCH UNIT: 100 INJECTION, SOLUTION SUBCUTANEOUS at 23:13

## 2019-04-10 RX ADMIN — SODIUM CHLORIDE SCH MLS/HR: 9 INJECTION, SOLUTION INTRAVENOUS at 23:17

## 2019-04-10 RX ADMIN — INSULIN ASPART SCH UNIT: 100 INJECTION, SOLUTION INTRAVENOUS; SUBCUTANEOUS at 17:40

## 2019-04-10 RX ADMIN — FUROSEMIDE SCH MG: 20 TABLET ORAL at 08:32

## 2019-04-10 RX ADMIN — PIPERACILLIN AND TAZOBACTAM SCH MLS/HR: 3; .375 INJECTION, POWDER, FOR SOLUTION INTRAVENOUS at 13:37

## 2019-04-10 RX ADMIN — ATORVASTATIN CALCIUM SCH MG: 40 TABLET, FILM COATED ORAL at 23:12

## 2019-04-10 RX ADMIN — INSULIN ASPART SCH UNIT: 100 INJECTION, SOLUTION INTRAVENOUS; SUBCUTANEOUS at 08:32

## 2019-04-10 NOTE — P.PN
Subjective


Progress Note Date: 04/10/19


Principal diagnosis: 





Fever 





T max overnight 100.4, afebrile this am. She is scheduled for retroperitoneal 

mass biopsy today. 





Objective





- Vital Signs


Vital signs: 


                                   Vital Signs











Temp  98.3 F   04/10/19 05:42


 


Pulse  153 H  04/10/19 05:42


 


Resp  18   04/10/19 05:42


 


BP  124/76   04/10/19 05:42


 


Pulse Ox  94 L  04/10/19 05:42








                                 Intake & Output











 04/09/19 04/10/19 04/10/19





 18:59 06:59 18:59


 


Intake Total 750 850 


 


Balance 750 850 


 


Weight 58 kg 64 kg 


 


Intake:   


 


   350 


 


    Piperacillin-Tazobactam 3 100 50 





    .375 gm In Sodium   





    Chloride 0.9% 100 ml @ 25   





    mls/hr IVPB Q8H [facility] Rx#:   





    [id number]   


 


    Sodium Chloride 0.9% 1, 400 300 





    000 ml @ 50 mls/hr IV .   





    Q20H [facility] Rx#:[id number]   


 


    Vancomycin 1,000 mg In 250  





    Sodium Chloride 0.9% 250   





    ml @ 125 mls/hr IVPB Q12H   





    [facility] Rx#:[id number]   


 


  Intake, IV Titration  250 





  Amount   


 


    Vancomycin 1,000 mg In  250 





    Sodium Chloride 0.9% 250   





    ml @ 125 mls/hr IVPB Q12H   





    [facility] Rx#:[id number]   


 


  Oral  250 


 


Other:   


 


  Voiding Method Incontinent Incontinent 


 


  # Voids 2 1 














- Exam





Gen: Alert and Oriented, NAD


Head: NC, NT


Neck: Supple


Mouth: Dry no lesions


Lungs: Diminished bibasilar, no increased effort noted


Heart RRR, S1


Abdomen S/ND/NT


Ext: BLE 1+ Edema


Neuro: No sensory or motor Deficits. 





- Labs


CBC & Chem 7: 


                                 04/10/19 13:15





                                 04/10/19 09:15


Labs: 


                  Abnormal Lab Results - Last 24 Hours (Table)











  04/09/19 04/09/19 04/09/19 Range/Units





  10:45 10:45 13:03 


 


WBC  37.7 H    (3.8-10.6)  k/uL


 


Hgb  8.8 L D    (11.4-16.0)  gm/dL


 


Hct  31.0 L    (34.0-46.0)  %


 


MCH  23.0 L    (25.0-35.0)  pg


 


MCHC  28.4 L    (31.0-37.0)  g/dL


 


RDW  17.3 H    (11.5-15.5)  %


 


Plt Count  541 H    (150-450)  k/uL


 


Neutrophils #  35.5 H    (1.3-7.7)  k/uL


 


Lymphocytes #  0.4 L    (1.0-4.8)  k/uL


 


Monocytes #  1.3 H    (0-1.0)  k/uL


 


Glucose   290 H   (74-99)  mg/dL


 


POC Glucose (mg/dL)     (75-99)  mg/dL


 


Calcium   10.3 H   (8.4-10.2)  mg/dL


 


Ionized Calcium Harper    5.7 H  (4.5-5.3)  mg/dL


 


Alkaline Phosphatase   519 H   ()  U/L


 


Total Protein   5.8 L   (6.3-8.2)  g/dL


 


Albumin   2.8 L   (3.5-5.0)  g/dL














  04/09/19 04/09/19 04/10/19 Range/Units





  17:15 21:26 07:02 


 


WBC     (3.8-10.6)  k/uL


 


Hgb     (11.4-16.0)  gm/dL


 


Hct     (34.0-46.0)  %


 


MCH     (25.0-35.0)  pg


 


MCHC     (31.0-37.0)  g/dL


 


RDW     (11.5-15.5)  %


 


Plt Count     (150-450)  k/uL


 


Neutrophils #     (1.3-7.7)  k/uL


 


Lymphocytes #     (1.0-4.8)  k/uL


 


Monocytes #     (0-1.0)  k/uL


 


Glucose     (74-99)  mg/dL


 


POC Glucose (mg/dL)  256 H  291 H  224 H  (75-99)  mg/dL


 


Calcium     (8.4-10.2)  mg/dL


 


Ionized Calcium Harper     (4.5-5.3)  mg/dL


 


Alkaline Phosphatase     ()  U/L


 


Total Protein     (6.3-8.2)  g/dL


 


Albumin     (3.5-5.0)  g/dL














  04/10/19 Range/Units





  09:15 


 


WBC   (3.8-10.6)  k/uL


 


Hgb   (11.4-16.0)  gm/dL


 


Hct   (34.0-46.0)  %


 


MCH   (25.0-35.0)  pg


 


MCHC   (31.0-37.0)  g/dL


 


RDW   (11.5-15.5)  %


 


Plt Count   (150-450)  k/uL


 


Neutrophils #   (1.3-7.7)  k/uL


 


Lymphocytes #   (1.0-4.8)  k/uL


 


Monocytes #   (0-1.0)  k/uL


 


Glucose  173 H  (74-99)  mg/dL


 


POC Glucose (mg/dL)   (75-99)  mg/dL


 


Calcium  10.3 H  (8.4-10.2)  mg/dL


 


Ionized Calcium Harper   (4.5-5.3)  mg/dL


 


Alkaline Phosphatase   ()  U/L


 


Total Protein   (6.3-8.2)  g/dL


 


Albumin   (3.5-5.0)  g/dL








                      Microbiology - Last 24 Hours (Table)











 04/08/19 21:08 Urine Culture - Final





 Urine,Catheterized 


 


 04/08/19 18:00 Blood Culture - Preliminary





 Blood    No Growth after 24 hours














Assessment and Plan


Plan: 














Fevers:


 - Unknown Etiology, potential tumor fever although T-max in 24 hours 100.4, 

this is improved


 - Pan cultures ordered


 - Chest Xray Negative


 - Zosyn 





Leukocytosis: Likely reactive


 - Work-up for infectious process with fever, could be related to malignancy


 - Baseline 11-15


 - Primarily neutrophils


 - Worsening today - 33 - Likely related to stress dose steroids received on 

admission


 - Await tomorrow WBC with diff


 - Blood Cultures negative at 48 hours 


 - Urine Culture Negative 





NHL:


 - Most recently status post treatment with Hycela and bendamustine, status Post

Cycle 2 1/8/19


 - Prior to above status post treatment with R-Ceop


 - Highly suspicious for progrssion: CT C/A/P reviewed and discussed with 

patient, definite progression cannot be ruled out. 


 - Status Post EGD for concern of penetrative lymphoma to Gastric/Esophageal 

wall - Path did not identify evidence of malignancy


 - Retroperitoneal Biopsy today 4/10/19





Anorexia and weight loss:


 - Megace recently ordered





Persisitent Nausea and Abdominal Pain:


 - GI Work-up and CT Scans Reviewed from previous admission





Normocytic Anemia: Secondary to chemo and lymphoma


 - CBC and CMP in am


 - Hemoglobin Decreased 8


 - Initial drop 12-8.8. No active bleeding noted, likely from dilution


 - Will order anemia work-up





Myopathy and Debility:


 - Rec PT/OT wwork with Patient 





Hypercalcemia:


 - Uric Acid 4.2, Check Phos


 - Continue IVF, 


 - Secondary to dehydration malignancy, monitor for tumor lysis


 - CMP in am








Physcian Attest: I have completed the full history and physical and developed 

the complete impression and plan, agree with above, dictated as a scribe

## 2019-04-10 NOTE — P.PN
Subjective





Patient just returned from retroperitoneal mass biopsy.  Patient continues to 

complain of generalized pain and fatigue.  Patient has had consultation with 

oncology and pulmonology





Objective





- Vital Signs


Vital signs: 


                                   Vital Signs











Temp  98.3 F   04/10/19 13:08


 


Pulse  87   04/10/19 13:08


 


Resp  16   04/10/19 13:08


 


BP  95/58   04/10/19 13:08


 


Pulse Ox  97   04/10/19 13:08








                                 Intake & Output











 04/09/19 04/10/19 04/10/19





 18:59 06:59 18:59


 


Intake Total 750 850 


 


Balance 750 850 


 


Weight 58 kg 64 kg 


 


Intake:   


 


   350 


 


    Piperacillin-Tazobactam 3 100 50 





    .375 gm In Sodium   





    Chloride 0.9% 100 ml @ 25   





    mls/hr IVPB Q8H [facility] Rx#:   





    [id number]   


 


    Sodium Chloride 0.9% 1, 400 300 





    000 ml @ 50 mls/hr IV .   





    Q20H [facility] Rx#:[id number]   


 


    Vancomycin 1,000 mg In 250  





    Sodium Chloride 0.9% 250   





    ml @ 125 mls/hr IVPB Q12H   





    [facility] Rx#:[id number]   


 


  Intake, IV Titration  250 





  Amount   


 


    Vancomycin 1,000 mg In  250 





    Sodium Chloride 0.9% 250   





    ml @ 125 mls/hr IVPB Q12H   





    [facility] Rx#:[id number]   


 


  Oral  250 


 


Other:   


 


  Voiding Method Incontinent Incontinent 


 


  # Voids 2 1 














- Constitutional


General appearance: Present: mild distress





- EENT


Eyes: Present: PERRLA


Ears: bilateral: normal





- Neck


Neck: Present: normal ROM





- Respiratory


Respiratory: bilateral: diminished





- Cardiovascular


Rhythm: regular





- Gastrointestinal


General gastrointestinal: Present: soft





- Integumentary


Integumentary: Present: normal





- Neurologic


Neurologic: Present: CNII-XII intact





- Musculoskeletal


Musculoskeletal: Present: generalized weakness





- Psychiatric


Psychiatric Comment(s): 





Patient having problems with short-term memory


Psychiatric: Present: A&O x's 3, appropriate affect, intact judgment & insight





- Labs


CBC & Chem 7: 


                                 04/09/19 10:45





                                 04/10/19 09:15


Labs: 


                  Abnormal Lab Results - Last 24 Hours (Table)











  04/09/19 04/09/19 04/09/19 Range/Units





  10:45 13:03 17:15 


 


WBC  37.7 H    (3.8-10.6)  k/uL


 


Hgb  8.8 L D    (11.4-16.0)  gm/dL


 


Hct  31.0 L    (34.0-46.0)  %


 


MCH  23.0 L    (25.0-35.0)  pg


 


MCHC  28.4 L    (31.0-37.0)  g/dL


 


RDW  17.3 H    (11.5-15.5)  %


 


Plt Count  541 H    (150-450)  k/uL


 


Neutrophils #  35.5 H    (1.3-7.7)  k/uL


 


Lymphocytes #  0.4 L    (1.0-4.8)  k/uL


 


Monocytes #  1.3 H    (0-1.0)  k/uL


 


Glucose     (74-99)  mg/dL


 


POC Glucose (mg/dL)    256 H  (75-99)  mg/dL


 


Calcium     (8.4-10.2)  mg/dL


 


Ionized Calcium Harper   5.7 H   (4.5-5.3)  mg/dL














  04/09/19 04/10/19 04/10/19 Range/Units





  21:26 07:02 09:15 


 


WBC     (3.8-10.6)  k/uL


 


Hgb     (11.4-16.0)  gm/dL


 


Hct     (34.0-46.0)  %


 


MCH     (25.0-35.0)  pg


 


MCHC     (31.0-37.0)  g/dL


 


RDW     (11.5-15.5)  %


 


Plt Count     (150-450)  k/uL


 


Neutrophils #     (1.3-7.7)  k/uL


 


Lymphocytes #     (1.0-4.8)  k/uL


 


Monocytes #     (0-1.0)  k/uL


 


Glucose    173 H  (74-99)  mg/dL


 


POC Glucose (mg/dL)  291 H  224 H   (75-99)  mg/dL


 


Calcium    10.3 H  (8.4-10.2)  mg/dL


 


Ionized Calcium Harper     (4.5-5.3)  mg/dL














  04/10/19 Range/Units





  13:05 


 


WBC   (3.8-10.6)  k/uL


 


Hgb   (11.4-16.0)  gm/dL


 


Hct   (34.0-46.0)  %


 


MCH   (25.0-35.0)  pg


 


MCHC   (31.0-37.0)  g/dL


 


RDW   (11.5-15.5)  %


 


Plt Count   (150-450)  k/uL


 


Neutrophils #   (1.3-7.7)  k/uL


 


Lymphocytes #   (1.0-4.8)  k/uL


 


Monocytes #   (0-1.0)  k/uL


 


Glucose   (74-99)  mg/dL


 


POC Glucose (mg/dL)  207 H  (75-99)  mg/dL


 


Calcium   (8.4-10.2)  mg/dL


 


Ionized Calcium Harper   (4.5-5.3)  mg/dL








                      Microbiology - Last 24 Hours (Table)











 04/08/19 21:08 Urine Culture - Final





 Urine,Catheterized 


 


 04/08/19 18:00 Blood Culture - Preliminary





 Blood    No Growth after 24 hours














Assessment and Plan


Plan: 





Assessment


Fever of undetermined origin


Normocytic anemia


B-cell non-Hodgkin's lymphoma stage IV


Diabetes type 2


Postbiopsy retroperitoneal mass


Hypertension


Hyperlipidemia


Hypothyroidism


Malnutrition protein calorie deficiency anorexia


Leukocytosis


Pleural effusion


Sepsis pneumonia





Plan


Continue consultation with pulmonology and oncology


Patient remains no code

## 2019-04-10 NOTE — P.PN
Subjective


Progress Note Date: 04/10/19


Principal diagnosis: 


 Fever, 100 investigation, likely related to progression of non-Hodgkin's 

lymphoma





This is a 63-year-old female patient with known history of stage IV high-grade 

lymphoma who is coming in for episodes of fever and pneumonia was a 

consideration for that reason a pulmonary consultation was requested.  Please 

refer to the detailed oncology note regarding her cancer history.  The patient 

was initially found to have incidental retroperitoneal lymphadenopathy left more

than right with the largest lymph node measuring 4 cm in size.  The computed 

tomography scan of the abdomen and pelvis showed retroperitoneal adenopathy and 

some additional lymph nodes in the upper abdomen.  CBC was normal with some mild

white cell count elevation and neutropenia.  The patient underwent a 

retroperitoneal core biopsy back in April 2018 and the results were positive for

B-cell non-Hodgkin's lymphoma, CD5 negative.  The patient was diagnosed having a

marginal zone lymphoma.  PET scan showed the abdominal activity and addition to 

that there was some scattered areas in the liver that was also involved with the

disease.  MRI of the liver was of normal and a biopsy of the liver lesion 

confirmed the presence of small numbers of B-cell lymphocytes and was consistent

with lymphoma.  The bone marrow I'll see that was done in July 2018 was also 

consistent with lymphoma.  Upon initial monitoring, the patient progressed and a

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 

population and that point the patient was started on systemic chemotherapy and 

she received R-CEOP and the patient was not given Adriamycin as the patient was 

suspected of congestion heart failure with poor ejection fraction.  The patient 

completed her systemic chemotherapy and she had received Neulasta for 

neutropenia.  Back in November 2018 she had a brief hospitalization for 

generalized weakness and fatigue and confusion and she subsequently improved and

she was discharged home.  Most recent CAT scan of the abdomen all this and chest

and the PET scan showed progression of her disease specifically the CAT scan 

that was done on 03/20/2019 showed worsening in the mid abdominal 

retroperitoneal adenopathy that was consistent with her most recent PET scan 

that showed increased uptake at the involved area.  No new thoracic or pelvic 

adenopathy seen.  There was also CHF with some small bilateral pleural effusion 

and interstitial edema.





Currently the patient is having no significant shortness of breath.  She has 

low-grade fever.  Her cough is dry.  No significant sputum production.  No 

pleurisy.  No hemoptysis.  She had diminished appetite.  No nausea vomiting or 

diarrhea.  No aspiration.  Initial white count was 21.3 and currently is up to 

37.7.  The renal function is within normal limits.  BNP level is 4720 and 

influenza screen was negative and the patient was placed on a combination of 

Zosyn and vancomycin.  She is hemodynamically stable at this point in time.  On 

a separate note, the most and echocardiogram that was done on 10/05/2018 showed 

preserved LV function with an ejection fraction of 55%.  No valvular 

abnormalities noted. 





On 04/10/2019 patient seen in follow-up on medical surgical floor.  Patient is 

scheduled for a biopsy of the retroperitoneal mass today by interventional 

radiology.  She is up in the wheelchair, getting ready to be taken downstairs, 

she had been given some occasion for anxiety, but she is awake and alert, on 2 L

of oxygen her pulse ox is 97%, she is afebrile, she denies any shortness of 

breath.  Lung sounds are clear, diminished at the bases, last episode of fever 

was yesterday at 2140 with a temperature of 100.4F, patient has been afebrile 

overnight.  Urine and blood culture showed no growth.  Today's lab work showed 

BNP and renal profile within normal limits.  











Objective





- Vital Signs


Vital signs: 


                                   Vital Signs











Temp  98.3 F   04/10/19 13:23


 


Pulse  82   04/10/19 13:23


 


Resp  16   04/10/19 13:23


 


BP  107/59   04/10/19 13:23


 


Pulse Ox  97   04/10/19 13:23








                                 Intake & Output











 04/09/19 04/10/19 04/10/19





 18:59 06:59 18:59


 


Intake Total 750 850 


 


Balance 750 850 


 


Weight 58 kg 64 kg 


 


Intake:   


 


   350 


 


    Piperacillin-Tazobactam 3 100 50 





    .375 gm In Sodium   





    Chloride 0.9% 100 ml @ 25   





    mls/hr IVPB Q8H [facility] Rx#:   





    [id number]   


 


    Sodium Chloride 0.9% 1, 400 300 





    000 ml @ 50 mls/hr IV .   





    Q20H [facility] Rx#:[id number]   


 


    Vancomycin 1,000 mg In 250  





    Sodium Chloride 0.9% 250   





    ml @ 125 mls/hr IVPB Q12H   





    [facility] Rx#:[id number]   


 


  Intake, IV Titration  250 





  Amount   


 


    Vancomycin 1,000 mg In  250 





    Sodium Chloride 0.9% 250   





    ml @ 125 mls/hr IVPB Q12H   





    [facility] Rx#:[id number]   


 


  Oral  250 


 


Other:   


 


  Voiding Method Incontinent Incontinent 


 


  # Voids 2 1 














- Exam


 GENERAL EXAM: Alert, pleasant, frail and pale 63-year-old white female on 2 L 

of oxygen, comfortable in no apparent distress.


HEAD: Normocephalic/atraumatic.


EYES: Normal reaction of pupils, equal size.  Conjunctiva pink, sclera white.


NOSE: Clear with pink turbinates.


THROAT: No erythema or exudates.


NECK: No masses, no JVD, no thyroid enlargement, no adenopathy.


CHEST: No chest wall deformity.  Symmetrical expansion.  Right chest MediPort is

in place, access, with IV fluids infusing through it


LUNGS: Equal air entry with no crackles, wheeze, rhonchi or dullness.  Image 

breath sounds at the bases


CVS: Regular rate and rhythm, normal S1 and S2, no gallops, no murmurs, no rubs


ABDOMEN: Soft, nontender.  No hepatosplenomegaly, normal bowel sounds, no 

guarding or rigidity.


EXTREMITIES: No clubbing, no edema, no cyanosis, 2+ pulses and upper and lower 

extremities.


MUSCULOSKELETAL: Muscle strength and tone normal.


SPINE: No scoliosis or deformity


SKIN: No rashes


CENTRAL NERVOUS SYSTEM: Alert and oriented -3.  No focal deficits, tone is 

normal in all 4 extremities.


PSYCHIATRIC: Alert and oriented -3.  Appropriate affect.  Intact judgment and 

insight.











- Labs


CBC & Chem 7: 


                                 04/09/19 10:45





                                 04/10/19 09:15


Labs: 


                  Abnormal Lab Results - Last 24 Hours (Table)











  04/09/19 04/09/19 04/09/19 Range/Units





  10:45 17:15 21:26 


 


WBC  37.7 H    (3.8-10.6)  k/uL


 


Hgb  8.8 L D    (11.4-16.0)  gm/dL


 


Hct  31.0 L    (34.0-46.0)  %


 


MCH  23.0 L    (25.0-35.0)  pg


 


MCHC  28.4 L    (31.0-37.0)  g/dL


 


RDW  17.3 H    (11.5-15.5)  %


 


Plt Count  541 H    (150-450)  k/uL


 


Neutrophils #  35.5 H    (1.3-7.7)  k/uL


 


Lymphocytes #  0.4 L    (1.0-4.8)  k/uL


 


Monocytes #  1.3 H    (0-1.0)  k/uL


 


Glucose     (74-99)  mg/dL


 


POC Glucose (mg/dL)   256 H  291 H  (75-99)  mg/dL


 


Calcium     (8.4-10.2)  mg/dL














  04/10/19 04/10/19 04/10/19 Range/Units





  07:02 09:15 13:05 


 


WBC     (3.8-10.6)  k/uL


 


Hgb     (11.4-16.0)  gm/dL


 


Hct     (34.0-46.0)  %


 


MCH     (25.0-35.0)  pg


 


MCHC     (31.0-37.0)  g/dL


 


RDW     (11.5-15.5)  %


 


Plt Count     (150-450)  k/uL


 


Neutrophils #     (1.3-7.7)  k/uL


 


Lymphocytes #     (1.0-4.8)  k/uL


 


Monocytes #     (0-1.0)  k/uL


 


Glucose   173 H   (74-99)  mg/dL


 


POC Glucose (mg/dL)  224 H   207 H  (75-99)  mg/dL


 


Calcium   10.3 H   (8.4-10.2)  mg/dL








                      Microbiology - Last 24 Hours (Table)











 04/08/19 21:08 Urine Culture - Final





 Urine,Catheterized 


 


 04/08/19 18:00 Blood Culture - Preliminary





 Blood    No Growth after 24 hours














Assessment and Plan


Plan: 


 Assessment:





1 fever under investigation, consider fever secondary to malignancy, she will 

fever very much likely.  Infections are felt to be less likely at this point 

although cultures of been sent and broad-spectrum antibiotics have been 

initiated





2 bilateral pleural effusion, chronic, consider lymphomatous effusion





3 leukocytosis





4 stage IV non-Hodgkin's lymphoma and the patient has completed R-CEOP and 

subsequently the disease progress and the patient has received Hycela and  

bendamustine, status Post Cycle 2 1/8/19





5 anorexia





6 normocytic anemia secondary to systemic chemotherapy/lymphoma





7 hypertension





8 hyperlipidemia





9 hypothyroidism





10 diabetes mellitus





Plan:





Continue same antibiotic coverage, fever is likely related to progression of 

underlying malignancy, and patient is undergoing biopsy of the retroperitoneal 

mass today.  No pulmonary complaints, no shortness of breath, no cough or 

congestion.  Afebrile overnight, hemodynamically stable.  Medical oncology is 

following.





I performed a history & physical examination of the patient and discussed their 

management with my nurse practitioner, Yulissa Yanez.  I reviewed the nurse 

practitioner's note and agree with the documented findings and plan of care.  

Lung sounds are positive for clear breath sounds, diminished at the bases.  The 

findings and the impression was discussed with the patient.  I attest to the 

documentation by the nurse practitioner. 





Time with Patient: Less than 30

## 2019-04-10 NOTE — CDI
Documentation Clarification Form



Date: 4/10/2019 1:43:26 PM

From: Whit Barry RN, CCDS

Phone: [phone]

MRN: [id number]

Admit Date: 4/8/2019 9:14:00 PM

Patient Name: Christina Falcon

Visit Number: [id number]

Discharge Date:  





ATTENTION: The Clinical Documentation Specialists (CDI) and [facility] Coding Staff 
appreciate your assistance in clarifying documentation. Please respond to the 
clarification below the line at the bottom and electronically sign. The CDI & 
[facility] Coding staff will review the response and follow-up if needed. Please note: 
Queries are made part of the Legal Health Record. If you have any questions, 
please contact the author of this message via ITS.



Dr. Faustino Villegas







CHF is documented in the past medical history and is noted on chest ray.



History/Risk Factors: B-cell non-Hodgkin 's lymphoma stage IV, Heart failure, 
Diabetes Mellitus, Hypertension



Clinical Indicators: 63-year-old female presenting with complaints of fever, 
pallor, some shortness of breath. 

VS/Pulse OX: 118/73 95 136 99.2 96 % RA

BNP: 4720

Echocardiogram Results: 10/5/2018 EF 55 % (per Dr. Montez consult on 4/9/18)

Chest X Ray: Impression: Congestive heart failure with plural effusions. No 
significant change



Treatment:  

Lasix po daily



In your professional opinion, can you please clarify the acuity and type of CHF 
if known?

Systolic Heart Failure:

 Acute 

 Chronic

 Acute on Chronic  



Diastolic Heart Failure:

 Acute 

 Chronic

 Acute on Chronic



Systolic & Diastolic Heart Failure:

 Acute 

 Chronic

 Acute on Chronic Heart Failure



Unable to Determine

Other, please specify



(Last Revision: April 2018)

___________________________________________________________________________



MTDD

## 2019-04-10 NOTE — CT
EXAMINATION TYPE: CT biopsy abdomen percutaneous

 

DATE OF EXAM: 4/10/2019

 

HISTORY: Retroperitoneal mass, lymphoma

 

COMPARISON: CT 3/19/2019

 

Maximal barrier technique was utilized.  The skin overlying a suitable path to the lesion was localiz
ed using CT and the overlying skin was prepped and draped.  Lidocaine used for local anesthesia.  A s
kin nick made with a scalpel.  Using CT guidance, access was gained to the right-sided retroperitonea
l mass with a 17-gauge guide needle. Coaxial placement of an 18-gauge needle was performed and core s
pecimen submitted to cytology.  2 passes were performed in all.  Following the procedure no immediate
 complications.   The patient is discharged in stable condition.  Hemostasis achieved.  

 

IMPRESSION: 

 

SUCCESSFUL CT GUIDED RETROPERITONEAL CORE BIOPSY.  PATHOLOGY PENDING.  THIS PROCEDURE WAS PERFORMED B
Y THE UNDERSIGNED.

## 2019-04-11 LAB
ALBUMIN SERPL-MCNC: 2.3 G/DL (ref 3.5–5)
ALP SERPL-CCNC: 492 U/L (ref 38–126)
ALT SERPL-CCNC: 25 U/L (ref 9–52)
ANION GAP SERPL CALC-SCNC: 14 MMOL/L
AST SERPL-CCNC: 14 U/L (ref 14–36)
BUN SERPL-SCNC: 24 MG/DL (ref 7–17)
CALCIUM SPEC-MCNC: 10.3 MG/DL (ref 8.4–10.2)
CELLS COUNTED: 100
CHLORIDE SERPL-SCNC: 106 MMOL/L (ref 98–107)
CO2 SERPL-SCNC: 19 MMOL/L (ref 22–30)
ERYTHROCYTE [DISTWIDTH] IN BLOOD BY AUTOMATED COUNT: 3.27 M/UL (ref 3.8–5.4)
ERYTHROCYTE [DISTWIDTH] IN BLOOD: 17.8 % (ref 11.5–15.5)
GLUCOSE BLD-MCNC: 152 MG/DL (ref 75–99)
GLUCOSE BLD-MCNC: 238 MG/DL (ref 75–99)
GLUCOSE BLD-MCNC: 357 MG/DL (ref 75–99)
GLUCOSE BLD-MCNC: 396 MG/DL (ref 75–99)
GLUCOSE SERPL-MCNC: 387 MG/DL (ref 74–99)
HCT VFR BLD AUTO: 28.2 % (ref 34–46)
HGB BLD-MCNC: 7.6 GM/DL (ref 11.4–16)
IGA SERPL-MCNC: 144 MG/DL (ref 60–350)
IGG SERPL-MCNC: 665 MG/DL (ref 700–1600)
IGM SERPL-MCNC: <16.9 MG/DL (ref 40–280)
LDH SPEC-CCNC: 252 U/L (ref 313–618)
LYMPHOCYTES # BLD MANUAL: 0.49 K/UL (ref 1–4.8)
MAGNESIUM SPEC-SCNC: 1.2 MG/DL (ref 1.6–2.3)
MCH RBC QN AUTO: 23.3 PG (ref 25–35)
MCHC RBC AUTO-ENTMCNC: 27.1 G/DL (ref 31–37)
MCV RBC AUTO: 86.2 FL (ref 80–100)
MONOCYTES # BLD MANUAL: 1.48 K/UL (ref 0–1)
NEUTROPHILS NFR BLD MANUAL: 96 %
NEUTS SEG # BLD MANUAL: 47.42 K/UL (ref 1.3–7.7)
PLATELET # BLD AUTO: 588 K/UL (ref 150–450)
POTASSIUM SERPL-SCNC: 4.3 MMOL/L (ref 3.5–5.1)
PROT SERPL-MCNC: 4.9 G/DL (ref 6.3–8.2)
SODIUM SERPL-SCNC: 139 MMOL/L (ref 137–145)
URATE SERPL-MCNC: 4.6 MG/DL (ref 3.7–7.4)
WBC # BLD AUTO: 49.4 K/UL (ref 3.8–10.6)

## 2019-04-11 RX ADMIN — POTASSIUM CHLORIDE SCH MEQ: 750 TABLET, EXTENDED RELEASE ORAL at 09:01

## 2019-04-11 RX ADMIN — SODIUM CHLORIDE SCH MLS/HR: 9 INJECTION, SOLUTION INTRAVENOUS at 09:03

## 2019-04-11 RX ADMIN — INSULIN ASPART SCH UNIT: 100 INJECTION, SOLUTION INTRAVENOUS; SUBCUTANEOUS at 17:43

## 2019-04-11 RX ADMIN — PIPERACILLIN AND TAZOBACTAM SCH MLS/HR: 3; .375 INJECTION, POWDER, FOR SOLUTION INTRAVENOUS at 12:21

## 2019-04-11 RX ADMIN — FUROSEMIDE SCH MG: 20 TABLET ORAL at 09:00

## 2019-04-11 RX ADMIN — POTASSIUM CHLORIDE SCH MEQ: 750 TABLET, EXTENDED RELEASE ORAL at 21:27

## 2019-04-11 RX ADMIN — INSULIN DETEMIR SCH UNIT: 100 INJECTION, SOLUTION SUBCUTANEOUS at 21:28

## 2019-04-11 RX ADMIN — THERA TABS SCH EACH: TAB at 12:36

## 2019-04-11 RX ADMIN — INSULIN ASPART SCH: 100 INJECTION, SOLUTION INTRAVENOUS; SUBCUTANEOUS at 21:24

## 2019-04-11 RX ADMIN — LEVOTHYROXINE SODIUM SCH MCG: 0.12 TABLET ORAL at 06:25

## 2019-04-11 RX ADMIN — HYDROCODONE BITARTRATE AND ACETAMINOPHEN PRN EACH: 7.5; 325 TABLET ORAL at 09:37

## 2019-04-11 RX ADMIN — CEFAZOLIN SCH MLS/HR: 330 INJECTION, POWDER, FOR SOLUTION INTRAMUSCULAR; INTRAVENOUS at 12:26

## 2019-04-11 RX ADMIN — PIPERACILLIN AND TAZOBACTAM SCH MLS/HR: 3; .375 INJECTION, POWDER, FOR SOLUTION INTRAVENOUS at 04:32

## 2019-04-11 RX ADMIN — PANTOPRAZOLE SODIUM SCH MG: 40 TABLET, DELAYED RELEASE ORAL at 07:56

## 2019-04-11 RX ADMIN — GABAPENTIN SCH MG: 100 CAPSULE ORAL at 21:28

## 2019-04-11 RX ADMIN — GABAPENTIN SCH MG: 100 CAPSULE ORAL at 09:00

## 2019-04-11 RX ADMIN — LORATADINE SCH MG: 10 TABLET ORAL at 21:27

## 2019-04-11 RX ADMIN — ATORVASTATIN CALCIUM SCH MG: 40 TABLET, FILM COATED ORAL at 21:28

## 2019-04-11 RX ADMIN — INSULIN ASPART SCH UNIT: 100 INJECTION, SOLUTION INTRAVENOUS; SUBCUTANEOUS at 12:36

## 2019-04-11 RX ADMIN — PIPERACILLIN AND TAZOBACTAM SCH MLS/HR: 3; .375 INJECTION, POWDER, FOR SOLUTION INTRAVENOUS at 18:04

## 2019-04-11 RX ADMIN — INSULIN ASPART SCH UNIT: 100 INJECTION, SOLUTION INTRAVENOUS; SUBCUTANEOUS at 07:56

## 2019-04-11 RX ADMIN — HYDROCODONE BITARTRATE AND ACETAMINOPHEN PRN EACH: 7.5; 325 TABLET ORAL at 19:07

## 2019-04-11 RX ADMIN — CITALOPRAM HYDROBROMIDE SCH MG: 20 TABLET ORAL at 21:28

## 2019-04-11 NOTE — P.PN
Subjective





Patient resting in bed complains of generalized pain.  Awaiting results from 

retroperitoneal mass biopsy.  There is family discussion about going to 

[facility] rehab center.  Echo ordered to confirm congestive heart failure.  

Patient is becoming increasingly confused noted recent MRI no masses noted





Objective





- Vital Signs


Vital signs: 


                                   Vital Signs











Temp  97.6 F   04/11/19 05:20


 


Pulse  71   04/11/19 05:20


 


Resp  16   04/11/19 10:20


 


BP  94/54   04/11/19 05:20


 


Pulse Ox  92 L  04/11/19 05:20








                                 Intake & Output











 04/10/19 04/11/19 04/11/19





 18:59 06:59 18:59


 


Intake Total 5020  


 


Balance 5020  


 


Weight  66.5 kg 


 


Intake:   


 


    


 


    Piperacillin-Tazobactam 3 100  





    .375 gm In Sodium   





    Chloride 0.9% 100 ml @ 25   





    mls/hr IVPB Q8H [facility] Rx#:   





    [id number]   


 


    Sodium Chloride 0.9% 1, 600  





    000 ml @ 50 mls/hr IV .   





    Q20H [facility] Rx#:[id number]   


 


  Oral 4320  


 


Other:   


 


  Voiding Method Incontinent Toilet Toilet





  Bedside Commode 


 


  # Voids 3 1 














- Constitutional


General appearance: Present: mild distress





- EENT


Eyes: Present: PERRLA


Ears: bilateral: normal





- Neck


Neck: Present: normal ROM





- Respiratory


Respiratory: bilateral: CTA





- Cardiovascular


Rhythm: regular





- Integumentary


Integumentary: Present: normal





- Neurologic


Neurologic: Present: CNII-XII intact





- Musculoskeletal


Musculoskeletal: Present: generalized weakness





- Psychiatric


Psychiatric Comment(s): 





Patient having difficulty with short-term memory increased confusion noted


Psychiatric: Present: appropriate affect





- Labs


CBC & Chem 7: 


                                 04/11/19 08:56





                                 04/11/19 08:56


Labs: 


                  Abnormal Lab Results - Last 24 Hours (Table)











  04/10/19 04/10/19 04/10/19 Range/Units





  13:05 13:15 13:15 


 


WBC   33.4 H   (3.8-10.6)  k/uL


 


RBC   3.43 L   (3.80-5.40)  m/uL


 


Hgb   8.0 L   (11.4-16.0)  gm/dL


 


Hct   29.0 L   (34.0-46.0)  %


 


MCH   23.2 L   (25.0-35.0)  pg


 


MCHC   27.4 L   (31.0-37.0)  g/dL


 


RDW   16.5 H   (11.5-15.5)  %


 


Plt Count   550 H   (150-450)  k/uL


 


Neutrophils #   32.2 H   (1.3-7.7)  k/uL


 


Lymphocytes #   0.3 L   (1.0-4.8)  k/uL


 


Carbon Dioxide     (22-30)  mmol/L


 


BUN     (7-17)  mg/dL


 


Glucose     (74-99)  mg/dL


 


POC Glucose (mg/dL)  207 H    (75-99)  mg/dL


 


Calcium     (8.4-10.2)  mg/dL


 


Alkaline Phosphatase     ()  U/L


 


Total Protein     (6.3-8.2)  g/dL


 


Albumin     (3.5-5.0)  g/dL


 


IgG    665.0 L  (700.0-1600.0)  mg/dL


 


IgM    <16.9 L  (40.0-280.0)  mg/dL














  04/10/19 04/10/19 04/10/19 Range/Units





  16:45 20:28 23:13 


 


WBC     (3.8-10.6)  k/uL


 


RBC     (3.80-5.40)  m/uL


 


Hgb     (11.4-16.0)  gm/dL


 


Hct     (34.0-46.0)  %


 


MCH     (25.0-35.0)  pg


 


MCHC     (31.0-37.0)  g/dL


 


RDW     (11.5-15.5)  %


 


Plt Count     (150-450)  k/uL


 


Neutrophils #     (1.3-7.7)  k/uL


 


Lymphocytes #     (1.0-4.8)  k/uL


 


Carbon Dioxide     (22-30)  mmol/L


 


BUN     (7-17)  mg/dL


 


Glucose     (74-99)  mg/dL


 


POC Glucose (mg/dL)  272 H  317 H  367 H  (75-99)  mg/dL


 


Calcium     (8.4-10.2)  mg/dL


 


Alkaline Phosphatase     ()  U/L


 


Total Protein     (6.3-8.2)  g/dL


 


Albumin     (3.5-5.0)  g/dL


 


IgG     (700.0-1600.0)  mg/dL


 


IgM     (40.0-280.0)  mg/dL














  04/11/19 04/11/19 04/11/19 Range/Units





  06:56 08:56 08:56 


 


WBC   49.4 H   (3.8-10.6)  k/uL


 


RBC   3.27 L   (3.80-5.40)  m/uL


 


Hgb   7.6 L   (11.4-16.0)  gm/dL


 


Hct   28.2 L   (34.0-46.0)  %


 


MCH   23.3 L   (25.0-35.0)  pg


 


MCHC   27.1 L   (31.0-37.0)  g/dL


 


RDW   17.8 H   (11.5-15.5)  %


 


Plt Count   588 H   (150-450)  k/uL


 


Neutrophils #     (1.3-7.7)  k/uL


 


Lymphocytes #     (1.0-4.8)  k/uL


 


Carbon Dioxide    19 L  (22-30)  mmol/L


 


BUN    24 H  (7-17)  mg/dL


 


Glucose    387 H  (74-99)  mg/dL


 


POC Glucose (mg/dL)  396 H    (75-99)  mg/dL


 


Calcium    10.3 H  (8.4-10.2)  mg/dL


 


Alkaline Phosphatase    492 H  ()  U/L


 


Total Protein    4.9 L  (6.3-8.2)  g/dL


 


Albumin    2.3 L  (3.5-5.0)  g/dL


 


IgG     (700.0-1600.0)  mg/dL


 


IgM     (40.0-280.0)  mg/dL














  04/11/19 Range/Units





  11:43 


 


WBC   (3.8-10.6)  k/uL


 


RBC   (3.80-5.40)  m/uL


 


Hgb   (11.4-16.0)  gm/dL


 


Hct   (34.0-46.0)  %


 


MCH   (25.0-35.0)  pg


 


MCHC   (31.0-37.0)  g/dL


 


RDW   (11.5-15.5)  %


 


Plt Count   (150-450)  k/uL


 


Neutrophils #   (1.3-7.7)  k/uL


 


Lymphocytes #   (1.0-4.8)  k/uL


 


Carbon Dioxide   (22-30)  mmol/L


 


BUN   (7-17)  mg/dL


 


Glucose   (74-99)  mg/dL


 


POC Glucose (mg/dL)  357 H  (75-99)  mg/dL


 


Calcium   (8.4-10.2)  mg/dL


 


Alkaline Phosphatase   ()  U/L


 


Total Protein   (6.3-8.2)  g/dL


 


Albumin   (3.5-5.0)  g/dL


 


IgG   (700.0-1600.0)  mg/dL


 


IgM   (40.0-280.0)  mg/dL








                      Microbiology - Last 24 Hours (Table)











 04/08/19 18:00 Blood Culture - Preliminary





 Blood    No Growth after 48 hours














Assessment and Plan


Plan: 





Assessment


Fever undetermined origin


Leukocytosis


Normocytic anemia 


retroperitoneal mass post biopsy


B-cell non-Hodgkin's lymphoma stage IV


Sepsis pneumonia


Hypertension


Hyperlipidemia


Hypothyroidism


Sleep apnea uses CPAP


Pleural effusions


Congestive heart failure per chest x-ray echo ordered for clarification ejection

fraction normal 10/05/2018


Fatigue and weakness


Metabolic encephalopathy





Plan


Continue consultation with oncology and pulmonology


 End plan transferred to rehab in [place]

## 2019-04-11 NOTE — P.PN
Subjective


Progress Note Date: 04/11/19


Principal diagnosis: 





Fever 





Status Post RP biopsy, no acute complaints. Afebrile greater than 24 hours. 





Objective





- Vital Signs


Vital signs: 


                                   Vital Signs











Temp  98.3 F   04/11/19 12:21


 


Pulse  84   04/11/19 12:21


 


Resp  16   04/11/19 12:21


 


BP  119/74   04/11/19 12:21


 


Pulse Ox  94 L  04/11/19 12:21








                                 Intake & Output











 04/10/19 04/11/19 04/11/19





 18:59 06:59 18:59


 


Intake Total 5020  4190


 


Balance 5020  4190


 


Weight  66.5 kg 


 


Intake:   


 


    


 


    Piperacillin-Tazobactam 3 100  





    .375 gm In Sodium   





    Chloride 0.9% 100 ml @ 25   





    mls/hr IVPB Q8H [facility] Rx#:   





    [id number]   


 


    Sodium Chloride 0.9% 1, 600  





    000 ml @ 50 mls/hr IV .   





    Q20H [facility] Rx#:[id number]   


 


  Intake, IV Titration   950





  Amount   


 


    Piperacillin-Tazobactam 3   100





    .375 gm In Sodium   





    Chloride 0.9% 100 ml @ 25   





    mls/hr IVPB Q8H [facility] Rx#:   





    [id number]   


 


    Sodium Chloride 0.9% 1,   600





    000 ml @ 50 mls/hr IV .   





    Q20H [facility] Rx#:[id number]   


 


    Vancomycin 1,000 mg In   250





    Sodium Chloride 0.9% 250   





    ml @ 125 mls/hr IVPB   





    Q24HR [facility] Rx#:[id number]   


 


  Oral 4320  3240


 


Other:   


 


  Voiding Method Incontinent Toilet Toilet





  Bedside Commode 


 


  # Voids 3 1 3














- Exam





Gen: Alert and Oriented, NAD


Head: NC, NT


Neck: Supple


Mouth: Dry no lesions


Lungs: Diminished bibasilar, no increased effort noted


Heart RRR, S1


Abdomen S/ND/NT


Ext: BLE 1+ Edema


Neuro: No sensory or motor Deficits. 





- Labs


CBC & Chem 7: 


                                 04/11/19 08:56





                                 04/11/19 08:56


Labs: 


                  Abnormal Lab Results - Last 24 Hours (Table)











  04/10/19 04/10/19 04/10/19 Range/Units





  13:15 16:45 20:28 


 


WBC     (3.8-10.6)  k/uL


 


RBC     (3.80-5.40)  m/uL


 


Hgb     (11.4-16.0)  gm/dL


 


Hct     (34.0-46.0)  %


 


MCH     (25.0-35.0)  pg


 


MCHC     (31.0-37.0)  g/dL


 


RDW     (11.5-15.5)  %


 


Plt Count     (150-450)  k/uL


 


Neutrophils # (Manual)     (1.3-7.7)  k/uL


 


Lymphocytes # (Manual)     (1.0-4.8)  k/uL


 


Monocytes # (Manual)     (0-1.0)  k/uL


 


Carbon Dioxide     (22-30)  mmol/L


 


BUN     (7-17)  mg/dL


 


Glucose     (74-99)  mg/dL


 


POC Glucose (mg/dL)   272 H  317 H  (75-99)  mg/dL


 


Calcium     (8.4-10.2)  mg/dL


 


Alkaline Phosphatase     ()  U/L


 


Total Protein     (6.3-8.2)  g/dL


 


Albumin     (3.5-5.0)  g/dL


 


IgG  665.0 L    (700.0-1600.0)  mg/dL


 


IgM  <16.9 L    (40.0-280.0)  mg/dL














  04/10/19 04/11/19 04/11/19 Range/Units





  23:13 06:56 08:56 


 


WBC    49.4 H  (3.8-10.6)  k/uL


 


RBC    3.27 L  (3.80-5.40)  m/uL


 


Hgb    7.6 L  (11.4-16.0)  gm/dL


 


Hct    28.2 L  (34.0-46.0)  %


 


MCH    23.3 L  (25.0-35.0)  pg


 


MCHC    27.1 L  (31.0-37.0)  g/dL


 


RDW    17.8 H  (11.5-15.5)  %


 


Plt Count    588 H  (150-450)  k/uL


 


Neutrophils # (Manual)    47.42 H  (1.3-7.7)  k/uL


 


Lymphocytes # (Manual)    0.49 L  (1.0-4.8)  k/uL


 


Monocytes # (Manual)    1.48 H  (0-1.0)  k/uL


 


Carbon Dioxide     (22-30)  mmol/L


 


BUN     (7-17)  mg/dL


 


Glucose     (74-99)  mg/dL


 


POC Glucose (mg/dL)  367 H  396 H   (75-99)  mg/dL


 


Calcium     (8.4-10.2)  mg/dL


 


Alkaline Phosphatase     ()  U/L


 


Total Protein     (6.3-8.2)  g/dL


 


Albumin     (3.5-5.0)  g/dL


 


IgG     (700.0-1600.0)  mg/dL


 


IgM     (40.0-280.0)  mg/dL














  04/11/19 04/11/19 Range/Units





  08:56 11:43 


 


WBC    (3.8-10.6)  k/uL


 


RBC    (3.80-5.40)  m/uL


 


Hgb    (11.4-16.0)  gm/dL


 


Hct    (34.0-46.0)  %


 


MCH    (25.0-35.0)  pg


 


MCHC    (31.0-37.0)  g/dL


 


RDW    (11.5-15.5)  %


 


Plt Count    (150-450)  k/uL


 


Neutrophils # (Manual)    (1.3-7.7)  k/uL


 


Lymphocytes # (Manual)    (1.0-4.8)  k/uL


 


Monocytes # (Manual)    (0-1.0)  k/uL


 


Carbon Dioxide  19 L   (22-30)  mmol/L


 


BUN  24 H   (7-17)  mg/dL


 


Glucose  387 H   (74-99)  mg/dL


 


POC Glucose (mg/dL)   357 H  (75-99)  mg/dL


 


Calcium  10.3 H   (8.4-10.2)  mg/dL


 


Alkaline Phosphatase  492 H   ()  U/L


 


Total Protein  4.9 L   (6.3-8.2)  g/dL


 


Albumin  2.3 L   (3.5-5.0)  g/dL


 


IgG    (700.0-1600.0)  mg/dL


 


IgM    (40.0-280.0)  mg/dL








                      Microbiology - Last 24 Hours (Table)











 04/08/19 18:00 Blood Culture - Preliminary





 Blood    No Growth after 48 hours














Assessment and Plan


Plan: 














Fevers:


 - Unknown Etiology, potential tumor fever although T-max in 24 hours 100.4, 

this is improved


 - Pan cultures ordered


 - Chest Xray Negative


 - Zosyn 





Leukocytosis: worsening, primarily neutrophils, although no known or obvious 

infectious cause. 


 - Work-up for infectious process with fever, could be related to malignancy


 - Baseline 11-15


 - Primarily neutrophils


 - Worsening today - 49- Likely related to stress dose steroids received on ad

mission


 - Await tomorrow WBC with diff


 - Blood Cultures negative at 48 hours 


 - Urine Culture Negative 


 - Will repeat Tumor Lysis labs todays checking phos and mag as well. 








NHL:


 - Most recently status post treatment with Hycela and bendamustine, status Post

Cycle 2 1/8/19


 - Prior to above status post treatment with R-Ceop


 - Highly suspicious for progrssion: CT C/A/P reviewed and discussed with 

patient, definite progression cannot be ruled out. 


 - Status Post EGD for concern of penetrative lymphoma to Gastric/Esophageal 

wall - Path did not identify evidence of malignancy


 - Retroperitoneal Biopsy4/10/19





Anorexia and weight loss:


 - Megace recently ordered





Persisitent Nausea and Abdominal Pain:


 - GI Work-up and CT Scans Reviewed from previous admission





Normocytic Anemia: Secondary to chemo and lymphoma


 - CBC and CMP in am


 - Hemoglobin Decreased 8


 - Initial drop 12-8.8. No active bleeding noted, likely from dilution


 - Will order anemia work-up





Myopathy and Debility:


 - Rec PT/OT wwork with Patient 





Hypercalcemia:


 - Uric Acid 4.2, Check Phos


 - Continue IVF, 


 - Secondary to dehydration malignancy, monitor for tumor lysis


 - CMP in am








Physcian Attest: I have completed the full history and physical and developed 

the complete impression and plan, agree with above, dictated as a scribe

## 2019-04-11 NOTE — ECHOF
Referral Reason:chf per chest xray



MEASUREMENTS

--------

HEIGHT: 157.5 cm

WEIGHT: 66.2 kg

BP: 111/68

RVIDd:   1.8 cm     (< 3.3)

IVSd:   0.7 cm     (0.6 - 1.1)

LVIDd:   5.9 cm     (3.9 - 5.3)

LVPWd:   1.1 cm     (0.6 - 1.1)

IVSs:   0.9 cm

LVIDs:   5.4 cm

LVPWs:   1.5 cm

LA Diam:   4.0 cm     (2.7 - 3.8)

LAESV Index (A-L):   36.28 ml/m

Ao Diam:   3.1 cm     (2.0 - 3.7)

AV Cusp:   1.5 cm     (1.5 - 2.6)

LA Diam:   4.0 cm     (2.7 - 3.8)

MV EXCURSION:   18.048 mm     (> 18.000)

MV EF SLOPE:   83 mm/s     (70 - 150)

EPSS:   2.7 cm

MV E Juan M:   1.01 m/s

MV DecT:   129 ms

MV A Juan M:   0.73 m/s

MV E/A Ratio:   1.39 

RAP:   5.00 mmHg

RVSP:   39.18 mmHg







FINDINGS

--------

Undetermined rhythm.

This was a technically good study.

The left ventricular size is normal.   Left ventricular wall thickness is normal.   There is severe g
lobal hypokinesis of LV .   Overall left ventricular systolic function is severely impaired with, an 
EF < 20%.

The right ventricle is normal in size.

The left atrium is markedly dilated.   LA is severely dilated >40 ml/m2

The right atrial size is normal.

The aortic valve is trileaflet, and appears structurally normal. No aortic stenosis or regurgitation.


Mild mitral annular calcification present.   Moderate-to-severe mitral regurgitation is present.

Mild tricuspid regurgitation present.   There is no evidence of pulmonary hypertension.   The right v
entricular systolic pressure, as measured by Doppler, is 39.18mmHg.

There is no pulmonic regurgitation present.

The aortic root size is normal.

There is no pericardial effusion.



CONCLUSIONS

--------

1. The left ventricular size is normal.

2. Left ventricular wall thickness is normal.

3. There is severe global hypokinesis of LV .

4. Overall left ventricular systolic function is severely impaired with, an EF < 20%.

5. The right ventricle is normal in size.

6. The left atrium is markedly dilated.

7. LA is severely dilated >40 ml/m2

8. The right atrial size is normal.

9. The aortic valve is trileaflet, and appears structurally normal. No aortic stenosis or regurgitati
on.

10. Mild mitral annular calcification present.

11. Moderate-to-severe mitral regurgitation is present.

12. Mild tricuspid regurgitation present.

13. There is no evidence of pulmonary hypertension.

14. The right ventricular systolic pressure, as measured by Doppler, is 39.18mmHg.

15. There is no pulmonic regurgitation present.

16. The aortic root size is normal.

17. There is no pericardial effusion.





SONOGRAPHER: Yesenia House RDCS

## 2019-04-12 LAB
ALBUMIN SERPL-MCNC: 2.5 G/DL (ref 3.5–5)
ALP SERPL-CCNC: 609 U/L (ref 38–126)
ALT SERPL-CCNC: 31 U/L (ref 9–52)
ANION GAP SERPL CALC-SCNC: 12 MMOL/L
AST SERPL-CCNC: 26 U/L (ref 14–36)
BASOPHILS # BLD AUTO: 0 K/UL (ref 0–0.2)
BASOPHILS NFR BLD AUTO: 0 %
BUN SERPL-SCNC: 18 MG/DL (ref 7–17)
CALCIUM SPEC-MCNC: 10.8 MG/DL (ref 8.4–10.2)
CHLORIDE SERPL-SCNC: 106 MMOL/L (ref 98–107)
CO2 SERPL-SCNC: 21 MMOL/L (ref 22–30)
EOSINOPHIL # BLD AUTO: 0.1 K/UL (ref 0–0.7)
EOSINOPHIL NFR BLD AUTO: 0 %
ERYTHROCYTE [DISTWIDTH] IN BLOOD BY AUTOMATED COUNT: 3.25 M/UL (ref 3.8–5.4)
ERYTHROCYTE [DISTWIDTH] IN BLOOD: 17.6 % (ref 11.5–15.5)
GLUCOSE BLD-MCNC: 105 MG/DL (ref 75–99)
GLUCOSE BLD-MCNC: 110 MG/DL (ref 75–99)
GLUCOSE BLD-MCNC: 117 MG/DL (ref 75–99)
GLUCOSE BLD-MCNC: 148 MG/DL (ref 75–99)
GLUCOSE BLD-MCNC: 199 MG/DL (ref 75–99)
GLUCOSE BLD-MCNC: 51 MG/DL (ref 75–99)
GLUCOSE BLD-MCNC: 53 MG/DL (ref 75–99)
GLUCOSE BLD-MCNC: 55 MG/DL (ref 75–99)
GLUCOSE BLD-MCNC: 57 MG/DL (ref 75–99)
GLUCOSE BLD-MCNC: 59 MG/DL (ref 75–99)
GLUCOSE BLD-MCNC: 62 MG/DL (ref 75–99)
GLUCOSE BLD-MCNC: 73 MG/DL (ref 75–99)
GLUCOSE SERPL-MCNC: 72 MG/DL (ref 74–99)
HCT VFR BLD AUTO: 27.2 % (ref 34–46)
HGB BLD-MCNC: 7.6 GM/DL (ref 11.4–16)
LYMPHOCYTES # SPEC AUTO: 0.3 K/UL (ref 1–4.8)
LYMPHOCYTES NFR SPEC AUTO: 1 %
MCH RBC QN AUTO: 23.4 PG (ref 25–35)
MCHC RBC AUTO-ENTMCNC: 28 G/DL (ref 31–37)
MCV RBC AUTO: 83.7 FL (ref 80–100)
MONOCYTES # BLD AUTO: 1.3 K/UL (ref 0–1)
MONOCYTES NFR BLD AUTO: 4 %
NEUTROPHILS # BLD AUTO: 30.1 K/UL (ref 1.3–7.7)
NEUTROPHILS NFR BLD AUTO: 94 %
PLATELET # BLD AUTO: 502 K/UL (ref 150–450)
POTASSIUM SERPL-SCNC: 3.9 MMOL/L (ref 3.5–5.1)
PROT SERPL-MCNC: 5.2 G/DL (ref 6.3–8.2)
SODIUM SERPL-SCNC: 139 MMOL/L (ref 137–145)
WBC # BLD AUTO: 32.1 K/UL (ref 3.8–10.6)

## 2019-04-12 RX ADMIN — PIPERACILLIN AND TAZOBACTAM SCH MLS/HR: 3; .375 INJECTION, POWDER, FOR SOLUTION INTRAVENOUS at 18:34

## 2019-04-12 RX ADMIN — PIPERACILLIN AND TAZOBACTAM SCH MLS/HR: 3; .375 INJECTION, POWDER, FOR SOLUTION INTRAVENOUS at 03:38

## 2019-04-12 RX ADMIN — KETOROLAC TROMETHAMINE PRN MG: 30 INJECTION, SOLUTION INTRAMUSCULAR at 23:28

## 2019-04-12 RX ADMIN — PANTOPRAZOLE SODIUM SCH MG: 40 INJECTION, POWDER, FOR SOLUTION INTRAVENOUS at 11:30

## 2019-04-12 RX ADMIN — CEFAZOLIN SCH MLS/HR: 330 INJECTION, POWDER, FOR SOLUTION INTRAMUSCULAR; INTRAVENOUS at 21:13

## 2019-04-12 RX ADMIN — GABAPENTIN SCH MG: 100 CAPSULE ORAL at 10:01

## 2019-04-12 RX ADMIN — PANTOPRAZOLE SODIUM SCH MG: 40 TABLET, DELAYED RELEASE ORAL at 07:31

## 2019-04-12 RX ADMIN — THERA TABS SCH EACH: TAB at 12:57

## 2019-04-12 RX ADMIN — MAGNESIUM SULFATE IN DEXTROSE SCH MLS/HR: 10 INJECTION, SOLUTION INTRAVENOUS at 21:14

## 2019-04-12 RX ADMIN — ONDANSETRON PRN MG: 2 INJECTION INTRAMUSCULAR; INTRAVENOUS at 06:03

## 2019-04-12 RX ADMIN — MORPHINE SULFATE SCH MG: 15 TABLET, EXTENDED RELEASE ORAL at 21:14

## 2019-04-12 RX ADMIN — INSULIN ASPART SCH: 100 INJECTION, SOLUTION INTRAVENOUS; SUBCUTANEOUS at 20:31

## 2019-04-12 RX ADMIN — PANTOPRAZOLE SODIUM SCH MG: 40 INJECTION, POWDER, FOR SOLUTION INTRAVENOUS at 21:14

## 2019-04-12 RX ADMIN — INSULIN DETEMIR SCH: 100 INJECTION, SOLUTION SUBCUTANEOUS at 20:31

## 2019-04-12 RX ADMIN — ATORVASTATIN CALCIUM SCH MG: 40 TABLET, FILM COATED ORAL at 21:15

## 2019-04-12 RX ADMIN — FUROSEMIDE SCH MG: 20 TABLET ORAL at 10:01

## 2019-04-12 RX ADMIN — HYDROCODONE BITARTRATE AND ACETAMINOPHEN PRN EACH: 7.5; 325 TABLET ORAL at 10:24

## 2019-04-12 RX ADMIN — PIPERACILLIN AND TAZOBACTAM SCH MLS/HR: 3; .375 INJECTION, POWDER, FOR SOLUTION INTRAVENOUS at 14:30

## 2019-04-12 RX ADMIN — INSULIN ASPART SCH: 100 INJECTION, SOLUTION INTRAVENOUS; SUBCUTANEOUS at 07:29

## 2019-04-12 RX ADMIN — SODIUM CHLORIDE SCH MLS/HR: 9 INJECTION, SOLUTION INTRAVENOUS at 08:51

## 2019-04-12 RX ADMIN — LEVOTHYROXINE SODIUM SCH MCG: 0.12 TABLET ORAL at 05:50

## 2019-04-12 RX ADMIN — MAGNESIUM SULFATE IN DEXTROSE SCH MLS/HR: 10 INJECTION, SOLUTION INTRAVENOUS at 23:19

## 2019-04-12 RX ADMIN — INSULIN ASPART SCH: 100 INJECTION, SOLUTION INTRAVENOUS; SUBCUTANEOUS at 13:00

## 2019-04-12 RX ADMIN — INSULIN ASPART SCH: 100 INJECTION, SOLUTION INTRAVENOUS; SUBCUTANEOUS at 17:38

## 2019-04-12 RX ADMIN — ONDANSETRON PRN MG: 2 INJECTION INTRAMUSCULAR; INTRAVENOUS at 01:09

## 2019-04-12 RX ADMIN — HEPARIN SODIUM SCH UNIT: 5000 INJECTION, SOLUTION INTRAVENOUS; SUBCUTANEOUS at 21:14

## 2019-04-12 RX ADMIN — POTASSIUM CHLORIDE SCH MEQ: 750 TABLET, EXTENDED RELEASE ORAL at 10:01

## 2019-04-12 RX ADMIN — CITALOPRAM HYDROBROMIDE SCH MG: 20 TABLET ORAL at 21:15

## 2019-04-12 RX ADMIN — MAGNESIUM SULFATE IN DEXTROSE SCH MLS/HR: 10 INJECTION, SOLUTION INTRAVENOUS at 22:12

## 2019-04-12 RX ADMIN — CEFAZOLIN SCH: 330 INJECTION, POWDER, FOR SOLUTION INTRAMUSCULAR; INTRAVENOUS at 15:41

## 2019-04-12 NOTE — P.PN
Subjective


Progress Note Date: 04/12/19


Principal diagnosis: 





Fever 





Afebrile greater than 24 hours, aPath Pending from biopsy. Hemoglobin 7.2 

yesterday, redraw now as may benefit from PRBC transfusion prior to discharge if

plan to discharge 24-48 hours. 





Objective





- Vital Signs


Vital signs: 


                                   Vital Signs











Temp  99.5 F   04/12/19 11:23


 


Pulse  60   04/12/19 11:23


 


Resp  16   04/12/19 13:36


 


BP  129/77   04/12/19 11:23


 


Pulse Ox  96   04/12/19 11:23








                                 Intake & Output











 04/11/19 04/12/19 04/12/19





 18:59 06:59 18:59


 


Intake Total 4670  240


 


Balance 4670  240


 


Weight   66.5 kg


 


Intake:   


 


  Intake, IV Titration 950  





  Amount   


 


    Piperacillin-Tazobactam 3 100  





    .375 gm In Sodium   





    Chloride 0.9% 100 ml @ 25   





    mls/hr IVPB Q8H [facility] Rx#:   





    [id number]   


 


    Sodium Chloride 0.9% 1, 600  





    000 ml @ 50 mls/hr IV .   





    Q20H [facility] Rx#:[id number]   


 


    Vancomycin 1,000 mg In 250  





    Sodium Chloride 0.9% 250   





    ml @ 125 mls/hr IVPB   





    Q24HR [facility] Rx#:[id number]   


 


  Oral 3720  240


 


Other:   


 


  Voiding Method Toilet Toilet Toilet


 


  # Voids 3 2 3


 


  # Bowel Movements  1 2














- Exam





Gen: Alert and Oriented, NAD


Head: NC, NT


Neck: Supple


Mouth: Dry no lesions


Lungs: Diminished bibasilar, no increased effort noted


Heart RRR, S1


Abdomen S/ND/NT


Ext: BLE 1+ Edema


Neuro: No sensory or motor Deficits. 





- Labs


CBC & Chem 7: 


                                 04/11/19 08:56





                                 04/12/19 12:28


Labs: 


                  Abnormal Lab Results - Last 24 Hours (Table)











  04/11/19 04/11/19 04/11/19 Range/Units





  08:56 08:56 16:42 


 


Carbon Dioxide     (22-30)  mmol/L


 


BUN     (7-17)  mg/dL


 


Glucose     (74-99)  mg/dL


 


POC Glucose (mg/dL)    238 H  (75-99)  mg/dL


 


Calcium     (8.4-10.2)  mg/dL


 


Magnesium   1.2 L   (1.6-2.3)  mg/dL


 


Alkaline Phosphatase     ()  U/L


 


Lactate Dehydrogenase   252 L   (313-618)  U/L


 


Total Protein     (6.3-8.2)  g/dL


 


Albumin     (3.5-5.0)  g/dL


 


Vitamin B12  1305.0 H    (200.0-944.0)  pg/mL














  04/11/19 04/12/19 04/12/19 Range/Units





  20:42 07:00 07:19 


 


Carbon Dioxide     (22-30)  mmol/L


 


BUN     (7-17)  mg/dL


 


Glucose     (74-99)  mg/dL


 


POC Glucose (mg/dL)  152 H  55 L  53 L  (75-99)  mg/dL


 


Calcium     (8.4-10.2)  mg/dL


 


Magnesium     (1.6-2.3)  mg/dL


 


Alkaline Phosphatase     ()  U/L


 


Lactate Dehydrogenase     (313-618)  U/L


 


Total Protein     (6.3-8.2)  g/dL


 


Albumin     (3.5-5.0)  g/dL


 


Vitamin B12     (200.0-944.0)  pg/mL














  04/12/19 04/12/19 04/12/19 Range/Units





  07:38 08:04 08:48 


 


Carbon Dioxide     (22-30)  mmol/L


 


BUN     (7-17)  mg/dL


 


Glucose     (74-99)  mg/dL


 


POC Glucose (mg/dL)  57 L  62 L  110 H  (75-99)  mg/dL


 


Calcium     (8.4-10.2)  mg/dL


 


Magnesium     (1.6-2.3)  mg/dL


 


Alkaline Phosphatase     ()  U/L


 


Lactate Dehydrogenase     (313-618)  U/L


 


Total Protein     (6.3-8.2)  g/dL


 


Albumin     (3.5-5.0)  g/dL


 


Vitamin B12     (200.0-944.0)  pg/mL














  04/12/19 04/12/19 Range/Units





  10:58 12:28 


 


Carbon Dioxide   21 L  (22-30)  mmol/L


 


BUN   18 H  (7-17)  mg/dL


 


Glucose   72 L  (74-99)  mg/dL


 


POC Glucose (mg/dL)  105 H   (75-99)  mg/dL


 


Calcium   10.8 H  (8.4-10.2)  mg/dL


 


Magnesium    (1.6-2.3)  mg/dL


 


Alkaline Phosphatase   609 H  ()  U/L


 


Lactate Dehydrogenase    (313-618)  U/L


 


Total Protein   5.2 L  (6.3-8.2)  g/dL


 


Albumin   2.5 L  (3.5-5.0)  g/dL


 


Vitamin B12    (200.0-944.0)  pg/mL








                      Microbiology - Last 24 Hours (Table)











 04/08/19 18:00 Blood Culture - Preliminary





 Blood    No Growth after 72 hours


 


 04/10/19 15:37 Blood Culture - Preliminary





 Blood    No Growth after 24 hours














Assessment and Plan


Plan: 














Fevers:


 - Unknown Etiology, potential tumor fever - no active identified infction at 

this time, afebrile greater than 24 hours


 - Pan cultures ordered


 - Chest Xray Negative


 - Zosyn 





Leukocytosis: Likely reactive


 - Work-up for infectious process with fever, could be related to malignancy


 - Baseline 11-15


 - Primarily neutrophils


 - Worsening  Likely related to stress dose steroids received on admission


 - Await tomorrow WBC with diff


 - Blood Cultures negative at 48 hours 


 - Urine Culture Negative 





NHL:


 - Most recently status post treatment with Hycela and bendamustine, status Post

Cycle 2 1/8/19


 - Prior to above status post treatment with R-Ceop


 - Highly suspicious for progrssion: CT C/A/P reviewed and discussed with 

patient, definite progression cannot be ruled out. 


 - Status Post EGD for concern of penetrative lymphoma to Gastric/Esophageal 

wall - Path did not identify evidence of malignancy


 - Retroperitoneal Biopsy today 4/10/19





Anorexia and weight loss:


 - Megace recently ordered





Persisitent Nausea and Abdominal Pain:


 - GI Work-up and CT Scans Reviewed from previous admission





Normocytic Anemia: Secondary to chemo and lymphoma


 - CBC and CMP in am


 - Hemoglobin Decreased 7.2


 - Initial drop 12-8.8. No active bleeding noted, likely from dilution


 - Will order anemia work-up


 - CBC today and transfuse if less than 7


Myopathy and Debility:


 - Rec PT/OT wwork with Patient 





Hypercalcemia:


 - Uric Acid 4.2, Check Phos


 - Continue IVF, 


 - Secondary to dehydration malignancy, monitor for tumor lysis


 - CMP in am








Physcian Attest: I have completed the full history and physical and developed 

the complete impression and plan, agree with above, dictated as a scribe

## 2019-04-12 NOTE — PN
PROGRESS NOTE



DATE OF SERVICE:

04/12/2009



I am covering for Dr. Villegas.



HISTORY OF PRESENT ILLNESS:

This 63-year-old woman with a past medical history of multiple medical problems

including retroperitoneal tumor, being evaluated for possibly B-cell non-Hodgkin

lymphoma, stage IV, also had some fever of undetermined origin.  The patient

complaining of severe back pain at this time.  Hematology and oncology following the

patient closely. Originally the patient is slated to go to the [facility], but the patient

reports a 10/10 pain in the back and patient unable to move and the patient

retroperitoneal mass has been biopsied at this time.



PAST MEDICAL HISTORY:

Reviewed.



REVIEW OF SYSTEMS:

CARDIOVASCULAR:  No angina or palpitations.

RESPIRATION: As mentioned earlier.

GASTROINTESTINAL: As mentioned earlier.

: No dysuria or retention.

CENTRAL NERVOUS SYSTEM: As mentioned earlier.



MEDICATIONS:

Reviewed and include:

1. Tylenol 650 q.4 p.r.n.

2. Xanax 0.25 daily.

3. Norco.

4. Lipitor 40 mg.

5. Celexa.

6. Lasix 20 mg.

7. Levemir 20 units subcu q.h.s.

8. Synthroid.

9. Glucophage.

10.Multivitamins.

11.Narcan.

12.Zofran.

13.Protonix.

14.Zosyn.

15.Vancomycin.



PHYSICAL EXAM:

Patient is alert, oriented x3.  Pulse is 97, blood pressure 106/69, respirations 16,

temperature 98.2, pulse ox 94% on room air.  The patient is in agony at this time

because of pain.

HEENT:  Conjunctivae normal.  Oral mucosa moist.

NECK is no jugular venous distention.  No carotid bruit. No lymph node enlargement.

CARDIOVASCULAR:  S1-S2 muffled.

RESPIRATIONS: Breath sounds diminished in the bases. A few scattered rhonchi and

crackles.

ABDOMEN:  Soft, nontender.  No mass.  Legs are no edema, no swelling.  Nervous system:

Diffusely weak.



LAB STUDIES:

WBC 32.2, hemoglobin 7.6, magnesium is 1.1, glucose noted low.



ASSESSMENT:

1. Severe back pain. with significant distress and as well as gait dysfunction.

2. Non-Hodgkin's lymphoma stage IV.

3. History of recent retroperitoneal mass and biopsy.

4. Fever of undetermined origin.

5. Leukocytosis.

6. Possible sepsis pneumonia.

7. Increased WBC.

8. Anemia, normocytic.

9. Hypoglycemia.

10.Hypomagnesemia.

11.Increased alkaline phosphatase.

12.Gait dysfunction.

13.Severe pain.

14.History of congestive heart failure.

15.Diabetes mellitus.

16.Hypertension.

17.Hyperlipidemia.

18.History of closed-head injury.

19.Hypothyroidism.

20.History of anxiety.



RECOMMENDATIONS AND DISCUSSION:

In this 63-year-old woman who presented with multiple complex medical issues.  We will

monitor the patient closely.  Continue the current medications, management and

symptomatic treatment.  I would adjust the pain medications.  I would recommend

morphine sulfate because of lack of improvement of pain and because of multiple complex

medical issues including oncological issues.  I would also recommend DVT prophylaxis

and I would also recommend to cut down the insulin because of the hypoglycemia

experienced by the patient.  Encourage p.o. food. PT/OT evaluation. Once the pain is

subsided, we will consider the possibility of ECF rehab at this time.  Otherwise,

continue to monitor.  Guarded prognosis.  Further recommendations to follow.  See

orders for further details.





MMODL / IJN: [id number] / Job#: [id number]

## 2019-04-13 LAB
ANION GAP SERPL CALC-SCNC: 7 MMOL/L
BASOPHILS # BLD AUTO: 0 K/UL (ref 0–0.2)
BASOPHILS NFR BLD AUTO: 0 %
BUN SERPL-SCNC: 16 MG/DL (ref 7–17)
CALCIUM SPEC-MCNC: 10.2 MG/DL (ref 8.4–10.2)
CHLORIDE SERPL-SCNC: 106 MMOL/L (ref 98–107)
CO2 SERPL-SCNC: 23 MMOL/L (ref 22–30)
EOSINOPHIL # BLD AUTO: 0.3 K/UL (ref 0–0.7)
EOSINOPHIL NFR BLD AUTO: 1 %
ERYTHROCYTE [DISTWIDTH] IN BLOOD BY AUTOMATED COUNT: 3.18 M/UL (ref 3.8–5.4)
ERYTHROCYTE [DISTWIDTH] IN BLOOD: 17.8 % (ref 11.5–15.5)
GLUCOSE BLD-MCNC: 134 MG/DL (ref 75–99)
GLUCOSE BLD-MCNC: 167 MG/DL (ref 75–99)
GLUCOSE BLD-MCNC: 173 MG/DL (ref 75–99)
GLUCOSE BLD-MCNC: 341 MG/DL (ref 75–99)
GLUCOSE SERPL-MCNC: 221 MG/DL (ref 74–99)
HCT VFR BLD AUTO: 26.6 % (ref 34–46)
HGB BLD-MCNC: 7.3 GM/DL (ref 11.4–16)
LYMPHOCYTES # SPEC AUTO: 0.2 K/UL (ref 1–4.8)
LYMPHOCYTES NFR SPEC AUTO: 1 %
MAGNESIUM SPEC-SCNC: 1.8 MG/DL (ref 1.6–2.3)
MCH RBC QN AUTO: 23 PG (ref 25–35)
MCHC RBC AUTO-ENTMCNC: 27.5 G/DL (ref 31–37)
MCV RBC AUTO: 83.8 FL (ref 80–100)
MONOCYTES # BLD AUTO: 0.9 K/UL (ref 0–1)
MONOCYTES NFR BLD AUTO: 3 %
NEUTROPHILS # BLD AUTO: 33 K/UL (ref 1.3–7.7)
NEUTROPHILS NFR BLD AUTO: 95 %
PLATELET # BLD AUTO: 507 K/UL (ref 150–450)
POTASSIUM SERPL-SCNC: 4.5 MMOL/L (ref 3.5–5.1)
SODIUM SERPL-SCNC: 136 MMOL/L (ref 137–145)
WBC # BLD AUTO: 34.8 K/UL (ref 3.8–10.6)

## 2019-04-13 RX ADMIN — INSULIN DETEMIR SCH: 100 INJECTION, SOLUTION SUBCUTANEOUS at 21:09

## 2019-04-13 RX ADMIN — INSULIN ASPART SCH: 100 INJECTION, SOLUTION INTRAVENOUS; SUBCUTANEOUS at 20:49

## 2019-04-13 RX ADMIN — ATORVASTATIN CALCIUM SCH MG: 40 TABLET, FILM COATED ORAL at 20:59

## 2019-04-13 RX ADMIN — INSULIN ASPART SCH UNIT: 100 INJECTION, SOLUTION INTRAVENOUS; SUBCUTANEOUS at 18:03

## 2019-04-13 RX ADMIN — PIPERACILLIN AND TAZOBACTAM SCH MLS/HR: 3; .375 INJECTION, POWDER, FOR SOLUTION INTRAVENOUS at 02:00

## 2019-04-13 RX ADMIN — MORPHINE SULFATE SCH MG: 15 TABLET, EXTENDED RELEASE ORAL at 20:58

## 2019-04-13 RX ADMIN — SODIUM CHLORIDE SCH MLS/HR: 9 INJECTION, SOLUTION INTRAVENOUS at 08:06

## 2019-04-13 RX ADMIN — LISINOPRIL SCH MG: 2.5 TABLET ORAL at 12:23

## 2019-04-13 RX ADMIN — INSULIN ASPART SCH UNIT: 100 INJECTION, SOLUTION INTRAVENOUS; SUBCUTANEOUS at 12:30

## 2019-04-13 RX ADMIN — POTASSIUM CHLORIDE SCH MEQ: 750 TABLET, EXTENDED RELEASE ORAL at 12:18

## 2019-04-13 RX ADMIN — CITALOPRAM HYDROBROMIDE SCH MG: 20 TABLET ORAL at 20:59

## 2019-04-13 RX ADMIN — PIPERACILLIN AND TAZOBACTAM SCH MLS/HR: 3; .375 INJECTION, POWDER, FOR SOLUTION INTRAVENOUS at 18:08

## 2019-04-13 RX ADMIN — METOCLOPRAMIDE SCH MG: 5 INJECTION, SOLUTION INTRAMUSCULAR; INTRAVENOUS at 18:03

## 2019-04-13 RX ADMIN — MORPHINE SULFATE SCH MG: 15 TABLET, EXTENDED RELEASE ORAL at 12:18

## 2019-04-13 RX ADMIN — FUROSEMIDE SCH MG: 20 TABLET ORAL at 12:19

## 2019-04-13 RX ADMIN — PANTOPRAZOLE SODIUM SCH MG: 40 INJECTION, POWDER, FOR SOLUTION INTRAVENOUS at 08:05

## 2019-04-13 RX ADMIN — PANTOPRAZOLE SODIUM SCH MG: 40 INJECTION, POWDER, FOR SOLUTION INTRAVENOUS at 21:00

## 2019-04-13 RX ADMIN — THERA TABS SCH EACH: TAB at 12:18

## 2019-04-13 RX ADMIN — HEPARIN SODIUM SCH UNIT: 5000 INJECTION, SOLUTION INTRAVENOUS; SUBCUTANEOUS at 12:19

## 2019-04-13 RX ADMIN — INSULIN ASPART SCH UNIT: 100 INJECTION, SOLUTION INTRAVENOUS; SUBCUTANEOUS at 08:05

## 2019-04-13 RX ADMIN — PIPERACILLIN AND TAZOBACTAM SCH MLS/HR: 3; .375 INJECTION, POWDER, FOR SOLUTION INTRAVENOUS at 12:23

## 2019-04-13 RX ADMIN — METOPROLOL TARTRATE SCH MG: 25 TABLET, FILM COATED ORAL at 20:59

## 2019-04-13 RX ADMIN — LEVOTHYROXINE SODIUM SCH MCG: 0.12 TABLET ORAL at 06:00

## 2019-04-13 RX ADMIN — HEPARIN SODIUM SCH UNIT: 5000 INJECTION, SOLUTION INTRAVENOUS; SUBCUTANEOUS at 21:00

## 2019-04-13 NOTE — XR
EXAMINATION TYPE: XR chest 2V

 

DATE OF EXAM: 4/13/2019

 

HISTORY: sob.

 

REFERENCE: Previous study dated 4/8/2019.

 

FINDINGS: There is a right internal jugular catheter in place. Its tip is in the superior vena cava. 
I do not see evidence of pneumothorax.

 

The heart is enlarged. There is bibasilar airspace disease. There are small effusions. The overall ap
pearance is worsened slightly.

 

IMPRESSION: 

 

WORSENING BIBASILAR AIRSPACE DISEASE WITH CONCOMITANT EFFUSIONS.

## 2019-04-13 NOTE — PN
PROGRESS NOTE



DATE OF SERVICE:

04/13/2019



I am covering for Dr. Villegas.



This 63-year-old woman was admitted with severe back pain, and non-Hodgkin's 
lymphoma.

The patient had retroperitoneal tumor biopsy.  The patient is rather mildly 
confused at

this time.  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

infarct.  No chest pain.  No palpitations.  No fever.



EXAM:

Alert and oriented x3.  The pulse is 99.  Blood pressure 126/64.  Respiration 
18.

Temperature 98 degrees, pulse ox 94% on room air.

HEENT: Conjunctivae normal.

NECK: No jugular venous distention.

CARDIOVASCULAR: S1, S2 muffled.

RESPIRATORY: Breath sounds diminished in the bases.  Bilateral scattered rhonchi
and

crackles.

Abdomen is soft, nontender.  Legs are no edema. No swelling.

CENTRAL NERVOUS SYSTEM: No focal deficits.



LABS:

WBC 34.2, hemoglobin 7.3, sodium 136.  Other labs are noted.



ASSESSMENT:

1. Severe back pain with significant distress as well as gait dysfunction.

2. Change in mental status acute metabolic encephalopathy multifactorial.

3. Non-Hodgkin's lymphoma Stage IV.

4. History of recent retroperitoneal mass biopsy.

5. Fever of undetermined origin.

6. Leukocytosis.

7. Possible sepsis with pneumonia, on presentation.

8. Increased WBC.

9. Anemia, normocytic.

10.Hypoglycemia.

11.Hypomagnesemia.

12.Increased .

13.Gait dysfunction.

14.Severe pain.

15.History of congestive heart failure.

16.Diabetes mellitus type 2.

17.Hypertension.

18.Hyperlipidemia.

19.History of closed-head injury.

20.Hypothyroidism.

21.History of anxiety.



RECOMMENDATIONS AND DISCUSSION:

Recommend to continue current medications and continue with monitoring and 
symptomatic

treatment. Otherwise,  at this time, I recommend continue with current 
medications.

Continue with broad-spectrum IV antibiotics.  Otherwise the cultures are 
negative so

far.  We will continue to monitor.  Further recommendations to follow. We will 
monitor

blood sugars closely.





MMODL / IJN: [id number] / Job#: [id number]

[id number]

## 2019-04-13 NOTE — P.CRDCN
History of Present Illness


History of present illness: 





This is a pleasant 63-year-old  female past medical history significant

for B-cell non-Hodgkin's lymphoma stage IV, diabetes mellitus, hypertension, 

dyslipidemia, obstructive sleep apnea and hypothyroidism.  She denies history of

coronary artery disease and does not follow with a cardiologist for any reason. 

She is currently in the hospital with symptoms of altered mental status, 

generalized weakness and leukocytosis.  She underwent treatment with Hycela and 

bendamustine last cycle was 01/08/2019.  Prior to that in October 2018 she had 

an echocardiogram that reveals a preserved LV systolic function.  Repeat 

echocardiogram on this admission reveals severe hypo-kinesia globally with an 

ejection fraction of 20%.  She is seen and examined resting comfortably in bed 

in no acute distress laying essentially flat.  She states she came to the 

hospital because she has been feeling so weak and fatigued at home she cannot 

perform her activities of daily living.  She also describes feeling increasingly

fatigued with some shortness of breath over the previous 2 months.  She states 

at baseline she had no shortness of breath however she now wakes up frequently 

at night short of breath and can no longer.  Clinically the stairs without 

stopping for air.  She denies any symptoms of chest discomfort, dizziness or 

palpitations.  Oncology is following and is suspect progression of her non-

Hodgkin's lymphoma.  Initially on admission it was thought that she had 

pneumonia and was started on antibiotics.  She has been seen in consultation by 

pulmonary team as well and they believe that her fever is related to progression

of underlying malignancy.





EKG on arrival reveals sinus mechanism with frequent PVCs noted and poor R-wave 

progression.


Chest x-ray reveals congestive heart failure with pleural effusions.  This was 

obtained on April 8.


Laboratory data reviewed, WBC 34.8, hemoglobin 7.3, platelets 507, sodium 136, 

potassium 4.5, creatinine 0.82, magnesium 1.8, NT proBNP on admission 4720.


Current cardiac medications include Lasix 20 mg daily and atorvastatin 40 mg 

daily.





At the time of my exam:


CONSTITUTIONAL: Denies fever. Denies chills.


EYES: Denies blurred vision. Denies vision changes. Denies eye pain.


EARS, NOSE, MOUTH & THROAT: Denies headache. Denies sore throat. Denies ear 

pain.


CARDIOVASCULAR: Denies chest pain. Denies shortness of breath. Denies orthopnea.

Denies PND. Denies palpitations.


RESPIRATORY: Denies cough. 


GASTROINTESTINAL: Denies abdominal pain. Denies diarrhea. Denies constipation. 

Denies nausea. Denies vomiting.


MUSCULOSKELETAL: Denies myalgias.


INTEGUMENTARY: Denies pruitis. Denies rash.


NEUROLOGIC: Denies numbness. Denies tingling. Denies weakness.


PSYCHIATRIC: Denies anxiety. Denies depression.


ENDOCRINE: Complains of fatigue. Denies weight change. Denies polydipsia. Denies

polyurina.


GENITOURINARY: Denies burning, hematuria or urgency with micturation.


HEMATOLOGIC: Denies history of anemia. Denies bleeding. 





Blood pressure 112/68 heart rate 85 afebrile maintaining oxygen saturation on 

nasal cannula


GENERAL: This is a 63-year-old  female in no apparent distress at the 

time of my examination.


HEENT: Head is atraumatic, normocephalic. Pupils are equal, round. Sclerae 

anicteric. Conjunctivae are clear. Mucous membranes of the mouth are moist. Neck

is supple. There is no jugular venous distention. No carotid bruit is heard.


LUNGS: Clear to auscultation no wheezes, rales or rhonchi. No chest wall 

tenderness is noted on palpation or with deep breathing.  Diminished 

bilaterally.


HEART: Regular rate and rhythm without murmurs, rubs or gallops. S1 and S2 

heard.


ABDOMEN: Soft, nontender. Bowel sounds are heard. No organomegaly noted.


EXTREMITIES: Bilateral lower extremity nonpitting edema of the feet, no preti

bial edema noted. 


VASCULAR: Radial and dorsalis pedis pulses palpated, no evidence of clubbing.  


NEUROLOGIC: Patient is awake, alert and oriented x3.





ASSESSMENT


New onset systolic heart failure


Non-hodgkins lymphoma


Leukocytosis


Febrile illness


Bilateral pleural effusions, chronic


Hypertension


Dyslipidemia


Diabetes mellitus


Anemia secondary to chemotherapy





PLAN


Repeat chest x-ray today.


Maximize her medical therapy with the addition of beta blockers and ACE 

inhibitor.  We'll initiate a low dose today of Lopressor 12.5 mg twice a day and

lisinopril 2.5 mg daily.  If her blood pressure tolerates remain increased 

tomorrow.


Continue with atorvastatin and by mouth Lasix.


Further recommendations to follow based upon clinical course.


Thank you kindly for this consultation.





Nurse Practitioner note has been reviewed, I agree with a documented findings 

and plan of care.  Patient was seen and examined.


 





Past Medical History


Past Medical History: Cancer, Heart Failure, Diabetes Mellitus, Hyperlipidemia, 

Hypertension, Sleep Apnea/CPAP/BIPAP, Thyroid Disorder


Additional Past Medical History / Comment(s): B-cell non-Hodgkin's lymphoma 

stage IV, diabetes mellitus,daughter stated pt had first chemo mon 9-24-18 

hypertension, hyperlipidemia, obstructive sleep apnea, hypothyroidism post 

thyroidectomy, closed head injury in 1989, difficulty with mobility and 

ambulation the patient has been using a walker. has a history of kidney stones


Last Myocardial Infarction Date:: 1989


History of Any Multi-Drug Resistant Organisms: None Reported


Past Surgical History: Cholecystectomy, Hysterectomy, Tonsillectomy


Additional Past Surgical History / Comment(s): Thyroidectomy, lymph node and 

liver biopsies. port a cath 9-10-18, right ureteroscopy with lithotripsy 11/2015


Past Anesthesia/Blood Transfusion Reactions: Postoperative Nausea & Vomiting 

(PONV)


Past Psychological History: Anxiety


Smoking Status: Never smoker


Past Alcohol Use History: None Reported


Past Drug Use History: Marijuana





- Past Family History


  ** Mother


Family Medical History: Cancer


Additional Family Medical History / Comment(s): breast CA





  ** Father


Family Medical History: Cancer


Additional Family Medical History / Comment(s): colon CA





  ** Sister(s)


Family Medical History: Cancer


Additional Family Medical History / Comment(s): Skin cancer





Medications and Allergies


                                Home Medications











 Medication  Instructions  Recorded  Confirmed  Type


 


Furosemide 20 mg PO DAILY 07/24/14 04/08/19 History


 


Gabapentin 100 mg PO QAM 07/24/14 04/08/19 History


 


Gabapentin [Neurontin] 200 mg PO HS 10/09/15 04/08/19 History


 


Levothyroxine Sodium [Synthroid] 125 mcg PO [id number] 10/09/15 04/08/19 

History


 


Multivitamins, Thera [Multivitamin 1 tab PO DAILY 03/28/18 04/08/19 History





(formulary)]    


 


Atorvastatin Calcium [Lipitor] 40 mg PO HS 04/16/18 04/08/19 History


 


Citalopram Hydrobromide [CeleXA] 20 mg PO HS 08/26/18 04/08/19 History


 


metFORMIN HCL 1,000 mg PO BID #0 10/12/18 04/08/19 Rx


 


Cetirizine HCl 10 mg PO HS 02/01/19 04/08/19 History


 


Pantoprazole [Protonix] 40 mg PO DAILY 02/01/19 04/08/19 History


 


Potassium Chloride ER [K-Dur 10] 10 meq PO BID 02/01/19 04/08/19 History


 


HYDROcodone/APAP 7.5-325MG [Norco 1 tab PO Q6HR PRN 03/18/19 04/08/19 History





7.5-325]    


 


Ondansetron [Zofran] 4 mg PO Q6H PRN 03/27/19 04/08/19 History


 


ALPRAZolam [Xanax] 0.33 mg PO DAILY PRN 04/08/19 04/08/19 History








                                    Allergies











Allergy/AdvReac Type Severity Reaction Status Date / Time


 


haloperidol [From Haldol] Allergy  Unknown Verified 04/08/19 18:46


 


haloperidol lactate Allergy  Unknown Verified 04/08/19 18:46





[From Haldol]     


 


Iodinated Contrast- Oral and Allergy  Rash/Hives Verified 04/08/19 18:46





IV Dye     





[Iodinated Contrast Media -     





IV Dye]     


 


ciprofloxacin [From Cipro] AdvReac  Heartburn Verified 04/08/19 18:46


 


steri-strips AdvReac Severe sore Uncoded 03/27/19 10:57














Physical Exam


Vitals: 


                                   Vital Signs











  Temp Pulse Resp BP BP Pulse Ox


 


 04/13/19 09:09       99


 


 04/13/19 05:00  98.3 F  85  17  112/68   99


 


 04/13/19 00:00   85  18   


 


 04/12/19 21:00  98.7 F  99  16  125/64   98


 


 04/12/19 19:08  98.3 F  102 H  16   129/70  95


 


 04/12/19 16:07  98.3 F  97  16   102/69  94 L


 


 04/12/19 13:36    16   


 


 04/12/19 11:23  99.5 F  60  16   129/77  96








                                Intake and Output











 04/12/19 04/13/19 04/13/19





 22:59 06:59 14:59


 


Intake Total 1154 200 


 


Output Total 450  


 


Balance 704 200 


 


Intake:   


 


  Intake, IV Titration 550  





  Amount   


 


    Dextrose 5% in Water 1, 150  





    000 ml @ 75 mls/hr IV .   





    [id number] [facility] Rx#:[id number]   


 


    Magnesium Sulfate-D5w Pmx 200  





    1 gm In Dextrose/Water 1   





    100ml.bag @ 100 mls/hr   





    IVPB Q1H [facility] Rx#:   





    [id number]   


 


    Sodium Chloride 0.9% 1, 200  





    000 ml @ 50 mls/hr IV .   





    Q20H [facility] Rx#:[id number]   


 


  Oral 604 200 


 


Output:   


 


  Urine 450  


 


Other:   


 


  Voiding Method  Bedside Commode 





  Diaper 


 


  # Voids 2 2 


 


  # Bowel Movements 2  


 


  Weight  66.5 kg 














Results





                                 04/13/19 08:09





                                 04/13/19 08:09


                                 Cardiac Enzymes











  04/12/19 Range/Units





  12:28 


 


AST  26  (14-36)  U/L








                                       CBC











  04/12/19 04/13/19 Range/Units





  15:46 08:09 


 


WBC  32.1 H  34.8 H  (3.8-10.6)  k/uL


 


RBC  3.25 L  3.18 L  (3.80-5.40)  m/uL


 


Hgb  7.6 L  7.3 L  (11.4-16.0)  gm/dL


 


Hct  27.2 L  26.6 L  (34.0-46.0)  %


 


Plt Count  502 H  507 H  (150-450)  k/uL








                          Comprehensive Metabolic Panel











  04/12/19 04/13/19 Range/Units





  12:28 08:09 


 


Sodium  139  136 L  (137-145)  mmol/L


 


Potassium  3.9  4.5  (3.5-5.1)  mmol/L


 


Chloride  106  106  ()  mmol/L


 


Carbon Dioxide  21 L  23  (22-30)  mmol/L


 


BUN  18 H  16  (7-17)  mg/dL


 


Creatinine  0.73  0.82  (0.52-1.04)  mg/dL


 


Glucose  72 L  221 H  (74-99)  mg/dL


 


Calcium  10.8 H  10.2  (8.4-10.2)  mg/dL


 


AST  26   (14-36)  U/L


 


ALT  31   (9-52)  U/L


 


Alkaline Phosphatase  609 H   ()  U/L


 


Total Protein  5.2 L   (6.3-8.2)  g/dL


 


Albumin  2.5 L   (3.5-5.0)  g/dL








                               Current Medications











Generic Name Dose Route Start Last Admin





  Trade Name Freq  PRN Reason Stop Dose Admin


 


Acetaminophen  650 mg  04/09/19 00:50 





  Tylenol Tab  PO  





  Q4HR PRN  





  Fever and/ or Pain  


 


Hydrocodone Bitart/Acetaminophen  1 each  04/12/19 15:09 





  [place] 5-325  PO  





  Q6HR PRN  





  Pain  


 


Alprazolam  0.25 mg  04/09/19 09:35  04/12/19 23:24





  Xanax  PO   0.25 mg





  DAILY PRN   Administration





  Anxiety  


 


Atorvastatin Calcium  40 mg  04/09/19 21:00  04/12/19 21:15





  Lipitor  PO   40 mg





  HS [facility]   Administration


 


Citalopram Hydrobromide  20 mg  04/09/19 21:00  04/12/19 21:15





  Celexa  PO   20 mg





  HS [facility]   Administration


 


Furosemide  20 mg  04/10/19 09:00  04/12/19 10:01





  Lasix  PO   20 mg





  DAILY [facility]   Administration


 


Heparin Sodium (Porcine)  5,000 unit  04/12/19 21:00  04/12/19 21:14





  Heparin  SQ   5,000 unit





  Q12HR [facility]   Administration


 


Piperacillin Sod/Tazobactam  100 mls @ 25 mls/hr  04/09/19 03:00  04/13/19 02:00





  Sod 3.375 gm/ Sodium Chloride  IVPB   25 mls/hr





  Q8H [facility]   Administration


 


Sodium Chloride  1,000 mls @ 50 mls/hr  04/09/19 00:45  04/12/19 21:13





  Saline 0.9%  IV   50 mls/hr





  .Q20H [facility]   Administration


 


Vancomycin HCl 1,000 mg/  250 mls @ 125 mls/hr  04/12/19 09:00  04/13/19 08:06





  Sodium Chloride  IVPB   125 mls/hr





  Q24HR [facility]   Administration


 


Insulin Aspart  0 unit  04/09/19 07:30  04/13/19 08:05





  Novolog  SQ   6 unit





  [facility]   Administration





  Protocol  


 


Insulin Detemir  10 unit  04/12/19 21:00  04/12/19 20:31





  Levemir  SQ   Not Given





  HS [facility]  


 


Ketorolac Tromethamine  15 mg  04/12/19 10:52  04/12/19 23:28





  Toradol  IVP  04/16/19 10:52  15 mg





  Q6HR PRN   Administration





  Breakthrough Pain  


 


Levothyroxine Sodium  125 mcg  04/09/19 09:45  04/13/19 06:00





  Synthroid  PO   125 mcg





  MoTuWeThFrSa@0630 [facility]   Administration


 


Metformin HCl  1,000 mg  04/09/19 21:00  04/12/19 20:32





  Glucophage  PO   Not Given





  BID [facility]  


 


Miscellaneous Information  1 each  04/12/19 18:17 





  Magnesium Per Protocol  MISCELLANE  





  DAILY PRN  





  Per Protocol  





  Protocol  


 


Miscellaneous Information  0 each  04/14/19 08:00 





  Vancomycin Trough Due  MISCELLANE  04/14/19 08:01 





  AS DIRECTED ONE  


 


Morphine Sulfate  15 mg  04/12/19 21:00  04/12/19 21:14





  Ms Contin  PO   15 mg





  Q12HR [facility]   Administration


 


Multivitamins  1 each  04/10/19 12:00  04/12/19 12:57





  Theragran  PO   1 each





  1200 [facility]   Administration


 


Naloxone HCl  0.2 mg  04/08/19 21:13 





  Narcan  IV  





  Q2M PRN  





  Opioid Reversal  


 


Ondansetron HCl  4 mg  04/09/19 00:49  04/12/19 06:03





  Zofran  IVP   4 mg





  Q6HR PRN   Administration





  Nausea And Vomiting  


 


Ondansetron HCl  4 mg  04/09/19 09:35 





  Zofran  PO  





  Q6H PRN  





  Nausea  


 


Pantoprazole Sodium  40 mg  04/12/19 11:00  04/13/19 08:05





  Protonix  IVP   40 mg





  BID [facility]   Administration


 


Potassium Chloride  10 meq  04/13/19 09:00 





  K-Dur 10  PO  





  DAILY [facility]  








                                Intake and Output











 04/12/19 04/13/19 04/13/19





 22:59 06:59 14:59


 


Intake Total 1154 200 


 


Output Total 450  


 


Balance 704 200 


 


Intake:   


 


  Intake, IV Titration 550  





  Amount   


 


    Dextrose 5% in Water 1, 150  





    000 ml @ 75 mls/hr IV .   





    [id number] ONE Rx#:[id number]   


 


    Magnesium Sulfate-D5w Pmx 200  





    1 gm In Dextrose/Water 1   





    100ml.bag @ 100 mls/hr   





    IVPB Q1H [facility] Rx#:   





    [id number]   


 


    Sodium Chloride 0.9% 1, 200  





    000 ml @ 50 mls/hr IV .   





    Q20H [facility] Rx#:[id number]   


 


  Oral 604 200 


 


Output:   


 


  Urine 450  


 


Other:   


 


  Voiding Method  Bedside Commode 





  Diaper 


 


  # Voids 2 2 


 


  # Bowel Movements 2  


 


  Weight  66.5 kg 








                                        





                                 04/13/19 08:09 





                                 04/13/19 08:09

## 2019-04-14 VITALS — RESPIRATION RATE: 16 BRPM

## 2019-04-14 LAB
ANION GAP SERPL CALC-SCNC: 8 MMOL/L
BASOPHILS # BLD AUTO: 0 K/UL (ref 0–0.2)
BASOPHILS NFR BLD AUTO: 0 %
BUN SERPL-SCNC: 16 MG/DL (ref 7–17)
CALCIUM SPEC-MCNC: 10.3 MG/DL (ref 8.4–10.2)
CHLORIDE SERPL-SCNC: 106 MMOL/L (ref 98–107)
CO2 SERPL-SCNC: 24 MMOL/L (ref 22–30)
EOSINOPHIL # BLD AUTO: 0.2 K/UL (ref 0–0.7)
EOSINOPHIL NFR BLD AUTO: 1 %
ERYTHROCYTE [DISTWIDTH] IN BLOOD BY AUTOMATED COUNT: 3.39 M/UL (ref 3.8–5.4)
ERYTHROCYTE [DISTWIDTH] IN BLOOD: 17.6 % (ref 11.5–15.5)
GLUCOSE BLD-MCNC: 211 MG/DL (ref 75–99)
GLUCOSE BLD-MCNC: 215 MG/DL (ref 75–99)
GLUCOSE BLD-MCNC: 230 MG/DL (ref 75–99)
GLUCOSE BLD-MCNC: 249 MG/DL (ref 75–99)
GLUCOSE SERPL-MCNC: 212 MG/DL (ref 74–99)
HCT VFR BLD AUTO: 28.1 % (ref 34–46)
HGB BLD-MCNC: 7.6 GM/DL (ref 11.4–16)
LYMPHOCYTES # SPEC AUTO: 0.3 K/UL (ref 1–4.8)
LYMPHOCYTES NFR SPEC AUTO: 1 %
MCH RBC QN AUTO: 22.5 PG (ref 25–35)
MCHC RBC AUTO-ENTMCNC: 27.2 G/DL (ref 31–37)
MCV RBC AUTO: 82.9 FL (ref 80–100)
MONOCYTES # BLD AUTO: 1 K/UL (ref 0–1)
MONOCYTES NFR BLD AUTO: 3 %
NEUTROPHILS # BLD AUTO: 36 K/UL (ref 1.3–7.7)
NEUTROPHILS NFR BLD AUTO: 95 %
PLATELET # BLD AUTO: 559 K/UL (ref 150–450)
POTASSIUM SERPL-SCNC: 4.3 MMOL/L (ref 3.5–5.1)
SODIUM SERPL-SCNC: 138 MMOL/L (ref 137–145)
WBC # BLD AUTO: 38 K/UL (ref 3.8–10.6)

## 2019-04-14 RX ADMIN — FOLIC ACID SCH MG: 1 TABLET ORAL at 09:50

## 2019-04-14 RX ADMIN — CITALOPRAM HYDROBROMIDE SCH MG: 20 TABLET ORAL at 21:31

## 2019-04-14 RX ADMIN — KETOROLAC TROMETHAMINE PRN MG: 30 INJECTION, SOLUTION INTRAMUSCULAR at 00:16

## 2019-04-14 RX ADMIN — INSULIN ASPART SCH UNIT: 100 INJECTION, SOLUTION INTRAVENOUS; SUBCUTANEOUS at 09:46

## 2019-04-14 RX ADMIN — INSULIN ASPART SCH UNIT: 100 INJECTION, SOLUTION INTRAVENOUS; SUBCUTANEOUS at 13:42

## 2019-04-14 RX ADMIN — PIPERACILLIN AND TAZOBACTAM SCH MLS/HR: 3; .375 INJECTION, POWDER, FOR SOLUTION INTRAVENOUS at 19:43

## 2019-04-14 RX ADMIN — INSULIN ASPART SCH UNIT: 100 INJECTION, SOLUTION INTRAVENOUS; SUBCUTANEOUS at 18:07

## 2019-04-14 RX ADMIN — Medication SCH MG: at 09:50

## 2019-04-14 RX ADMIN — METOPROLOL TARTRATE SCH MG: 25 TABLET, FILM COATED ORAL at 09:47

## 2019-04-14 RX ADMIN — SODIUM CHLORIDE SCH MLS/HR: 9 INJECTION, SOLUTION INTRAVENOUS at 09:49

## 2019-04-14 RX ADMIN — INSULIN ASPART SCH UNIT: 100 INJECTION, SOLUTION INTRAVENOUS; SUBCUTANEOUS at 21:31

## 2019-04-14 RX ADMIN — MORPHINE SULFATE SCH MG: 15 TABLET, EXTENDED RELEASE ORAL at 09:44

## 2019-04-14 RX ADMIN — HYDROCODONE BITARTRATE AND ACETAMINOPHEN PRN EACH: 5; 325 TABLET ORAL at 01:33

## 2019-04-14 RX ADMIN — LISINOPRIL SCH MG: 2.5 TABLET ORAL at 09:47

## 2019-04-14 RX ADMIN — PIPERACILLIN AND TAZOBACTAM SCH MLS/HR: 3; .375 INJECTION, POWDER, FOR SOLUTION INTRAVENOUS at 03:36

## 2019-04-14 RX ADMIN — HEPARIN SODIUM SCH UNIT: 5000 INJECTION, SOLUTION INTRAVENOUS; SUBCUTANEOUS at 21:32

## 2019-04-14 RX ADMIN — POTASSIUM CHLORIDE SCH MEQ: 750 TABLET, EXTENDED RELEASE ORAL at 09:48

## 2019-04-14 RX ADMIN — PANTOPRAZOLE SODIUM SCH MG: 40 INJECTION, POWDER, FOR SOLUTION INTRAVENOUS at 09:46

## 2019-04-14 RX ADMIN — PANTOPRAZOLE SODIUM SCH MG: 40 TABLET, DELAYED RELEASE ORAL at 18:07

## 2019-04-14 RX ADMIN — METOCLOPRAMIDE SCH MG: 5 INJECTION, SOLUTION INTRAMUSCULAR; INTRAVENOUS at 00:18

## 2019-04-14 RX ADMIN — MORPHINE SULFATE SCH MG: 15 TABLET, EXTENDED RELEASE ORAL at 21:31

## 2019-04-14 RX ADMIN — PIPERACILLIN AND TAZOBACTAM SCH MLS/HR: 3; .375 INJECTION, POWDER, FOR SOLUTION INTRAVENOUS at 12:37

## 2019-04-14 RX ADMIN — ATORVASTATIN CALCIUM SCH: 40 TABLET, FILM COATED ORAL at 21:33

## 2019-04-14 RX ADMIN — METOCLOPRAMIDE SCH MG: 5 INJECTION, SOLUTION INTRAMUSCULAR; INTRAVENOUS at 09:43

## 2019-04-14 RX ADMIN — INSULIN DETEMIR SCH: 100 INJECTION, SOLUTION SUBCUTANEOUS at 21:28

## 2019-04-14 RX ADMIN — METOPROLOL TARTRATE SCH MG: 25 TABLET, FILM COATED ORAL at 21:31

## 2019-04-14 RX ADMIN — HEPARIN SODIUM SCH UNIT: 5000 INJECTION, SOLUTION INTRAVENOUS; SUBCUTANEOUS at 09:48

## 2019-04-14 RX ADMIN — FUROSEMIDE SCH MG: 20 TABLET ORAL at 09:47

## 2019-04-14 RX ADMIN — THERA TABS SCH EACH: TAB at 09:47

## 2019-04-14 RX ADMIN — METOCLOPRAMIDE SCH MG: 5 INJECTION, SOLUTION INTRAMUSCULAR; INTRAVENOUS at 18:07

## 2019-04-14 NOTE — PN
PROGRESS NOTE



DATE OF SERVICE:

04/14/2019



I am covering for Dr. Villegas.



This 63-year-old woman who was admitted with severe back pain also had some gait

dysfunction.  The patient also had possibly non-Hodgkin's lymphoma stage IV with

retroperitoneal mass.  The patient also had some confusion also.  No chest pain.  No

palpitations.  No fever.



EXAM:

Alert and oriented x3.  The pulse is 94, blood pressure 160/67, respirations 17, temp

97.4%, pulse ox 99% on 2 L.

HEENT is conjunctivae normal.

NECK: No jugular venous distention.

CARDIOVASCULAR: S1, S2 muffled.

RESPIRATORY SYSTEM: Breath sounds diminished at the bases. A few scattered rhonchi and

crackles.

ABDOMEN:  Soft. Nontender.  Nervous system:  Diffusely weak.



LABS:

WBC 38, hemoglobin 7.6, sodium 138, potassium 4.3, glucose is 212.



ASSESSMENT:

1. Severe back pain and significant distress as well as gait dysfunction.

2. Change in mental status acute metabolic encephalopathy multifactorial.

3. Non-Hodgkin's lymphoma stage IV.

4. History of recent retroperitoneal mass biopsy.

5. Fever of undetermined origin.

6. Leukocytosis.

7. Possible sepsis with pneumonia on presentation.

8. Increased WBC.

9. Anemia, normocytic.

10.Hypoglycemia.

11.Hypomagnesemia.

12.Gait dysfunction.

13.Severe pain.

14.History of congestive heart failure.

15.Diabetes type 2.

16.Hypertension.

17.Hyperlipidemia.

18.History of closed-head injury.

19.Hypothyroidism.

20.History of anxiety.

21.NO CODE, NO CPR, NO VENT.



RECOMMENDATIONS AND DISCUSSION:

This 63-year-old woman who presented with multiple complex medical issues, we will

monitor the patient closely.  Continue the current medications, management and

symptomatic treatment. Otherwise at this time I recommend continue with current

medications, continue with symptomatic treatment.  I would recommend repeat labs. PT/OT

evaluation, possible ECF rehab.  Dr. Villegas will follow tomorrow. Further

recommendations to follow.





MMODL / IJN: [id number] / Job#: [id number]

## 2019-04-14 NOTE — P.PN
Subjective





This is a pleasant 63-year-old  female past medical history significant

for B-cell non-Hodgkin's lymphoma stage IV, diabetes mellitus, hypertension, 

dyslipidemia, obstructive sleep apnea and hypothyroidism.  She denies history of

coronary artery disease and does not follow with a cardiologist for any reason. 

She is currently in the hospital with symptoms of altered mental status, 

generalized weakness and leukocytosis.  She underwent treatment with Hycela and 

bendamustine last cycle was 01/08/2019.  Prior to that in October 2018 she had 

an echocardiogram that reveals a preserved LV systolic function.  Repeat 

echocardiogram on this admission reveals severe hypo-kinesia globally with an 

ejection fraction of 20%.  She is seen and examined resting comfortably in bed 

in no acute distress laying essentially flat.  She states she came to the 

hospital because she has been feeling so weak and fatigued at home she cannot 

perform her activities of daily living.  She also describes feeling increasingly

fatigued with some shortness of breath over the previous 2 months.  She states 

at baseline she had no shortness of breath however she now wakes up frequently 

at night short of breath and can no longer.  Clinically the stairs without 

stopping for air.  She denies any symptoms of chest discomfort, dizziness or 

palpitations.  Oncology is following and is suspect progression of her non-

Hodgkin's lymphoma.  Initially on admission it was thought that she had 

pneumonia and was started on antibiotics.  She has been seen in consultation by 

pulmonary team as well and they believe that her fever is related to progression

of underlying malignancy.





4/15/2019


She is seen and examined resting comfortably in bed in no acute distress.  She 

is tolerating Lopressor and lisinopril.  Blood pressure 116/67 heart rate 94 

afebrile maintaining oxygen saturation on nasal cannula.  She denies any 

symptoms of shortness of breath, chest pain, dizziness or palpitations.  

Laboratory data reviewed, WBC 38, hemoglobin 7.6, platelets 559, sodium 138, 

potassium 4.3, creatinine 0.84.  Repeat chest x-ray reveals worsening bibasilar 

airspace disease with small effusions.





GENERAL: This is a 63-year-old  female in no apparent distress at the 

time of my examination.


HEENT: Head is atraumatic, normocephalic. Pupils are equal, round. Sclerae a

nicteric. Conjunctivae are clear. Mucous membranes of the mouth are moist. Neck 

is supple. There is no jugular venous distention. No carotid bruit is heard.


LUNGS: Clear to auscultation no wheezes, rales or rhonchi. No chest wall 

tenderness is noted on palpation or with deep breathing.  Diminished b

ilaterally.


HEART: Regular rate and rhythm without murmurs, rubs or gallops. S1 and S2 

heard.


EXTREMITIES: Bilateral lower extremity nonpitting edema of the feet, no 

pretibial edema noted. 





ASSESSMENT


New onset systolic heart failure


Non-hodgkins lymphoma


Leukocytosis


Febrile illness


Bilateral pleural effusions, chronic


Hypertension


Dyslipidemia


Diabetes mellitus


Anemia secondary to chemotherapy





PLAN


Continue current medical regimen.


We recommend she follow-up in the office for repeat echocardiogram in a couple 

of months.  If her cardiomyopathy does not improve we will further investigate 

the cause of cardiomyopathy at that time as an outpatient.


We will continue to follow as needed, please feel free to call with further 

questions or concerns.








Nurse Practitioner note has been reviewed, I agree with a documented findings 

and plan of care.  Patient was seen and examined.





Objective





- Vital Signs


Vital signs: 


                                   Vital Signs











Temp  97.4 F L  04/14/19 05:00


 


Pulse  94   04/14/19 05:00


 


Resp  17   04/14/19 05:00


 


BP  116/67   04/14/19 05:00


 


Pulse Ox  91 L  04/14/19 05:00








                                 Intake & Output











 04/13/19 04/14/19 04/14/19





 18:59 06:59 18:59


 


Intake Total 160 500 


 


Output Total 300  


 


Balance -140 500 


 


Weight  69.6 kg 


 


Intake:   


 


  IV  500 


 


    Piperacillin-Tazobactam 3  100 





    .375 gm In Sodium   





    Chloride 0.9% 100 ml @ 25   





    mls/hr IVPB Q8H [facility] Rx#:   





    [id number]   


 


    Sodium Chloride 0.9% 1,  400 





    000 ml @ 50 mls/hr IV .   





    Q20H [facility] Rx#:[id number]   


 


  Oral 160  


 


Output:   


 


  Urine 300  


 


Other:   


 


  Voiding Method Bedside Commode Bedside Commode 





 Diaper Diaper 


 


  # Voids 2 2 














- Labs


CBC & Chem 7: 


                                 04/14/19 07:46





                                 04/14/19 07:46


Labs: 


                  Abnormal Lab Results - Last 24 Hours (Table)











  04/11/19 04/13/19 04/13/19 Range/Units





  08:56 11:52 16:58 


 


WBC     (3.8-10.6)  k/uL


 


RBC     (3.80-5.40)  m/uL


 


Hgb     (11.4-16.0)  gm/dL


 


Hct     (34.0-46.0)  %


 


MCH     (25.0-35.0)  pg


 


MCHC     (31.0-37.0)  g/dL


 


RDW     (11.5-15.5)  %


 


Plt Count     (150-450)  k/uL


 


Neutrophils #     (1.3-7.7)  k/uL


 


Lymphocytes #     (1.0-4.8)  k/uL


 


Glucose     (74-99)  mg/dL


 


POC Glucose (mg/dL)   173 H  167 H  (75-99)  mg/dL


 


Calcium     (8.4-10.2)  mg/dL


 


Methylmalonic Acid  0.63 H    (<0.40)  umol/L














  04/13/19 04/14/19 04/14/19 Range/Units





  20:49 07:20 07:46 


 


WBC    38.0 H  (3.8-10.6)  k/uL


 


RBC    3.39 L  (3.80-5.40)  m/uL


 


Hgb    7.6 L  (11.4-16.0)  gm/dL


 


Hct    28.1 L  (34.0-46.0)  %


 


MCH    22.5 L  (25.0-35.0)  pg


 


MCHC    27.2 L  (31.0-37.0)  g/dL


 


RDW    17.6 H  (11.5-15.5)  %


 


Plt Count    559 H  (150-450)  k/uL


 


Neutrophils #    36.0 H  (1.3-7.7)  k/uL


 


Lymphocytes #    0.3 L  (1.0-4.8)  k/uL


 


Glucose     (74-99)  mg/dL


 


POC Glucose (mg/dL)  134 H  215 H   (75-99)  mg/dL


 


Calcium     (8.4-10.2)  mg/dL


 


Methylmalonic Acid     (<0.40)  umol/L














  04/14/19 04/14/19 Range/Units





  07:46 11:00 


 


WBC    (3.8-10.6)  k/uL


 


RBC    (3.80-5.40)  m/uL


 


Hgb    (11.4-16.0)  gm/dL


 


Hct    (34.0-46.0)  %


 


MCH    (25.0-35.0)  pg


 


MCHC    (31.0-37.0)  g/dL


 


RDW    (11.5-15.5)  %


 


Plt Count    (150-450)  k/uL


 


Neutrophils #    (1.3-7.7)  k/uL


 


Lymphocytes #    (1.0-4.8)  k/uL


 


Glucose  212 H   (74-99)  mg/dL


 


POC Glucose (mg/dL)   249 H  (75-99)  mg/dL


 


Calcium  10.3 H   (8.4-10.2)  mg/dL


 


Methylmalonic Acid    (<0.40)  umol/L








                      Microbiology - Last 24 Hours (Table)











 04/08/19 18:00 Blood Culture - Preliminary





 Blood    No Growth after 120 hours


 


 04/10/19 15:37 Blood Culture - Preliminary





 Blood    No Growth after 72 hours

## 2019-04-15 VITALS — DIASTOLIC BLOOD PRESSURE: 55 MMHG | SYSTOLIC BLOOD PRESSURE: 100 MMHG | TEMPERATURE: 97.7 F | HEART RATE: 68 BPM

## 2019-04-15 LAB
ANION GAP SERPL CALC-SCNC: 9 MMOL/L
BASOPHILS # BLD AUTO: 0 K/UL (ref 0–0.2)
BASOPHILS NFR BLD AUTO: 0 %
BUN SERPL-SCNC: 18 MG/DL (ref 7–17)
CALCIUM SPEC-MCNC: 10.6 MG/DL (ref 8.4–10.2)
CHLORIDE SERPL-SCNC: 108 MMOL/L (ref 98–107)
CO2 SERPL-SCNC: 22 MMOL/L (ref 22–30)
EOSINOPHIL # BLD AUTO: 0.3 K/UL (ref 0–0.7)
EOSINOPHIL NFR BLD AUTO: 1 %
ERYTHROCYTE [DISTWIDTH] IN BLOOD BY AUTOMATED COUNT: 3.33 M/UL (ref 3.8–5.4)
ERYTHROCYTE [DISTWIDTH] IN BLOOD: 17.7 % (ref 11.5–15.5)
GLUCOSE BLD-MCNC: 195 MG/DL (ref 75–99)
GLUCOSE BLD-MCNC: 204 MG/DL (ref 75–99)
GLUCOSE BLD-MCNC: 220 MG/DL (ref 75–99)
GLUCOSE SERPL-MCNC: 191 MG/DL (ref 74–99)
HCT VFR BLD AUTO: 27.4 % (ref 34–46)
HGB BLD-MCNC: 7.6 GM/DL (ref 11.4–16)
LYMPHOCYTES # SPEC AUTO: 0.4 K/UL (ref 1–4.8)
LYMPHOCYTES NFR SPEC AUTO: 1 %
MAGNESIUM SPEC-SCNC: 1.6 MG/DL (ref 1.6–2.3)
MCH RBC QN AUTO: 22.8 PG (ref 25–35)
MCHC RBC AUTO-ENTMCNC: 27.7 G/DL (ref 31–37)
MCV RBC AUTO: 82.3 FL (ref 80–100)
MONOCYTES # BLD AUTO: 1.2 K/UL (ref 0–1)
MONOCYTES NFR BLD AUTO: 3 %
NEUTROPHILS # BLD AUTO: 39.5 K/UL (ref 1.3–7.7)
NEUTROPHILS NFR BLD AUTO: 94 %
PLATELET # BLD AUTO: 654 K/UL (ref 150–450)
POTASSIUM SERPL-SCNC: 4.3 MMOL/L (ref 3.5–5.1)
SODIUM SERPL-SCNC: 139 MMOL/L (ref 137–145)
WBC # BLD AUTO: 41.9 K/UL (ref 3.8–10.6)

## 2019-04-15 RX ADMIN — METOPROLOL TARTRATE SCH MG: 25 TABLET, FILM COATED ORAL at 08:19

## 2019-04-15 RX ADMIN — HYDROCODONE BITARTRATE AND ACETAMINOPHEN PRN EACH: 5; 325 TABLET ORAL at 01:44

## 2019-04-15 RX ADMIN — HEPARIN SODIUM SCH UNIT: 5000 INJECTION, SOLUTION INTRAVENOUS; SUBCUTANEOUS at 08:17

## 2019-04-15 RX ADMIN — POTASSIUM CHLORIDE SCH MEQ: 750 TABLET, EXTENDED RELEASE ORAL at 08:21

## 2019-04-15 RX ADMIN — THERA TABS SCH EACH: TAB at 11:32

## 2019-04-15 RX ADMIN — KETOROLAC TROMETHAMINE PRN MG: 30 INJECTION, SOLUTION INTRAMUSCULAR at 01:01

## 2019-04-15 RX ADMIN — METOCLOPRAMIDE SCH MG: 5 INJECTION, SOLUTION INTRAMUSCULAR; INTRAVENOUS at 00:30

## 2019-04-15 RX ADMIN — PIPERACILLIN AND TAZOBACTAM SCH MLS/HR: 3; .375 INJECTION, POWDER, FOR SOLUTION INTRAVENOUS at 11:32

## 2019-04-15 RX ADMIN — Medication SCH MG: at 11:32

## 2019-04-15 RX ADMIN — INSULIN ASPART SCH UNIT: 100 INJECTION, SOLUTION INTRAVENOUS; SUBCUTANEOUS at 08:19

## 2019-04-15 RX ADMIN — LISINOPRIL SCH MG: 2.5 TABLET ORAL at 08:18

## 2019-04-15 RX ADMIN — MORPHINE SULFATE SCH MG: 15 TABLET, EXTENDED RELEASE ORAL at 08:18

## 2019-04-15 RX ADMIN — MAGNESIUM SULFATE IN DEXTROSE SCH MLS/HR: 10 INJECTION, SOLUTION INTRAVENOUS at 08:16

## 2019-04-15 RX ADMIN — SODIUM CHLORIDE SCH MLS/HR: 9 INJECTION, SOLUTION INTRAVENOUS at 08:26

## 2019-04-15 RX ADMIN — PIPERACILLIN AND TAZOBACTAM SCH MLS/HR: 3; .375 INJECTION, POWDER, FOR SOLUTION INTRAVENOUS at 03:57

## 2019-04-15 RX ADMIN — MAGNESIUM SULFATE IN DEXTROSE SCH MLS/HR: 10 INJECTION, SOLUTION INTRAVENOUS at 09:50

## 2019-04-15 RX ADMIN — LEVOTHYROXINE SODIUM SCH MCG: 0.12 TABLET ORAL at 06:22

## 2019-04-15 RX ADMIN — FUROSEMIDE SCH MG: 20 TABLET ORAL at 08:18

## 2019-04-15 RX ADMIN — FOLIC ACID SCH MG: 1 TABLET ORAL at 11:32

## 2019-04-15 RX ADMIN — METOCLOPRAMIDE SCH MG: 5 INJECTION, SOLUTION INTRAMUSCULAR; INTRAVENOUS at 08:17

## 2019-04-15 RX ADMIN — INSULIN ASPART SCH UNIT: 100 INJECTION, SOLUTION INTRAVENOUS; SUBCUTANEOUS at 11:33

## 2019-04-15 RX ADMIN — PANTOPRAZOLE SODIUM SCH MG: 40 TABLET, DELAYED RELEASE ORAL at 08:18

## 2019-04-15 NOTE — P.DS
Providers


Date of admission: 


04/08/19 21:14





Expected date of discharge: 04/15/19


Attending physician: 


Faustino Villegas





Consults: 





                                        





04/08/19 21:14


Consult Physician Routine 


   Consulting Provider: Michael Dumont


   Consult Reason/Comments: nhl


   Do you want consulting provider notified?: Yes





04/09/19 09:41


Consult Physician Urgent 


   Consulting Provider: Jewel Montez


   Consult Reason/Comments: pneumonia pleural effusion


   Do you want consulting provider notified?: Yes





04/12/19 15:30


Consult Physician Routine 


   Consulting Provider: [facility]


   Consult Reason/Comments: EF  20%


   Do you want consulting provider notified?: Yes











Primary care physician: 


Faustino Villegas





Hospital Course: 





63-year-old female was admitted through the emergency room with complaints of 

severe abdominal back pain.  Patient has history of non-Hodgkin's lymphoma stage

IV.  Patient was evaluated weighted by oncology pulmonology and cardiology.  

Family made a decision with the patient to go to hospice care.  Patient will be 

discharged home to hospice care








Assessment


Severe back pain with gait dysfunction


Mental status metabolic encephalopathy multifactorial


 retroperitoneal mass biopsy





Fever of undetermined origin most likely from Hodgkin's


Anemia normocytic


Hypoglycemia hypo-magnesium


Congestive heart failure new onset systolic ejection fraction 20%


Diabetes type 2


Hypertension


Hyperlipidemia


No code no CPR no ventilator





Plan


Hospice care at home








Patient Condition at Discharge: Stable





Plan - Discharge Summary


Discharge Rx Participant: No


New Discharge Prescriptions: 


New


   Insulin Detemir (Levemir) [Levemir] 10 unit SQ HS #2 syr


   Metoprolol Tartrate [Lopressor] 12.5 mg PO BID #60 tab


   Morphine Sulfate ER [Ms Contin] 15 mg PO Q12HR #6 tablet


   INSULIN ASPART (NovoLOG) [NovoLOG (formulary)] 0 unit SQ ACHS #1 vial


   Acetaminophen Tab [Tylenol] 650 mg PO Q4HR PRN  tab


     PRN Reason: Fever And/ Or Pain





Continue


   Gabapentin 100 mg PO QAM


   Furosemide 20 mg PO DAILY


   Gabapentin [Neurontin] 200 mg PO HS


   Levothyroxine Sodium [Synthroid] 125 mcg PO MOTUWETHFRSA


   Citalopram Hydrobromide [CeleXA] 20 mg PO HS


   metFORMIN HCL 1,000 mg PO BID #0


   Potassium Chloride ER [K-Dur 10] 10 meq PO BID


   Cetirizine HCl 10 mg PO HS


   Pantoprazole [Protonix] 40 mg PO DAILY


   Ondansetron [Zofran] 4 mg PO Q6H PRN


     PRN Reason: Nausea


   ALPRAZolam [Xanax] 0.33 mg PO DAILY PRN


     PRN Reason: Anxiety





Discontinued


   Multivitamins, Thera [Multivitamin (formulary)] 1 tab PO DAILY


   Atorvastatin Calcium [Lipitor] 40 mg PO HS


   HYDROcodone/APAP 7.5-325MG [Norco 7.5-325] 1 tab PO Q6HR PRN


     PRN Reason: Pain


Discharge Medication List





Furosemide 20 mg PO DAILY 07/24/14 [History]


Gabapentin 100 mg PO QAM 07/24/14 [History]


Gabapentin [Neurontin] 200 mg PO HS 10/09/15 [History]


Levothyroxine Sodium [Synthroid] 125 mcg PO MOTUWETHFRSA 10/09/15 [History]


Citalopram Hydrobromide [CeleXA] 20 mg PO HS 08/26/18 [History]


metFORMIN HCL 1,000 mg PO BID #0 10/12/18 [Rx]


Cetirizine HCl 10 mg PO HS 02/01/19 [History]


Pantoprazole [Protonix] 40 mg PO DAILY 02/01/19 [History]


Potassium Chloride ER [K-Dur 10] 10 meq PO BID 02/01/19 [History]


Ondansetron [Zofran] 4 mg PO Q6H PRN 03/27/19 [History]


ALPRAZolam [Xanax] 0.33 mg PO DAILY PRN 04/08/19 [History]


Acetaminophen Tab [Tylenol] 650 mg PO Q4HR PRN  tab 04/15/19 [Rx]


INSULIN ASPART (NovoLOG) [NovoLOG (formulary)] 0 unit SQ ACHS #1 vial 04/15/19 

[Rx]


Insulin Detemir (Levemir) [Levemir] 10 unit SQ HS #2 syr 04/15/19 [Rx]


Metoprolol Tartrate [Lopressor] 12.5 mg PO BID #60 tab 04/15/19 [Rx]


Morphine Sulfate ER [Ms Contin] 15 mg PO Q12HR #6 tablet 04/15/19 [Rx]








Follow up Appointment(s)/Referral(s): 


Faustino Villegas MD [Primary Care Provider] - 1-2 days


Michael Dumont MD [Family Provider] - 04/18/19 1:45 pm ([facility] on 

[address])


Paramjit Ren MD [STAFF PHYSICIAN] - 2 Weeks


Discharge Disposition: HOME WITH HOSPICE

## 2019-04-15 NOTE — P.PN
Subjective





Patient and family have requested consultation with hospice.  This was discussed

with oncology.  Patient states she is of family weaker.  Patient had 

consultation with cardiology regarding new-onset of of congestive heart failure 

systolic dysfunction ejection fraction 20%





Objective





- Vital Signs


Vital signs: 


                                   Vital Signs











Temp  97.9 F   04/15/19 05:00


 


Pulse  91   04/15/19 08:00


 


Resp  16   04/15/19 05:00


 


BP  99/61   04/15/19 05:00


 


Pulse Ox  92 L  04/15/19 05:00








                                 Intake & Output











 04/14/19 04/15/19 04/15/19





 18:59 06:59 18:59


 


Intake Total  300 


 


Balance  300 


 


Intake:   


 


  IV  200 


 


    Piperacillin-Tazobactam 3  200 





    .375 gm In Sodium   





    Chloride 0.9% 100 ml @ 25   





    mls/hr IVPB Q8H LUIS Rx#:   





    [id number]   


 


  Oral  100 


 


Other:   


 


  Voiding Method  Bedside Commode 





  Diaper 


 


  # Voids 2 1 














- Constitutional


General appearance: Present: mild distress





- EENT


Eyes: Present: PERRLA


Ears: bilateral: normal





- Neck


Neck: Present: normal ROM





- Respiratory


Respiratory: bilateral: diminished





- Cardiovascular


Rhythm: regular





- Integumentary


Integumentary: Present: normal





- Neurologic


Neurologic: Present: CNII-XII intact





- Musculoskeletal


Musculoskeletal: Present: generalized weakness





- Psychiatric


Psychiatric Comment(s): 





Short-term memory problems


Psychiatric: Present: A&O x's 3, appropriate affect, intact judgment & insight





- Labs


CBC & Chem 7: 


                                 04/15/19 06:09





                                 04/15/19 06:09


Labs: 


                  Abnormal Lab Results - Last 24 Hours (Table)











  04/14/19 04/14/19 04/15/19 Range/Units





  17:11 19:53 05:25 


 


WBC     (3.8-10.6)  k/uL


 


RBC     (3.80-5.40)  m/uL


 


Hgb     (11.4-16.0)  gm/dL


 


Hct     (34.0-46.0)  %


 


MCH     (25.0-35.0)  pg


 


MCHC     (31.0-37.0)  g/dL


 


RDW     (11.5-15.5)  %


 


Plt Count     (150-450)  k/uL


 


Neutrophils #     (1.3-7.7)  k/uL


 


Lymphocytes #     (1.0-4.8)  k/uL


 


Monocytes #     (0-1.0)  k/uL


 


Chloride     ()  mmol/L


 


BUN     (7-17)  mg/dL


 


Glucose     (74-99)  mg/dL


 


POC Glucose (mg/dL)  230 H  211 H  195 H  (75-99)  mg/dL


 


Calcium     (8.4-10.2)  mg/dL














  04/15/19 04/15/19 04/15/19 Range/Units





  06:09 06:09 06:54 


 


WBC  41.9 H    (3.8-10.6)  k/uL


 


RBC  3.33 L    (3.80-5.40)  m/uL


 


Hgb  7.6 L    (11.4-16.0)  gm/dL


 


Hct  27.4 L    (34.0-46.0)  %


 


MCH  22.8 L    (25.0-35.0)  pg


 


MCHC  27.7 L    (31.0-37.0)  g/dL


 


RDW  17.7 H    (11.5-15.5)  %


 


Plt Count  654 H    (150-450)  k/uL


 


Neutrophils #  39.5 H    (1.3-7.7)  k/uL


 


Lymphocytes #  0.4 L    (1.0-4.8)  k/uL


 


Monocytes #  1.2 H    (0-1.0)  k/uL


 


Chloride   108 H   ()  mmol/L


 


BUN   18 H   (7-17)  mg/dL


 


Glucose   191 H   (74-99)  mg/dL


 


POC Glucose (mg/dL)    204 H  (75-99)  mg/dL


 


Calcium   10.6 H   (8.4-10.2)  mg/dL








                      Microbiology - Last 24 Hours (Table)











 04/08/19 18:00 Blood Culture - Final





 Blood    No Growth after 144 hours


 


 04/10/19 15:37 Blood Culture - Preliminary





 Blood    No Growth after 96 hours














Assessment and Plan


Plan: 





Assessment


Back pain with significant distress as well as gait dysfunction


Change in mental status acute metabolic encephalopathy multifactorial


Non-Hodgkin's lymphoma stage IV


Retroperitoneal mass biopsy


Malnutrition calorie protein deficiency moderate


Fever of undetermined origin


Leukocytosis


Sepsis with pneumonia


Anemia normocytic


Hyperglycemia hypo-magnesium


Congestive heart failure systolic new-onset ejection fraction 20%


Diabetes type 2


Hypertension


Hyperlipidemia


Hypothyroidism





No code no CPR no ventilator








Plan


Family and patient discussed hospice care visiting nurses


Continue consultation with oncology pulmonology and cardiology

## 2019-04-15 NOTE — P.PN
Subjective


Progress Note Date: 04/15/19


Principal diagnosis: 





Fever 





Long discussion today with patient and daughter regtarding limited treatment 

options wit the new Cardiac co-morbidity of EF 20%. Patient and daughter have 

decided to go home with hospice care, which is apppropriate at this time. 





Objective





- Vital Signs


Vital signs: 


                                   Vital Signs











Temp  97.7 F   04/15/19 11:28


 


Pulse  68   04/15/19 11:28


 


Resp  16   04/15/19 11:28


 


BP  100/55   04/15/19 11:28


 


Pulse Ox  95   04/15/19 11:28








                                 Intake & Output











 04/14/19 04/15/19 04/15/19





 18:59 06:59 18:59


 


Intake Total  300 


 


Balance  300 


 


Intake:   


 


  IV  200 


 


    Piperacillin-Tazobactam 3  200 





    .375 gm In Sodium   





    Chloride 0.9% 100 ml @ 25   





    mls/hr IVPB Q8H [facility]IS Rx#:   





    [id number]   


 


  Oral  100 


 


Other:   


 


  Voiding Method  Bedside Commode 





  Diaper 


 


  # Voids 2 1 














- Exam





Gen: Alert and Oriented, NAD


Head: NC, NT


Neck: Supple


Mouth: Dry no lesions


Lungs: Diminished bibasilar, no increased effort noted


Heart RRR, S1


Abdomen S/ND/NT


Ext: BLE 1+ Edema


Neuro: No sensory or motor Deficits. 





- Labs


CBC & Chem 7: 


                                 04/15/19 06:09





                                 04/15/19 06:09


Labs: 


                  Abnormal Lab Results - Last 24 Hours (Table)











  04/14/19 04/14/19 04/15/19 Range/Units





  17:11 19:53 05:25 


 


WBC     (3.8-10.6)  k/uL


 


RBC     (3.80-5.40)  m/uL


 


Hgb     (11.4-16.0)  gm/dL


 


Hct     (34.0-46.0)  %


 


MCH     (25.0-35.0)  pg


 


MCHC     (31.0-37.0)  g/dL


 


RDW     (11.5-15.5)  %


 


Plt Count     (150-450)  k/uL


 


Neutrophils #     (1.3-7.7)  k/uL


 


Lymphocytes #     (1.0-4.8)  k/uL


 


Monocytes #     (0-1.0)  k/uL


 


Chloride     ()  mmol/L


 


BUN     (7-17)  mg/dL


 


Glucose     (74-99)  mg/dL


 


POC Glucose (mg/dL)  230 H  211 H  195 H  (75-99)  mg/dL


 


Calcium     (8.4-10.2)  mg/dL














  04/15/19 04/15/19 04/15/19 Range/Units





  06:09 06:09 06:54 


 


WBC  41.9 H    (3.8-10.6)  k/uL


 


RBC  3.33 L    (3.80-5.40)  m/uL


 


Hgb  7.6 L    (11.4-16.0)  gm/dL


 


Hct  27.4 L    (34.0-46.0)  %


 


MCH  22.8 L    (25.0-35.0)  pg


 


MCHC  27.7 L    (31.0-37.0)  g/dL


 


RDW  17.7 H    (11.5-15.5)  %


 


Plt Count  654 H    (150-450)  k/uL


 


Neutrophils #  39.5 H    (1.3-7.7)  k/uL


 


Lymphocytes #  0.4 L    (1.0-4.8)  k/uL


 


Monocytes #  1.2 H    (0-1.0)  k/uL


 


Chloride   108 H   ()  mmol/L


 


BUN   18 H   (7-17)  mg/dL


 


Glucose   191 H   (74-99)  mg/dL


 


POC Glucose (mg/dL)    204 H  (75-99)  mg/dL


 


Calcium   10.6 H   (8.4-10.2)  mg/dL














  04/15/19 Range/Units





  11:27 


 


WBC   (3.8-10.6)  k/uL


 


RBC   (3.80-5.40)  m/uL


 


Hgb   (11.4-16.0)  gm/dL


 


Hct   (34.0-46.0)  %


 


MCH   (25.0-35.0)  pg


 


MCHC   (31.0-37.0)  g/dL


 


RDW   (11.5-15.5)  %


 


Plt Count   (150-450)  k/uL


 


Neutrophils #   (1.3-7.7)  k/uL


 


Lymphocytes #   (1.0-4.8)  k/uL


 


Monocytes #   (0-1.0)  k/uL


 


Chloride   ()  mmol/L


 


BUN   (7-17)  mg/dL


 


Glucose   (74-99)  mg/dL


 


POC Glucose (mg/dL)  220 H  (75-99)  mg/dL


 


Calcium   (8.4-10.2)  mg/dL








                      Microbiology - Last 24 Hours (Table)











 04/08/19 18:00 Blood Culture - Final





 Blood    No Growth after 144 hours


 


 04/10/19 15:37 Blood Culture - Preliminary





 Blood    No Growth after 96 hours














Assessment and Plan


Plan: 














Fevers:


 - Unknown Etiology, potential tumor fever - no active identified infction at t

his time, afebrile greater than 24 hours


 - Pan cultures ordered


 - Chest Xray Negative


 - Zosyn 





Leukocytosis: Likely reactive


 - Work-up for infectious process with fever, could be related to malignancy


 - Baseline 11-15


 - Primarily neutrophils


 - Worsening  Likely related to stress dose steroids received on admission


 - Await tomorrow WBC with diff


 - Blood Cultures negative at 48 hours 


 - Urine Culture Negative 





NHL:


 - Most recently status post treatment with Hycela and bendamustine, status Post

Cycle 2 1/8/19


 - Prior to above status post treatment with R-Ceop


 - Highly suspicious for progrssion: CT C/A/P reviewed and discussed with 

patient, definite progression cannot be ruled out. 


 - Status Post EGD for concern of penetrative lymphoma to Gastric/Esophageal 

wall - Path did not identify evidence of malignancy


 - Retroperitoneal Biopsy today 4/10/19





Anorexia and weight loss:


 - Megace recently ordered





Persisitent Nausea and Abdominal Pain:


 - GI Work-up and CT Scans Reviewed from previous admission





Normocytic Anemia: Secondary to chemo and lymphoma


 - CBC and CMP in am


 - Initial drop 12-8.8. No active bleeding noted, likely from dilutio


 - CBC today and transfuse if less than 7


Myopathy and Debility:


 - Rec PT/OT work with Patient 





Hypercalcemia:


 - Secondary to dehydration malignancy, monitor for tumor lysis





PLAN:


 - New Onset CHF, EF 20%, limited options for treatment at this time. Dr. Dumont 

had a long discussion with patient and daughter, plan is home with hospice care.







[facility]











Physcian Attest: I have completed the full history and physical and developed 

the complete impression and plan, agree with above, dictated as a scribe

## 2019-04-19 NOTE — CDI
Documentation Clarification Form



Date: 4/19

From: Federico Lee

Phone: call kofi baugh at [phone]

MRN: [id number]

Admit Date: 4/8/2019 9:14:00 PM

Patient Name: Christina Falcon

Visit Number: [id number]

Discharge Date:  4/15/2019 3:46:00 PM



ATTENTION: The Clinical Documentation Specialists (CDI) and [facility] Coding Staff 
appreciate your assistance in clarifying documentation. Please respond to the 
clarification below the line at the bottom and electronically sign. The CDI & 
[facility] Coding staff will review the response and follow-up if needed. Please note: 
Queries are made part of the Legal Health Record. If you have any questions, 
please contact the author of this message via ITS.

Dr. Faustino Villegas,

The patient presented with fever of undetermined origin, Sepsis Pneumonia with 
history of B-cell non hodgkin's lymphoma stage 4

Conflicting Information-Patient admitted with fever and related to Infectious 
process initially and started IV abx and at last progress note  and discharge 
summary stated fever of undetermined origin, no active infection was identified 
at this time, blood &urine all cultures are negative. stated fever could be 
related to Lymphoma.

WBC: 21.3        

Lactic acid: 1.8 

Blood cultures: Negative for 48 hours 

Vitals signs on admission: T-102.3, pulse-108 RR-16 BP-118/73 

Other Clinical Indicators:  O2- 92

Treatment: IV antibiotics.

ID Consult: None

Antibiotics:Zosyn and vancomycin

IV Bolus: yes



In your professional opinion, please clarify if these findings signify one of 
the following conditions, whether the condition is POA, and cause, if known:

Condition

   Sepsis ruled out

   Fever of undetermined etiology

   Sepsis ruled in

   Other, please specify  _____________

   Unable to determine



Present on Admission

   Yes

   No

___________________________________________________________________________

MTDD

## 2019-04-22 NOTE — P.PN
Progress Note - Text





Addendum.





Sepsis ruled out fever of undetermined origin malignancy of lymphoma

## 2022-12-21 NOTE — P.HPIM
History of Present Illness


H&P Date: 08/26/18


63-year-old female presents emergency Department via EMS with multiple 

complaints.  Patient states she's had generalized weakness over the last 3-4 

weeks which has worsened she states that she sleeps mostly today because she 

has no energy.  She states that she's been having increased difficulty 

ambulating states that she is very unsteady with her gait and requires the use 

of assistive device at this time.  Patient states that today she developed 

worsening symptoms including severe nausea and had vomiting just prior to EMS 

arriving.  Patient also complained that she had some mild chest discomfort and 

she did state that it's been intermittent.  Patient denies any known fever, 

chills.  She states that she just says generalized fatigue.  She states that 

she has no appetite that her blood sugars have been labile.  Patient denies any 

focal weakness.  She has no current headache.  Patient does state that she has 

been diagnosed with stage IV non-Hodgkin's lymphoma by Dr. ervin.  Patient states 

she has no current treatment.








Review of Systems


Constitutional: Reports fatigue, Reports lethargy, Reports weakness


Eyes: denies blurred vision


Ears, nose, mouth and throat: Denies headache


Cardiovascular: Reports chest pain, Denies irregular heart beat, Denies 

palpitations


Respiratory: Denies cough, Denies wheezing


Gastrointestinal: Denies abdominal pain, Denies nausea, Denies vomiting


Musculoskeletal: Reports gait dysfunction, Reports muscle weakness


Neurological: Denies change in speech, Denies double vision


Endocrine: Denies cold intolerance, Denies heat intolerance





Past Medical History


Past Medical History: Cancer, Heart Failure, Diabetes Mellitus, Hyperlipidemia, 

Hypertension, Sleep Apnea/CPAP/BIPAP, Thyroid Disorder


Additional Past Medical History / Comment(s): Closed head injury 1989, 

sometimes has walking difficullties, uses walker as needed for long walks, no 

sense of smell. Hx of  kidney stones. Recent diagnosis of B cell non-hodgkins 

lymphona stage 4 and liver biopsy with 8 lesions seen on liver. CPAP use. Had 

thyroidectomy. Patient states she has NOT had MI.


Last Myocardial Infarction Date:: 1989


History of Any Multi-Drug Resistant Organisms: None Reported


Past Surgical History: Cholecystectomy, Hysterectomy, Tonsillectomy


Additional Past Surgical History / Comment(s): Thyroidectomy.


Past Anesthesia/Blood Transfusion Reactions: Postoperative Nausea & Vomiting (

PONV)


Smoking Status: Never smoker





- Past Family History


  ** Mother


Family Medical History: Cancer


Additional Family Medical History / Comment(s): breast CA





  ** Father


Family Medical History: Cancer


Additional Family Medical History / Comment(s): colon CA





  ** Sister(s)


Family Medical History: Cancer


Additional Family Medical History / Comment(s): Skin cancer





Medications and Allergies


 Home Medications











 Medication  Instructions  Recorded  Confirmed  Type


 


Furosemide 20 mg PO BID 07/24/14 08/26/18 History


 


Gabapentin 100 mg PO QAM 07/24/14 08/26/18 History


 


Lisinopril [Zestril] 20 mg PO DAILY 07/24/14 08/26/18 History


 


Potassium Chloride [Klor-Con 10] 10 meq PO DAILY 07/24/14 08/26/18 History


 


Aspirin 162 mg PO DAILY 10/09/15 08/26/18 History


 


Gabapentin [Neurontin] 200 mg PO HS 10/09/15 08/26/18 History


 


Levothyroxine Sodium [Synthroid] 125 mcg PO DAILY 10/09/15 08/26/18 History


 


amLODIPine [Norvasc] 5 mg PO QAM 10/09/15 08/26/18 History


 


metFORMIN HCL 1,000 mg PO BID 10/09/15 08/26/18 History


 


Cetirizine HCl [Zyrtec] 10 mg PO HS 03/28/18 08/26/18 History


 


Cholecalciferol [Vitamin D3] 1,000 unit PO DAILY 03/28/18 08/26/18 History


 


Glimepiride [Amaryl] 2 mg PO DAILY 03/28/18 08/26/18 History


 


Multivitamins, Thera [Multivitamin 1 tab PO DAILY 03/28/18 08/26/18 History





(formulary)]    


 


ALPRAZolam [Xanax] 1 mg PO DAILY PRN 04/16/18 08/26/18 History


 


Atorvastatin Calcium [Lipitor] 40 mg PO HS 04/16/18 08/26/18 History


 


HYDROcodone/APAP 7.5-325MG [Norco 1 tab PO Q6HR PRN 04/16/18 08/26/18 History





7.5-325]    


 


Naproxen Sodium [Aleve] 220 mg PO Q48H PRN 06/28/18 08/26/18 History


 


B Complex-Vit C-Vit E-Zinc [Z-Bec] 1 tab PO DAILY 08/25/18 08/26/18 History


 


Citalopram Hydrobromide [CeleXA] 10 - 20 mg PO DAILY 08/26/18 08/26/18 History











 Allergies











Allergy/AdvReac Type Severity Reaction Status Date / Time


 


haloperidol [From Haldol] Allergy  Unknown Verified 08/26/18 11:04


 


haloperidol lactate Allergy  Unknown Verified 08/26/18 11:04





[From Haldol]     


 


Iodinated Contrast- Oral and Allergy  Rash/Hives Verified 08/26/18 11:04





IV Dye     





[Iodinated Contrast Media -     





IV Dye]     


 


ciprofloxacin [From Cipro] AdvReac  Heartburn Verified 08/26/18 11:04


 


steri-strips AdvReac Severe sore Uncoded 08/25/18 18:07














Physical Exam


Vitals: 


 Vital Signs











  Temp Pulse Pulse Pulse Pulse Pulse Resp


 


 08/26/18 12:00  98.0 F      


 


 08/26/18 10:53     80  84  75 


 


 08/26/18 08:00  98.5 F   72     16


 


 08/26/18 03:46        16


 


 08/26/18 03:20  98.3 F   73     16


 


 08/26/18 00:05  98.2 F   75     16


 


 08/26/18 00:00        16


 


 08/25/18 22:25        16


 


 08/25/18 21:45  98.2 F   80     16


 


 08/25/18 21:37  99.1 F  80      16


 


 08/25/18 20:28  97.8 F  81      16


 


 08/25/18 19:28   88      16


 


 08/25/18 18:02  97.7 F  70      18














  BP BP BP BP BP Pulse Ox


 


 08/26/18 12:00       95


 


 08/26/18 10:53    123/71  112/69  111/66 


 


 08/26/18 08:00   119/69     91 L


 


 08/26/18 03:46      


 


 08/26/18 03:20   99/65    


 


 08/26/18 00:05   100/54     92 L


 


 08/26/18 00:00      


 


 08/25/18 22:25      


 


 08/25/18 21:45   100/57     92 L


 


 08/25/18 21:37  101/59      94 L


 


 08/25/18 20:28  102/66      94 L


 


 08/25/18 19:28  117/57      96


 


 08/25/18 18:02  108/64      96








 Intake and Output











 08/26/18 08/26/18 08/26/18





 06:59 14:59 22:59


 


Intake Total 100.261  


 


Balance 100.261  


 


Intake:   


 


  Intake, IV Titration 100.261  





  Amount   


 


    Heparin Sod,Pork in 0.45% 100.261  





    NaCl 25,000 unit In 0.45   





    % NaCl 1 500ml.bag @ 12   





    UNITS/KG/HR 17.09 mls/hr   





    IV .Q24H [facility] Rx#:   





    [id number]   


 


Other:   


 


  Voiding Method  Toilet 


 


  # Voids 1  











- Constitutional


General appearance: Present: average body habitus, cooperative, no acute 

distress


- EENT


Eyes: Present: anicteric sclerae, EOMI, PERRLA, normal appearance


ENT: Present: hearing grossly normal, normal oropharynx


Ears: bilateral: normal


- Neck


Neck: Present: normal ROM.  Absent: lymphadenopathy, rigidity, thyromegaly


Carotids: negative: bruit present


Thyroid: bilateral: normal size, negative: enlarged, nodule


- Respiratory


Respiratory: bilateral: CTA, negative: rales, rhonchi, wheezing


- Cardiovascular


Rhythm: regular


Heart sounds: normal: S1, S2


Abnormal Heart Sounds: Absent: systolic murmur, diastolic murmur


- Gastrointestinal


General gastrointestinal: Present: normal bowel sounds, soft.  Absent: distended

, organomegaly, tenderness


- Genitourinary


Genitourinary Comment(s): deferred


- Integumentary


Integumentary: Present: normal turgor.  Absent: jaundiced, rash, ulcer


- Neurologic


Neurologic: Present: CNII-XII intact.  Absent: focal deficits


- Musculoskeletal


Musculoskeletal: Present: gait normal, strength equal bilaterally


- Psychiatric


Psychiatric: Present: A&O x's 3, appropriate affect, intact judgment & insight











Results


CBC & Chem 7: 


 08/25/18 18:10





 08/25/18 18:10


Labs: 


 Abnormal Lab Results - Last 24 Hours (Table)











  08/25/18 08/25/18 08/25/18 Range/Units





  18:09 18:10 18:10 


 


WBC    13.2 H  (3.8-10.6)  k/uL


 


Hgb    10.6 L  (11.4-16.0)  gm/dL


 


MCV    79.9 L  (80.0-100.0)  fL


 


MCH    24.4 L  (25.0-35.0)  pg


 


MCHC    30.5 L  (31.0-37.0)  g/dL


 


RDW    17.0 H  (11.5-15.5)  %


 


Plt Count    510 H  (150-450)  k/uL


 


Neutrophils # (Manual)    9.90 H  (1.3-7.7)  k/uL


 


Monocytes # (Manual)    1.45 H  (0-1.0)  k/uL


 


Retic Count     (0.5-2.0)  %


 


APTT     (22.0-30.0)  sec


 


BUN     (7-17)  mg/dL


 


Creatinine     (0.52-1.04)  mg/dL


 


Glucose     (74-99)  mg/dL


 


POC Glucose (mg/dL)  73 L    (75-99)  mg/dL


 


Calcium     (8.4-10.2)  mg/dL


 


Alkaline Phosphatase     ()  U/L


 


Total Creatine Kinase   <20 L   ()  U/L


 


HDL Cholesterol     (40-60)  mg/dL


 


Urine Appearance     (Clear)  


 


Urine Protein     (Negative)  


 


Ur Leukocyte Esterase     (Negative)  


 


Ur Squamous Epith Cells     (0-4)  /hpf


 


Urine Bacteria     (None)  /hpf


 


Urine Mucus     (None)  /hpf














  08/25/18 08/25/18 08/25/18 Range/Units





  18:10 18:10 18:10 


 


WBC     (3.8-10.6)  k/uL


 


Hgb     (11.4-16.0)  gm/dL


 


MCV     (80.0-100.0)  fL


 


MCH     (25.0-35.0)  pg


 


MCHC     (31.0-37.0)  g/dL


 


RDW     (11.5-15.5)  %


 


Plt Count     (150-450)  k/uL


 


Neutrophils # (Manual)     (1.3-7.7)  k/uL


 


Monocytes # (Manual)     (0-1.0)  k/uL


 


Retic Count     (0.5-2.0)  %


 


APTT   19.9 L   (22.0-30.0)  sec


 


BUN  27 H    (7-17)  mg/dL


 


Creatinine  1.10 H    (0.52-1.04)  mg/dL


 


Glucose  73 L    (74-99)  mg/dL


 


POC Glucose (mg/dL)     (75-99)  mg/dL


 


Calcium  12.0 H    (8.4-10.2)  mg/dL


 


Alkaline Phosphatase  254 H    ()  U/L


 


Total Creatine Kinase     ()  U/L


 


HDL Cholesterol    29 L  (40-60)  mg/dL


 


Urine Appearance     (Clear)  


 


Urine Protein     (Negative)  


 


Ur Leukocyte Esterase     (Negative)  


 


Ur Squamous Epith Cells     (0-4)  /hpf


 


Urine Bacteria     (None)  /hpf


 


Urine Mucus     (None)  /hpf














  08/25/18 08/25/18 08/26/18 Range/Units





  19:37 22:13 01:10 


 


WBC     (3.8-10.6)  k/uL


 


Hgb     (11.4-16.0)  gm/dL


 


MCV     (80.0-100.0)  fL


 


MCH     (25.0-35.0)  pg


 


MCHC     (31.0-37.0)  g/dL


 


RDW     (11.5-15.5)  %


 


Plt Count     (150-450)  k/uL


 


Neutrophils # (Manual)     (1.3-7.7)  k/uL


 


Monocytes # (Manual)     (0-1.0)  k/uL


 


Retic Count     (0.5-2.0)  %


 


APTT     (22.0-30.0)  sec


 


BUN     (7-17)  mg/dL


 


Creatinine     (0.52-1.04)  mg/dL


 


Glucose     (74-99)  mg/dL


 


POC Glucose (mg/dL)   122 H   (75-99)  mg/dL


 


Calcium     (8.4-10.2)  mg/dL


 


Alkaline Phosphatase     ()  U/L


 


Total Creatine Kinase    <20 L  ()  U/L


 


HDL Cholesterol     (40-60)  mg/dL


 


Urine Appearance  Cloudy H    (Clear)  


 


Urine Protein  Trace H    (Negative)  


 


Ur Leukocyte Esterase  Moderate H    (Negative)  


 


Ur Squamous Epith Cells  7 H    (0-4)  /hpf


 


Urine Bacteria  Rare H    (None)  /hpf


 


Urine Mucus  Moderate H    (None)  /hpf














  08/26/18 08/26/18 08/26/18 Range/Units





  01:10 07:03 07:03 


 


WBC     (3.8-10.6)  k/uL


 


Hgb     (11.4-16.0)  gm/dL


 


MCV     (80.0-100.0)  fL


 


MCH     (25.0-35.0)  pg


 


MCHC     (31.0-37.0)  g/dL


 


RDW     (11.5-15.5)  %


 


Plt Count     (150-450)  k/uL


 


Neutrophils # (Manual)     (1.3-7.7)  k/uL


 


Monocytes # (Manual)     (0-1.0)  k/uL


 


Retic Count     (0.5-2.0)  %


 


APTT  31.1 H   36.1 H  (22.0-30.0)  sec


 


BUN     (7-17)  mg/dL


 


Creatinine     (0.52-1.04)  mg/dL


 


Glucose     (74-99)  mg/dL


 


POC Glucose (mg/dL)     (75-99)  mg/dL


 


Calcium     (8.4-10.2)  mg/dL


 


Alkaline Phosphatase     ()  U/L


 


Total Creatine Kinase   <20 L   ()  U/L


 


HDL Cholesterol     (40-60)  mg/dL


 


Urine Appearance     (Clear)  


 


Urine Protein     (Negative)  


 


Ur Leukocyte Esterase     (Negative)  


 


Ur Squamous Epith Cells     (0-4)  /hpf


 


Urine Bacteria     (None)  /hpf


 


Urine Mucus     (None)  /hpf














  08/26/18 08/26/18 08/26/18 Range/Units





  11:57 14:02 15:06 


 


WBC     (3.8-10.6)  k/uL


 


Hgb     (11.4-16.0)  gm/dL


 


MCV     (80.0-100.0)  fL


 


MCH     (25.0-35.0)  pg


 


MCHC     (31.0-37.0)  g/dL


 


RDW     (11.5-15.5)  %


 


Plt Count     (150-450)  k/uL


 


Neutrophils # (Manual)     (1.3-7.7)  k/uL


 


Monocytes # (Manual)     (0-1.0)  k/uL


 


Retic Count    2.8 H  (0.5-2.0)  %


 


APTT     (22.0-30.0)  sec


 


BUN     (7-17)  mg/dL


 


Creatinine     (0.52-1.04)  mg/dL


 


Glucose     (74-99)  mg/dL


 


POC Glucose (mg/dL)  161 H  226 H   (75-99)  mg/dL


 


Calcium     (8.4-10.2)  mg/dL


 


Alkaline Phosphatase     ()  U/L


 


Total Creatine Kinase     ()  U/L


 


HDL Cholesterol     (40-60)  mg/dL


 


Urine Appearance     (Clear)  


 


Urine Protein     (Negative)  


 


Ur Leukocyte Esterase     (Negative)  


 


Ur Squamous Epith Cells     (0-4)  /hpf


 


Urine Bacteria     (None)  /hpf


 


Urine Mucus     (None)  /hpf














Thrombosis Risk Factor Assmnt





- Choose All That Apply


Each Risk Factor Represents 2 Points: Age 61-74 years


Thrombosis Risk Factor Assessment Total Risk Factor Score: 2


Thrombosis Risk Factor Assessment Level: Low Risk





Assessment and Plan


Plan: 


1.  Chest pain


- monitor cardiac enzymes and EKG


- cardiology is consulted; echocardiogram done in March 2018 shows left 

ventricular systolic function with ejection fraction of 50-55%, mild tricuspid 

regurg


- No repeat echocardiogram is recommended


- We will resume all current home medications





2.  Acute renal injury/dehydration


- Start patient on IV fluids half-normal saline run at 75 mL an hour; monitor 

renal function, electrolytes, strict VLADIMIR's


- Avoid nephrotoxins and hypotension





3.  Leukocytosis/ thrombocytosis; Possibly secondary to dehydration versus 

cancerous process


- Oncology consultation is done and recommendations are pending





4.  Anemia; We will monitor H&H closely and type crossmatch and transfuse if 

less than 8.0


- We will order stool occult blood and start patient on PPI





5.  Non-Hodgkin's lymphoma


- Oncology consult in place and recommendations are pending


 


6.  DVT prophylaxis





CODE STATUS; full code


Time with Patient: Greater than 30 respiratory services